# Patient Record
Sex: FEMALE | Race: WHITE | Employment: OTHER | URBAN - METROPOLITAN AREA
[De-identification: names, ages, dates, MRNs, and addresses within clinical notes are randomized per-mention and may not be internally consistent; named-entity substitution may affect disease eponyms.]

---

## 2017-01-01 ENCOUNTER — APPOINTMENT (OUTPATIENT)
Dept: CT IMAGING | Age: 82
End: 2017-01-01
Attending: EMERGENCY MEDICINE
Payer: MEDICARE

## 2017-01-01 ENCOUNTER — HOSPITAL ENCOUNTER (EMERGENCY)
Age: 82
Discharge: HOME | End: 2017-01-01
Attending: EMERGENCY MEDICINE
Payer: MEDICARE

## 2017-01-01 VITALS
BODY MASS INDEX: 21.75 KG/M2 | DIASTOLIC BLOOD PRESSURE: 69 MMHG | WEIGHT: 118.17 LBS | HEART RATE: 67 BPM | TEMPERATURE: 98.2 F | OXYGEN SATURATION: 94 % | HEIGHT: 62 IN | SYSTOLIC BLOOD PRESSURE: 149 MMHG | RESPIRATION RATE: 14 BRPM

## 2017-01-01 DIAGNOSIS — R10.2 SUPRAPUBIC PAIN: Primary | ICD-10-CM

## 2017-01-01 DIAGNOSIS — N76.0 ACUTE VAGINITIS: ICD-10-CM

## 2017-01-01 LAB
ANION GAP BLD CALC-SCNC: 9 MMOL/L (ref 5–15)
APPEARANCE UR: CLEAR
BACTERIA URNS QL MICRO: NEGATIVE /HPF
BASOPHILS # BLD AUTO: 0 K/UL
BASOPHILS # BLD: 1 %
BILIRUB UR QL: NEGATIVE
BUN SERPL-MCNC: 11 MG/DL (ref 6–20)
BUN/CREAT SERPL: 13 (ref 12–20)
CALCIUM SERPL-MCNC: 8.8 MG/DL (ref 8.5–10.1)
CHLORIDE SERPL-SCNC: 102 MMOL/L (ref 97–108)
CLUE CELLS VAG QL WET PREP: NORMAL
CO2 SERPL-SCNC: 27 MMOL/L (ref 21–32)
COLOR UR: ABNORMAL
CREAT SERPL-MCNC: 0.86 MG/DL (ref 0.55–1.02)
DIFFERENTIAL METHOD BLD: NORMAL
EOSINOPHIL # BLD: 0.2 K/UL
EOSINOPHIL NFR BLD: 4 %
EPITH CASTS URNS QL MICRO: ABNORMAL /LPF
ERYTHROCYTE [DISTWIDTH] IN BLOOD BY AUTOMATED COUNT: 13.2 % (ref 11.5–14.5)
GLUCOSE SERPL-MCNC: 139 MG/DL (ref 65–100)
GLUCOSE UR STRIP.AUTO-MCNC: NEGATIVE MG/DL
HCT VFR BLD AUTO: 41.2 % (ref 35–47)
HGB BLD-MCNC: 13.9 G/DL (ref 11.5–16)
HGB UR QL STRIP: NEGATIVE
HYALINE CASTS URNS QL MICRO: ABNORMAL /LPF (ref 0–5)
KETONES UR QL STRIP.AUTO: NEGATIVE MG/DL
KOH PREP SPEC: NORMAL
LEUKOCYTE ESTERASE UR QL STRIP.AUTO: ABNORMAL
LYMPHOCYTES # BLD AUTO: 11 %
LYMPHOCYTES # BLD: 0.5 K/UL
MCH RBC QN AUTO: 30.3 PG (ref 26–34)
MCHC RBC AUTO-ENTMCNC: 33.7 G/DL (ref 30–36.5)
MCV RBC AUTO: 90 FL (ref 80–99)
MONOCYTES # BLD: 0.4 K/UL
MONOCYTES NFR BLD AUTO: 9 %
NEUTS SEG # BLD: 3.2 K/UL
NEUTS SEG NFR BLD AUTO: 75 %
NITRITE UR QL STRIP.AUTO: NEGATIVE
PH UR STRIP: 7 [PH] (ref 5–8)
PLATELET # BLD AUTO: 227 K/UL (ref 150–400)
POTASSIUM SERPL-SCNC: 3.9 MMOL/L (ref 3.5–5.1)
PROT UR STRIP-MCNC: NEGATIVE MG/DL
RBC # BLD AUTO: 4.58 M/UL (ref 3.8–5.2)
RBC #/AREA URNS HPF: ABNORMAL /HPF (ref 0–5)
RBC MORPH BLD: NORMAL
SERVICE CMNT-IMP: NORMAL
SODIUM SERPL-SCNC: 138 MMOL/L (ref 136–145)
SP GR UR REFRACTOMETRY: 1 (ref 1–1.03)
T VAGINALIS VAG QL WET PREP: NORMAL
UA: UC IF INDICATED,UAUC: ABNORMAL
UROBILINOGEN UR QL STRIP.AUTO: 0.2 EU/DL (ref 0.2–1)
WBC # BLD AUTO: 4.3 K/UL (ref 3.6–11)
WBC URNS QL MICRO: ABNORMAL /HPF (ref 0–4)

## 2017-01-01 PROCEDURE — 74177 CT ABD & PELVIS W/CONTRAST: CPT

## 2017-01-01 PROCEDURE — 74011250636 HC RX REV CODE- 250/636: Performed by: EMERGENCY MEDICINE

## 2017-01-01 PROCEDURE — 80048 BASIC METABOLIC PNL TOTAL CA: CPT | Performed by: EMERGENCY MEDICINE

## 2017-01-01 PROCEDURE — 87210 SMEAR WET MOUNT SALINE/INK: CPT | Performed by: EMERGENCY MEDICINE

## 2017-01-01 PROCEDURE — 87086 URINE CULTURE/COLONY COUNT: CPT | Performed by: EMERGENCY MEDICINE

## 2017-01-01 PROCEDURE — 99284 EMERGENCY DEPT VISIT MOD MDM: CPT

## 2017-01-01 PROCEDURE — 85025 COMPLETE CBC W/AUTO DIFF WBC: CPT | Performed by: EMERGENCY MEDICINE

## 2017-01-01 PROCEDURE — 74011636320 HC RX REV CODE- 636/320: Performed by: EMERGENCY MEDICINE

## 2017-01-01 PROCEDURE — 74011250637 HC RX REV CODE- 250/637: Performed by: EMERGENCY MEDICINE

## 2017-01-01 PROCEDURE — 36415 COLL VENOUS BLD VENIPUNCTURE: CPT | Performed by: EMERGENCY MEDICINE

## 2017-01-01 PROCEDURE — 81001 URINALYSIS AUTO W/SCOPE: CPT | Performed by: EMERGENCY MEDICINE

## 2017-01-01 RX ORDER — SODIUM CHLORIDE 9 MG/ML
50 INJECTION, SOLUTION INTRAVENOUS
Status: COMPLETED | OUTPATIENT
Start: 2017-01-01 | End: 2017-01-01

## 2017-01-01 RX ORDER — SODIUM CHLORIDE 0.9 % (FLUSH) 0.9 %
10 SYRINGE (ML) INJECTION
Status: COMPLETED | OUTPATIENT
Start: 2017-01-01 | End: 2017-01-01

## 2017-01-01 RX ORDER — FLUCONAZOLE 100 MG/1
150 TABLET ORAL
Status: COMPLETED | OUTPATIENT
Start: 2017-01-01 | End: 2017-01-01

## 2017-01-01 RX ADMIN — IOPAMIDOL 100 ML: 755 INJECTION, SOLUTION INTRAVENOUS at 13:57

## 2017-01-01 RX ADMIN — Medication 10 ML: at 13:57

## 2017-01-01 RX ADMIN — SODIUM CHLORIDE 50 ML/HR: 900 INJECTION, SOLUTION INTRAVENOUS at 13:57

## 2017-01-01 RX ADMIN — FLUCONAZOLE 150 MG: 100 TABLET ORAL at 15:37

## 2017-01-01 NOTE — ED NOTES
Assumed care of patient. Patient placed in position of comfort. Call bell in reach. Skin warm and dry. Respirations even and unlabored. In no apparent distress at this time. Pt presents ambulatory into the ED with c/o suprapubic abd pain, dysuria, vaginal itching and mucous in her stool x 10 days. Seen and treated in the ED on 12/21, for a UTI but symptoms have become worse. Pmh diverticulitis.

## 2017-01-01 NOTE — ED NOTES
ED physician at bedside to provide discharge paperwork. Vital signs stable. Pt in no apparent distress at this time. Mental status at baseline. To waiting room via wheelchair. Accompanied by her granddaughter.

## 2017-01-01 NOTE — ED PROVIDER NOTES
HPI Comments: Sully Reese is a 80 y.o. female, pmhx HTN / A-fib, who presents ambulatory to the ED c/o progressively worsening dysuria s/p being dx'd with a UTI on 12/21/2016. Pt reports associated suprapubic pressure, vaginal discharge, and vaginal itching. She notes compliance with her 7 day course of Keflex following her last discharge for similar sx's. Pt reports additional diarrhea, but states it has since improved without intervention. Pt specifically denies any recent malodorous urine, nausea, vomiting, diarrhea, hematuria, hematochezia, fever, or chills. PCP: Cuco Gomez MD    PMHx: Significant for A-fib, HTN, GERD, arthritis, asthma, diverticulitis  PSHx: Significant for appendectomy, cholecystectomy, hernia repair, hemorrhoidectomy, tonsillectomy  Social Hx: -tobacco (former 0047), -EtOH, -Illicit Drugs    There are no other complaints, changes, or physical findings at this time. The history is provided by the patient.         Past Medical History:   Diagnosis Date    Arrhythmia      afib    Arthritis     Asthma     Atrial fibrillation (HCC)     Cancer (HCC)      skin    GERD (gastroesophageal reflux disease)     High cholesterol     Hypertension     Other ill-defined conditions(799.89)      collapsed lung prior to hernia repair surgery    Unspecified adverse effect of anesthesia      pt states she went into cardiac arrest prior to hernia repair after the insertion of gas in stomach       Past Surgical History:   Procedure Laterality Date    Hx appendectomy      Hx cholecystectomy      Hx hernia repair      Hx gi       hemorrhoidectomy    Hx tonsillectomy      Hx heent       sinus surgery         Family History:   Problem Relation Age of Onset    Heart Disease Mother     Dementia Mother     Heart Disease Father     Neuropathy Child     Depression Child     Other Child      diverticulitis       Social History     Social History    Marital status:      Spouse name: N/A    Number of children: N/A    Years of education: N/A     Occupational History    Not on file. Social History Main Topics    Smoking status: Former Smoker     Packs/day: 0.75     Years: 5.00     Quit date: 10/11/1967    Smokeless tobacco: Never Used    Alcohol use No    Drug use: No    Sexual activity: Not on file     Other Topics Concern    Not on file     Social History Narrative         ALLERGIES: Bactrim [sulfamethoprim ds]; Ciprofloxacin (bulk); Prednisone; and Statins-hmg-coa reductase inhibitors    Review of Systems   Constitutional: Negative for chills, fatigue and fever. HENT: Negative. Eyes: Negative. Respiratory: Negative for shortness of breath and wheezing. Cardiovascular: Negative for chest pain and leg swelling. Gastrointestinal: Positive for abdominal pain (suprapubic). Negative for blood in stool, constipation, diarrhea, nausea and vomiting. Endocrine: Negative. Genitourinary: Positive for dysuria and vaginal discharge. Negative for difficulty urinating, frequency and hematuria.        +vaginal itching  -malodorous urine   Musculoskeletal: Negative. Skin: Negative for rash. Allergic/Immunologic: Negative. Neurological: Negative for weakness and numbness. Hematological: Negative. Psychiatric/Behavioral: Negative. Vitals:    01/01/17 0937 01/01/17 1100 01/01/17 1200 01/01/17 1300   BP: 158/87 142/71 158/83 140/49   Pulse: 81 92 88 70   Resp: 20 14 17 17   Temp: 98.2 °F (36.8 °C)      SpO2: 97% 96% 96% 96%   Weight: 53.6 kg (118 lb 2.7 oz)      Height: 5' 2\" (1.575 m)               Physical Exam   Constitutional: She is oriented to person, place, and time. She appears well-developed and well-nourished. HENT:   Head: Normocephalic and atraumatic. Mouth/Throat: Mucous membranes are normal.   Eyes: EOM are normal. Pupils are equal, round, and reactive to light. Neck: Normal range of motion. No JVD present. No tracheal deviation present. Cardiovascular: Normal rate, regular rhythm, normal heart sounds and intact distal pulses. Exam reveals no gallop and no friction rub. No murmur heard. Pulmonary/Chest: Effort normal and breath sounds normal. No stridor. She has no wheezes. She has no rales. Abdominal: Soft. Bowel sounds are normal. She exhibits no distension and no mass. There is no tenderness. There is no guarding. Genitourinary:   Genitourinary Comments: Normal external genitalia. Normal cervix. Mild vaginal discharge; thick and white. Low suspicion for PID. Musculoskeletal: Normal range of motion. She exhibits no edema or tenderness. Neurological: She is alert and oriented to person, place, and time. Skin: Skin is warm and dry. No rash noted. Psychiatric: She has a normal mood and affect. Her behavior is normal. Judgment and thought content normal.   Nursing note and vitals reviewed. Written by JAMILAH Pleitez, as dictated by Kristyn Whitfield DO    MDM  Number of Diagnoses or Management Options  Acute vaginitis:   Suprapubic pain:   Diagnosis management comments: DDx: uti, vaginitis, urethritis, yeast infection. Per chart review, patient was treated with Keflex. Urine cultures showed mixed urogenital jimi. Will recheck UA, labs. Low for diverticulitis given lack of abdominal pain or fever. Amount and/or Complexity of Data Reviewed  Clinical lab tests: ordered and reviewed  Tests in the radiology section of CPT®: ordered and reviewed  Review and summarize past medical records: yes  Independent visualization of images, tracings, or specimens: yes    Patient Progress  Patient progress: stable        Procedures     PROGRESS NOTE:  10:29 AM  Per chart review; pt had mixed urogenital jimi only in urine cultures taken on 12/21/2016.    Written by JAMILAH Pleitez, as dictated by Kristyn Whitfield DO      Procedure Note - Pelvic Exam:    12:27 PM  Performed by: Kristyn Whitfield DO  Chaperoned by: Lonnie Carrillo RN  Pelvic exam was performed using bimanual and speculum. Further findings noted in physical exam.   The procedure took 1-15 minutes, and pt tolerated well. Written by Arjun Chang ED Scribe, as dictated by Jovon Hanna DO    PROGRESS NOTE:  3:00 PM  Pt reevaluated. Updated on all available lab findings at this time. Still awaiting CT; spoke with CT technician who states the radiologist hasn't read the CT yet. Will call radiologist to confirm. Written by Arjun Chang ED Scribe, as dictated by Jovon Hanna DO    PROGRESS NOTE:  3:19 PM  Pt reevaluated. Pt states she is feeling better. Updated on all final lab and imaging findings. Pt agrees to follow up as instructed. Vital signs stable for discharge. Written by Arjun Chang ED Scribe, as dictated by Jovon Hanna DO    LABORATORY TESTS:  Recent Results (from the past 12 hour(s))   METABOLIC PANEL, BASIC    Collection Time: 01/01/17 10:33 AM   Result Value Ref Range    Sodium 138 136 - 145 mmol/L    Potassium 3.9 3.5 - 5.1 mmol/L    Chloride 102 97 - 108 mmol/L    CO2 27 21 - 32 mmol/L    Anion gap 9 5 - 15 mmol/L    Glucose 139 (H) 65 - 100 mg/dL    BUN 11 6 - 20 MG/DL    Creatinine 0.86 0.55 - 1.02 MG/DL    BUN/Creatinine ratio 13 12 - 20      GFR est AA >60 >60 ml/min/1.73m2    GFR est non-AA >60 >60 ml/min/1.73m2    Calcium 8.8 8.5 - 10.1 MG/DL   CBC WITH AUTOMATED DIFF    Collection Time: 01/01/17 10:33 AM   Result Value Ref Range    WBC 4.3 3.6 - 11.0 K/uL    RBC 4.58 3.80 - 5.20 M/uL    HGB 13.9 11.5 - 16.0 g/dL    HCT 41.2 35.0 - 47.0 %    MCV 90.0 80.0 - 99.0 FL    MCH 30.3 26.0 - 34.0 PG    MCHC 33.7 30.0 - 36.5 g/dL    RDW 13.2 11.5 - 14.5 %    PLATELET 633 132 - 450 K/uL    NEUTROPHILS 75 %    LYMPHOCYTES 11 %    MONOCYTES 9 %    EOSINOPHILS 4 %    BASOPHILS 1 %    ABS. NEUTROPHILS 3.2 K/UL    ABS. LYMPHOCYTES 0.5 K/UL    ABS. MONOCYTES 0.4 K/UL    ABS.  EOSINOPHILS 0.2 K/UL ABS. BASOPHILS 0.0 K/UL    RBC COMMENTS NORMOCYTIC, NORMOCHROMIC      DF MANUAL     URINALYSIS W/ REFLEX CULTURE    Collection Time: 01/01/17 12:20 PM   Result Value Ref Range    Color YELLOW/STRAW      Appearance CLEAR CLEAR      Specific gravity 1.005 1.003 - 1.030      pH (UA) 7.0 5.0 - 8.0      Protein NEGATIVE  NEG mg/dL    Glucose NEGATIVE  NEG mg/dL    Ketone NEGATIVE  NEG mg/dL    Bilirubin NEGATIVE  NEG      Blood NEGATIVE  NEG      Urobilinogen 0.2 0.2 - 1.0 EU/dL    Nitrites NEGATIVE  NEG      Leukocyte Esterase MODERATE (A) NEG      WBC 10-20 0 - 4 /hpf    RBC 0-5 0 - 5 /hpf    Epithelial cells MODERATE (A) FEW /lpf    Bacteria NEGATIVE  NEG /hpf    UA:UC IF INDICATED URINE CULTURE ORDERED (A) CNI      Hyaline Cast 2-5 0 - 5 /lpf   WET PREP    Collection Time: 01/01/17 12:59 PM   Result Value Ref Range    Clue cells CLUE CELLS ABSENT      Wet prep NO TRICHOMONAS SEEN     KOH, OTHER SOURCES    Collection Time: 01/01/17 12:59 PM   Result Value Ref Range    Special Requests: NO SPECIAL REQUESTS      KOH NO YEAST SEEN         IMAGING RESULTS:  CT ABD PELV W CONT   Final Result     EXAM: CT ABDOMEN PELVIS WITH CONTRAST     INDICATION: Low abdominal pain     COMPARISON: 8/18/2026.     CONTRAST: 100 mL of Isovue-370.     TECHNIQUE:   Multislice helical CT was performed from the diaphragm to the symphysis pubis  during uneventful rapid bolus intravenous contrast administration. Oral contrast  administered. Contiguous 5 mm axial images were reconstructed and lung and soft  tissue windows were generated. Coronal and sagittal reformations were generated. CT dose reduction was achieved through use of a standardized protocol tailored  for this examination and automatic exposure control for dose modulation.      FINDINGS:  CT ABDOMEN/PELVIS:   Minimal opacity suggesting atelectasis at the left posterior lateral lung base  is seen  A cyst at the dome of the right lobe of the liver is again noted.   The spleen is upper normal size measuring 13.7 cm. There is slight prominence of the adrenal glands. There are no focal abnormalities detected within the liver, spleen, pancreas,  and adrenal glands. The kidneys show symmetric function and no hydronephrosis. The abdomen aorta is slightly ectatic and shows diffuse atherosclerotic mural  calcification. The gallbladder is surgically absent. The extrahepatic biliary duct measures 1.0 cm which is top normal to slightly  dilated status post cholecystectomy and there is trace dilatation of the  intrahepatic.      A diverticulum of the second portion of the duodenum is seen. The small bowel is normal in caliber. The colon shows quite numerous diverticula. There is no ascites or inflammatory infiltration.      The urinary bladder shows a satisfactory appearance  The uterus is normal in size. A prominent left ovarian vein and a few pelvic collateral veins are noted. Degenerative change of the spine with discogenic disease at L3-S1 is noted     IMPRESSION  IMPRESSION:   Extensive diverticulosis.     No ascites or inflammatory infiltration         MEDICATIONS GIVEN:  Medications   fluconazole (DIFLUCAN) tablet 150 mg (not administered)   0.9% sodium chloride infusion (50 mL/hr IntraVENous New Bag 1/1/17 1357)   iopamidol (ISOVUE-370) 76 % injection 100 mL (100 mL IntraVENous Given 1/1/17 1357)   sodium chloride (NS) flush 10 mL (10 mL IntraVENous Given 1/1/17 1357)       IMPRESSION:  1. Suprapubic pain    2. Acute vaginitis        PLAN:  1. Current Discharge Medication List      CONTINUE these medications which have NOT CHANGED    Details   CYANOCOBALAMIN, VITAMIN B-12, (VITAMIN B-12 PO) Take  by mouth.      magnesium hydroxide (YOUNG MILK OF MAGNESIA) 400 mg/5 mL suspension Take 30 mL by mouth daily as needed for Constipation. polyethylene glycol (MIRALAX) 17 gram packet Take 17 g by mouth daily.       aspirin delayed-release 81 mg tablet Take 1 Tab by mouth daily.  Qty: 30 Tab, Refills: 11      fexofenadine (ALLEGRA) 60 mg tablet Take 30 mg by mouth daily. Cholecalciferol, Vitamin D3, (VITAMIN D3) 1,000 unit cap Take  by mouth.      b complex vitamins (B COMPLEX 1) tablet Take 1 Tab by mouth daily. triamterene-hydrochlorothiazide (DYAZIDE) 37.5-25 mg per capsule Take 1 Cap by mouth daily. disopyramide (NORPACE) 100 mg capsule Take  by mouth two (2) times a day. metoprolol (LOPRESSOR) 25 mg tablet Take  by mouth two (2) times a day. esomeprazole (NEXIUM) 40 mg capsule Take  by mouth daily. 2.   Follow-up Information     Follow up With Details Comments [address] [place], [place] Schedule an appointment as soon as possible for a visit  [address]  [phone]      Your OBGYN Schedule an appointment as soon as possible for a visit      [facility]  As needed, If symptoms worsen [address] [phone]        Return to ED if worse     DISCHARGE NOTE:  3:27 PM  The patient's results have been reviewed with family and/or caregiver. They verbally convey their understanding and agreement of the patient's signs, symptoms, diagnosis, treatment, and prognosis. They additionally agree to follow up as recommended in the discharge instructions or to return to the Emergency Room should the patient's condition change prior to their follow-up appointment. The family and/or caregiver verbally agrees with the care-plan and all of their questions have been answered. The discharge instructions have also been provided to the them along with educational information regarding the patient's diagnosis and a list of reasons why the patient would want to return to the ER prior to their follow-up appointment should their condition change. Written by JAMILAH Thurston, as dictated by Aric Leigh DO.          This note is prepared by Reggie Riggins, acting as Scribe for [address], DO : The scribe's documentation has been prepared under my direction and personally reviewed by me in its entirety. I confirm that the note above accurately reflects all work, treatment, procedures, and medical decision making performed by me. This note will not be viewable in [address]

## 2017-01-01 NOTE — DISCHARGE INSTRUCTIONS
Pelvic Pain: Care Instructions  Your Care Instructions    Pelvic pain, or pain in the lower belly, can have many causes. Often pelvic pain is not serious and gets better in a few days. If your pain continues or gets worse, you may need tests and treatment. Tell your doctor about any new symptoms. These may be signs of a serious problem. Follow-up care is a key part of your treatment and safety. Be sure to make and go to all appointments, and call your doctor if you are having problems. It's also a good idea to know your test results and keep a list of the medicines you take. How can you care for yourself at home? · Rest until you feel better. Lie down, and raise your legs by placing a pillow under your knees. · Drink plenty of fluids. You may find that small, frequent sips are easier on your stomach than if you drink a lot at once. Avoid drinks with carbonation or caffeine, such as soda pop, tea, or coffee. · Try eating several small meals instead of 2 or 3 large ones. Eat mild foods, such as rice, dry toast or crackers, bananas, and applesauce. Avoid fatty and spicy foods, other fruits, and alcohol until 48 hours after your symptoms have gone away. · Take an over-the-counter pain medicine, such as acetaminophen (Tylenol), ibuprofen (Advil, Motrin), or naproxen (Aleve). Read and follow all instructions on the label. · Do not take two or more pain medicines at the same time unless the doctor told you to. Many pain medicines have acetaminophen, which is Tylenol. Too much acetaminophen (Tylenol) can be harmful. · You can put a heating pad, a warm cloth, or moist heat on your belly to relieve pain. When should you call for help? Call 911 anytime you think you may need emergency care. For example, call if:  · You passed out (lost consciousness). Call your doctor now or seek immediate medical care if:  · Your pain is getting worse. · Your pain becomes focused in one area of your belly.   · You have severe vaginal bleeding. Severe means that you are soaking through your usual pads or tampons every hour for 2 or more hours and passing clots of blood. · You have new symptoms such as fever, urinary problems or unexpected vaginal bleeding. · You are dizzy or lightheaded, or you feel like you may faint. Watch closely for changes in your health, and be sure to contact your doctor if:  · You do not get better as expected. Where can you learn more? Go to http://pinky-shamar.info/. Enter [phone] in the search box to learn more about \"Pelvic Pain: Care Instructions. \"  Current as of: February 25, 2016  Content Version: 11.1  © 3311-4173 [employer]. Care instructions adapted under license by [employer] (which disclaims liability or warranty for this information). If you have questions about a medical condition or this instruction, always ask your healthcare professional. [address] any warranty or liability for your use of this information.

## 2017-01-03 LAB
BACTERIA SPEC CULT: NORMAL
CC UR VC: NORMAL
SERVICE CMNT-IMP: NORMAL

## 2017-05-02 ENCOUNTER — OFFICE VISIT (OUTPATIENT)
Dept: NEUROLOGY | Age: 82
End: 2017-05-02

## 2017-05-02 VITALS
BODY MASS INDEX: 22.08 KG/M2 | RESPIRATION RATE: 16 BRPM | DIASTOLIC BLOOD PRESSURE: 68 MMHG | WEIGHT: 120 LBS | HEART RATE: 67 BPM | HEIGHT: 62 IN | SYSTOLIC BLOOD PRESSURE: 122 MMHG | OXYGEN SATURATION: 98 %

## 2017-05-02 DIAGNOSIS — G60.8 IDIOPATHIC SMALL AND LARGE FIBER SENSORY NEUROPATHY: ICD-10-CM

## 2017-05-02 DIAGNOSIS — E11.42 DIABETIC PERIPHERAL NEUROPATHY ASSOCIATED WITH TYPE 2 DIABETES MELLITUS (HCC): ICD-10-CM

## 2017-05-02 DIAGNOSIS — R93.0 ABNORMAL MRI OF HEAD: ICD-10-CM

## 2017-05-02 DIAGNOSIS — I65.23 STENOSIS OF BOTH CAROTID ARTERIES WITHOUT CEREBRAL INFARCTION: ICD-10-CM

## 2017-05-02 DIAGNOSIS — G25.0 ESSENTIAL TREMOR: Primary | ICD-10-CM

## 2017-05-02 DIAGNOSIS — R41.3 MEMORY LOSS: ICD-10-CM

## 2017-05-02 DIAGNOSIS — I67.9 CEREBROVASCULAR DISEASE, UNSPECIFIED: ICD-10-CM

## 2017-05-02 RX ORDER — MELATONIN 5 MG
5 CAPSULE ORAL
COMMUNITY
End: 2017-08-21 | Stop reason: ALTCHOICE

## 2017-05-02 NOTE — PATIENT INSTRUCTIONS

## 2017-05-02 NOTE — LETTER
2017 12:21 PM 
 
Patient:  Kevin Duran YOB: 1926 Date of Visit: 2017 Dear No Recipients: Thank you for referring Ms. Davion Dial to me for evaluation/treatment. Below are the relevant portions of my assessment and plan of care. Consult Subjective:  
 
Kevin Duran is a 80 y.o. right-handed  female seen for evaluation of new problem of increasing occasional dream that will persist when she wakes up, but the sensation that somebody is in the house so she is hearing somebody in the house and she gets very nervous and upset. This has happened since her  , never happened before. One time she had to get up and walk around the house to make sure nobody was there. Normally it will just go away if she just lays in bed. He is difficult to be sure but this sounds more like hypnagogic hallucinations from persistent REM sleep into the awake state probably from her fragmented sleep, but cannot completely rule out spontaneous visual hallucinations and auditory hallucinations that can be common in the elderly. She does not rest that well at night and we did suggest melatonin 3-5 mg to take at bedtime to help improve her sleep cycle. We will follow these at her next visit make sure that he will get any worse or daughter will call us. So far have not caused her to be panicky or do anything untoward and might injure her. She still has the essential tremor seems to be slowly progressive but not too bad today, and it does vary some. She is on metoprolol 55 mg twice a day. She has essential tremor in her arms and head, which was helped by the Mysoline, but she could not tolerate the sedation and GI side effects. Lyrica and Topamax also caused side effects. Patient tried Neurontin 100 mg and could not tolerate it. She has essential tremor, is already on a beta blocker, and I'm afraid to try amantadine because of all of her drug sensitivities.  We tried her on Remeron at the lowest dose of 7.5 mg but she had side effects on that also. . Very difficult case. She is also seen for progressive numbness in her feet,and neuropathy workup negative except for elevated hemoglobin A1c of 6.1 one year ago. B12 level was somewhat low and she is taking supplements now. She complained of memory loss but neuropsych testing did not documented dementia but did document some ADHD. Patient gives a five year history of action tremors in her hands when she tries to do things. It initially seemed to respond to her beta blockers, but now is getting worse. In addition she has progressive numbness in her feet for about 6 years. It is in both feet, and not associated with back pain, any numbness in her hands, or bowel or bladder difficulty. She is a latent diabetic, has no family history of tremor or neuropathy, and has had no toxin exposure, or precipitating causes for this. She also has progressive difficulty with memory, and seems to be forgetting things, and her family has noticed this. They feel she needs evaluation. She had an MRI scan 6 years ago that showed mild microvascular disease atrophic changes. She is on a diet, and watching her sugars well, and taking her vitamins and will increase her vitamin D up to 2000 units. She has had no other new focal weakness, sensory loss, visual changes or other Has had no new neurological symptoms or focal neurological deficits since last seen Patient did have abnormal MRI of the brain with lacunar type stroke and negative Dopplers and will continue aspirin Past Medical History:  
Diagnosis Date  Arrhythmia   
 afib  Arthritis  Asthma  Atrial fibrillation ([facility]  Cancer ([facility] skin  GERD (gastroesophageal reflux disease)  High cholesterol  Hypertension  Other ill-defined conditions   
 collapsed lung prior to hernia repair surgery  Unspecified adverse effect of anesthesia pt states she went into cardiac arrest prior to hernia repair after the insertion of gas in stomach Past Surgical History:  
Procedure Laterality Date  HX APPENDECTOMY  HX CHOLECYSTECTOMY  HX GI    
 hemorrhoidectomy  HX HEENT    
 sinus surgery  HX HERNIA REPAIR    
 HX TONSILLECTOMY Family History Problem Relation Age of Onset  Heart Disease Mother  Dementia Mother  Heart Disease Father  Neuropathy Child  Depression Child  Other Child   
  diverticulitis Social History Substance Use Topics  Smoking status: Former Smoker Packs/day: 0.75 Years: 5.00 Quit date: 10/11/1967  Smokeless tobacco: Never Used  Alcohol use No  
   
 
Current Outpatient Prescriptions:  
  melatonin 5 mg cap capsule, Take 5 mg by mouth nightly., Disp: , Rfl:  
  CYANOCOBALAMIN, VITAMIN B-12, (VITAMIN B-12 PO), Take  by mouth., Disp: , Rfl:  
  magnesium hydroxide (YOUNG MILK OF MAGNESIA) 400 mg/5 mL suspension, Take 30 mL by mouth daily as needed for Constipation. , Disp: , Rfl:  
  polyethylene glycol (MIRALAX) 17 gram packet, Take 17 g by mouth daily. , Disp: , Rfl:  
  aspirin delayed-release 81 mg tablet, Take 1 Tab by mouth daily. , Disp: 30 Tab, Rfl: 11 
  fexofenadine (ALLEGRA) 60 mg tablet, Take 30 mg by mouth daily. , Disp: , Rfl:  
  Cholecalciferol, Vitamin D3, (VITAMIN D3) 1,000 unit cap, Take  by mouth., Disp: , Rfl:  
  triamterene-hydrochlorothiazide (DYAZIDE) 37.5-25 mg per capsule, Take 1 Cap by mouth daily. , Disp: , Rfl:  
  disopyramide (NORPACE) 100 mg capsule, Take  by mouth two (2) times a day.  , Disp: , Rfl:  
  metoprolol (LOPRESSOR) 25 mg tablet, Take  by mouth two (2) times a day.  , Disp: , Rfl:  
  esomeprazole (NEXIUM) 40 mg capsule, Take  by mouth daily. , Disp: , Rfl:  
  b complex vitamins (B COMPLEX 1) tablet, Take 1 Tab by mouth daily. , Disp: , Rfl:  
 
 
 
Allergies Allergen Reactions  Bactrim [Sulfamethoprim Ds] Other (comments)  
  consipation  Ciprofloxacin (Bulk) Other (comments) Low wbc  Prednisone Other (comments)  
  tachycardia  Statins-Hmg-Coa Reductase Inhibitors Unknown (comments) Stomach uipset and mild muscle aches Review of Systems: A comprehensive review of systems was negative except for: Constitutional: positive for fatigue and malaise Cardiovascular: positive for palpitations, exertional chest pressure/discomfort Gastrointestinal: positive for dyspepsia, reflux symptoms and abdominal pain Musculoskeletal: positive for myalgias, arthralgias and stiff joints Neurological: positive for memory problems, paresthesia and tremor Behvioral/Psych: positive for anxiety and depression Vitals:  
 05/02/17 1140 BP: 122/68 Pulse: 67 Resp: 16 SpO2: 98% Weight: 120 lb (54.4 kg) Height: 5' 2\" (1.575 m) Objective: I 
 
 
NEUROLOGICAL EXAM: 
 
Appearance: The patient is well developed, well nourished, provides a coherent history and is in no acute distress. Mental Status: Oriented to time, place and person and the president, patient has slight difficulty doing serial 7 subtractions, can remember 2 of 3 words at 30 seconds with distraction, can spell the word world backwards, and draw a clock that shows that time 10 minutes to 11. Mood and affect appropriate but slightly anxious  And depressed. Cranial Nerves:   Intact visual fields. Fundi are benign. SIM, EOM's full, no nystagmus, no ptosis. Facial sensation is normal. Corneal reflexes are not tested. Facial movement is symmetric. Hearing is abnormal bilaterally. Palate is midline with normal sternocleidomastoid and trapezius muscles are normal. Tongue is midline. Neck without meningismus or bruits Motor:  4/5 strength in upper and lower proximal and distal muscles. Normal bulk and tone. No fasciculations.   
Reflexes:   Deep tendon reflexes 1+/4 and symmetrical. 
 No babinski or clonus present Sensory:   Abnormal to touch, pinprick and vibration and temperature in both feet to ankle level. DSS is intact Gait:  Normal gait for her age. Tremor:   Mild bilateral intention and head tremor noted. Cerebellar:  No cerebellar signs present. Neurovascular:  Normal heart sounds and regular rhythm, peripheral pulses decreased, and no carotid bruits. Assessment: ICD-10-CM ICD-9-CM 1. Essential tremor G25.0 333.1 2. Cerebrovascular disease, unspecified I67.9 437.9 3. Diabetic peripheral neuropathy associated with type 2 diabetes mellitus (HCC) E11.42 250.60   
  357.2 4. 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   
  433.30   
5. Abnormal MRI of head R93.0 793.0 6. Memory loss R41.3 780.93   
7. 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  
 
Patient with visual hallucinations and auditory hallucinations at night, questionable hypnagogic hallucinations versus visual hallucinations in the elderly We reassured the patient and will have her try melatonin to see if that helps stabilize her sleep patterns reduce the hallucinations They may be partly aggravated by the death of her  and her fear of being alone at night. Patient feels memory is stable and doesn't want treatment for that yet or neuropsych testing repeat Patient most likely has benign essential tremor, ruled out other treatable causes Patient also has mild memory loss and probably mild cognitive impairment with ADHD Patient also has a neuropathy, etiology latent disbetes Complete metabolic panels for neuropathy and memory loss, showed elevated hemoglobin A1c only and low vitamin D level and B12 Patient MRI of the brain did show lacunar type stroke with negative carotid Dopplers, continue baby aspirin and repeat MRI next visit Patient will continue Neurontin 300 mg 3 times a day for her tremor and her neuropathy She is to take a multivitamin and vitamin D and B12 on a daily basis and remain mentally and physically active 35 minutes spent reviewing her records, examining the patient, and her daughter, discussing her condition with family and patient in detail, and further diagnosis and treatment They will call for results of tests, and followup in 6 months time or earlier if needed Signed By: Marcelina Slaughter MD   
 May 2, 2017 If you have questions, please do not hesitate to call me. I look forward to following Ms. Hieu Chavez along with you. Sincerely, Marcelina Slaughter MD

## 2017-05-02 NOTE — MR AVS SNAPSHOT
Visit Information Date & Time Provider Department Dept. Phone Encounter #  
 5/2/2017 11:20 AM Maya Cid MD [facility] [phone] [id number] Follow-up Instructions Return in about 6 months (around 11/2/2017). Upcoming Health Maintenance Date Due  
 FOOT EXAM Q1 6/22/1936 MICROALBUMIN Q1 6/22/1936 EYE EXAM RETINAL OR DILATED Q1 6/22/1936 DTaP/Tdap/Td series (1 - Tdap) 6/22/1947 ZOSTER VACCINE AGE 60> 6/22/1986 GLAUCOMA SCREENING Q2Y 6/22/1991 Pneumococcal 65+ Low/Medium Risk (1 of 2 - PCV13) 6/22/1991 MEDICARE YEARLY EXAM 6/22/1991 LIPID PANEL Q1 8/20/2014 HEMOGLOBIN A1C Q6M 10/21/2016 INFLUENZA AGE 9 TO ADULT 8/1/2017 Allergies as of 5/2/2017  Review Complete On: 5/2/2017 By: Maya Cid MD  
  
 Severity Noted Reaction Type Reactions Bactrim [Sulfamethoprim Ds]  05/14/2012    Other (comments)  
 consipation Ciprofloxacin (Bulk)  05/14/2012    Other (comments) Low wbc Prednisone  05/14/2012    Other (comments)  
 tachycardia Statins-hmg-coa Reductase Inhibitors  02/19/2013   Intolerance Unknown (comments) Stomach uipset and mild muscle aches Current Immunizations  Never Reviewed No immunizations on file. Not reviewed this visit You Were Diagnosed With   
  
 Codes Comments Essential tremor    -  Primary ICD-10-CM: G25.0 ICD-9-CM: 333.1 Cerebrovascular disease, unspecified     ICD-10-CM: I67.9 ICD-9-CM: 437.9 Diabetic peripheral neuropathy associated with type 2 diabetes mellitus (HCC)     ICD-10-CM: E11.42 
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 4 Vitals BP Pulse Resp Height(growth percentile) Weight(growth percentile) SpO2  
 122/68 67 16 5' 2\" (1.575 m) 120 lb (54.4 kg) 98% BMI OB Status Smoking Status 21.95 kg/m2 Postmenopausal Former Smoker Vitals History BMI and BSA Data Body Mass Index Body Surface Area  
 21.95 kg/m 2 1.54 m 2 Preferred Pharmacy Pharmacy Name Phone [facility] [address], [address] [place] [address] Avenue [phone] Your Updated Medication List  
  
   
This list is accurate as of: 5/2/17 12:11 PM.  Always use your most recent med list.  
  
  
  
  
 aspirin delayed-release 81 mg tablet Take 1 Tab by mouth daily. B COMPLEX 1 tablet Generic drug:  b complex vitamins Take 1 Tab by mouth daily. DYAZIDE 37.5-25 mg per capsule Generic drug:  triamterene-hydroCHLOROthiazide Take 1 Cap by mouth daily. fexofenadine 60 mg tablet Commonly known as:  Nannie Murdock Take 30 mg by mouth daily. melatonin 5 mg Cap capsule Take 5 mg by mouth nightly. metoprolol tartrate 25 mg tablet Commonly known as:  LOPRESSOR Take  by mouth two (2) times a day. MIRALAX 17 gram packet Generic drug:  polyethylene glycol Take 17 g by mouth daily. NexIUM 40 mg capsule Generic drug:  esomeprazole Take  by mouth daily. NORPACE 100 mg capsule Generic drug:  disopyramide phosphate Take  by mouth two (2) times a day. YOUNG MILK OF MAGNESIA 400 mg/5 mL suspension Generic drug:  magnesium hydroxide Take 30 mL by mouth daily as needed for Constipation. VITAMIN B-12 PO Take  by mouth. VITAMIN D3 1,000 unit Cap Generic drug:  cholecalciferol Take  by mouth. Follow-up Instructions Return in about 6 months (around 11/2/2017). Patient Instructions A Healthy Lifestyle: Care Instructions Your Care Instructions A healthy lifestyle can help you feel good, stay at a healthy weight, and have plenty of energy for both work and play. A healthy lifestyle is something you can share with your whole family. A healthy lifestyle also can lower your risk for serious health problems, such as high blood pressure, heart disease, and diabetes. You can follow a few steps listed below to improve your health and the health of your family. Follow-up care is a key part of your treatment and safety. Be sure to make and go to all appointments, and call your doctor if you are having problems. Its also a good idea to know your test results and keep a list of the medicines you take. How can you care for yourself at home? · Do not eat too much sugar, fat, or fast foods. You can still have dessert and treats now and then. The goal is moderation. · Start small to improve your eating habits. Pay attention to portion sizes, drink less juice and soda pop, and eat more fruits and vegetables. ¨ Eat a healthy amount of food. A 3-ounce serving of meat, for example, is about the size of a deck of cards. Fill the rest of your plate with vegetables and whole grains. ¨ Limit the amount of soda and sports drinks you have every day. Drink more water when you are thirsty. ¨ Eat at least 5 servings of fruits and vegetables every day. It may seem like a lot, but it is not hard to reach this goal. A serving or helping is 1 piece of fruit, 1 cup of vegetables, or 2 cups of leafy, raw vegetables. Have an apple or some carrot sticks as an afternoon snack instead of a candy bar. Try to have fruits and/or vegetables at every meal. 
· Make exercise part of your daily routine. You may want to start with simple activities, such as walking, bicycling, or slow swimming. Try to be active 30 to 60 minutes every day. You do not need to do all 30 to 60 minutes all at once.  For example, you can exercise 3 times a day for 10 or 20 minutes. Moderate exercise is safe for most people, but it is always a good idea to talk to your doctor before starting an exercise program. 
· Keep moving. Arcelia Parry the lawn, work in the garden, or [employer]. Take the stairs instead of the elevator at work. · If you smoke, quit. People who smoke have an increased risk for heart attack, stroke, cancer, and other lung illnesses. Quitting is hard, but there are ways to boost your chance of quitting tobacco for good. ¨ Use nicotine gum, patches, or lozenges. ¨ Ask your doctor about stop-smoking programs and medicines. ¨ Keep trying. In addition to reducing your risk of diseases in the future, you will notice some benefits soon after you stop using tobacco. If you have shortness of breath or asthma symptoms, they will likely get better within a few weeks after you quit. · Limit how much alcohol you drink. Moderate amounts of alcohol (up to 2 drinks a day for men, 1 drink a day for women) are okay. But drinking too much can lead to liver problems, high blood pressure, and other health problems. Family health If you have a family, there are many things you can do together to improve your health. · Eat meals together as a family as often as possible. · Eat healthy foods. This includes fruits, vegetables, lean meats and dairy, and whole grains. · Include your family in your fitness plan. Most people think of activities such as jogging or tennis as the way to fitness, but there are many ways you and your family can be more active. Anything that makes you breathe hard and gets your heart pumping is exercise. Here are some tips: 
¨ Walk to do errands or to take your child to school or the bus. ¨ Go for a family bike ride after dinner instead of watching TV. Where can you learn more? Go to http://pinky-shamar.info/. Enter Y693 in the search box to learn more about \"A Healthy Lifestyle: Care Instructions. \" Current as of: July 26, 2016 Content Version: 11.2 © 4531-0113 [employer]. Care instructions adapted under license by [employer] (which disclaims liability or warranty for this information). If you have questions about a medical condition or this instruction, always ask your healthcare professional. [address] any warranty or liability for your use of this information. Introducing [facility]! Dear Marcus Trejo: Thank you for requesting a [facility] account. Our records indicate that you already have an active [facility] account. You can access your account anytime at [email or web address] Did you know that you can access your hospital and ER discharge instructions at any time in [facility]? You can also review all of your test results from your hospital stay or ER visit. Additional Information If you have questions, please visit the Frequently Asked Questions section of the [facility] website at [email or web address] Remember, [facility] is NOT to be used for urgent needs. For medical emergencies, dial 911. Now available from your iPhone and Android! Please provide this summary of care documentation to your next provider. Your primary care clinician is listed as RISHI Scott. If you have any questions after today's visit, please call [phone].

## 2017-05-02 NOTE — PROGRESS NOTES
Consult    Subjective:     Dorena Baumgarten is a 80 y.o. right-handed  female seen for evaluation of new problem of increasing occasional dream that will persist when she wakes up, but the sensation that somebody is in the house so she is hearing somebody in the house and she gets very nervous and upset. This has happened since her  , never happened before. One time she had to get up and walk around the house to make sure nobody was there. Normally it will just go away if she just lays in bed. He is difficult to be sure but this sounds more like hypnagogic hallucinations from persistent REM sleep into the awake state probably from her fragmented sleep, but cannot completely rule out spontaneous visual hallucinations and auditory hallucinations that can be common in the elderly. She does not rest that well at night and we did suggest melatonin 3-5 mg to take at bedtime to help improve her sleep cycle. We will follow these at her next visit make sure that he will get any worse or daughter will call us. So far have not caused her to be panicky or do anything untoward and might injure her. She still has the essential tremor seems to be slowly progressive but not too bad today, and it does vary some. She is on metoprolol 55 mg twice a day. She has essential tremor in her arms and head, which was helped by the Mysoline, but she could not tolerate the sedation and GI side effects. Lyrica and Topamax also caused side effects. Patient tried Neurontin 100 mg and could not tolerate it. She has essential tremor, is already on a beta blocker, and I'm afraid to try amantadine because of all of her drug sensitivities. We tried her on Remeron at the lowest dose of 7.5 mg but she had side effects on that also. . Very difficult case. She is also seen for progressive numbness in her feet,and neuropathy workup negative except for elevated hemoglobin A1c of 6.1 one year ago.  B12 level was somewhat low and she is taking supplements now. She complained of memory loss but neuropsych testing did not documented dementia but did document some ADHD. Patient gives a five year history of action tremors in her hands when she tries to do things. It initially seemed to respond to her beta blockers, but now is getting worse. In addition she has progressive numbness in her feet for about 6 years. It is in both feet, and not associated with back pain, any numbness in her hands, or bowel or bladder difficulty. She is a latent diabetic, has no family history of tremor or neuropathy, and has had no toxin exposure, or precipitating causes for this. She also has progressive difficulty with memory, and seems to be forgetting things, and her family has noticed this. They feel she needs evaluation. She had an MRI scan 6 years ago that showed mild microvascular disease atrophic changes. She is on a diet, and watching her sugars well, and taking her vitamins and will increase her vitamin D up to 2000 units.  She has had no other new focal weakness, sensory loss, visual changes or other  Has had no new neurological symptoms or focal neurological deficits since last seen  Patient did have abnormal MRI of the brain with lacunar type stroke and negative Dopplers and will continue aspirin    Past Medical History:   Diagnosis Date    Arrhythmia     afib    Arthritis     Asthma     Atrial fibrillation (HCC)     Cancer (HCC)     skin    GERD (gastroesophageal reflux disease)     High cholesterol     Hypertension     Other ill-defined conditions     collapsed lung prior to hernia repair surgery    Unspecified adverse effect of anesthesia     pt states she went into cardiac arrest prior to hernia repair after the insertion of gas in stomach      Past Surgical History:   Procedure Laterality Date    HX APPENDECTOMY      HX CHOLECYSTECTOMY      HX GI      hemorrhoidectomy    HX HEENT      sinus surgery    HX HERNIA REPAIR      HX TONSILLECTOMY       Family History   Problem Relation Age of Onset    Heart Disease Mother     Dementia Mother     Heart Disease Father     Neuropathy Child     Depression Child     Other Child      diverticulitis      Social History   Substance Use Topics    Smoking status: Former Smoker     Packs/day: 0.75     Years: 5.00     Quit date: 10/11/1967    Smokeless tobacco: Never Used    Alcohol use No         Current Outpatient Prescriptions:     melatonin 5 mg cap capsule, Take 5 mg by mouth nightly., Disp: , Rfl:     CYANOCOBALAMIN, VITAMIN B-12, (VITAMIN B-12 PO), Take  by mouth., Disp: , Rfl:     magnesium hydroxide (cube19 MILK OF MAGNESIA) 400 mg/5 mL suspension, Take 30 mL by mouth daily as needed for Constipation. , Disp: , Rfl:     polyethylene glycol (MIRALAX) 17 gram packet, Take 17 g by mouth daily. , Disp: , Rfl:     aspirin delayed-release 81 mg tablet, Take 1 Tab by mouth daily. , Disp: 30 Tab, Rfl: 11    fexofenadine (ALLEGRA) 60 mg tablet, Take 30 mg by mouth daily. , Disp: , Rfl:     Cholecalciferol, Vitamin D3, (VITAMIN D3) 1,000 unit cap, Take  by mouth., Disp: , Rfl:     triamterene-hydrochlorothiazide (DYAZIDE) 37.5-25 mg per capsule, Take 1 Cap by mouth daily. , Disp: , Rfl:     disopyramide (NORPACE) 100 mg capsule, Take  by mouth two (2) times a day.  , Disp: , Rfl:     metoprolol (LOPRESSOR) 25 mg tablet, Take  by mouth two (2) times a day.  , Disp: , Rfl:     esomeprazole (NEXIUM) 40 mg capsule, Take  by mouth daily. , Disp: , Rfl:     b complex vitamins (B COMPLEX 1) tablet, Take 1 Tab by mouth daily. , Disp: , Rfl:         Allergies   Allergen Reactions    Bactrim [Sulfamethoprim Ds] Other (comments)     consipation    Ciprofloxacin (Bulk) Other (comments)     Low wbc    Prednisone Other (comments)     tachycardia    Statins-Hmg-Coa Reductase Inhibitors Unknown (comments)     Stomach uipset and mild muscle aches        Review of Systems:  A comprehensive review of systems was negative except for: Constitutional: positive for fatigue and malaise  Cardiovascular: positive for palpitations, exertional chest pressure/discomfort  Gastrointestinal: positive for dyspepsia, reflux symptoms and abdominal pain  Musculoskeletal: positive for myalgias, arthralgias and stiff joints  Neurological: positive for memory problems, paresthesia and tremor  Behvioral/Psych: positive for anxiety and depression   Vitals:    05/02/17 1140   BP: 122/68   Pulse: 67   Resp: 16   SpO2: 98%   Weight: 120 lb (54.4 kg)   Height: 5' 2\" (1.575 m)     Objective:     I      NEUROLOGICAL EXAM:    Appearance: The patient is well developed, well nourished, provides a coherent history and is in no acute distress. Mental Status: Oriented to time, place and person and the president, patient has slight difficulty doing serial 7 subtractions, can remember 2 of 3 words at 30 seconds with distraction, can spell the word world backwards, and draw a clock that shows that time 10 minutes to 11. Mood and affect appropriate but slightly anxious  And depressed. Cranial Nerves:   Intact visual fields. Fundi are benign. SIM, EOM's full, no nystagmus, no ptosis. Facial sensation is normal. Corneal reflexes are not tested. Facial movement is symmetric. Hearing is abnormal bilaterally. Palate is midline with normal sternocleidomastoid and trapezius muscles are normal. Tongue is midline. Neck without meningismus or bruits   Motor:  4/5 strength in upper and lower proximal and distal muscles. Normal bulk and tone. No fasciculations. Reflexes:   Deep tendon reflexes 1+/4 and symmetrical.  No babinski or clonus present   Sensory:   Abnormal to touch, pinprick and vibration and temperature in both feet to ankle level. DSS is intact   Gait:  Normal gait for her age. Tremor:   Mild bilateral intention and head tremor noted. Cerebellar:  No cerebellar signs present.    Neurovascular:  Normal heart sounds and regular rhythm, peripheral pulses decreased, and no carotid bruits. Assessment:       ICD-10-CM ICD-9-CM    1. Essential tremor G25.0 333.1    2. Cerebrovascular disease, unspecified I67.9 437.9    3. 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 Abnormal MRI of head R93.0 793.0    6. Memory loss R41.3 780.93    7. Idiopathic small and large fiber sensory neuropathy G60.8 356.4          Plan:     Patient with visual hallucinations and auditory hallucinations at night, questionable hypnagogic hallucinations versus visual hallucinations in the elderly  We reassured the patient and will have her try melatonin to see if that helps stabilize her sleep patterns reduce the hallucinations  They may be partly aggravated by the death of her  and her fear of being alone at night.   Patient feels memory is stable and doesn't want treatment for that yet or neuropsych testing repeat  Patient most likely has benign essential tremor, ruled out other treatable causes  Patient also has mild memory loss and probably mild cognitive impairment with ADHD  Patient also has a neuropathy, etiology latent disbetes  Complete metabolic panels for neuropathy and memory loss, showed elevated hemoglobin A1c only and low vitamin D level and B12  Patient MRI of the brain did show lacunar type stroke with negative carotid Dopplers, continue baby aspirin and repeat MRI next visit  Patient will continue Neurontin 300 mg 3 times a day for her tremor and her neuropathy  She is to take a multivitamin and vitamin D and B12 on a daily basis and remain mentally and physically active  35 minutes spent reviewing her records, examining the patient, and her daughter, discussing her condition with family and patient in detail, and further diagnosis and treatment  They will call for results of tests, and followup in 6 months time or earlier if needed    Signed By: Reyes Monte MD     May 2, 2017

## 2017-06-16 ENCOUNTER — HOSPITAL ENCOUNTER (OUTPATIENT)
Dept: CT IMAGING | Age: 82
Discharge: HOME | End: 2017-06-16
Attending: PHYSICIAN ASSISTANT
Payer: MEDICARE

## 2017-06-16 DIAGNOSIS — R10.9 ABDOMINAL PAIN: ICD-10-CM

## 2017-06-16 LAB — CREAT BLD-MCNC: 0.9 MG/DL (ref 0.6–1.3)

## 2017-06-16 PROCEDURE — 74011250636 HC RX REV CODE- 250/636: Performed by: PHYSICIAN ASSISTANT

## 2017-06-16 PROCEDURE — 74011000255 HC RX REV CODE- 255: Performed by: PHYSICIAN ASSISTANT

## 2017-06-16 PROCEDURE — 74011636320 HC RX REV CODE- 636/320: Performed by: PHYSICIAN ASSISTANT

## 2017-06-16 PROCEDURE — 74177 CT ABD & PELVIS W/CONTRAST: CPT

## 2017-06-16 PROCEDURE — 82565 ASSAY OF CREATININE: CPT

## 2017-06-16 RX ORDER — BARIUM SULFATE 20 MG/ML
900 SUSPENSION ORAL
Status: COMPLETED | OUTPATIENT
Start: 2017-06-16 | End: 2017-06-16

## 2017-06-16 RX ORDER — SODIUM CHLORIDE 9 MG/ML
50 INJECTION, SOLUTION INTRAVENOUS
Status: COMPLETED | OUTPATIENT
Start: 2017-06-16 | End: 2017-06-16

## 2017-06-16 RX ORDER — SODIUM CHLORIDE 0.9 % (FLUSH) 0.9 %
10 SYRINGE (ML) INJECTION
Status: COMPLETED | OUTPATIENT
Start: 2017-06-16 | End: 2017-06-16

## 2017-06-16 RX ADMIN — Medication 10 ML: at 16:43

## 2017-06-16 RX ADMIN — SODIUM CHLORIDE 50 ML/HR: 900 INJECTION, SOLUTION INTRAVENOUS at 16:43

## 2017-06-16 RX ADMIN — IOPAMIDOL 100 ML: 755 INJECTION, SOLUTION INTRAVENOUS at 16:43

## 2017-06-16 RX ADMIN — BARIUM SULFATE 900 ML: 21 SUSPENSION ORAL at 16:43

## 2017-08-10 ENCOUNTER — HOSPITAL ENCOUNTER (EMERGENCY)
Age: 82
Discharge: HOME | End: 2017-08-10
Attending: EMERGENCY MEDICINE
Payer: MEDICARE

## 2017-08-10 ENCOUNTER — APPOINTMENT (OUTPATIENT)
Dept: CT IMAGING | Age: 82
End: 2017-08-10
Attending: EMERGENCY MEDICINE
Payer: MEDICARE

## 2017-08-10 VITALS
BODY MASS INDEX: 22.27 KG/M2 | SYSTOLIC BLOOD PRESSURE: 165 MMHG | HEIGHT: 62 IN | RESPIRATION RATE: 14 BRPM | OXYGEN SATURATION: 97 % | HEART RATE: 72 BPM | WEIGHT: 121.03 LBS | TEMPERATURE: 97.7 F | DIASTOLIC BLOOD PRESSURE: 82 MMHG

## 2017-08-10 DIAGNOSIS — N30.00 ACUTE CYSTITIS WITHOUT HEMATURIA: Primary | ICD-10-CM

## 2017-08-10 DIAGNOSIS — R19.7 DIARRHEA, UNSPECIFIED TYPE: ICD-10-CM

## 2017-08-10 DIAGNOSIS — R10.84 ABDOMINAL PAIN, GENERALIZED: ICD-10-CM

## 2017-08-10 LAB
ALBUMIN SERPL BCP-MCNC: 3.4 G/DL (ref 3.5–5)
ALBUMIN/GLOB SERPL: 0.9 {RATIO} (ref 1.1–2.2)
ALP SERPL-CCNC: 71 U/L (ref 45–117)
ALT SERPL-CCNC: 21 U/L (ref 12–78)
AMORPH CRY URNS QL MICRO: ABNORMAL
ANION GAP BLD CALC-SCNC: 6 MMOL/L (ref 5–15)
APPEARANCE UR: ABNORMAL
AST SERPL W P-5'-P-CCNC: 22 U/L (ref 15–37)
BACTERIA URNS QL MICRO: ABNORMAL /HPF
BASOPHILS # BLD AUTO: 0 K/UL (ref 0–0.1)
BASOPHILS # BLD: 1 % (ref 0–1)
BILIRUB SERPL-MCNC: 0.6 MG/DL (ref 0.2–1)
BILIRUB UR QL: NEGATIVE
BUN SERPL-MCNC: 13 MG/DL (ref 6–20)
BUN/CREAT SERPL: 18 (ref 12–20)
CALCIUM SERPL-MCNC: 9.2 MG/DL (ref 8.5–10.1)
CHLORIDE SERPL-SCNC: 94 MMOL/L (ref 97–108)
CO2 SERPL-SCNC: 31 MMOL/L (ref 21–32)
COLOR UR: ABNORMAL
CREAT SERPL-MCNC: 0.72 MG/DL (ref 0.55–1.02)
EOSINOPHIL # BLD: 0 K/UL (ref 0–0.4)
EOSINOPHIL NFR BLD: 1 % (ref 0–7)
EPITH CASTS URNS QL MICRO: ABNORMAL /LPF
ERYTHROCYTE [DISTWIDTH] IN BLOOD BY AUTOMATED COUNT: 13.2 % (ref 11.5–14.5)
GLOBULIN SER CALC-MCNC: 3.7 G/DL (ref 2–4)
GLUCOSE SERPL-MCNC: 99 MG/DL (ref 65–100)
GLUCOSE UR STRIP.AUTO-MCNC: NEGATIVE MG/DL
HCT VFR BLD AUTO: 39.2 % (ref 35–47)
HGB BLD-MCNC: 13.4 G/DL (ref 11.5–16)
HGB UR QL STRIP: ABNORMAL
KETONES UR QL STRIP.AUTO: NEGATIVE MG/DL
LACTATE SERPL-SCNC: 0.7 MMOL/L (ref 0.4–2)
LEUKOCYTE ESTERASE UR QL STRIP.AUTO: ABNORMAL
LIPASE SERPL-CCNC: 166 U/L (ref 73–393)
LYMPHOCYTES # BLD AUTO: 16 % (ref 12–49)
LYMPHOCYTES # BLD: 0.9 K/UL (ref 0.8–3.5)
MAGNESIUM SERPL-MCNC: 2.5 MG/DL (ref 1.6–2.4)
MCH RBC QN AUTO: 29.6 PG (ref 26–34)
MCHC RBC AUTO-ENTMCNC: 34.2 G/DL (ref 30–36.5)
MCV RBC AUTO: 86.5 FL (ref 80–99)
MONOCYTES # BLD: 0.4 K/UL (ref 0–1)
MONOCYTES NFR BLD AUTO: 6 % (ref 5–13)
NEUTS SEG # BLD: 4.6 K/UL (ref 1.8–8)
NEUTS SEG NFR BLD AUTO: 76 % (ref 32–75)
NITRITE UR QL STRIP.AUTO: NEGATIVE
PH UR STRIP: 7 [PH] (ref 5–8)
PLATELET # BLD AUTO: 250 K/UL (ref 150–400)
POTASSIUM SERPL-SCNC: 4.4 MMOL/L (ref 3.5–5.1)
PROT SERPL-MCNC: 7.1 G/DL (ref 6.4–8.2)
PROT UR STRIP-MCNC: NEGATIVE MG/DL
RBC # BLD AUTO: 4.53 M/UL (ref 3.8–5.2)
RBC #/AREA URNS HPF: ABNORMAL /HPF (ref 0–5)
SODIUM SERPL-SCNC: 131 MMOL/L (ref 136–145)
SP GR UR REFRACTOMETRY: 1.01 (ref 1–1.03)
UA: UC IF INDICATED,UAUC: ABNORMAL
UROBILINOGEN UR QL STRIP.AUTO: 0.2 EU/DL (ref 0.2–1)
WBC # BLD AUTO: 6 K/UL (ref 3.6–11)
WBC URNS QL MICRO: >100 /HPF (ref 0–4)

## 2017-08-10 PROCEDURE — 74011636320 HC RX REV CODE- 636/320: Performed by: EMERGENCY MEDICINE

## 2017-08-10 PROCEDURE — 74011250637 HC RX REV CODE- 250/637: Performed by: EMERGENCY MEDICINE

## 2017-08-10 PROCEDURE — 80053 COMPREHEN METABOLIC PANEL: CPT | Performed by: EMERGENCY MEDICINE

## 2017-08-10 PROCEDURE — 74177 CT ABD & PELVIS W/CONTRAST: CPT

## 2017-08-10 PROCEDURE — 87086 URINE CULTURE/COLONY COUNT: CPT | Performed by: EMERGENCY MEDICINE

## 2017-08-10 PROCEDURE — 99284 EMERGENCY DEPT VISIT MOD MDM: CPT

## 2017-08-10 PROCEDURE — 85025 COMPLETE CBC W/AUTO DIFF WBC: CPT | Performed by: EMERGENCY MEDICINE

## 2017-08-10 PROCEDURE — 83605 ASSAY OF LACTIC ACID: CPT | Performed by: EMERGENCY MEDICINE

## 2017-08-10 PROCEDURE — 74011250636 HC RX REV CODE- 250/636: Performed by: EMERGENCY MEDICINE

## 2017-08-10 PROCEDURE — 81001 URINALYSIS AUTO W/SCOPE: CPT | Performed by: EMERGENCY MEDICINE

## 2017-08-10 PROCEDURE — 36415 COLL VENOUS BLD VENIPUNCTURE: CPT | Performed by: EMERGENCY MEDICINE

## 2017-08-10 PROCEDURE — 83690 ASSAY OF LIPASE: CPT | Performed by: EMERGENCY MEDICINE

## 2017-08-10 PROCEDURE — 83735 ASSAY OF MAGNESIUM: CPT | Performed by: EMERGENCY MEDICINE

## 2017-08-10 RX ORDER — METRONIDAZOLE 250 MG/1
TABLET ORAL 3 TIMES DAILY
COMMUNITY
End: 2017-08-21 | Stop reason: ALTCHOICE

## 2017-08-10 RX ORDER — CEPHALEXIN 250 MG/1
500 CAPSULE ORAL
Status: COMPLETED | OUTPATIENT
Start: 2017-08-10 | End: 2017-08-10

## 2017-08-10 RX ORDER — SODIUM CHLORIDE 0.9 % (FLUSH) 0.9 %
10 SYRINGE (ML) INJECTION
Status: COMPLETED | OUTPATIENT
Start: 2017-08-10 | End: 2017-08-10

## 2017-08-10 RX ORDER — SODIUM CHLORIDE 9 MG/ML
50 INJECTION, SOLUTION INTRAVENOUS
Status: COMPLETED | OUTPATIENT
Start: 2017-08-10 | End: 2017-08-10

## 2017-08-10 RX ORDER — CEPHALEXIN 500 MG/1
500 CAPSULE ORAL 2 TIMES DAILY
Qty: 13 CAP | Refills: 0 | Status: SHIPPED | OUTPATIENT
Start: 2017-08-10 | End: 2017-08-17

## 2017-08-10 RX ADMIN — DIATRIZOATE MEGLUMINE AND DIATRIZOATE SODIUM 30 ML: 600; 100 SOLUTION ORAL; RECTAL at 11:28

## 2017-08-10 RX ADMIN — CEPHALEXIN 500 MG: 250 CAPSULE ORAL at 14:14

## 2017-08-10 RX ADMIN — SODIUM CHLORIDE 50 ML/HR: 900 INJECTION, SOLUTION INTRAVENOUS at 12:56

## 2017-08-10 RX ADMIN — Medication 10 ML: at 12:56

## 2017-08-10 RX ADMIN — IOPAMIDOL 100 ML: 755 INJECTION, SOLUTION INTRAVENOUS at 12:56

## 2017-08-10 NOTE — ED NOTES
Dr Og Bauer reviewed discharge instructions with the patient. The patient verbalized understanding. All questions and concerns were addressed. The patient declined a wheelchair and is discharged ambulatory in the care of family members with instructions and prescriptions in hand. Pt is alert and oriented x 4. Respirations are clear and unlabored.

## 2017-08-10 NOTE — ED NOTES
Patient has been seeing a GI doctor and has been having constipation with diarrhea for a while now. Patient reporting having some medication trouble with Miralax and Linzess.

## 2017-08-10 NOTE — ED PROVIDER NOTES
HPI Comments: 81 yo F with Hx of A-fib, GERD, diverticulitis, HTN, and hypercholesterolemia presenting for evaluation of ongoing LUQ pain and intermittent constipation and diarrhea x 2 months. Pt reports she has been followed by GI for same and had a CT scan at that time showing constipation at which time she was started on a bowel regimen. Today she has had multiple episodes of diarrhea and so came to ED after being unable to get in to see her GI doctor. Pt also notes decreased appetite and early satiety, but denies any weight loss or night sweats. She also reports urinary frequency and suprapubic pain without hematuria or dysuria. She denies any F/C, CP, SOB, nausea, vomiting, hematochezia, or melena. GI: Ashley    The history is provided by the patient. Past Medical History:   Diagnosis Date    Arrhythmia     afib    Arthritis     Asthma     Atrial fibrillation (HCC)     Cancer (HCC)     skin    GERD (gastroesophageal reflux disease)     High cholesterol     Hypertension     Other ill-defined conditions     collapsed lung prior to hernia repair surgery    Unspecified adverse effect of anesthesia     pt states she went into cardiac arrest prior to hernia repair after the insertion of gas in stomach       Past Surgical History:   Procedure Laterality Date    HX APPENDECTOMY      HX CHOLECYSTECTOMY      HX GI      hemorrhoidectomy    HX HEENT      sinus surgery    HX HERNIA REPAIR      HX TONSILLECTOMY           Family History:   Problem Relation Age of Onset    Heart Disease Mother     Dementia Mother     Heart Disease Father     Neuropathy Child     Depression Child     Other Child      diverticulitis       Social History     Social History    Marital status:      Spouse name: N/A    Number of children: N/A    Years of education: N/A     Occupational History    Not on file.      Social History Main Topics    Smoking status: Former Smoker     Packs/day: 0.75     Years: 5.00     Quit date: 10/11/1967    Smokeless tobacco: Never Used    Alcohol use No    Drug use: No    Sexual activity: Not on file     Other Topics Concern    Not on file     Social History Narrative         ALLERGIES: Bactrim [sulfamethoprim ds]; Ciprofloxacin (bulk); Prednisone; and Statins-hmg-coa reductase inhibitors    Review of Systems   Constitutional: Positive for appetite change. Negative for chills, diaphoresis, fever and unexpected weight change. HENT: Negative for congestion, rhinorrhea and sore throat. Eyes: Negative for photophobia, redness and visual disturbance. Respiratory: Negative for apnea, cough and shortness of breath. Cardiovascular: Negative for chest pain, palpitations and leg swelling. Gastrointestinal: Positive for abdominal pain, constipation and diarrhea. Negative for blood in stool, nausea and vomiting. Endocrine: Negative for polyuria. Genitourinary: Positive for frequency. Negative for difficulty urinating, dysuria, hematuria, vaginal bleeding and vaginal discharge. Musculoskeletal: Negative for arthralgias, back pain and myalgias. Skin: Negative for color change, pallor, rash and wound. Neurological: Negative for dizziness, syncope, light-headedness and headaches. Psychiatric/Behavioral: Negative for agitation and confusion. The patient is not nervous/anxious. All other systems reviewed and are negative. Vitals:    08/10/17 1200 08/10/17 1230 08/10/17 1330 08/10/17 1400   BP: 151/82 165/82     Pulse:       Resp:       Temp:       SpO2: 99% 95% 96% 97%   Weight:       Height:                Physical Exam   Constitutional: She appears well-developed and well-nourished. No distress. Well-appearing elderly female   HENT:   Head: Normocephalic and atraumatic. Eyes: Conjunctivae are normal. No scleral icterus. Neck: No tracheal deviation present. Cardiovascular: Normal rate, regular rhythm and normal heart sounds.   Exam reveals no gallop and no friction rub. No murmur heard. Pulmonary/Chest: Effort normal and breath sounds normal. No respiratory distress. She has no wheezes. She has no rales. Abdominal: Soft. Bowel sounds are normal. She exhibits no distension and no mass. There is tenderness (minimal TTP of LUQ and LLQ). There is no rebound and no guarding. Musculoskeletal: She exhibits no edema or deformity. Neurological: She is alert. Skin: Skin is warm and dry. No rash noted. She is not diaphoretic. No erythema. No pallor. Psychiatric: She has a normal mood and affect. Her behavior is normal.   Nursing note and vitals reviewed. MDM  Number of Diagnoses or Management Options  Abdominal pain, generalized:   Acute cystitis without hematuria:   Diarrhea, unspecified type:   Diagnosis management comments: DDx: Ischemic colitis, diverticulitis, pancreatitis, GI malignancy, constipation, UTI    A/P: 79 yo F with Hx of A-fib, GERD, diverticulitis, HTN, and hypercholesterolemia presenting for evaluation of ongoing LUQ pain and intermittent constipation and diarrhea x 2 months with previous negative CT at that time. Exam reveals only mild LUQ and LLQ TTP. Will obtain belly labs, UA, and repeat CT       Amount and/or Complexity of Data Reviewed  Clinical lab tests: ordered and reviewed  Tests in the radiology section of CPT®: ordered and reviewed  Tests in the medicine section of CPT®: ordered and reviewed  Review and summarize past medical records: yes    Patient Progress  Patient progress: stable    ED Course       Procedures    DISCHARGE NOTE  2:12 PM  The patient has been re-evaluated and is ready for discharge. Reviewed available results with patient. Counseled pt on diagnosis and care plan. Pt has expressed understanding, and all questions have been answered. Pt agrees with plan and agrees to F/U as recommended, or return to the ED if their sxs worsen.  Discharge instructions have been provided and explained to the pt, along with reasons to return to the ED. Roopa Case MD     LABORATORY TESTS:  Recent Results (from the past 12 hour(s))   CBC WITH AUTOMATED DIFF    Collection Time: 08/10/17 11:28 AM   Result Value Ref Range    WBC 6.0 3.6 - 11.0 K/uL    RBC 4.53 3.80 - 5.20 M/uL    HGB 13.4 11.5 - 16.0 g/dL    HCT 39.2 35.0 - 47.0 %    MCV 86.5 80.0 - 99.0 FL    MCH 29.6 26.0 - 34.0 PG    MCHC 34.2 30.0 - 36.5 g/dL    RDW 13.2 11.5 - 14.5 %    PLATELET 085 472 - 710 K/uL    NEUTROPHILS 76 (H) 32 - 75 %    LYMPHOCYTES 16 12 - 49 %    MONOCYTES 6 5 - 13 %    EOSINOPHILS 1 0 - 7 %    BASOPHILS 1 0 - 1 %    ABS. NEUTROPHILS 4.6 1.8 - 8.0 K/UL    ABS. LYMPHOCYTES 0.9 0.8 - 3.5 K/UL    ABS. MONOCYTES 0.4 0.0 - 1.0 K/UL    ABS. EOSINOPHILS 0.0 0.0 - 0.4 K/UL    ABS. BASOPHILS 0.0 0.0 - 0.1 K/UL   METABOLIC PANEL, COMPREHENSIVE    Collection Time: 08/10/17 11:28 AM   Result Value Ref Range    Sodium 131 (L) 136 - 145 mmol/L    Potassium 4.4 3.5 - 5.1 mmol/L    Chloride 94 (L) 97 - 108 mmol/L    CO2 31 21 - 32 mmol/L    Anion gap 6 5 - 15 mmol/L    Glucose 99 65 - 100 mg/dL    BUN 13 6 - 20 MG/DL    Creatinine 0.72 0.55 - 1.02 MG/DL    BUN/Creatinine ratio 18 12 - 20      GFR est AA >60 >60 ml/min/1.73m2    GFR est non-AA >60 >60 ml/min/1.73m2    Calcium 9.2 8.5 - 10.1 MG/DL    Bilirubin, total 0.6 0.2 - 1.0 MG/DL    ALT (SGPT) 21 12 - 78 U/L    AST (SGOT) 22 15 - 37 U/L    Alk.  phosphatase 71 45 - 117 U/L    Protein, total 7.1 6.4 - 8.2 g/dL    Albumin 3.4 (L) 3.5 - 5.0 g/dL    Globulin 3.7 2.0 - 4.0 g/dL    A-G Ratio 0.9 (L) 1.1 - 2.2     MAGNESIUM    Collection Time: 08/10/17 11:28 AM   Result Value Ref Range    Magnesium 2.5 (H) 1.6 - 2.4 mg/dL   LACTIC ACID    Collection Time: 08/10/17 11:28 AM   Result Value Ref Range    Lactic acid 0.7 0.4 - 2.0 MMOL/L   LIPASE    Collection Time: 08/10/17 11:28 AM   Result Value Ref Range    Lipase 166 73 - 393 U/L   URINALYSIS W/ REFLEX CULTURE    Collection Time: 08/10/17 11:28 AM   Result Value Ref Range Color YELLOW/STRAW      Appearance CLOUDY (A) CLEAR      Specific gravity 1.010 1.003 - 1.030      pH (UA) 7.0 5.0 - 8.0      Protein NEGATIVE  NEG mg/dL    Glucose NEGATIVE  NEG mg/dL    Ketone NEGATIVE  NEG mg/dL    Bilirubin NEGATIVE  NEG      Blood TRACE (A) NEG      Urobilinogen 0.2 0.2 - 1.0 EU/dL    Nitrites NEGATIVE  NEG      Leukocyte Esterase LARGE (A) NEG      WBC >100 (H) 0 - 4 /hpf    RBC 5-10 0 - 5 /hpf    Epithelial cells MANY (A) FEW /lpf    Bacteria 3+ (A) NEG /hpf    UA:UC IF INDICATED URINE CULTURE ORDERED (A) CNI      Amorphous Crystals FEW (A) NEG         IMAGING RESULTS:  CT ABD PELV W CONT   Final Result   INDICATION: LUQ pain, diarrhea, Hx of diverticulitis, early satiety     COMPARISON: CT June 16, 2017     CONTRAST:  100 mL of Isovue-370.     TECHNIQUE:   Following the uneventful intravenous administration of contrast, thin axial  images were obtained through the abdomen and pelvis. Coronal and sagittal  reconstructions were generated. Oral contrast was administered. CT dose  reduction was achieved through use of a standardized protocol tailored for this  examination and automatic exposure control for dose modulation.     FINDINGS:   LUNG BASES: Several tiny right basilar calcified nodules again shown. Linear  scarring at left pulmonary base again noted. INCIDENTALLY IMAGED HEART AND MEDIASTINUM: Unremarkable. LIVER: 1 cm cyst in segment 8 again noted. Otherwise, unremarkable. GALLBLADDER: Status post cholecystectomy. SPLEEN: No mass. PANCREAS: No mass or ductal dilatation. ADRENALS: Unchanged nonspecific mild diffuse thickening of left adrenal gland. KIDNEYS: No mass, calculus, or hydronephrosis. STOMACH: Unremarkable. SMALL BOWEL: No dilatation or wall thickening. COLON: Diffuse abundant diverticulosis. Moderate amount of fecal material  throughout sigmoid colon and rectum. APPENDIX: Not demonstrated. PERITONEUM: No ascites or pneumoperitoneum.   RETROPERITONEUM: No lymphadenopathy or aortic aneurysm. Moderately abundant  atherosclerotic calcifications. REPRODUCTIVE ORGANS:  URINARY BLADDER: No mass or calculus. BONES: Severe diffuse osteopenia. Moderate lower lumbar spine degenerative  changes. ADDITIONAL COMMENTS: N/A     IMPRESSION  IMPRESSION:  Colonic diverticulosis. Moderate fecal material throughout the sigmoid colon and  rectum. No acute findings. MEDICATIONS GIVEN:  Medications   diatrizoate meglumine-d.sodium (MD-GASTROVIEW,GASTROGRAFIN) 66-10 % contrast solution 30 mL (30 mL Oral Given 8/10/17 1128)   iopamidol (ISOVUE-370) 76 % injection 100 mL (100 mL IntraVENous Given 8/10/17 1256)   sodium chloride (NS) flush 10 mL (10 mL IntraVENous Given 8/10/17 1256)   0.9% sodium chloride infusion (0 mL/hr IntraVENous IV Completed 8/10/17 1420)   cephALEXin (KEFLEX) capsule 500 mg (500 mg Oral Given 8/10/17 1414)       IMPRESSION:  1. Acute cystitis without hematuria    2. Abdominal pain, generalized    3. Diarrhea, unspecified type        PLAN:  1. Discharge Medication List as of 8/10/2017  2:04 PM      START taking these medications    Details   cephALEXin (KEFLEX) 500 mg capsule Take 1 Cap by mouth two (2) times a day for 7 days. , Normal, Disp-13 Cap, R-0         CONTINUE these medications which have NOT CHANGED    Details   metroNIDAZOLE (FLAGYL) 250 mg tablet Take  by mouth three (3) times daily. , Historical Med      CYANOCOBALAMIN, VITAMIN B-12, (VITAMIN B-12 PO) Take  by mouth., Historical Med      polyethylene glycol (MIRALAX) 17 gram packet Take 17 g by mouth daily. , Historical Med      aspirin delayed-release 81 mg tablet Take 1 Tab by mouth daily. , No Print, Disp-30 Tab, R-11      fexofenadine (ALLEGRA) 60 mg tablet Take 30 mg by mouth daily. , Historical Med      Cholecalciferol, Vitamin D3, (VITAMIN D3) 1,000 unit cap Take  by mouth., Historical Med      b complex vitamins (B COMPLEX 1) tablet Take 1 Tab by mouth daily. , Historical Med triamterene-hydrochlorothiazide (DYAZIDE) 37.5-25 mg per capsule Take 1 Cap by mouth daily. , Historical Med      disopyramide (NORPACE) 100 mg capsule Take  by mouth two (2) times a day.  , Historical Med      metoprolol (LOPRESSOR) 25 mg tablet Take  by mouth two (2) times a day.  , Historical Med      esomeprazole (NEXIUM) 40 mg capsule Take  by mouth daily. , Historical Med      melatonin 5 mg cap capsule Take 5 mg by mouth nightly., Historical Med      magnesium hydroxide (YOUNG MILK OF MAGNESIA) 400 mg/5 mL suspension Take 30 mL by mouth daily as needed for Constipation. , Historical Med           2.    Follow-up Information     Follow up With Details Comments Contact Info    Daxa Serna MD In 2 days  [address] [facility]      Jose Luis Marquez MD In 2 days  [address]  [phone]      \[facility] [place] [facility]\"" EMERGENCY DEPT  If symptoms worsen, As needed [address] [place] [address] Drive  [address] [facility]  [phone]        Return to ED if worse

## 2017-08-10 NOTE — DISCHARGE INSTRUCTIONS
Abdominal Pain: Care Instructions  Your Care Instructions    Abdominal pain has many possible causes. Some aren't serious and get better on their own in a few days. Others need more testing and treatment. If your pain continues or gets worse, you need to be rechecked and may need more tests to find out what is wrong. You may need surgery to correct the problem. Don't ignore new symptoms, such as fever, nausea and vomiting, urination problems, pain that gets worse, and dizziness. These may be signs of a more serious problem. Your doctor may have recommended a follow-up visit in the next 8 to 12 hours. If you are not getting better, you may need more tests or treatment. The doctor has checked you carefully, but problems can develop later. If you notice any problems or new symptoms, get medical treatment right away. Follow-up care is a key part of your treatment and safety. Be sure to make and go to all appointments, and call your doctor if you are having problems. It's also a good idea to know your test results and keep a list of the medicines you take. How can you care for yourself at home? · Rest until you feel better. · To prevent dehydration, drink plenty of fluids, enough so that your urine is light yellow or clear like water. Choose water and other caffeine-free clear liquids until you feel better. If you have kidney, heart, or liver disease and have to limit fluids, talk with your doctor before you increase the amount of fluids you drink. · If your stomach is upset, eat mild foods, such as rice, dry toast or crackers, bananas, and applesauce. Try eating several small meals instead of two or three large ones. · Wait until 48 hours after all symptoms have gone away before you have spicy foods, alcohol, and drinks that contain caffeine. · Do not eat foods that are high in fat. · Avoid anti-inflammatory medicines such as aspirin, ibuprofen (Advil, Motrin), and naproxen (Aleve).  These can cause stomach upset. Talk to your doctor if you take daily aspirin for another health problem. When should you call for help? Call 911 anytime you think you may need emergency care. For example, call if:  · You passed out (lost consciousness). · You pass maroon or very bloody stools. · You vomit blood or what looks like coffee grounds. · You have new, severe belly pain. Call your doctor now or seek immediate medical care if:  · Your pain gets worse, especially if it becomes focused in one area of your belly. · You have a new or higher fever. · Your stools are black and look like tar, or they have streaks of blood. · You have unexpected vaginal bleeding. · You have symptoms of a urinary tract infection. These may include:  ¨ Pain when you urinate. ¨ Urinating more often than usual.  ¨ Blood in your urine. · You are dizzy or lightheaded, or you feel like you may faint. Watch closely for changes in your health, and be sure to contact your doctor if:  · You are not getting better after 1 day (24 hours). Where can you learn more? Go to http://pinkyDeep Casing Toolsshamar.info/. Enter Q902 in the search box to learn more about \"Abdominal Pain: Care Instructions. \"  Current as of: March 20, 2017  Content Version: 11.3  © 3646-8599 [employer]. Care instructions adapted under license by [employer] (which disclaims liability or warranty for this information). If you have questions about a medical condition or this instruction, always ask your healthcare professional. [place] [address] any warranty or liability for your use of this information. Diarrhea: Care Instructions  Your Care Instructions    Diarrhea is loose, watery stools (bowel movements). The exact cause is often hard to find. Sometimes diarrhea is your body's way of getting rid of what caused an upset stomach. Viruses, food poisoning, and many medicines can cause diarrhea.  Some people get diarrhea in response to emotional stress, anxiety, or certain foods. Almost everyone has diarrhea now and then. It usually isn't serious, and your stools will return to normal soon. The important thing to do is replace the fluids you have lost, so you can prevent dehydration. The doctor has checked you carefully, but problems can develop later. If you notice any problems or new symptoms, get medical treatment right away. Follow-up care is a key part of your treatment and safety. Be sure to make and go to all appointments, and call your doctor if you are having problems. It's also a good idea to know your test results and keep a list of the medicines you take. How can you care for yourself at home? · Watch for signs of dehydration, which means your body has lost too much water. Dehydration is a serious condition and should be treated right away. Signs of dehydration are:  ¨ Increasing thirst and dry eyes and mouth. ¨ Feeling faint or lightheaded. ¨ Darker urine, and a smaller amount of urine than normal.  · To prevent dehydration, drink plenty of fluids, enough so that your urine is light yellow or clear like water. Choose water and other caffeine-free clear liquids until you feel better. If you have kidney, heart, or liver disease and have to limit fluids, talk with your doctor before you increase the amount of fluids you drink. · Begin eating small amounts of mild foods the next day, if you feel like it. ¨ Try yogurt that has live cultures of Lactobacillus. (Check the label.)  ¨ Avoid spicy foods, fruits, alcohol, and caffeine until 48 hours after all symptoms are gone. ¨ Avoid chewing gum that contains sorbitol. ¨ Avoid dairy products (except for yogurt with Lactobacillus) while you have diarrhea and for 3 days after symptoms are gone. · The doctor may recommend that you take over-the-counter medicine, such as loperamide (Imodium), if you still have diarrhea after 6 hours.  Read and follow all instructions on the label. Do not use this medicine if you have bloody diarrhea, a high fever, or other signs of serious illness. Call your doctor if you think you are having a problem with your medicine. When should you call for help? Call 911 anytime you think you may need emergency care. For example, call if:  · You passed out (lost consciousness). · Your stools are maroon or very bloody. Call your doctor now or seek immediate medical care if:  · You are dizzy or lightheaded, or you feel like you may faint. · Your stools are black and look like tar, or they have streaks of blood. · You have new or worse belly pain. · You have symptoms of dehydration, such as:  ¨ Dry eyes and a dry mouth. ¨ Passing only a little dark urine. ¨ Feeling thirstier than usual.  · You have a new or higher fever. Watch closely for changes in your health, and be sure to contact your doctor if:  · Your diarrhea is getting worse. · You see pus in the diarrhea. · You are not getting better after 2 days (48 hours). Where can you learn more? Go to http://pinkyOwl biomedicalshamar.info/. Enter F870 in the search box to learn more about \"Diarrhea: Care Instructions. \"  Current as of: March 20, 2017  Content Version: 11.3  © 5440-1287 [employer]. Care instructions adapted under license by [employer] (which disclaims liability or warranty for this information). If you have questions about a medical condition or this instruction, always ask your healthcare professional. [place] [address] any warranty or liability for your use of this information. Urinary Tract Infection in Women: Care Instructions  Your Care Instructions    A urinary tract infection, or UTI, is a general term for an infection anywhere between the kidneys and the urethra (where urine comes out). Most UTIs are bladder infections. They often cause pain or burning when you urinate.   UTIs are caused by bacteria and can be cured with antibiotics. Be sure to complete your treatment so that the infection goes away. Follow-up care is a key part of your treatment and safety. Be sure to make and go to all appointments, and call your doctor if you are having problems. It's also a good idea to know your test results and keep a list of the medicines you take. How can you care for yourself at home? · Take your antibiotics as directed. Do not stop taking them just because you feel better. You need to take the full course of antibiotics. · Drink extra water and other fluids for the next day or two. This may help wash out the bacteria that are causing the infection. (If you have kidney, heart, or liver disease and have to limit fluids, talk with your doctor before you increase your fluid intake.)  · Avoid drinks that are carbonated or have caffeine. They can irritate the bladder. · Urinate often. Try to empty your bladder each time. · To relieve pain, take a hot bath or lay a heating pad set on low over your lower belly or genital area. Never go to sleep with a heating pad in place. To prevent UTIs  · Drink plenty of water each day. This helps you urinate often, which clears bacteria from your system. (If you have kidney, heart, or liver disease and have to limit fluids, talk with your doctor before you increase your fluid intake.)  · Urinate when you need to. · Urinate right after you have sex. · Change sanitary pads often. · Avoid douches, bubble baths, feminine hygiene sprays, and other feminine hygiene products that have deodorants. · After going to the bathroom, wipe from front to back. When should you call for help? Call your doctor now or seek immediate medical care if:  · Symptoms such as fever, chills, nausea, or vomiting get worse or appear for the first time. · You have new pain in your back just below your rib cage. This is called flank pain. · There is new blood or pus in your urine.   · You have any problems with your antibiotic medicine. Watch closely for changes in your health, and be sure to contact your doctor if:  · You are not getting better after taking an antibiotic for 2 days. · Your symptoms go away but then come back. Where can you learn more? Go to http://pinky-shamar.info/. Enter X710 in the search box to learn more about \"Urinary Tract Infection in Women: Care Instructions. \"  Current as of: November 28, 2016  Content Version: 11.3  © 1872-2082 [employer]. Care instructions adapted under license by [employer] (which disclaims liability or warranty for this information). If you have questions about a medical condition or this instruction, always ask your healthcare professional. [address] any warranty or liability for your use of this information.

## 2017-08-12 LAB
BACTERIA SPEC CULT: NORMAL
CC UR VC: NORMAL
SERVICE CMNT-IMP: NORMAL

## 2017-08-21 ENCOUNTER — OFFICE VISIT (OUTPATIENT)
Dept: INTERNAL MEDICINE CLINIC | Age: 82
End: 2017-08-21

## 2017-08-21 VITALS
TEMPERATURE: 99.2 F | SYSTOLIC BLOOD PRESSURE: 116 MMHG | BODY MASS INDEX: 22.34 KG/M2 | WEIGHT: 121.4 LBS | HEIGHT: 62 IN | DIASTOLIC BLOOD PRESSURE: 70 MMHG

## 2017-08-21 DIAGNOSIS — N39.0 URINARY TRACT INFECTION WITHOUT HEMATURIA, SITE UNSPECIFIED: Primary | ICD-10-CM

## 2017-08-21 DIAGNOSIS — B37.31 MONILIAL VAGINITIS: ICD-10-CM

## 2017-08-21 LAB
BACTERIA UA POCT, BACTPOCT: ABNORMAL
BILIRUB UR QL STRIP: NEGATIVE
CASTS UA POCT: ABNORMAL
CLUE CELLS, CLUEPOCT: NEGATIVE
CRYSTALS UA POCT, CRYSPOCT: NEGATIVE
EPITHELIAL CELLS POCT, EPITHPOCT: ABNORMAL
GLUCOSE UR-MCNC: NEGATIVE MG/DL
KETONES P FAST UR STRIP-MCNC: NEGATIVE MG/DL
MUCUS UA POCT, MUCPOCT: ABNORMAL
PH UR STRIP: 7 [PH] (ref 5–7)
PROTEIN,URINE POC: NEGATIVE MG/DL
RBC UA POCT, RBCPOCT: ABNORMAL
SP GR UR STRIP: 1 (ref 1.01–1.02)
TRICH UA POCT, TRICHPOC: NEGATIVE
UA UROBILINOGEN AMB POC: NORMAL (ref 0.2–1)
URINALYSIS CLARITY POC: ABNORMAL
URINALYSIS COLOR POC: ABNORMAL
URINE BLOOD POC: NEGATIVE
URINE LEUKOCYTES POC: ABNORMAL
URINE NITRITES POC: NEGATIVE
WBC UA POCT, WBCPOCT: ABNORMAL
YEAST UA POCT, YEASTPOC: NEGATIVE

## 2017-08-21 RX ORDER — FLUCONAZOLE 100 MG/1
100 TABLET ORAL DAILY
Qty: 3 TAB | Refills: 0 | Status: SHIPPED | OUTPATIENT
Start: 2017-08-21 | End: 2017-08-24

## 2017-08-21 NOTE — PATIENT INSTRUCTIONS
Urinary Tract Infection in Women: Care Instructions  Your Care Instructions    A urinary tract infection, or UTI, is a general term for an infection anywhere between the kidneys and the urethra (where urine comes out). Most UTIs are bladder infections. They often cause pain or burning when you urinate. UTIs are caused by bacteria and can be cured with antibiotics. Be sure to complete your treatment so that the infection goes away. Follow-up care is a key part of your treatment and safety. Be sure to make and go to all appointments, and call your doctor if you are having problems. It's also a good idea to know your test results and keep a list of the medicines you take. How can you care for yourself at home? · Take your antibiotics as directed. Do not stop taking them just because you feel better. You need to take the full course of antibiotics. · Drink extra water and other fluids for the next day or two. This may help wash out the bacteria that are causing the infection. (If you have kidney, heart, or liver disease and have to limit fluids, talk with your doctor before you increase your fluid intake.)  · Avoid drinks that are carbonated or have caffeine. They can irritate the bladder. · Urinate often. Try to empty your bladder each time. · To relieve pain, take a hot bath or lay a heating pad set on low over your lower belly or genital area. Never go to sleep with a heating pad in place. To prevent UTIs  · Drink plenty of water each day. This helps you urinate often, which clears bacteria from your system. (If you have kidney, heart, or liver disease and have to limit fluids, talk with your doctor before you increase your fluid intake.)  · Urinate when you need to. · Urinate right after you have sex. · Change sanitary pads often. · Avoid douches, bubble baths, feminine hygiene sprays, and other feminine hygiene products that have deodorants.   · After going to the bathroom, wipe from front to back.  When should you call for help? Call your doctor now or seek immediate medical care if:  · Symptoms such as fever, chills, nausea, or vomiting get worse or appear for the first time. · You have new pain in your back just below your rib cage. This is called flank pain. · There is new blood or pus in your urine. · You have any problems with your antibiotic medicine. Watch closely for changes in your health, and be sure to contact your doctor if:  · You are not getting better after taking an antibiotic for 2 days. · Your symptoms go away but then come back. Where can you learn more? Go to http://pinky-shamar.info/. Enter U294 in the search box to learn more about \"Urinary Tract Infection in Women: Care Instructions. \"  Current as of: November 28, 2016  Content Version: 11.3  © 2851-3943 [employer], "[employer]. Care instructions adapted under license by [employer] (which disclaims liability or warranty for this information). If you have questions about a medical condition or this instruction, always ask your healthcare professional. [address] any warranty or liability for your use of this information.

## 2017-08-21 NOTE — PROGRESS NOTES
Fidela Bamberger is a 80 y.o. female presenting for Bladder Infection  . 1. Have you been to the ER, urgent care clinic since your last visit? Hospitalized since your last visit? Yes When: 2 wks ago Where: ED [facility] ER Reason for visit: uti    2. Have you seen or consulted any other health care providers outside of the [address] [place] [address] since your last visit? Include any pap smears or colon screening. Yes When: 8-9-17 Where: Dr Tito Givens  Reason for visit: abd pain    Fall Risk Assessment, last 12 mths 8/21/2017   Able to walk? Yes   Fall in past 12 months? No         Abuse Screening Questionnaire 8/21/2017   Do you ever feel afraid of your partner? N   Are you in a relationship with someone who physically or mentally threatens you? N   Is it safe for you to go home? Y       No flowsheet data found. There are no discontinued medications.

## 2017-08-21 NOTE — MR AVS SNAPSHOT
Visit Information Date & Time Provider Department Dept. Phone Encounter #  
 8/21/2017 10:20 AM Amy Arreaga, [address] [facility] [phone] [id number] Follow-up Instructions Return if symptoms worsen or fail to improve. Follow-up and Disposition History Your Appointments 11/3/2017  2:20 PM  
Follow Up with Naga Amin MD  
[facility] [address]) Appt Note: f/u memory, jrb 5/2/17  
 [address] [place] [address], 
[address] [facility], [address] [phone]  
[address] [facility], [address] [place], [address] [address] Upcoming Health Maintenance Date Due  
 FOOT EXAM Q1 6/22/1936 MICROALBUMIN Q1 6/22/1936 EYE EXAM RETINAL OR DILATED Q1 6/22/1936 DTaP/Tdap/Td series (1 - Tdap) 6/22/1947 ZOSTER VACCINE AGE 60> 4/22/1986 GLAUCOMA SCREENING Q2Y 6/22/1991 Pneumococcal 65+ Low/Medium Risk (1 of 2 - PCV13) 6/22/1991 MEDICARE YEARLY EXAM 6/22/1991 LIPID PANEL Q1 8/20/2014 HEMOGLOBIN A1C Q6M 10/21/2016 INFLUENZA AGE 9 TO ADULT 8/1/2017 Allergies as of 8/21/2017  Review Complete On: 8/21/2017 By: Amy Arreaga MD  
  
 Severity Noted Reaction Type Reactions Bactrim [Sulfamethoprim Ds]  05/14/2012    Other (comments)  
 consipation Ciprofloxacin (Bulk)  05/14/2012    Other (comments) Low wbc Prednisone  05/14/2012    Other (comments)  
 tachycardia Statins-hmg-coa Reductase Inhibitors  02/19/2013   Intolerance Unknown (comments) Stomach uipset and mild muscle aches Current Immunizations  Reviewed on 8/10/2017 No immunizations on file. Not reviewed this visit You Were Diagnosed With   
  
 Codes Comments Urinary tract infection without hematuria, site unspecified    -  Primary ICD-10-CM: N39.0 ICD-9-CM: 599.0 Monilial vaginitis     ICD-10-CM: B37.3 ICD-9-CM: 112.1 Vitals BP Temp Height(growth percentile) Weight(growth percentile) BMI OB Status 116/70 (BP 1 Location: Left arm, BP Patient Position: Sitting) 99.2 °F (37.3 °C) (Oral) 5' 2\" (1.575 m) 121 lb 6.4 oz (55.1 kg) 22.2 kg/m2 Postmenopausal  
 Smoking Status Former Smoker BMI and BSA Data Body Mass Index Body Surface Area  
 22.2 kg/m 2 1.55 m 2 Preferred Pharmacy Pharmacy Name Phone [facility] [address] [place] [address] Dr [place], [address] [phone] Your Updated Medication List  
  
   
This list is accurate as of: 8/21/17 11:05 AM.  Always use your most recent med list.  
  
  
  
  
 DYAZIDE 37.5-25 mg per capsule Generic drug:  triamterene-hydroCHLOROthiazide Take 1 Cap by mouth daily. fexofenadine 60 mg tablet Commonly known as:  Brittany Grim Take 30 mg by mouth daily. fluconazole 100 mg tablet Commonly known as:  DIFLUCAN Take 1 Tab by mouth daily for 3 days. FDA advises cautious prescribing of oral fluconazole in pregnancy. metoprolol tartrate 25 mg tablet Commonly known as:  LOPRESSOR Take  by mouth two (2) times a day. NexIUM 40 mg capsule Generic drug:  esomeprazole Take  by mouth daily. NORPACE 100 mg capsule Generic drug:  disopyramide phosphate Take  by mouth two (2) times a day. VITAMIN D3 1,000 unit Cap Generic drug:  cholecalciferol Take  by mouth. Prescriptions Sent to Pharmacy Refills  
 fluconazole (DIFLUCAN) 100 mg tablet 0 Sig: Take 1 Tab by mouth daily for 3 days. FDA advises cautious prescribing of oral fluconazole in pregnancy. Class: Normal  
 Pharmacy: [address] [place], [address] Ph #: [phone] Route: Oral  
  
We Performed the Following AMB POC URINALYSIS DIP STICK AUTO W/ MICRO  [80466 CPT(R)] CULTURE, URINE [id number] CPT(R)] Follow-up Instructions Return if symptoms worsen or fail to improve. Patient Instructions Urinary Tract Infection in Women: Care Instructions Your Care Instructions A urinary tract infection, or UTI, is a general term for an infection anywhere between the kidneys and the urethra (where urine comes out). Most UTIs are bladder infections. They often cause pain or burning when you urinate. UTIs are caused by bacteria and can be cured with antibiotics. Be sure to complete your treatment so that the infection goes away. Follow-up care is a key part of your treatment and safety. Be sure to make and go to all appointments, and call your doctor if you are having problems. It's also a good idea to know your test results and keep a list of the medicines you take. How can you care for yourself at home? · Take your antibiotics as directed. Do not stop taking them just because you feel better. You need to take the full course of antibiotics. · Drink extra water and other fluids for the next day or two. This may help wash out the bacteria that are causing the infection. (If you have kidney, heart, or liver disease and have to limit fluids, talk with your doctor before you increase your fluid intake.) · Avoid drinks that are carbonated or have caffeine. They can irritate the bladder. · Urinate often. Try to empty your bladder each time. · To relieve pain, take a hot bath or lay a heating pad set on low over your lower belly or genital area. Never go to sleep with a heating pad in place. To prevent UTIs · Drink plenty of water each day. This helps you urinate often, which clears bacteria from your system. (If you have kidney, heart, or liver disease and have to limit fluids, talk with your doctor before you increase your fluid intake.) · Urinate when you need to. · Urinate right after you have sex. · Change sanitary pads often.  
· Avoid douches, bubble baths, feminine hygiene sprays, and other feminine hygiene products that have deodorants. · After going to the bathroom, wipe from front to back. When should you call for help? Call your doctor now or seek immediate medical care if: · Symptoms such as fever, chills, nausea, or vomiting get worse or appear for the first time. · You have new pain in your back just below your rib cage. This is called flank pain. · There is new blood or pus in your urine. · You have any problems with your antibiotic medicine. Watch closely for changes in your health, and be sure to contact your doctor if: 
· You are not getting better after taking an antibiotic for 2 days. · Your symptoms go away but then come back. Where can you learn more? Go to http://pinky-shamar.info/. Enter X969 in the search box to learn more about \"Urinary Tract Infection in Women: Care Instructions. \" Current as of: November 28, 2016 Content Version: 11.3 © 3021-4295 [employer]. Care instructions adapted under license by [employer] (which disclaims liability or warranty for this information). If you have questions about a medical condition or this instruction, always ask your healthcare professional. [address] any warranty or liability for your use of this information. Patient Instructions History Introducing [facility]! Dear Pauline Garcia: Thank you for requesting a ThrowMotion account. Our records indicate that you already have an active ThrowMotion account. You can access your account anytime at [email or web address] Did you know that you can access your hospital and ER discharge instructions at any time in ThrowMotion? You can also review all of your test results from your hospital stay or ER visit. Additional Information If you have questions, please visit the Frequently Asked Questions section of the ThrowMotion website at [email or web address] Remember, MyChart is NOT to be used for urgent needs. For medical emergencies, dial 911. Now available from your iPhone and Android! Please provide this summary of care documentation to your next provider. Your primary care clinician is listed as RISHI Scott. If you have any questions after today's visit, please call [phone].

## 2017-08-21 NOTE — PROGRESS NOTES
Subjective: This note will not be viewable in [address] The patient presents to the office with complaints of a possible UTI. Complains of dysuria, frequency and urgency ongoing for 4 days. Denies hematuria, abdominal pain or flank pain. No fever, chills, nausea or vomiting. She was recently seen in the emergency room on August 10 and a urinalysis was obtained because of possible urinary tract infection. She was placed on Keflex empirically but the culture only grew mixed urogenital jimi. Over the last several days she feels as though she is coming down with a yeast infection which she has had in the past.  She is having more urinary frequency and dysuria as well. Past Medical History:   Diagnosis Date    Arrhythmia     afib    Arthritis     Asthma     Atrial fibrillation (HCC)     Cancer (HCC)     skin    GERD (gastroesophageal reflux disease)     High cholesterol     Hypertension     Other ill-defined conditions     collapsed lung prior to hernia repair surgery    Unspecified adverse effect of anesthesia     pt states she went into cardiac arrest prior to hernia repair after the insertion of gas in stomach     Past Surgical History:   Procedure Laterality Date    HX APPENDECTOMY      HX CHOLECYSTECTOMY      HX GI      hemorrhoidectomy    HX HEENT      sinus surgery    HX HERNIA REPAIR      HX TONSILLECTOMY       Allergies   Allergen Reactions    Bactrim [Sulfamethoprim Ds] Other (comments)     consipation    Ciprofloxacin (Bulk) Other (comments)     Low wbc    Prednisone Other (comments)     tachycardia    Statins-Hmg-Coa Reductase Inhibitors Unknown (comments)     Stomach uipset and mild muscle aches     Current Outpatient Prescriptions   Medication Sig Dispense Refill    fluconazole (DIFLUCAN) 100 mg tablet Take 1 Tab by mouth daily for 3 days. FDA advises cautious prescribing of oral fluconazole in pregnancy.  3 Tab 0    fexofenadine (ALLEGRA) 60 mg tablet Take 30 mg by mouth daily.      Cholecalciferol, Vitamin D3, (VITAMIN D3) 1,000 unit cap Take  by mouth.  triamterene-hydrochlorothiazide (DYAZIDE) 37.5-25 mg per capsule Take 1 Cap by mouth daily.  disopyramide (NORPACE) 100 mg capsule Take  by mouth two (2) times a day.  metoprolol (LOPRESSOR) 25 mg tablet Take  by mouth two (2) times a day.  esomeprazole (NEXIUM) 40 mg capsule Take  by mouth daily. Social History     Social History    Marital status:      Spouse name: N/A    Number of children: N/A    Years of education: N/A     Social History Main Topics    Smoking status: Former Smoker     Packs/day: 0.75     Years: 5.00     Quit date: 10/11/1967    Smokeless tobacco: Never Used    Alcohol use No    Drug use: No    Sexual activity: Not Asked     Other Topics Concern    None     Social History Narrative     Family History   Problem Relation Age of Onset    Heart Disease Mother     Dementia Mother     Heart Disease Father     Neuropathy Child     Depression Child     Other Child      diverticulitis       Review of Systems:  GEN: no fever,chills or sweats  CV: no PND, orthopnea, or chest pain  Resp: no dyspnea on exertion, no cough  Abd: no nausea, vomiting or diarrhea or abdominal pain  Back: denies flank pain  : positive for dysuria, frequency and urgency  Neurological ROS: no TIA or stroke symptoms  ROS otherwise negative      Objective:     Visit Vitals    /70 (BP 1 Location: Left arm, BP Patient Position: Sitting)    Temp 99.2 °F (37.3 °C) (Oral)    Ht 5' 2\" (1.575 m)    Wt 121 lb 6.4 oz (55.1 kg)    BMI 22.2 kg/m2     Body mass index is 22.2 kg/(m^2). General:   alert, cooperative and no distress   Skin: No rash appreciated   Neck: Supple   Heart: RRR without murmur   Lungs: Clear to auscultation bilaterally   Abdomen: Soft, nontender.   Normal bowel sounds   Back: No CVAT present   Neuro: No focal deficits appreciated     Physical exam otherwise negative Assessment/Plan:     Diagnoses and all orders for this visit:    Urinary tract infection without hematuria, site unspecified  -     AMB POC URINALYSIS DIP STICK AUTO W/ MICRO   -     CULTURE, URINE    Monilial vaginitis  -     fluconazole (DIFLUCAN) 100 mg tablet; Take 1 Tab by mouth daily for 3 days. FDA advises cautious prescribing of oral fluconazole in pregnancy. , Normal, Disp-3 Tab, R-0        Other instructions:   Await results of urine culture    We will empirically treat her for a monilial vaginitis for 3 days worth of Diflucan. Azo-standard as needed for discomfort    Increase po fluids    Follow-up Disposition:  Return if symptoms worsen or fail to improve.     Alvaro Brennan MD

## 2017-08-22 LAB
BACTERIA UR CULT: NORMAL
BACTERIA UR CULT: NORMAL

## 2017-09-21 RX ORDER — ESOMEPRAZOLE MAGNESIUM 40 MG/1
CAPSULE, DELAYED RELEASE ORAL
Qty: 90 CAP | Refills: 3 | Status: SHIPPED | OUTPATIENT
Start: 2017-09-21 | End: 2018-03-06 | Stop reason: ALTCHOICE

## 2017-09-21 NOTE — TELEPHONE ENCOUNTER
Requested Prescriptions     Pending Prescriptions Disp Refills    NEXIUM 40 mg capsule [Pharmacy Med Name: Kamaljittisha Hendrix 40MG] 90 Cap 3     Sig: TAKE 1 CAPSULE DAILY       Last Refill: 6/26/17  Last visit:8/21/2017

## 2017-09-25 ENCOUNTER — OFFICE VISIT (OUTPATIENT)
Dept: INTERNAL MEDICINE CLINIC | Age: 82
End: 2017-09-25

## 2017-09-25 VITALS
BODY MASS INDEX: 22.12 KG/M2 | WEIGHT: 120.2 LBS | HEIGHT: 62 IN | SYSTOLIC BLOOD PRESSURE: 118 MMHG | TEMPERATURE: 98.4 F | DIASTOLIC BLOOD PRESSURE: 76 MMHG

## 2017-09-25 DIAGNOSIS — J01.00 ACUTE MAXILLARY SINUSITIS, RECURRENCE NOT SPECIFIED: Primary | ICD-10-CM

## 2017-09-25 PROBLEM — R68.81 EARLY SATIETY: Status: ACTIVE | Noted: 2017-09-25

## 2017-09-25 PROBLEM — M85.80 OSTEOPENIA: Status: ACTIVE | Noted: 2017-09-25

## 2017-09-25 PROBLEM — J30.9 ALLERGIC RHINITIS: Status: ACTIVE | Noted: 2017-09-25

## 2017-09-25 PROBLEM — R07.89 CHEST WALL PAIN: Status: ACTIVE | Noted: 2017-09-25

## 2017-09-25 PROBLEM — K57.30 DIVERTICULOSIS OF LARGE INTESTINE WITHOUT HEMORRHAGE: Status: ACTIVE | Noted: 2017-09-25

## 2017-09-25 PROBLEM — G25.0 BENIGN ESSENTIAL TREMOR: Status: ACTIVE | Noted: 2017-09-25

## 2017-09-25 PROBLEM — K57.92 ACUTE DIVERTICULITIS: Status: ACTIVE | Noted: 2017-09-25

## 2017-09-25 PROBLEM — K90.0 CELIAC DISEASE: Status: ACTIVE | Noted: 2017-09-25

## 2017-09-25 PROBLEM — H18.519 FUCHS' CORNEAL DYSTROPHY: Status: ACTIVE | Noted: 2017-09-25

## 2017-09-25 PROBLEM — E78.00 HYPERCHOLESTEROLEMIA: Status: ACTIVE | Noted: 2017-09-25

## 2017-09-25 PROBLEM — H02.403 PTOSIS, BOTH EYELIDS: Status: ACTIVE | Noted: 2017-09-25

## 2017-09-25 PROBLEM — R20.0 NUMBNESS: Status: ACTIVE | Noted: 2017-09-25

## 2017-09-25 PROBLEM — R19.7 DIARRHEA: Status: ACTIVE | Noted: 2017-09-25

## 2017-09-25 PROBLEM — Z78.9 STATIN INTOLERANCE: Status: ACTIVE | Noted: 2017-09-25

## 2017-09-25 PROBLEM — K21.9 GASTROESOPHAGEAL REFLUX DISEASE WITHOUT ESOPHAGITIS: Status: ACTIVE | Noted: 2017-09-25

## 2017-09-25 PROBLEM — K58.0 IRRITABLE BOWEL SYNDROME WITH DIARRHEA: Status: ACTIVE | Noted: 2017-09-25

## 2017-09-25 PROBLEM — K59.00 CONSTIPATION: Status: ACTIVE | Noted: 2017-09-25

## 2017-09-25 RX ORDER — BISMUTH SUBSALICYLATE 262 MG
1 TABLET,CHEWABLE ORAL DAILY
COMMUNITY
End: 2017-09-25 | Stop reason: ALTCHOICE

## 2017-09-25 RX ORDER — IPRATROPIUM BROMIDE 21 UG/1
2 SPRAY, METERED NASAL EVERY 12 HOURS
COMMUNITY
End: 2017-11-03 | Stop reason: CLARIF

## 2017-09-25 RX ORDER — UREA 10 %
100 LOTION (ML) TOPICAL DAILY
COMMUNITY
End: 2019-01-07 | Stop reason: SDUPTHER

## 2017-09-25 RX ORDER — AMOXICILLIN 250 MG/1
CAPSULE ORAL 3 TIMES DAILY
COMMUNITY
End: 2017-09-25 | Stop reason: ALTCHOICE

## 2017-09-25 RX ORDER — CEFUROXIME AXETIL 250 MG/1
250 TABLET ORAL 2 TIMES DAILY
Qty: 20 TAB | Refills: 0 | Status: SHIPPED | OUTPATIENT
Start: 2017-09-25 | End: 2017-11-03 | Stop reason: CLARIF

## 2017-09-25 NOTE — PROGRESS NOTES
Nicole Perez is a 80 y.o. female presenting for Cold  . 1. Have you been to the ER, urgent care clinic since your last visit? Hospitalized since your last visit? No    2. Have you seen or consulted any other health care providers outside of the [address] [place] [address] since your last visit? Include any pap smears or colon screening. No    Fall Risk Assessment, last 12 mths 8/21/2017   Able to walk? Yes   Fall in past 12 months? No         Abuse Screening Questionnaire 8/21/2017   Do you ever feel afraid of your partner? N   Are you in a relationship with someone who physically or mentally threatens you? N   Is it safe for you to go home? Y       No flowsheet data found. There are no discontinued medications.

## 2017-09-25 NOTE — MR AVS SNAPSHOT
Visit Information Date & Time Provider Department Dept. Phone Encounter #  
 9/25/2017  3:30 PM Sam Staples Melody Omer 26 [phone] [id number] Follow-up Instructions Return if symptoms worsen or fail to improve. Your Appointments 11/3/2017  2:20 PM  
Follow Up with Sarah Beth Norris MD  
[facility] at [facility] [address] [facility]) Appt Note: f/u memory, jrb 5/2/17  
 [address], 
[address] [place] [address], [phone]  
[address] [facility], [address] [place], [phone] Upcoming Health Maintenance Date Due  
 FOOT EXAM Q1 6/22/1936 MICROALBUMIN Q1 6/22/1936 EYE EXAM RETINAL OR DILATED Q1 6/22/1936 DTaP/Tdap/Td series (1 - Tdap) 6/22/1947 ZOSTER VACCINE AGE 60> 4/22/1986 GLAUCOMA SCREENING Q2Y 6/22/1991 Pneumococcal 65+ Low/Medium Risk (1 of 2 - PCV13) 6/22/1991 MEDICARE YEARLY EXAM 6/22/1991 LIPID PANEL Q1 8/20/2014 HEMOGLOBIN A1C Q6M 10/21/2016 INFLUENZA AGE 9 TO ADULT 8/1/2017 Allergies as of 9/25/2017  Review Complete On: 9/25/2017 By: Sam Staples MD  
  
 Severity Noted Reaction Type Reactions Bactrim [Sulfamethoprim Ds]  05/14/2012    Other (comments)  
 consipation Ciprofloxacin (Bulk)  05/14/2012    Other (comments) Low wbc Iodine  09/25/2017    Unknown (comments) Prednisone  05/14/2012    Other (comments)  
 tachycardia Statins-hmg-coa Reductase Inhibitors  02/19/2013   Intolerance Unknown (comments) Stomach uipset and mild muscle aches Current Immunizations  Reviewed on 8/10/2017 Name Date Influenza Vaccine 10/25/2016, 9/23/2014 Not reviewed this visit You Were Diagnosed With   
  
 Codes Comments Acute maxillary sinusitis, recurrence not specified    -  Primary ICD-10-CM: J01.00 ICD-9-CM: 461.0 Vitals BP Temp Height(growth percentile) Weight(growth percentile) BMI OB Status 118/76 (BP 1 Location: Right arm, BP Patient Position: Sitting) 98.4 °F (36.9 °C) (Oral) 5' 2\" (1.575 m) 120 lb 3.2 oz (54.5 kg) 21.98 kg/m2 Postmenopausal  
 Smoking Status Former Smoker BMI and BSA Data Body Mass Index Body Surface Area  
 21.98 kg/m 2 1.54 m 2 Preferred Pharmacy Pharmacy Name Phone [facility] [address] [phone] Your Updated Medication List  
  
   
This list is accurate as of: 9/25/17  3:46 PM.  Always use your most recent med list.  
  
  
  
  
 cefUROXime 250 mg tablet Commonly known as:  CEFTIN Take 1 Tab by mouth two (2) times a day. DYAZIDE 37.5-25 mg per capsule Generic drug:  triamterene-hydroCHLOROthiazide Take 1 Cap by mouth daily. fexofenadine 60 mg tablet Commonly known as:  Sergio Keny Take 30 mg by mouth daily. ipratropium 0.03 % nasal spray Commonly known as:  ATROVENT  
2 Sprays every twelve (12) hours. metoprolol tartrate 25 mg tablet Commonly known as:  LOPRESSOR Take  by mouth two (2) times a day. NexIUM 40 mg capsule Generic drug:  esomeprazole TAKE 1 CAPSULE DAILY  
  
 NORPACE 100 mg capsule Generic drug:  disopyramide phosphate Take  by mouth two (2) times a day. VITAMIN B-12 100 mcg tablet Generic drug:  cyanocobalamin Take 100 mcg by mouth daily. VITAMIN D3 1,000 unit Cap Generic drug:  cholecalciferol Take  by mouth. Prescriptions Sent to Pharmacy Refills  
 cefUROXime (CEFTIN) 250 mg tablet 0 Sig: Take 1 Tab by mouth two (2) times a day. Class: Normal  
 Pharmacy: [id number] #: [phone] Route: Oral  
  
Follow-up Instructions Return if symptoms worsen or fail to improve. Patient Instructions Sinusitis: Care Instructions Your Care Instructions Sinusitis is an infection of the lining of the sinus cavities in your head. Sinusitis often follows a cold. It causes pain and pressure in your head and face. In most cases, sinusitis gets better on its own in 1 to 2 weeks. But some mild symptoms may last for several weeks. Sometimes antibiotics are needed. Follow-up care is a key part of your treatment and safety. Be sure to make and go to all appointments, and call your doctor if you are having problems. It's also a good idea to know your test results and keep a list of the medicines you take. How can you care for yourself at home? · Take an over-the-counter pain medicine, such as acetaminophen (Tylenol), ibuprofen (Advil, Motrin), or naproxen (Aleve). Read and follow all instructions on the label. · If the doctor prescribed antibiotics, take them as directed. Do not stop taking them just because you feel better. You need to take the full course of antibiotics. · Be careful when taking over-the-counter cold or flu medicines and Tylenol at the same time. Many of these medicines have acetaminophen, which is Tylenol. Read the labels to make sure that you are not taking more than the recommended dose. Too much acetaminophen (Tylenol) can be harmful. · Breathe warm, moist air from a steamy shower, a hot bath, or a sink filled with hot water. Avoid cold, dry air. Using a humidifier in your home may help. Follow the directions for cleaning the machine. · Use saline (saltwater) nasal washes to help keep your nasal passages open and wash out mucus and bacteria. You can buy saline nose drops at a grocery store or drugstore. Or you can make your own at home by adding 1 teaspoon of salt and 1 teaspoon of baking soda to 2 cups of distilled water. If you make your own, fill a bulb syringe with the solution, insert the tip into your nostril, and squeeze gently. Felt Barban your nose. · Put a hot, wet towel or a warm gel pack on your face 3 or 4 times a day for 5 to 10 minutes each time. · Try a decongestant nasal spray like oxymetazoline (Afrin). Do not use it for more than 3 days in a row. Using it for more than 3 days can make your congestion worse. When should you call for help? Call your doctor now or seek immediate medical care if: 
· You have new or worse swelling or redness in your face or around your eyes. · You have a new or higher fever. Watch closely for changes in your health, and be sure to contact your doctor if: 
· You have new or worse facial pain. · The mucus from your nose becomes thicker (like pus) or has new blood in it. · You are not getting better as expected. Where can you learn more? Go to http://pinky-shamar.info/. Enter I174 in the search box to learn more about \"Sinusitis: Care Instructions. \" Current as of: July 29, 2016 Content Version: 11.3 © 6576-8033 Sr.Pago. Care instructions adapted under license by [employer] (which disclaims liability or warranty for this information). If you have questions about a medical condition or this instruction, always ask your healthcare professional. [place] [address] any warranty or liability for your use of this information. Introducing \[facility] [place] [facility]\"" & HEALTH SERVICES! Dear [facility]: Thank you for requesting a [employer] account. Our records indicate that you already have an active [employer] account. You can access your account anytime at [email or web address] Did you know that you can access your hospital and ER discharge instructions at any time in [employer]? You can also review all of your test results from your hospital stay or ER visit. Additional Information If you have questions, please visit the Frequently Asked Questions section of the [employer] website at [email or web address] Remember, [email or web address] is NOT to be used for urgent needs. For medical emergencies, dial 911. Now available from your iPhone and Android! Please provide this summary of care documentation to your next provider. Your primary care clinician is listed as RISHI Scott. If you have any questions after today's visit, please call [phone].

## 2017-09-25 NOTE — PATIENT INSTRUCTIONS
Sinusitis: Care Instructions  Your Care Instructions    Sinusitis is an infection of the lining of the sinus cavities in your head. Sinusitis often follows a cold. It causes pain and pressure in your head and face. In most cases, sinusitis gets better on its own in 1 to 2 weeks. But some mild symptoms may last for several weeks. Sometimes antibiotics are needed. Follow-up care is a key part of your treatment and safety. Be sure to make and go to all appointments, and call your doctor if you are having problems. It's also a good idea to know your test results and keep a list of the medicines you take. How can you care for yourself at home? · Take an over-the-counter pain medicine, such as acetaminophen (Tylenol), ibuprofen (Advil, Motrin), or naproxen (Aleve). Read and follow all instructions on the label. · If the doctor prescribed antibiotics, take them as directed. Do not stop taking them just because you feel better. You need to take the full course of antibiotics. · Be careful when taking over-the-counter cold or flu medicines and Tylenol at the same time. Many of these medicines have acetaminophen, which is Tylenol. Read the labels to make sure that you are not taking more than the recommended dose. Too much acetaminophen (Tylenol) can be harmful. · Breathe warm, moist air from a steamy shower, a hot bath, or a sink filled with hot water. Avoid cold, dry air. Using a humidifier in your home may help. Follow the directions for cleaning the machine. · Use saline (saltwater) nasal washes to help keep your nasal passages open and wash out mucus and bacteria. You can buy saline nose drops at a grocery store or drugstore. Or you can make your own at home by adding 1 teaspoon of salt and 1 teaspoon of baking soda to 2 cups of distilled water. If you make your own, fill a bulb syringe with the solution, insert the tip into your nostril, and squeeze gently. Anam Favre your nose.   · Put a hot, wet towel or a warm gel pack on your face 3 or 4 times a day for 5 to 10 minutes each time. · Try a decongestant nasal spray like oxymetazoline (Afrin). Do not use it for more than 3 days in a row. Using it for more than 3 days can make your congestion worse. When should you call for help? Call your doctor now or seek immediate medical care if:  · You have new or worse swelling or redness in your face or around your eyes. · You have a new or higher fever. Watch closely for changes in your health, and be sure to contact your doctor if:  · You have new or worse facial pain. · The mucus from your nose becomes thicker (like pus) or has new blood in it. · You are not getting better as expected. Where can you learn more? Go to http://pinky-shamar.info/. Enter R923 in the search box to learn more about \"Sinusitis: Care Instructions. \"  Current as of: July 29, 2016  Content Version: 11.3  © 1292-9740 [employer]. Care instructions adapted under license by Migo.me (which disclaims liability or warranty for this information). If you have questions about a medical condition or this instruction, always ask your healthcare professional. [place] [address] any warranty or liability for your use of this information.

## 2017-09-25 NOTE — PROGRESS NOTES
This note will not be viewable in [address] Arlene Clayton is a 80 y.o. female and presents with Cold  . Subjective:  Mrs. Harriette Jeans presents to the office today with complaints of an upper respiratory infection ongoing over the last week with the development of sinus congestion, maxillary discomfort and purulent sinus drainage. The patient has had a sore throat as well. She denies any significant cough and denies any neck stiffness or rash. She has taken over-the-counter medication without relief.     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0    Allergic rhinitis J30.9    Acute diverticulitis K57.92    Numbness R20.0    Fuchs' corneal dystrophy H18.51    Ptosis, both eyelids H02.403     Past Surgical History:   Procedure Laterality Date    HX APPENDECTOMY      HX CHOLECYSTECTOMY      HX GI      hemorrhoidectomy    HX HEENT      sinus surgery    HX HERNIA REPAIR      HX TONSILLECTOMY       Allergies   Allergen Reactions    Bactrim [Sulfamethoprim Ds] Other (comments)     consipation    Ciprofloxacin (Bulk) Other (comments)     Low wbc    Iodine Unknown (comments)    Prednisone Other (comments)     tachycardia    Statins-Hmg-Coa Reductase Inhibitors Unknown (comments)     Stomach uipset and mild muscle aches     Current Outpatient Prescriptions   Medication Sig Dispense Refill    cyanocobalamin (VITAMIN B-12) 100 mcg tablet Take 100 mcg by mouth daily.  ipratropium (ATROVENT) 0.03 % nasal spray 2 Sprays every twelve (12) hours.  cefUROXime (CEFTIN) 250 mg tablet Take 1 Tab by mouth two (2) times a day. 20 Tab 0    NEXIUM 40 mg capsule TAKE 1 CAPSULE DAILY 90 Cap 3    fexofenadine (ALLEGRA) 60 mg tablet Take 30 mg by mouth daily.  Cholecalciferol, Vitamin D3, (VITAMIN D3) 1,000 unit cap Take  by mouth.  triamterene-hydrochlorothiazide (DYAZIDE) 37.5-25 mg per capsule Take 1 Cap by mouth daily.  disopyramide (NORPACE) 100 mg capsule Take  by mouth two (2) times a day.  metoprolol (LOPRESSOR) 25 mg tablet Take  by mouth two (2) times a day.          Social History     Social History    Marital status:      Spouse name: N/A    Number of children: N/A    Years of education: N/A     Social History Main Topics    Smoking status: Former Smoker     Packs/day: 0.75     Years: 5.00     Quit date: 10/11/1967    Smokeless tobacco: Never Used    Alcohol use No    Drug use: No    Sexual activity: Not Asked     Other Topics Concern    None     Social History Narrative     Family History   Problem Relation Age of Onset    Heart Disease Mother     Dementia Mother     Heart Disease Father     Neuropathy Child     Depression Child     Other Child      diverticulitis       Review of Systems  Constitutional: negative for fevers, chills, anorexia and weight loss  Eyes:   negative for visual disturbance and irritation  ENT:   Positive for sinus congestion, maxillary discomfort, purulent psotnasal draingage and throat irritation  Respiratory:  negative for cough, hemoptysis, dyspnea,wheezing  CV:   negative for chest pain, palpitations, lower extremity edema  GI:   negative for nausea, vomiting, diarrhea, abdominal pain,melena  Integumentary: negative for rash and pruritus  Neurological:  negative for headaches, dizziness, vertigo, memory problems and gait       Objective:  Visit Vitals    /76 (BP 1 Location: Right arm, BP Patient Position: Sitting)    Temp 98.4 °F (36.9 °C) (Oral)    Ht 5' 2\" (1.575 m)    Wt 120 lb 3.2 oz (54.5 kg)    BMI 21.98 kg/m2     Body mass index is 21.98 kg/(m^2). Physical Exam:   General appearance - alert, well appearing, and in no distress  Mental status - alert, oriented to person, place, and time  EYE-SIM, EOMI, sclera clear. No lid swelling or purulent drainage  ENT- TM's clear without A/F level. Pharynx slightly erythematous with drainage noted  Nose - normal and patent, no erythema,  Neck - supple, no significant adenopathy   Chest - clear to auscultation, no wheezes, rales or rhonchi, symmetric air entry   Heart - normal rate, regular rhythm, normal S1, S2, no murmurs, rubs, clicks or gallops   Skin-No rash appreciated  Neuro -alert, oriented, normal speech, no focal findings. Assessment/Plan:  Diagnoses and all orders for this visit:    Acute maxillary sinusitis, recurrence not specified  -     cefUROXime (CEFTIN) 250 mg tablet; Take 1 Tab by mouth two (2) times a day., Normal, Disp-20 Tab, R-0        Other Instructions:  Mucinex as directed    Increase po fluids    Follow-up Disposition:  Return if symptoms worsen or fail to improve. I have reviewed with the patient details of the assessment and plan and all questions were answered. Relevent patient education was performed. An After Visit Summary was printed and given to the patient.     Gregor Noland MD

## 2017-10-09 RX ORDER — METOPROLOL TARTRATE 25 MG/1
TABLET, FILM COATED ORAL
Qty: 180 TAB | Refills: 2 | Status: SHIPPED | OUTPATIENT
Start: 2017-10-09 | End: 2018-11-26 | Stop reason: SDUPTHER

## 2017-10-09 NOTE — TELEPHONE ENCOUNTER
Requested Prescriptions     Pending Prescriptions Disp Refills    metoprolol tartrate (LOPRESSOR) 25 mg tablet [Pharmacy Med Name: METOPROLOL TARTRATE TABS 25MG] 180 Tab 2     Sig: TAKE 1 TABLET TWICE A DAY       Last Refill: 7/11/17  Last visit:9/25/2017

## 2017-11-01 ENCOUNTER — CLINICAL SUPPORT (OUTPATIENT)
Dept: INTERNAL MEDICINE CLINIC | Age: 82
End: 2017-11-01

## 2017-11-01 DIAGNOSIS — Z23 ENCOUNTER FOR IMMUNIZATION: Primary | ICD-10-CM

## 2017-11-01 NOTE — PROGRESS NOTES
After obtaining consent, and per orders of Dr. Pop Smith, injection of high dose flu vaccine given by Skye Michel LPN.

## 2017-11-03 ENCOUNTER — OFFICE VISIT (OUTPATIENT)
Dept: NEUROLOGY | Age: 82
End: 2017-11-03

## 2017-11-03 VITALS
BODY MASS INDEX: 22.26 KG/M2 | DIASTOLIC BLOOD PRESSURE: 66 MMHG | SYSTOLIC BLOOD PRESSURE: 166 MMHG | HEART RATE: 66 BPM | WEIGHT: 121 LBS | HEIGHT: 62 IN | OXYGEN SATURATION: 97 %

## 2017-11-03 DIAGNOSIS — R41.3 MEMORY LOSS: ICD-10-CM

## 2017-11-03 DIAGNOSIS — G25.0 BENIGN ESSENTIAL TREMOR: ICD-10-CM

## 2017-11-03 DIAGNOSIS — R93.0 ABNORMAL MRI OF HEAD: ICD-10-CM

## 2017-11-03 DIAGNOSIS — F98.8 ATTENTION DEFICIT DISORDER, UNSPECIFIED HYPERACTIVITY PRESENCE: ICD-10-CM

## 2017-11-03 DIAGNOSIS — G60.8 IDIOPATHIC SMALL AND LARGE FIBER SENSORY NEUROPATHY: Primary | ICD-10-CM

## 2017-11-03 DIAGNOSIS — I65.23 STENOSIS OF BOTH CAROTID ARTERIES WITHOUT CEREBRAL INFARCTION: ICD-10-CM

## 2017-11-03 DIAGNOSIS — E11.42 DIABETIC PERIPHERAL NEUROPATHY ASSOCIATED WITH TYPE 2 DIABETES MELLITUS (HCC): ICD-10-CM

## 2017-11-03 RX ORDER — DONEPEZIL HYDROCHLORIDE 10 MG/1
TABLET, FILM COATED ORAL
Qty: 90 TAB | Refills: 5 | Status: SHIPPED | OUTPATIENT
Start: 2017-11-03 | End: 2017-12-04

## 2017-11-03 RX ORDER — CHOLECALCIFEROL (VITAMIN D3) 125 MCG
10 CAPSULE ORAL
COMMUNITY
End: 2019-01-10 | Stop reason: SDUPTHER

## 2017-11-03 NOTE — LETTER
11/3/2017 7:04 PM 
 
Patient:  Marissa Orosco YOB: 1926 Date of Visit: 11/3/2017 Dear No Recipients: Thank you for referring Ms. Jersey Choudhury to me for evaluation/treatment. Below are the relevant portions of my assessment and plan of care. Consult Subjective:  
 
Marissa Orosco is a 80 y.o. right-handed  female seen for evaluation of new problem of increasing difficulty carrying on conversation, and remembering words, and forgetting things more frequently in the last several months on referral from Dr. Karina Wu. The patient had previous neuropsych testing that suggested attention deficit disorder 2 years ago, but she is convinced that her memory is slowly worsening and her daughter seems to verify this. 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 She does not want to repeat neuropsych testing again. Her visual hallucinations have all gotten better now. Still does not sleep that well at nighttime sometimes. Her imaging of the brain in the past is just shown mild microvascular disease and atrophic changes. Her metabolic studies have shown normal B12 and vitamin D levels recently. She still has the essential tremor seems to be slowly progressive but not too bad today, and it does vary some. She is on metoprolol 55 mg twice a day. She has essential tremor in her arms and head, which was helped by the Mysoline, but she could not tolerate the sedation and GI side effects. Lyrica and Topamax also caused side effects. Patient tried Neurontin 100 mg and could not tolerate it. She has essential tremor, is already on a beta blocker, and I'm afraid to try amantadine because of all of her drug sensitivities. We tried her on Remeron at the lowest dose of 7.5 mg but she had side effects on that also. . Very difficult case.  She is also seen for progressive numbness in her feet,and neuropathy workup negative except for elevated hemoglobin A1c of 6.1 one year ago. B12 level was somewhat low and she is taking supplements now. Patient gives a five year history of action tremors in her hands when she tries to do things. It initially seemed to respond to her beta blockers, but now is getting worse. In addition she has progressive numbness in her feet for about 6 years. It is in both feet, and not associated with back pain, any numbness in her hands, or bowel or bladder difficulty. She is a latent diabetic, has no family history of tremor or neuropathy, and has had no toxin exposure, or precipitating causes for this. She also has progressive difficulty with memory, and seems to be forgetting things, and her family has noticed this. They feel she needs evaluation. She had an MRI scan 6 years ago that showed mild microvascular disease atrophic changes. She is on a diet, and watching her sugars well, and taking her vitamins and will increase her vitamin D up to 2000 units. She has had no other new focal weakness, sensory loss, visual changes or other Has had no new neurological symptoms or focal neurological deficits since last seen Patient did have abnormal MRI of the brain with lacunar type stroke and negative Dopplers and will continue aspirin Past Medical History:  
Diagnosis Date  Acute diverticulitis 9/25/2017  Allergic rhinitis 9/25/2017  Arrhythmia   
 afib  Arthritis  Asthma  Atrial fibrillation ([address]  Benign essential tremor 9/25/2017  Cancer ([address] skin  Celiac disease 9/25/2017  Constipation 9/25/2017  Diabetic peripheral neuropathy associated with type 2 diabetes mellitus ([address] 4/21/2016  Diarrhea 9/25/2017  Diverticulosis of large intestine without hemorrhage 9/25/2017  Early satiety 9/25/2017  Fuchs' corneal dystrophy 9/25/2017  Gastroesophageal reflux disease without esophagitis 9/25/2017  GERD (gastroesophageal reflux disease)  High cholesterol  Hypercholesterolemia 9/25/2017  Hypertension  Irritable bowel syndrome with diarrhea 9/25/2017  Numbness 9/25/2017  Osteopenia 9/25/2017  Other ill-defined conditions(799.89)   
 collapsed lung prior to hernia repair surgery  Ptosis, both eyelids 9/25/2017  Statin intolerance 9/25/2017  Unspecified adverse effect of anesthesia   
 pt states she went into cardiac arrest prior to hernia repair after the insertion of gas in stomach Past Surgical History:  
Procedure Laterality Date  HX APPENDECTOMY  HX CHOLECYSTECTOMY  HX GI    
 hemorrhoidectomy  HX HEENT    
 sinus surgery  HX HERNIA REPAIR    
 HX TONSILLECTOMY Family History Problem Relation Age of Onset  Heart Disease Mother  Dementia Mother  Heart Disease Father  Neuropathy Child  Depression Child  Other Child   
  diverticulitis Social History Substance Use Topics  Smoking status: Former Smoker Packs/day: 0.75 Years: 5.00 Quit date: 10/11/1967  Smokeless tobacco: Never Used  Alcohol use No  
   
 
Current Outpatient Prescriptions:  
  melatonin tab tablet, Take 10 mg by mouth nightly., Disp: , Rfl:  
  donepezil (ARICEPT) 10 mg tablet, 1/2 po qam pc for 1 month, then 1 po qam pc thereafter  Indications: MILD TO MODERATE ALZHEIMER'S TYPE DEMENTIA, Disp: 90 Tab, Rfl: 5 
  metoprolol tartrate (LOPRESSOR) 25 mg tablet, TAKE 1 TABLET TWICE A DAY, Disp: 180 Tab, Rfl: 2   cyanocobalamin (VITAMIN B-12) 100 mcg tablet, Take 100 mcg by mouth daily. , Disp: , Rfl:  
  NEXIUM 40 mg capsule, TAKE 1 CAPSULE DAILY, Disp: 90 Cap, Rfl: 3 
  fexofenadine (ALLEGRA) 60 mg tablet, Take 30 mg by mouth daily. , Disp: , Rfl:  
  Cholecalciferol, Vitamin D3, (VITAMIN D3) 1,000 unit cap, Take  by mouth., Disp: , Rfl:  
  triamterene-hydrochlorothiazide (DYAZIDE) 37.5-25 mg per capsule, Take 1 Cap by mouth daily. , Disp: , Rfl:  
  disopyramide (NORPACE) 100 mg capsule, Take  by mouth two (2) times a day.  , Disp: , Rfl:  
 
 
 
Allergies Allergen Reactions  Bactrim [Sulfamethoprim Ds] Other (comments)  
  consipation  Ciprofloxacin (Bulk) Other (comments) Low wbc  Iodine Unknown (comments)  Prednisone Other (comments)  
  tachycardia  Statins-Hmg-Coa Reductase Inhibitors Unknown (comments) Stomach uipset and mild muscle aches Review of Systems: A comprehensive review of systems was negative except for: Constitutional: positive for fatigue and malaise Cardiovascular: positive for palpitations, exertional chest pressure/discomfort Gastrointestinal: positive for dyspepsia, reflux symptoms and abdominal pain Musculoskeletal: positive for myalgias, arthralgias and stiff joints Neurological: positive for memory problems, paresthesia and tremor Behvioral/Psych: positive for anxiety and depression Vitals:  
 11/03/17 1437 BP: 166/66 Pulse: 66 SpO2: 97% Weight: 121 lb (54.9 kg) Height: 5' 2\" (1.575 m) Objective: I 
 
 
NEUROLOGICAL EXAM: 
 
Appearance: The patient is well developed, well nourished, provides a coherent history and is in no acute distress. Mental Status: Oriented to time, place and person and the president, patient has slight difficulty doing serial 7 subtractions, can remember 2 of 3 words at 30 seconds with distraction, can spell the word world backwards, and draw a clock that shows that time 10 minutes to 11. Mood and affect appropriate but slightly anxious  And depressed. Cranial Nerves:   Intact visual fields. Fundi are benign. SIM, EOM's full, no nystagmus, no ptosis. Facial sensation is normal. Corneal reflexes are not tested. Facial movement is symmetric. Hearing is abnormal bilaterally. Palate is midline with normal sternocleidomastoid and trapezius muscles are normal. Tongue is midline. Neck without meningismus or bruits Temporal arteries are not tender or enlarged Motor:  4/5 strength in upper and lower proximal and distal muscles. Normal bulk and tone. No fasciculations. Reflexes:   Deep tendon reflexes 1+/4 and symmetrical. 
No babinski or clonus present Sensory:   Abnormal to touch, pinprick and vibration and temperature in both feet to ankle level. DSS is intact Gait:  Normal gait for her age. Tremor:   Mild bilateral intention and head tremor noted. Cerebellar:  No cerebellar signs present. Neurovascular:  Normal heart sounds and regular rhythm, peripheral pulses decreased, and no carotid bruits. Assessment: ICD-10-CM ICD-9-CM 1. Idiopathic small and large fiber sensory neuropathy G60.8 356.4 melatonin tab tablet  
   donepezil (ARICEPT) 10 mg tablet 2. Diabetic peripheral neuropathy associated with type 2 diabetes mellitus (HCC) E11.42 250.60 melatonin tab tablet  
  357.2 donepezil (ARICEPT) 10 mg tablet 3. 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 Benign essential tremor G25.0 333.1 melatonin tab tablet  
   donepezil (ARICEPT) 10 mg tablet 5. Memory loss R41.3 780.93 melatonin tab tablet  
   donepezil (ARICEPT) 10 mg tablet 6. Abnormal MRI of head R93.0 793.0 melatonin tab tablet  
   donepezil (ARICEPT) 10 mg tablet 7. Attention deficit disorder, unspecified hyperactivity presence F98.8 314.00 melatonin tab tablet  
   donepezil (ARICEPT) 10 mg tablet Plan: She with progressive memory loss, and seems to be more impaired, will start her on Aricept and see how she does. Family and the patient advised of the risk of the medications and complications. In particular to watch for dizziness, increased sedation, stomach upset, or any side effect at all to call us immediately. Her hallucinations have all gotten better and not too much of a problem now. We will have her continue melatonin to see if that helps stabilize her sleep patterns reduce the hallucinations Patient is essential tremor is still present, but not to progressive so far, we will continue current therapy. Patient also has a neuropathy, etiology latent disbetes, and the patient is counseled again to watch her blood sugars and watch her diet carefully as the best control of her neuropathy Complete metabolic panels for neuropathy and memory loss, showed elevated hemoglobin A1c only and low vitamin D level and B12 Patient MRI of the brain did show lacunar type stroke with negative carotid Dopplers, continue baby aspirin and repeat MRI next visit Patient intolerant of Neurontin and Lyrica for her tremors and Remeron and Mysoline She is to take a multivitamin and vitamin D and B12 on a daily basis and remain mentally and physically active 35 minutes spent reviewing her records, examining the patient, and her daughter, discussing her condition with family and patient in detail, and further diagnosis and treatment They will call for results of tests, and followup in 6 months time or earlier if needed Signed By: Dee Antonio MD   
 November 3, 2017 This note will not be viewable in [address] If you have questions, please do not hesitate to call me. I look forward to following Ms. Ioana Kwok along with you. Sincerely, Dee Antonio MD

## 2017-11-03 NOTE — PROGRESS NOTES
Consult    Subjective:     Terrance Brittle is a 80 y.o. right-handed  female seen for evaluation of new problem of increasing difficulty carrying on conversation, and remembering words, and forgetting things more frequently in the last several months on referral from Dr. Celina Gamez. The patient had previous neuropsych testing that suggested attention deficit disorder 2 years ago, but she is convinced that her memory is slowly worsening and her daughter seems to verify this. 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 She does not want to repeat neuropsych testing again. Her visual hallucinations have all gotten better now. Still does not sleep that well at nighttime sometimes. Her imaging of the brain in the past is just shown mild microvascular disease and atrophic changes. Her metabolic studies have shown normal B12 and vitamin D levels recently. She still has the essential tremor seems to be slowly progressive but not too bad today, and it does vary some. She is on metoprolol 55 mg twice a day. She has essential tremor in her arms and head, which was helped by the Mysoline, but she could not tolerate the sedation and GI side effects. Lyrica and Topamax also caused side effects. Patient tried Neurontin 100 mg and could not tolerate it. She has essential tremor, is already on a beta blocker, and I'm afraid to try amantadine because of all of her drug sensitivities. We tried her on Remeron at the lowest dose of 7.5 mg but she had side effects on that also. . Very difficult case. She is also seen for progressive numbness in her feet,and neuropathy workup negative except for elevated hemoglobin A1c of 6.1 one year ago. B12 level was somewhat low and she is taking supplements now. Patient gives a five year history of action tremors in her hands when she tries to do things.  It initially seemed to respond to her beta blockers, but now is getting worse. In addition she has progressive numbness in her feet for about 6 years. It is in both feet, and not associated with back pain, any numbness in her hands, or bowel or bladder difficulty. She is a latent diabetic, has no family history of tremor or neuropathy, and has had no toxin exposure, or precipitating causes for this. She also has progressive difficulty with memory, and seems to be forgetting things, and her family has noticed this. They feel she needs evaluation. She had an MRI scan 6 years ago that showed mild microvascular disease atrophic changes. She is on a diet, and watching her sugars well, and taking her vitamins and will increase her vitamin D up to 2000 units.  She has had no other new focal weakness, sensory loss, visual changes or other  Has had no new neurological symptoms or focal neurological deficits since last seen  Patient did have abnormal MRI of the brain with lacunar type stroke and negative Dopplers and will continue aspirin    Past Medical History:   Diagnosis Date    Acute diverticulitis 9/25/2017    Allergic rhinitis 9/25/2017    Arrhythmia     afib    Arthritis     Asthma     Atrial fibrillation ([address]     Benign essential tremor 9/25/2017    Cancer ([address]     skin    Celiac disease 9/25/2017    Constipation 9/25/2017    Diabetic peripheral neuropathy associated with type 2 diabetes mellitus ([address] 4/21/2016    Diarrhea 9/25/2017    Diverticulosis of large intestine without hemorrhage 9/25/2017    Early satiety 9/25/2017    Fuchs' corneal dystrophy 9/25/2017    Gastroesophageal reflux disease without esophagitis 9/25/2017    GERD (gastroesophageal reflux disease)     High cholesterol     Hypercholesterolemia 9/25/2017    Hypertension     Irritable bowel syndrome with diarrhea 9/25/2017    Numbness 9/25/2017    Osteopenia 9/25/2017    Other ill-defined conditions(799.89)     collapsed lung prior to hernia repair surgery  Ptosis, both eyelids 9/25/2017    Statin intolerance 9/25/2017    Unspecified adverse effect of anesthesia     pt states she went into cardiac arrest prior to hernia repair after the insertion of gas in stomach      Past Surgical History:   Procedure Laterality Date    HX APPENDECTOMY      HX CHOLECYSTECTOMY      HX GI      hemorrhoidectomy    HX HEENT      sinus surgery    HX HERNIA REPAIR      HX TONSILLECTOMY       Family History   Problem Relation Age of Onset    Heart Disease Mother     Dementia Mother     Heart Disease Father     Neuropathy Child     Depression Child     Other Child      diverticulitis      Social History   Substance Use Topics    Smoking status: Former Smoker     Packs/day: 0.75     Years: 5.00     Quit date: 10/11/1967    Smokeless tobacco: Never Used    Alcohol use No         Current Outpatient Prescriptions:     melatonin tab tablet, Take 10 mg by mouth nightly., Disp: , Rfl:     donepezil (ARICEPT) 10 mg tablet, 1/2 po qam pc for 1 month, then 1 po qam pc thereafter  Indications: MILD TO MODERATE ALZHEIMER'S TYPE DEMENTIA, Disp: 90 Tab, Rfl: 5    metoprolol tartrate (LOPRESSOR) 25 mg tablet, TAKE 1 TABLET TWICE A DAY, Disp: 180 Tab, Rfl: 2    cyanocobalamin (VITAMIN B-12) 100 mcg tablet, Take 100 mcg by mouth daily. , Disp: , Rfl:     NEXIUM 40 mg capsule, TAKE 1 CAPSULE DAILY, Disp: 90 Cap, Rfl: 3    fexofenadine (ALLEGRA) 60 mg tablet, Take 30 mg by mouth daily. , Disp: , Rfl:     Cholecalciferol, Vitamin D3, (VITAMIN D3) 1,000 unit cap, Take  by mouth., Disp: , Rfl:     triamterene-hydrochlorothiazide (DYAZIDE) 37.5-25 mg per capsule, Take 1 Cap by mouth daily. , Disp: , Rfl:     disopyramide (NORPACE) 100 mg capsule, Take  by mouth two (2) times a day.  , Disp: , Rfl:         Allergies   Allergen Reactions    Bactrim [Sulfamethoprim Ds] Other (comments)     consipation    Ciprofloxacin (Bulk) Other (comments)     Low wbc    Iodine Unknown (comments)    Prednisone Other (comments)     tachycardia    Statins-Hmg-Coa Reductase Inhibitors Unknown (comments)     Stomach uipset and mild muscle aches        Review of Systems:  A comprehensive review of systems was negative except for: Constitutional: positive for fatigue and malaise  Cardiovascular: positive for palpitations, exertional chest pressure/discomfort  Gastrointestinal: positive for dyspepsia, reflux symptoms and abdominal pain  Musculoskeletal: positive for myalgias, arthralgias and stiff joints  Neurological: positive for memory problems, paresthesia and tremor  Behvioral/Psych: positive for anxiety and depression   Vitals:    11/03/17 1437   BP: 166/66   Pulse: 66   SpO2: 97%   Weight: 121 lb (54.9 kg)   Height: 5' 2\" (1.575 m)     Objective:     I      NEUROLOGICAL EXAM:    Appearance: The patient is well developed, well nourished, provides a coherent history and is in no acute distress. Mental Status: Oriented to time, place and person and the president, patient has slight difficulty doing serial 7 subtractions, can remember 2 of 3 words at 30 seconds with distraction, can spell the word world backwards, and draw a clock that shows that time 10 minutes to 11. Mood and affect appropriate but slightly anxious  And depressed. Cranial Nerves:   Intact visual fields. Fundi are benign. SIM, EOM's full, no nystagmus, no ptosis. Facial sensation is normal. Corneal reflexes are not tested. Facial movement is symmetric. Hearing is abnormal bilaterally. Palate is midline with normal sternocleidomastoid and trapezius muscles are normal. Tongue is midline. Neck without meningismus or bruits  Temporal arteries are not tender or enlarged   Motor:  4/5 strength in upper and lower proximal and distal muscles. Normal bulk and tone. No fasciculations.    Reflexes:   Deep tendon reflexes 1+/4 and symmetrical.  No babinski or clonus present   Sensory:   Abnormal to touch, pinprick and vibration and temperature in both feet to ankle level. DSS is intact   Gait:  Normal gait for her age. Tremor:   Mild bilateral intention and head tremor noted. Cerebellar:  No cerebellar signs present. Neurovascular:  Normal heart sounds and regular rhythm, peripheral pulses decreased, and no carotid bruits. Assessment:       ICD-10-CM ICD-9-CM    1. Idiopathic small and large fiber sensory neuropathy G60.8 356.4 melatonin tab tablet      donepezil (ARICEPT) 10 mg tablet   2. Diabetic peripheral neuropathy associated with type 2 diabetes mellitus (HCC) E11.42 250.60 melatonin tab tablet     357.2 donepezil (ARICEPT) 10 mg tablet   3. 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 Benign essential tremor G25.0 333.1 melatonin tab tablet      donepezil (ARICEPT) 10 mg tablet   5. Memory loss R41.3 780.93 melatonin tab tablet      donepezil (ARICEPT) 10 mg tablet   6. Abnormal MRI of head R93.0 793.0 melatonin tab tablet      donepezil (ARICEPT) 10 mg tablet   7. Attention deficit disorder, unspecified hyperactivity presence F98.8 314.00 melatonin tab tablet      donepezil (ARICEPT) 10 mg tablet         Plan:     She with progressive memory loss, and seems to be more impaired, will start her on Aricept and see how she does. Family and the patient advised of the risk of the medications and complications. In particular to watch for dizziness, increased sedation, stomach upset, or any side effect at all to call us immediately. Her hallucinations have all gotten better and not too much of a problem now. We will have her continue melatonin to see if that helps stabilize her sleep patterns reduce the hallucinations  Patient is essential tremor is still present, but not to progressive so far, we will continue current therapy.   Patient also has a neuropathy, etiology latent disbetes, and the patient is counseled again to watch her blood sugars and watch her diet carefully as the best control of her neuropathy  Complete metabolic panels for neuropathy and memory loss, showed elevated hemoglobin A1c only and low vitamin D level and B12  Patient MRI of the brain did show lacunar type stroke with negative carotid Dopplers, continue baby aspirin and repeat MRI next visit  Patient intolerant of Neurontin and Lyrica for her tremors and Remeron and Mysoline  She is to take a multivitamin and vitamin D and B12 on a daily basis and remain mentally and physically active  35 minutes spent reviewing her records, examining the patient, and her daughter, discussing her condition with family and patient in detail, and further diagnosis and treatment  They will call for results of tests, and followup in 6 months time or earlier if needed    Signed By: Maggi Rojas MD     November 3, 2017       This note will not be viewable in [address]

## 2017-11-03 NOTE — PATIENT INSTRUCTIONS

## 2017-11-03 NOTE — MR AVS SNAPSHOT
Visit Information Date & Time Provider Department Dept. Phone Encounter #  
 11/3/2017  2:20 PM Heath Meier MD Neurology [facility] at [facility] [phone] [id number] Follow-up Instructions Return in about 6 months (around 5/3/2018). Upcoming Health Maintenance Date Due  
 FOOT EXAM Q1 6/22/1936 MICROALBUMIN Q1 6/22/1936 EYE EXAM RETINAL OR DILATED Q1 6/22/1936 DTaP/Tdap/Td series (1 - Tdap) 6/22/1947 ZOSTER VACCINE AGE 60> 4/22/1986 GLAUCOMA SCREENING Q2Y 6/22/1991 Pneumococcal 65+ Low/Medium Risk (1 of 2 - PCV13) 6/22/1991 MEDICARE YEARLY EXAM 6/22/1991 LIPID PANEL Q1 8/20/2014 HEMOGLOBIN A1C Q6M 10/21/2016 Allergies as of 11/3/2017  Review Complete On: 11/3/2017 By: Heath Meier MD  
  
 Severity Noted Reaction Type Reactions Bactrim [Sulfamethoprim Ds]  05/14/2012    Other (comments)  
 consipation Ciprofloxacin (Bulk)  05/14/2012    Other (comments) Low wbc Iodine  09/25/2017    Unknown (comments) Prednisone  05/14/2012    Other (comments)  
 tachycardia Statins-hmg-coa Reductase Inhibitors  02/19/2013   Intolerance Unknown (comments) Stomach uipset and mild muscle aches Current Immunizations  Reviewed on 8/10/2017 Name Date Influenza High Dose Vaccine PF 11/1/2017 Influenza Vaccine 10/25/2016, 9/23/2014 Not reviewed this visit You Were Diagnosed With   
  
 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  
 166/66 66 5' 2\" (1.575 m) 121 lb (54.9 kg) 97% 22.13 kg/m2 OB Status Smoking Status Postmenopausal Former Smoker BMI and BSA Data Body Mass Index Body Surface Area  
 22.13 kg/m 2 1.55 m 2 Preferred Pharmacy Pharmacy Name Phone [address] [facility] [phone] Your Updated Medication List  
  
   
This list is accurate as of: 11/3/17  3:04 PM.  Always use your most recent med list.  
  
  
  
  
 donepezil 10 mg tablet Commonly known as:  ARICEPT  
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 fexofenadine 60 mg tablet Commonly known as:  Rod Holstein Take 30 mg by mouth daily. melatonin Tab tablet Take 10 mg by mouth nightly. metoprolol tartrate 25 mg tablet Commonly known as:  LOPRESSOR  
TAKE 1 TABLET TWICE A DAY NexIUM 40 mg capsule Generic drug:  esomeprazole TAKE 1 CAPSULE DAILY  
  
 NORPACE 100 mg capsule Generic drug:  disopyramide phosphate Take  by mouth two (2) times a day. VITAMIN B-12 100 mcg tablet Generic drug:  cyanocobalamin Take 100 mcg by mouth daily. VITAMIN D3 1,000 unit Cap Generic drug:  cholecalciferol Take  by mouth. Prescriptions Sent to Pharmacy Refills  
 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  
 Pharmacy: [address], [address] [facility] #: [phone] Follow-up Instructions Return in about 6 months (around 5/3/2018). Patient Instructions A Healthy Lifestyle: Care Instructions Your Care Instructions A healthy lifestyle can help you feel good, stay at a healthy weight, and have plenty of energy for both work and play. A healthy lifestyle is something you can share with your whole family. A healthy lifestyle also can lower your risk for serious health problems, such as high blood pressure, heart disease, and diabetes. You can follow a few steps listed below to improve your health and the health of your family. Follow-up care is a key part of your treatment and safety. Be sure to make and go to all appointments, and call your doctor if you are having problems. It's also a good idea to know your test results and keep a list of the medicines you take. How can you care for yourself at home? · Do not eat too much sugar, fat, or fast foods. You can still have dessert and treats now and then. The goal is moderation. · Start small to improve your eating habits. Pay attention to portion sizes, drink less juice and soda pop, and eat more fruits and vegetables. ¨ Eat a healthy amount of food. A 3-ounce serving of meat, for example, is about the size of a deck of cards. Fill the rest of your plate with vegetables and whole grains. ¨ Limit the amount of soda and sports drinks you have every day. Drink more water when you are thirsty. ¨ Eat at least 5 servings of fruits and vegetables every day. It may seem like a lot, but it is not hard to reach this goal. A serving or helping is 1 piece of fruit, 1 cup of vegetables, or 2 cups of leafy, raw vegetables. Have an apple or some carrot sticks as an afternoon snack instead of a candy bar. Try to have fruits and/or vegetables at every meal. 
· Make exercise part of your daily routine.  You may want to start with simple activities, such as walking, bicycling, or slow swimming. Try to be active 30 to 60 minutes every day. You do not need to do all 30 to 60 minutes all at once. For example, you can exercise 3 times a day for 10 or 20 minutes. Moderate exercise is safe for most people, but it is always a good idea to talk to your doctor before starting an exercise program. 
· Keep moving. Narda  the lawn, work in the garden, or Medicine in Practice. Take the stairs instead of the elevator at work. · If you smoke, quit. People who smoke have an increased risk for heart attack, stroke, cancer, and other lung illnesses. Quitting is hard, but there are ways to boost your chance of quitting tobacco for good. ¨ Use nicotine gum, patches, or lozenges. ¨ Ask your doctor about stop-smoking programs and medicines. ¨ Keep trying. In addition to reducing your risk of diseases in the future, you will notice some benefits soon after you stop using tobacco. If you have shortness of breath or asthma symptoms, they will likely get better within a few weeks after you quit. · Limit how much alcohol you drink. Moderate amounts of alcohol (up to 2 drinks a day for men, 1 drink a day for women) are okay. But drinking too much can lead to liver problems, high blood pressure, and other health problems. Family health If you have a family, there are many things you can do together to improve your health. · Eat meals together as a family as often as possible. · Eat healthy foods. This includes fruits, vegetables, lean meats and dairy, and whole grains. · Include your family in your fitness plan. Most people think of activities such as jogging or tennis as the way to fitness, but there are many ways you and your family can be more active. Anything that makes you breathe hard and gets your heart pumping is exercise. Here are some tips: 
¨ Walk to do errands or to take your child to school or the bus. ¨ Go for a family bike ride after dinner instead of watching TV. Where can you learn more? Go to http://pinky-shamar.info/. Enter K490 in the search box to learn more about \"A Healthy Lifestyle: Care Instructions. \" Current as of: May 12, 2017 Content Version: 11.4 © 0596-6932 [employer]. Care instructions adapted under license by [employer] (which disclaims liability or warranty for this information). If you have questions about a medical condition or this instruction, always ask your healthcare professional. [address] any warranty or liability for your use of this information. Introducing [facility]! Dear Gurdeep Nava: Thank you for requesting a [employer] account. Our records indicate that you already have an active [employer] account. You can access your account anytime at [email or web address] Did you know that you can access your hospital and ER discharge instructions at any time in [employer]? You can also review all of your test results from your hospital stay or ER visit. Additional Information If you have questions, please visit the Frequently Asked Questions section of the [employer] website at [email or web address] Remember, [employer] is NOT to be used for urgent needs. For medical emergencies, dial 911. Now available from your iPhone and Android! Please provide this summary of care documentation to your next provider. Your primary care clinician is listed as RISHI Scott. If you have any questions after today's visit, please call [phone].

## 2017-12-04 ENCOUNTER — APPOINTMENT (OUTPATIENT)
Dept: CT IMAGING | Age: 82
End: 2017-12-04
Attending: EMERGENCY MEDICINE
Payer: MEDICARE

## 2017-12-04 ENCOUNTER — HOSPITAL ENCOUNTER (EMERGENCY)
Age: 82
Discharge: HOME | End: 2017-12-04
Attending: EMERGENCY MEDICINE
Payer: MEDICARE

## 2017-12-04 ENCOUNTER — APPOINTMENT (OUTPATIENT)
Dept: GENERAL RADIOLOGY | Age: 82
End: 2017-12-04
Attending: EMERGENCY MEDICINE
Payer: MEDICARE

## 2017-12-04 VITALS
WEIGHT: 118 LBS | SYSTOLIC BLOOD PRESSURE: 159 MMHG | DIASTOLIC BLOOD PRESSURE: 73 MMHG | RESPIRATION RATE: 18 BRPM | TEMPERATURE: 97.7 F | OXYGEN SATURATION: 97 % | BODY MASS INDEX: 21.71 KG/M2 | HEART RATE: 82 BPM | HEIGHT: 62 IN

## 2017-12-04 DIAGNOSIS — S82.892A CLOSED FRACTURE OF LEFT ANKLE, INITIAL ENCOUNTER: Primary | ICD-10-CM

## 2017-12-04 LAB
ALBUMIN SERPL-MCNC: 3.3 G/DL (ref 3.5–5)
ALBUMIN/GLOB SERPL: 1.1 {RATIO} (ref 1.1–2.2)
ALP SERPL-CCNC: 56 U/L (ref 45–117)
ALT SERPL-CCNC: 23 U/L (ref 12–78)
ANION GAP SERPL CALC-SCNC: 6 MMOL/L (ref 5–15)
APPEARANCE UR: CLEAR
AST SERPL-CCNC: 16 U/L (ref 15–37)
BACTERIA URNS QL MICRO: NEGATIVE /HPF
BASOPHILS # BLD: 0 K/UL (ref 0–0.1)
BASOPHILS NFR BLD: 1 % (ref 0–1)
BILIRUB SERPL-MCNC: 0.3 MG/DL (ref 0.2–1)
BILIRUB UR QL: NEGATIVE
BUN SERPL-MCNC: 16 MG/DL (ref 6–20)
BUN/CREAT SERPL: 20 (ref 12–20)
CALCIUM SERPL-MCNC: 8.8 MG/DL (ref 8.5–10.1)
CHLORIDE SERPL-SCNC: 106 MMOL/L (ref 97–108)
CO2 SERPL-SCNC: 28 MMOL/L (ref 21–32)
COLOR UR: ABNORMAL
CREAT SERPL-MCNC: 0.8 MG/DL (ref 0.55–1.02)
DIFFERENTIAL METHOD BLD: ABNORMAL
EOSINOPHIL # BLD: 0.1 K/UL (ref 0–0.4)
EOSINOPHIL NFR BLD: 2 % (ref 0–7)
EPITH CASTS URNS QL MICRO: ABNORMAL /LPF
ERYTHROCYTE [DISTWIDTH] IN BLOOD BY AUTOMATED COUNT: 13.6 % (ref 11.5–14.5)
GLOBULIN SER CALC-MCNC: 3.1 G/DL (ref 2–4)
GLUCOSE SERPL-MCNC: 97 MG/DL (ref 65–100)
GLUCOSE UR STRIP.AUTO-MCNC: NEGATIVE MG/DL
HCT VFR BLD AUTO: 38.7 % (ref 35–47)
HGB BLD-MCNC: 13.1 G/DL (ref 11.5–16)
HGB UR QL STRIP: NEGATIVE
KETONES UR QL STRIP.AUTO: NEGATIVE MG/DL
LEUKOCYTE ESTERASE UR QL STRIP.AUTO: ABNORMAL
LYMPHOCYTES # BLD: 0.7 K/UL (ref 0.8–3.5)
LYMPHOCYTES NFR BLD: 19 % (ref 12–49)
MCH RBC QN AUTO: 30.3 PG (ref 26–34)
MCHC RBC AUTO-ENTMCNC: 33.9 G/DL (ref 30–36.5)
MCV RBC AUTO: 89.6 FL (ref 80–99)
MONOCYTES # BLD: 0.5 K/UL (ref 0–1)
MONOCYTES NFR BLD: 13 % (ref 5–13)
NEUTS SEG # BLD: 2.6 K/UL (ref 1.8–8)
NEUTS SEG NFR BLD: 65 % (ref 32–75)
NITRITE UR QL STRIP.AUTO: NEGATIVE
PH UR STRIP: 7 [PH] (ref 5–8)
PLATELET # BLD AUTO: 193 K/UL (ref 150–400)
POTASSIUM SERPL-SCNC: 3.9 MMOL/L (ref 3.5–5.1)
PROT SERPL-MCNC: 6.4 G/DL (ref 6.4–8.2)
PROT UR STRIP-MCNC: NEGATIVE MG/DL
RBC # BLD AUTO: 4.32 M/UL (ref 3.8–5.2)
RBC #/AREA URNS HPF: ABNORMAL /HPF (ref 0–5)
RBC MORPH BLD: ABNORMAL
SODIUM SERPL-SCNC: 140 MMOL/L (ref 136–145)
SP GR UR REFRACTOMETRY: 1.01 (ref 1–1.03)
UA: UC IF INDICATED,UAUC: ABNORMAL
UROBILINOGEN UR QL STRIP.AUTO: 0.2 EU/DL (ref 0.2–1)
WBC # BLD AUTO: 3.9 K/UL (ref 3.6–11)
WBC URNS QL MICRO: ABNORMAL /HPF (ref 0–4)

## 2017-12-04 PROCEDURE — 85025 COMPLETE CBC W/AUTO DIFF WBC: CPT | Performed by: EMERGENCY MEDICINE

## 2017-12-04 PROCEDURE — 93005 ELECTROCARDIOGRAM TRACING: CPT

## 2017-12-04 PROCEDURE — 80053 COMPREHEN METABOLIC PANEL: CPT | Performed by: EMERGENCY MEDICINE

## 2017-12-04 PROCEDURE — 97116 GAIT TRAINING THERAPY: CPT

## 2017-12-04 PROCEDURE — G8978 MOBILITY CURRENT STATUS: HCPCS

## 2017-12-04 PROCEDURE — G8979 MOBILITY GOAL STATUS: HCPCS

## 2017-12-04 PROCEDURE — 99285 EMERGENCY DEPT VISIT HI MDM: CPT

## 2017-12-04 PROCEDURE — 36415 COLL VENOUS BLD VENIPUNCTURE: CPT | Performed by: EMERGENCY MEDICINE

## 2017-12-04 PROCEDURE — 70450 CT HEAD/BRAIN W/O DYE: CPT

## 2017-12-04 PROCEDURE — 77030036687 HC SHOE PSTOP S2SG -A

## 2017-12-04 PROCEDURE — 73610 X-RAY EXAM OF ANKLE: CPT

## 2017-12-04 PROCEDURE — G8980 MOBILITY D/C STATUS: HCPCS

## 2017-12-04 PROCEDURE — 97161 PT EVAL LOW COMPLEX 20 MIN: CPT

## 2017-12-04 PROCEDURE — 81001 URINALYSIS AUTO W/SCOPE: CPT | Performed by: EMERGENCY MEDICINE

## 2017-12-04 RX ORDER — ADHESIVE BANDAGE
30 BANDAGE TOPICAL DAILY PRN
COMMUNITY
End: 2019-07-24

## 2017-12-04 RX ORDER — ASPIRIN 81 MG/1
81 TABLET ORAL DAILY
COMMUNITY
End: 2018-03-06 | Stop reason: ALTCHOICE

## 2017-12-04 RX ORDER — TRAMADOL HYDROCHLORIDE 50 MG/1
50 TABLET ORAL
Qty: 20 TAB | Refills: 0 | Status: SHIPPED | OUTPATIENT
Start: 2017-12-04 | End: 2018-02-02 | Stop reason: ALTCHOICE

## 2017-12-04 NOTE — ED NOTES
Assumed care, pt resting in bed, call bell within reach, family at bedside, awaiting PT eval and [facility] transport home

## 2017-12-04 NOTE — DISCHARGE INSTRUCTIONS
Broken Ankle: Care Instructions  Your Care Instructions    An ankle may break (fracture) during sports, a fall, or other accidents. Fractures can range from a small, hairline crack, to a bone or bones broken into two or more pieces. Your treatment depends on how bad the break is. Your doctor may have put your ankle in a splint or cast to allow it to heal or to keep it stable until you see another doctor. It may take weeks or months for your ankle to heal. You can help your ankle heal with some care at home. You heal best when you take good care of yourself. Eat a variety of healthy foods, and don't smoke. You may have had a sedative to help you relax. You may be unsteady after having sedation. It can take a few hours for the medicine's effects to wear off. Common side effects of sedation include nausea, vomiting, and feeling sleepy or tired. The doctor has checked you carefully, but problems can develop later. If you notice any problems or new symptoms,  get medical treatment right away. Follow-up care is a key part of your treatment and safety. Be sure to make and go to all appointments, and call your doctor if you are having problems. It's also a good idea to know your test results and keep a list of the medicines you take. How can you care for yourself at home? · If the doctor gave you a sedative:  ¨ For 24 hours, don't do anything that requires attention to detail. It takes time for the medicine's effects to completely wear off. ¨ For your safety, do not drive or operate any machinery that could be dangerous. Wait until the medicine wears off and you can think clearly and react easily. · Put ice or a cold pack on your ankle for 10 to 20 minutes at a time. Try to do this every 1 to 2 hours for the next 3 days (when you are awake). Put a thin cloth between the ice and your cast or splint. Keep your cast or splint dry. · Follow the cast care instructions your doctor gives you.  If you have a splint, do not take it off unless your doctor tells you to. · Be safe with medicines. Take pain medicines exactly as directed. ¨ If the doctor gave you a prescription medicine for pain, take it as prescribed. ¨ If you are not taking a prescription pain medicine, ask your doctor if you can take an over-the-counter medicine. · Prop up your leg on pillows in the first few days after the injury. Keep the ankle higher than the level of your heart. This will help reduce swelling. · Do not put weight on your ankle unless your doctor tells you to. Use crutches to walk. · Follow instructions for exercises to keep your leg strong. · Wiggle your toes often to reduce swelling and stiffness. When should you call for help? Call 911 anytime you think you may need emergency care. For example, call if:  ? · You have chest pain, are short of breath, or you cough up blood. ? · You are very sleepy and you have trouble waking up. ?Call your doctor now or seek immediate medical care if:  ? · You have new or worse nausea or vomiting. ? · You have new or worse pain. ? · Your foot is cool or pale or changes color. ? · You have tingling, weakness, or numbness in your toes. ? · Your cast or splint feels too tight. ? · You have signs of a blood clot in your leg (called a deep vein thrombosis), such as:  ¨ Pain in your calf, back of the knee, thigh, or groin. ¨ Redness or swelling in your leg. ? Watch closely for changes in your health, and be sure to contact your doctor if:  ? · You have a problem with your splint or cast.   ? · You do not get better as expected. Where can you learn more? Go to http://pinky-shamar.info/. Enter P763 in the search box to learn more about \"Broken Ankle: Care Instructions. \"  Current as of: March 21, 2017  Content Version: 11.4  © 9070-7134 [employer].  Care instructions adapted under license by [employer] (which disclaims liability or warranty for this information). If you have questions about a medical condition or this instruction, always ask your healthcare professional. [place] [address] any warranty or liability for your use of this information.

## 2017-12-04 NOTE — PROGRESS NOTES
physical Therapy Emergency Department EVALUATION/DISCHARGE with CMS G codes  Patient: Марина Torres (58 y.o. female)  Date: 12/4/2017  Primary Diagnosis: There are no admission diagnoses documented for this encounter. Precautions: WBAT LLE     ASSESSMENT :  Based on the objective data described below, the patient presents with limitations in gait, balance and activity tolerance s/p fall resulting in L fibular fx. Pt in air cast and spoke with nurse who relays that physician confirmed WBAT. Pt lives partially alone, dtr with her part of week; however, dtr will stay with her currently post fx until pt stable. Pt normally walks with furniture cruising in the home and [facility] out of the home. She has 5 steps to enter. Inside she is mainly on one level but does have one step into sunken living room. Currently, pt is independent in her bed mobility and transfers. She is able after instruction to amb with RW with WBAT (self limits to less than 50%) with SBA only and vcs. She shows good balance with RW. Reviewed stair amb and walker height adjust. Dtr present for all training. Handouts for written review issued for walker adjust, amb sequencing, stair amb, and home safety. Dtr states she will be staying with pt until pt able to function independently. Pt and dtr express confidence with training. Discussed possibility of home safety check, but pt and dtr state they will evaluate how it is going at home and if needed will request referral from PCP/ortho on follow-up. Rounded with CM. She met with pt and dtr/pt choose to obtain walker on their own. They voice no further questions. Pt ready for d/c to home with dtr. Further acute physical therapy in the ED is not indicated at this time.      PLAN :  Discharge Recommendations:     [x]   Home with family  []   Skilled nursing facility  []   Admission to hospital with rehab likely needed  []   Inpatient rehab referral  []   Outpatient physical therapy referral  [] Other:    Further Equipment Recommendations for Discharge:   [x]   Rolling walker with 5\" wheels  []   Crutches   []   Cane   []   Wheelchair   []   Other:     COMMUNICATION/EDUCATION:   Communication/Collaboration:  [x]   Fall prevention education was provided and the patient/caregiver indicated understanding. [x]   Patient/family have participated as able and agree with findings and recommendations. []   Patient is unable to participate in plan of care at this time. Findings and recommendations were discussed with: MD physician and Registered Nurse and care manager       SUBJECTIVE:   Patient stated It hurts a little, it is better than before.     OBJECTIVE DATA SUMMARY:     Past Medical History:   Diagnosis Date    Acute diverticulitis 9/25/2017    Allergic rhinitis 9/25/2017    Arrhythmia     afib    Arthritis     Asthma     [facility]     Benign essential tremor 9/25/2017    Cancer ([facility]     skin    Celiac disease 9/25/2017    Constipation 9/25/2017    Diabetic peripheral neuropathy associated with type 2 diabetes mellitus ([facility] [address] 4/21/2016    Diarrhea 9/25/2017    Diverticulosis of large intestine without hemorrhage 9/25/2017    Early satiety 9/25/2017    Fuchs' corneal dystrophy 9/25/2017    Gastroesophageal reflux disease without esophagitis 9/25/2017    GERD (gastroesophageal reflux disease)     High cholesterol     Hypercholesterolemia 9/25/2017    Hypertension     Irritable bowel syndrome with diarrhea 9/25/2017    Numbness 9/25/2017    Osteopenia 9/25/2017    Other ill-defined conditions(799.89)     collapsed lung prior to hernia repair surgery    Ptosis, both eyelids 9/25/2017    Statin intolerance 9/25/2017    Unspecified adverse effect of anesthesia     pt states she went into cardiac arrest prior to hernia repair after the insertion of gas in stomach     Past Surgical History:   Procedure Laterality Date    HX APPENDECTOMY      HX CHOLECYSTECTOMY      HX GI      hemorrhoidectomy    HX HEENT      sinus surgery    HX HERNIA REPAIR      HX TONSILLECTOMY       Prior Level of Function/Home Situation: Pt lives partially alone, dtr with her part of week; however, dtr will stay with her currently post fx until pt stable. Pt normally walks with furniture cruising in the home and [facility] out of the home. She has 5 steps to enter. Inside she is mainly on one level but does have one step into sunken living room. Home Situation  Home Environment: Private residence  # Steps to Enter: 5  Rails to Enter: Yes  Hand Rails : Bilateral (wide)  One/Two Story Residence: One story (does have one step to living room)  Living Alone: No (dtr will be with her)  Support Systems: Child(maggie)  Patient Expects to be Discharged to[de-identified] Private residence  Current DME Used/Available at Home: molly Daniel, Shower chair  Critical Behavior:  Neurologic State: Alert  Orientation Level: Oriented X4  Cognition: Follows commands     Strength:    Strength:  Within functional limits                    Tone & Sensation:   Tone: Normal              Sensation: Intact               Range Of Motion:  AROM: Within functional limits (except L ankle in air splint)           PROM: Within functional limits (L ankle not stressed)           Coordination:  Coordination: Within functional limits    Functional Mobility:  Bed Mobility:  Rolling: Independent  Supine to Sit: Independent  Sit to Supine: Independent     Transfers:  Sit to Stand: Independent  Stand to Sit: Independent                       Balance:   Sitting: Intact  Standing: Impaired  Standing - Static: Good  Standing - Dynamic : Good (with RW)  Ambulation/Gait Training:  Distance (ft): 75 Feet (ft)  Assistive Device: Walker, rolling  Ambulation - Level of Assistance: Stand-by asssistance        Gait Abnormalities: Antalgic (on L)     Left Side Weight Bearing: As tolerated (per RN/MD)  Base of Support: Shift to right  Stance: Left decreased  Speed/Brittni: Slow  Step Length: Right shortened             Functional Measure:  Barthel Index:    Bathin (at sink)  Bladder: 10  Bowels: 10  Groomin  Dressin  Feeding: 10  Mobility: 10  Stairs: 5  Toilet Use: 10  Transfer (Bed to Chair and Back): 15  Total: 85       Barthel and G-code impairment scale:  Percentage of impairment CH  0% CI  1-19% CJ  20-39% CK  40-59% CL  60-79% CM  80-99% CN  100%   Barthel Score 0-100 100 99-80 79-60 59-40 20-39 1-19   0   Barthel Score 0-20 20 17-19 13-16 9-12 5-8 1-4 0      The Barthel ADL Index: Guidelines  1. The index should be used as a record of what a patient does, not as a record of what a patient could do. 2. The main aim is to establish degree of independence from any help, physical or verbal, however minor and for whatever reason. 3. The need for supervision renders the patient not independent. 4. A patient's performance should be established using the best available evidence. Asking the patient, friends/relatives and nurses are the usual sources, but direct observation and common sense are also important. However direct testing is not needed. 5. Usually the patient's performance over the preceding 24-48 hours is important, but occasionally longer periods will be relevant. 6. Middle categories imply that the patient supplies over 50 per cent of the effort. 7. Use of aids to be independent is allowed. Mars Peralta., Barthel, DPjW. (1835). Functional evaluation: the Barthel Index. [facility] (14)2. ASHLEY Thomas, [place]ancho Howell, [facility] (). Measuring the change indisability after inpatient rehabilitation; comparison of the responsiveness of the Barthel Index and Functional Susan Measure. Journal of Neurology, Neurosurgery, and Psychiatry, 66(4), 941-371. Preet Gannon, N.OMAR.DI, CINTHIA Flores, & Teresa Montes MCESAR. (2004.) Assessment of post-stroke quality of life in cost-effectiveness studies:  The usefulness of the Barthel Index and the EuroQoL-5D. Quality of Life Research, 13, 792-16       In compliance with CMSs Claims Based Outcome Reporting, the following G-code set was chosen for this patient based on their primary functional limitation being treated: The outcome measure chosen to determine the severity of the functional limitation was the barthel with a score of 85/100 which was correlated with the impairment scale. ? Mobility - Walking and Moving Around:     - CURRENT STATUS: CI - 1%-19% impaired, limited or restricted    - GOAL STATUS: CI - 1%-19% impaired, limited or restricted    - D/C STATUS:  CI - 1%-19% impaired, limited or restricted     Pain:Pain Scale 1: Numeric (0 - 10)  Pain Intensity 1: 6  Pain Location 1: Ankle  Pain Orientation 1: Left  Pain Description 1: Aching; Intermittent     Activity Tolerance:   Good for session; no acute distress    Please refer to the flowsheet for vital signs taken during this treatment.   After treatment:   []         Patient left in no apparent distress sitting up in chair  [x]         Patient left in no apparent distress in bed  [x]         Call bell left within reach  [x]         Nursing notified  [x]         Caregiver present  []         Bed alarm activated        Thank you for this referral.  Gilma Treviño, PT   Time Calculation: 32 mins

## 2017-12-04 NOTE — ED NOTES
Pt dressed and removed off monitor per family request. [facility]ing for [facility]. Bedside and Verbal shift change report given to MILLY Moreno (oncoming nurse) by Ashley Buenrostro (offgoing nurse). Report included the following information SBAR, ED Summary, Intake/Output, MAR and Recent Results. Monitor x 0. Call bell in reach. Bed in lowest position, with side rails up x 2. Pt updated on plan of care. Family at bedside.

## 2017-12-04 NOTE — ED PROVIDER NOTES
EMERGENCY DEPARTMENT HISTORY AND PHYSICAL EXAM      Date: 12/4/2017  Patient Name: Denise Woodall    History of Presenting Illness     Chief Complaint   Patient presents with   Erickson Luria Fall     on the way to the bathroom. Denies LOC       History Provided By: Patient and EMS    HPI: Denise Woodall is a 80 y.o. female, who presents via EMS to the ED for evaluation s/p GLF earlier this morning. Pt states she was attempting to ambulate to the bathroom when \"I just started to slip down to the floor\". On evaluation in the ED, pt currently c/o L ankle and L sided facial pain. She notes her daughter was able to help her get up off the ground and ambulate to the bathroom. Pt denies any recent medications for her current sxs. She notes she is intermittently ambulatory with a cane at baseline, but states she only uses it as needed. Pt otherwise specifically denies any recent LOC, fever, chills, nausea, vomiting, diarrhea, abd pain, CP, SOB, lightheadedness, dizziness, numbness, weakness, tingling, BLE swelling, HA, heart palpitations, urinary sxs, changes in BM, changes in PO intake, melena, hematochezia, cough, or congestion. PCP: Melquiades Toth MD   Cardiology: [facility] Hx: -tobacco (former 5514), -EtOH, -Illicit Drugs    There are no other complaints, changes, or physical findings at this time. Current Outpatient Prescriptions   Medication Sig Dispense Refill    aspirin delayed-release 81 mg tablet Take 81 mg by mouth daily.  magnesium hydroxide (YOUNG MILK OF MAGNESIA) 400 mg/5 mL suspension Take 30 mL by mouth daily as needed for Constipation.  psyllium (METAMUCIL) powd Take 1 Dose by mouth daily.  traMADol (ULTRAM) 50 mg tablet Take 1 Tab by mouth every six (6) hours as needed for Pain. Max Daily Amount: 200 mg. 20 Tab 0    melatonin tab tablet Take 10 mg by mouth nightly.       metoprolol tartrate (LOPRESSOR) 25 mg tablet TAKE 1 TABLET TWICE A  Tab 2    cyanocobalamin (VITAMIN B-12) 100 mcg tablet Take 100 mcg by mouth daily.  NEXIUM 40 mg capsule TAKE 1 CAPSULE DAILY 90 Cap 3    fexofenadine (ALLEGRA) 60 mg tablet Take 30 mg by mouth daily.  Cholecalciferol, Vitamin D3, (VITAMIN D3) 1,000 unit cap Take  by mouth.  triamterene-hydrochlorothiazide (DYAZIDE) 37.5-25 mg per capsule Take 1 Cap by mouth daily.  disopyramide (NORPACE) 100 mg capsule Take  by mouth two (2) times a day.            Past History     Past Medical History:  Past Medical History:   Diagnosis Date    Acute diverticulitis 9/25/2017    Allergic rhinitis 9/25/2017    Arrhythmia     afib    Arthritis     Asthma     [facility] ([facility]     Benign essential tremor 9/25/2017    Cancer ([facility]     skin    Celiac disease 9/25/2017    Constipation 9/25/2017    Diabetic peripheral neuropathy associated with type 2 diabetes mellitus ([facility] 4/21/2016    Diarrhea 9/25/2017    Diverticulosis of large intestine without hemorrhage 9/25/2017    Early satiety 9/25/2017    Fuchs' corneal dystrophy 9/25/2017    Gastroesophageal reflux disease without esophagitis 9/25/2017    GERD (gastroesophageal reflux disease)     High cholesterol     Hypercholesterolemia 9/25/2017    Hypertension     Irritable bowel syndrome with diarrhea 9/25/2017    Numbness 9/25/2017    Osteopenia 9/25/2017    Other ill-defined conditions(799.89)     collapsed lung prior to hernia repair surgery    Ptosis, both eyelids 9/25/2017    Statin intolerance 9/25/2017    Unspecified adverse effect of anesthesia     pt states she went into cardiac arrest prior to hernia repair after the insertion of gas in stomach       Past Surgical History:  Past Surgical History:   Procedure Laterality Date    HX APPENDECTOMY      HX CHOLECYSTECTOMY      HX GI      hemorrhoidectomy    HX HEENT      sinus surgery    HX HERNIA REPAIR      HX TONSILLECTOMY         Family History:  Family History   Problem Relation Age of Onset  Heart Disease Mother     Dementia Mother     Heart Disease Father     Neuropathy Child     Depression Child     Other Child      diverticulitis       Social History:  Social History   Substance Use Topics    Smoking status: Former Smoker     Packs/day: 0.75     Years: 5.00     Quit date: 10/11/1967    Smokeless tobacco: Never Used    Alcohol use No       Allergies: Allergies   Allergen Reactions    Bactrim [Sulfamethoprim Ds] Other (comments)     consipation    Ciprofloxacin (Bulk) Other (comments)     Low wbc    Iodine Unknown (comments)    Prednisone Other (comments)     tachycardia    Statins-Hmg-Coa Reductase Inhibitors Unknown (comments)     Stomach uipset and mild muscle aches         Review of Systems   Review of Systems   Constitutional: Negative for chills and fever. HENT: Negative for congestion, ear pain, rhinorrhea and sore throat.         +L sided facial pain   Respiratory: Negative for cough and shortness of breath. Cardiovascular: Negative for chest pain, palpitations and leg swelling. Gastrointestinal: Negative for abdominal pain, constipation, diarrhea, nausea and vomiting. No melena  No hematochezia   Endocrine: Negative for polyuria. Genitourinary: Negative for dysuria, frequency and hematuria. Musculoskeletal: Positive for arthralgias (L ankle). Neurological: Negative for dizziness, weakness, light-headedness, numbness and headaches. No tingling  No LOC   All other systems reviewed and are negative. Physical Exam   Physical Exam   Nursing note and vitals reviewed.     General appearance - well nourished, well appearing, and in no distress  Eyes - pupils equal and reactive, extraocular eye movements intact  ENT - mucous membranes moist, pharynx normal without lesions, , ecchymosis and swelling over her L cheek; normal bite  Neck - supple, no significant adenopathy; non-tender to palpation  Chest - clear to auscultation, no wheezes, rales or rhonchi; non-tender to palpation  Heart - normal rate and regular rhythm, S1 and S2 normal, no murmurs noted  Abdomen - soft, nontender, nondistended, no masses or organomegaly  Musculoskeletal - no joint deformity or swelling; decreased ROM secondary to pain, tenderness over the L lateral malleolus  Extremities - peripheral pulses normal, no pedal edema  Skin - normal coloration and turgor, no rashes  Neurological - alert, oriented x3, normal speech, no focal findings or movement disorder noted  Written by Loli Majano ED Scribe, as dictated by Armin Sheffield MD      Diagnostic Study Results     Labs -     Recent Results (from the past 12 hour(s))   CBC WITH AUTOMATED DIFF    Collection Time: 12/04/17  5:41 AM   Result Value Ref Range    WBC 3.9 3.6 - 11.0 K/uL    RBC 4.32 3.80 - 5.20 M/uL    HGB 13.1 11.5 - 16.0 g/dL    HCT 38.7 35.0 - 47.0 %    MCV 89.6 80.0 - 99.0 FL    MCH 30.3 26.0 - 34.0 PG    MCHC 33.9 30.0 - 36.5 g/dL    RDW 13.6 11.5 - 14.5 %    PLATELET 346 090 - 115 K/uL    NEUTROPHILS 65 32 - 75 %    LYMPHOCYTES 19 12 - 49 %    MONOCYTES 13 5 - 13 %    EOSINOPHILS 2 0 - 7 %    BASOPHILS 1 0 - 1 %    ABS. NEUTROPHILS 2.6 1.8 - 8.0 K/UL    ABS. LYMPHOCYTES 0.7 (L) 0.8 - 3.5 K/UL    ABS. MONOCYTES 0.5 0.0 - 1.0 K/UL    ABS. EOSINOPHILS 0.1 0.0 - 0.4 K/UL    ABS.  BASOPHILS 0.0 0.0 - 0.1 K/UL    RBC COMMENTS NORMOCYTIC, NORMOCHROMIC      DF SMEAR SCANNED     URINALYSIS W/ REFLEX CULTURE    Collection Time: 12/04/17  5:41 AM   Result Value Ref Range    Color YELLOW/STRAW      Appearance CLEAR CLEAR      Specific gravity 1.011 1.003 - 1.030      pH (UA) 7.0 5.0 - 8.0      Protein NEGATIVE  NEG mg/dL    Glucose NEGATIVE  NEG mg/dL    Ketone NEGATIVE  NEG mg/dL    Bilirubin NEGATIVE  NEG      Blood NEGATIVE  NEG      Urobilinogen 0.2 0.2 - 1.0 EU/dL    Nitrites NEGATIVE  NEG      Leukocyte Esterase TRACE (A) NEG      WBC 0-4 0 - 4 /hpf    RBC 0-5 0 - 5 /hpf    Epithelial cells FEW FEW /lpf    Bacteria NEGATIVE NEG /hpf    UA:UC IF INDICATED CULTURE NOT INDICATED BY UA RESULT CNI     METABOLIC PANEL, COMPREHENSIVE    Collection Time: 12/04/17  5:41 AM   Result Value Ref Range    Sodium 140 136 - 145 mmol/L    Potassium 3.9 3.5 - 5.1 mmol/L    Chloride 106 97 - 108 mmol/L    CO2 28 21 - 32 mmol/L    Anion gap 6 5 - 15 mmol/L    Glucose 97 65 - 100 mg/dL    BUN 16 6 - 20 MG/DL    Creatinine 0.80 0.55 - 1.02 MG/DL    BUN/Creatinine ratio 20 12 - 20      GFR est AA >60 >60 ml/min/1.73m2    GFR est non-AA >60 >60 ml/min/1.73m2    Calcium 8.8 8.5 - 10.1 MG/DL    Bilirubin, total 0.3 0.2 - 1.0 MG/DL    ALT (SGPT) 23 12 - 78 U/L    AST (SGOT) 16 15 - 37 U/L    Alk. phosphatase 56 45 - 117 U/L    Protein, total 6.4 6.4 - 8.2 g/dL    Albumin 3.3 (L) 3.5 - 5.0 g/dL    Globulin 3.1 2.0 - 4.0 g/dL    A-G Ratio 1.1 1.1 - 2.2     EKG, 12 LEAD, INITIAL    Collection Time: 12/04/17  5:42 AM   Result Value Ref Range    Ventricular Rate 73 BPM    Atrial Rate 73 BPM    P-R Interval 154 ms    QRS Duration 78 ms    Q-T Interval 408 ms    QTC Calculation (Bezet) 449 ms    Calculated P Axis 67 degrees    Calculated R Axis 24 degrees    Calculated T Axis 51 degrees    Diagnosis       Normal sinus rhythm  Possible Left atrial enlargement  Borderline ECG  No previous ECGs available         Radiologic Studies -   CT HEAD WO CONT   Final Result      XR ANKLE LT MIN 3 V   Final Result        CT Results  (Last 48 hours)               12/04/17 0451  CT HEAD WO CONT Final result    Impression:  IMPRESSION: Right basal ganglia lacunae, interval small right cerebellar infarct   which has a chronic appearance, and chronic white matter changes compatible with   small vessel ischemic and/or senescent change. No acute hemorrhage or infarct. Cranial atherosclerosis. Paranasal sinus disease. Narrative:  EXAM:  CT HEAD WO CONT       INDICATION:   Ground-level fall this a.m. attempting to go the bathroom. COMPARISON: MRI brain 4/30/2015. [facility] CONTRAST:  None. TECHNIQUE: Unenhanced CT of the head was performed using 5 mm images. Brain and   bone windows were generated. CT dose reduction was achieved through use of a   standardized protocol tailored for this examination and automatic exposure   control for dose modulation. FINDINGS:   The ventricles and sulci are normal in size, shape and configuration and   midline. Chronic right basal ganglia lacune noted. There is a small focus of   encephalomalacia in the superior right cerebellar hemisphere, not seen on prior   MRI. There is periventricular white matter hypodensity corresponding with T2   signal elevation on prior MRI. There is no intracranial hemorrhage, extra-axial   collection, mass, mass effect or midline shift. The basilar cisterns are open. No acute infarct is identified. Atherosclerotic calcifications affect the   carotid siphons and vertebrobasilar system. The bone windows demonstrate no   abnormalities. The visualized portions of the paranasal sinuses and mastoid air   cells show some posterior debris within the maxillary sinus disease and some   minimal mucoperiosteal thickening in the ethmoid sinus disease. .               CXR Results  (Last 48 hours)    None            Medical Decision Making   I am the first provider for this patient. I reviewed the vital signs, available nursing notes, past medical history, past surgical history, family history and social history. Vital Signs-Reviewed the patient's vital signs.   Patient Vitals for the past 12 hrs:   Temp Pulse Resp BP SpO2   12/04/17 0711 - 82 18 159/73 97 %   12/04/17 0700 - 77 18 - 95 %   12/04/17 0533 - 73 - 149/71 95 %   12/04/17 0515 - 73 18 152/72 96 %   12/04/17 0500 - 76 18 166/72 97 %   12/04/17 0431 - 77 18 167/68 98 %   12/04/17 0422 97.7 °F (36.5 °C) 78 18 159/80 95 %       Pulse Oximetry Analysis - 96% on RA    Cardiac Monitor:   Rate: 79bpm  Rhythm: Normal Sinus Rhythm       Records Reviewed: Nursing Notes, Old Medical Records, Previous electrocardiograms, Ambulance Run Sheet, Previous Radiology Studies and Previous Laboratory Studies    Provider Notes (Medical Decision Making):     DDx: sprain, strain, fracture, contusion, abrasions, UTI, ICH    ED Course:   Initial assessment performed. The patients presenting problems have been discussed, and they are in agreement with the care plan formulated and outlined with them. I have encouraged them to ask questions as they arise throughout their visit. EKG interpretation: (Preliminary) 0545  Rhythm: normal sinus rhythm. Rate (approx.): 73bpm; Axis: normal; Normal WV, QRS, QTc intervals; ST/T wave: no ischemic changes; Other findings: non-ischemic. Written by Bam Waters, ED Scribe, as dictated by Drea Barnes MD    PROGRESS NOTE:  6:37 AM  Pt reevaluated. Pt states her pain is improved at this time. Velcro splint in place. Updated on all final lab and imaging findings. Will await 0700 PT consult prior to discharge. Written by Bam Waters, ED Scribe, as dictated by Dena Delcid MD      Progress note:  7:12 AM  Pt noted to be feeling better, ready for discharge. Updated pt and/or family on all final lab and imaging findings. Will follow up as instructed. All questions have been answered, pt voiced understanding and agreement with plan. Specific return precautions provided as well as instructions to return to the ED should sx worsen at any time. Vital signs stable for discharge. Written by Bam Waters, ED Scribe, as dictated by Drea Barnes MD        PLAN:  1. Current Discharge Medication List      START taking these medications    Details   traMADol (ULTRAM) 50 mg tablet Take 1 Tab by mouth every six (6) hours as needed for Pain. Max Daily Amount: 200 mg. Qty: 20 Tab, Refills: 0           2.    Follow-up Information     Follow up With Details 0000 Andrea De Leon MD   [place] [address] [address] [facility]  [phone]      Chris Quintana MD Schedule an appointment as soon as possible for a visit  [address] [facility]  [address] [facility],[address]  [facility]  [phone]      [facility] EMERGENCY DEPT  If symptoms worsen [address] [place] [address]  [phone]        Return to ED if worse     Disposition:    DISCHARGE NOTE:  7:12 AM  The patient's results have been reviewed with family and/or caregiver. They verbally convey their understanding and agreement of the patient's signs, symptoms, diagnosis, treatment, and prognosis. They additionally agree to follow up as recommended in the discharge instructions or to return to the Emergency Room should the patient's condition change prior to their follow-up appointment. The family and/or caregiver verbally agrees with the care-plan and all of their questions have been answered. The discharge instructions have also been provided to the them along with educational information regarding the patient's diagnosis and a list of reasons why the patient would want to return to the ER prior to their follow-up appointment should their condition change. Diagnosis     Clinical Impression:   1. Closed fracture of left ankle, initial encounter        Attestations: This note is prepared by Dora Rodriguez, acting as Scribe for MD Dena Huntley MD : The scribe's documentation has been prepared under my direction and personally reviewed by me in its entirety. I confirm that the note above accurately reflects all work, treatment, procedures, and medical decision making performed by me. This note will not be viewable in [address]

## 2017-12-04 NOTE — ED NOTES
Family concerned about ambulation at discharge. MD discussed with charge RN. Patient to be transported by [facility].

## 2017-12-04 NOTE — ED NOTES
Pt report approx 30min prior to EMS arrival falling out of bed in attempts to go to bathroom. Denies LOC. Denies dizziness/SOB/lightheadedness. \"I feel like I just slipped out the bed, I hit my face on the carpet. \" Reports usually uses cane to ambulate. Daughter lives with patient part time. Pt reports taking 2 Tylenol prior to arrival in ED.

## 2017-12-05 LAB
ATRIAL RATE: 73 BPM
CALCULATED P AXIS, ECG09: 67 DEGREES
CALCULATED R AXIS, ECG10: 24 DEGREES
CALCULATED T AXIS, ECG11: 51 DEGREES
DIAGNOSIS, 93000: NORMAL
P-R INTERVAL, ECG05: 154 MS
Q-T INTERVAL, ECG07: 408 MS
QRS DURATION, ECG06: 78 MS
QTC CALCULATION (BEZET), ECG08: 449 MS
VENTRICULAR RATE, ECG03: 73 BPM

## 2018-02-02 ENCOUNTER — OFFICE VISIT (OUTPATIENT)
Dept: INTERNAL MEDICINE CLINIC | Age: 83
End: 2018-02-02

## 2018-02-02 VITALS
SYSTOLIC BLOOD PRESSURE: 122 MMHG | BODY MASS INDEX: 22.6 KG/M2 | DIASTOLIC BLOOD PRESSURE: 78 MMHG | HEIGHT: 62 IN | WEIGHT: 122.8 LBS

## 2018-02-02 DIAGNOSIS — L72.0 EPIDERMOID CYST OF FINGER OF LEFT HAND: Primary | ICD-10-CM

## 2018-02-02 NOTE — PATIENT INSTRUCTIONS
Epidermoid Cyst: Care Instructions  Your Care Instructions  An epidermoid (say \"kd-gth-BXF-darrion\") cyst is a lump just under the skin. These cysts can form when a hair follicle becomes blocked. They are common in acne and may occur on the face, neck, back, and genitals. However, they can form anywhere on the body. These cysts are not cancer and do not lead to cancer. They tend not to hurt, but they can sometimes become swollen and painful. They also may break open (rupture) and cause scarring. These cysts sometimes do not cause problems and may not need treatment. If you have a cyst that is swollen and hurts, your doctor may inject it with a medicine to help it heal. But it is more likely that a painful cyst will need to be removed. Your doctor will give you a shot of numbing medicine and cut into the cyst to drain it or remove it. This makes the symptoms go away. Follow-up care is a key part of your treatment and safety. Be sure to make and go to all appointments, and call your doctor if you are having problems. It's also a good idea to know your test results and keep a list of the medicines you take. How can you care for yourself at home? · Do not squeeze the cyst or poke it with a needle to open it. This can cause swelling, redness, and infection. · Always have a doctor look at any new lumps you get to make sure that they are not serious. When should you call for help? Watch closely for changes in your health, and be sure to contact your doctor if:  ? · You have a fever, redness, or swelling after you get a shot of medicine in the cyst.   ? · You see or feel a new lump on your skin. Where can you learn more? Go to http://pinky-shamar.info/. Enter U525 in the search box to learn more about \"Epidermoid Cyst: Care Instructions. \"  Current as of: October 13, 2016  Content Version: 11.4  © 5430-5933 [employer].  Care instructions adapted under license by [employer] (which disclaims liability or warranty for this information). If you have questions about a medical condition or this instruction, always ask your healthcare professional. [address] any warranty or liability for your use of this information.

## 2018-02-02 NOTE — PROGRESS NOTES
This note will not be viewable in [address] Subjective:     Mrs. Alonso Ríos presents with a small cyst on the proximal phalanx of her left third finger it is been present for 1 week and there is been no trauma to this area she notes no pain and no drainage from it.     Past Medical History:   Diagnosis Date    Acute diverticulitis 9/25/2017    Allergic rhinitis 9/25/2017    Arrhythmia     afib    Arthritis     Asthma     [facility] ([facility]     Benign essential tremor 9/25/2017    Cancer ([facility]     skin    Celiac disease 9/25/2017    Constipation 9/25/2017    Diabetic peripheral neuropathy associated with type 2 diabetes mellitus ([facility] 4/21/2016    Diarrhea 9/25/2017    Diverticulosis of large intestine without hemorrhage 9/25/2017    Early satiety 9/25/2017    Fuchs' corneal dystrophy 9/25/2017    Gastroesophageal reflux disease without esophagitis 9/25/2017    GERD (gastroesophageal reflux disease)     High cholesterol     Hypercholesterolemia 9/25/2017    Hypertension     Irritable bowel syndrome with diarrhea 9/25/2017    Numbness 9/25/2017    Osteopenia 9/25/2017    Other ill-defined conditions(799.89)     collapsed lung prior to hernia repair surgery    Ptosis, both eyelids 9/25/2017    Statin intolerance 9/25/2017    Unspecified adverse effect of anesthesia     pt states she went into cardiac arrest prior to hernia repair after the insertion of gas in stomach     Past Surgical History:   Procedure Laterality Date    HX APPENDECTOMY      HX CHOLECYSTECTOMY      HX GI      hemorrhoidectomy    HX HEENT      sinus surgery    HX HERNIA REPAIR      HX TONSILLECTOMY       Allergies   Allergen Reactions    Bactrim [Sulfamethoprim Ds] Other (comments)     consipation    Ciprofloxacin (Bulk) Other (comments)     Low wbc    Iodine Unknown (comments)    Prednisone Other (comments)     tachycardia    Statins-Hmg-Coa Reductase Inhibitors Unknown (comments)     Stomach uipset and mild muscle aches     Current Outpatient Prescriptions   Medication Sig Dispense Refill    aspirin delayed-release 81 mg tablet Take 81 mg by mouth daily.  magnesium hydroxide (YOUNG MILK OF MAGNESIA) 400 mg/5 mL suspension Take 30 mL by mouth daily as needed for Constipation.  psyllium (METAMUCIL) powd Take 1 Dose by mouth daily.  melatonin tab tablet Take 10 mg by mouth nightly.  metoprolol tartrate (LOPRESSOR) 25 mg tablet TAKE 1 TABLET TWICE A  Tab 2    cyanocobalamin (VITAMIN B-12) 100 mcg tablet Take 100 mcg by mouth daily.  NEXIUM 40 mg capsule TAKE 1 CAPSULE DAILY 90 Cap 3    fexofenadine (ALLEGRA) 60 mg tablet Take 30 mg by mouth daily.  Cholecalciferol, Vitamin D3, (VITAMIN D3) 1,000 unit cap Take  by mouth.  triamterene-hydrochlorothiazide (DYAZIDE) 37.5-25 mg per capsule Take 1 Cap by mouth daily.  disopyramide (NORPACE) 100 mg capsule Take  by mouth two (2) times a day.          Social History     Social History    Marital status:      Spouse name: N/A    Number of children: N/A    Years of education: N/A     Social History Main Topics    Smoking status: Former Smoker     Packs/day: 0.75     Years: 5.00     Quit date: 10/11/1967    Smokeless tobacco: Never Used    Alcohol use No    Drug use: No    Sexual activity: Not Asked     Other Topics Concern    None     Social History Narrative     Family History   Problem Relation Age of Onset    Heart Disease Mother     Dementia Mother     Heart Disease Father     Neuropathy Child     Depression Child     Other Child      diverticulitis       Review of Systems:  GEN: no weight loss, weight gain, fatigue or night sweats  Derm: Small inclusion cyst present on left hand  ROS otherwise negative      Objective:     Visit Vitals    /78 (BP 1 Location: Right arm, BP Patient Position: Sitting)    Ht 5' 2\" (1.575 m)    Wt 122 lb 12.8 oz (55.7 kg)    BMI 22.46 kg/m2     Body mass index is 22.46 kg/(m^2). General:   alert, cooperative and no distress   Derm  BB sized cyst proximal phalanx of left hand. Firm and without pain or drainage     Physical exam otherwise negative         Assessment/Plan:     Diagnoses and all orders for this visit:    Epidermoid cyst of finger of left hand        Other instructions:   I have covered the cyst with a Band-Aid for the time being and we will just watch it. Have asked her to try to avoid trauma to it. Should it become bothersome can refer for excision. Follow-up Disposition:  Return if symptoms worsen or fail to improve.     Bonilla Porras MD

## 2018-02-02 NOTE — PROGRESS NOTES
Kavon Olguin is a 80 y.o. female presenting for Finger Swelling  . 1. Have you been to the ER, urgent care clinic since your last visit? Hospitalized since your last visit? No    2. Have you seen or consulted any other health care providers outside of the [address] [place] [address] since your last visit? Include any pap smears or colon screening. No    Fall Risk Assessment, last 12 mths 8/21/2017   Able to walk? Yes   Fall in past 12 months? No         Abuse Screening Questionnaire 8/21/2017   Do you ever feel afraid of your partner? N   Are you in a relationship with someone who physically or mentally threatens you? N   Is it safe for you to go home? Y       No flowsheet data found. There are no discontinued medications.

## 2018-02-02 NOTE — MR AVS SNAPSHOT
[facility] [address] [id number] [phone] Patient: Jing Rivers MRN: [id number] Visit Information Date & Time Provider Department Dept. Phone Encounter #  
 2018  2:00 PM Jack Hernandez MD [facility] [id number] Follow-up Instructions Return if symptoms worsen or fail to improve. Your Appointments 2018  2:20 PM  
Follow Up with Erika De Jesus MD  
[facility] at [facility] [facility]) Appt Note: f/u neuropathy, jrb 11/3/17  
 [address] [facility], 
[address] [place] [address], [address] [address]  
[address] [facility], [address] [place] [address], [address] [facility] [address] [address] Upcoming Health Maintenance Date Due  
 FOOT EXAM Q1 1936 MICROALBUMIN Q1 1936 EYE EXAM RETINAL OR DILATED Q1 1936 DTaP/Tdap/Td series (1 - Tdap) 1947 ZOSTER VACCINE AGE 60> 1986 GLAUCOMA SCREENING Q2Y 1991 Pneumococcal 65+ Low/Medium Risk (1 of 2 - PCV13) 1991 MEDICARE YEARLY EXAM 1991 LIPID PANEL Q1 2014 HEMOGLOBIN A1C Q6M 10/21/2016 Allergies as of 2018  Review Complete On: 2018 By: Jack Hernandez MD  
  
 Severity Noted Reaction Type Reactions Bactrim [Sulfamethoprim Ds]  2012    Other (comments)  
 consipation Ciprofloxacin (Bulk)  2012    Other (comments) Low wbc Iodine  2017    Unknown (comments) Prednisone  2012    Other (comments)  
 tachycardia Statins-hmg-coa Reductase Inhibitors  2013   Intolerance Unknown (comments) Stomach uipset and mild muscle aches Current Immunizations  Reviewed on 8/10/2017 Name Date Influenza High Dose Vaccine PF 2017 Influenza Vaccine 10/25/2016, 2014 Not reviewed this visit You Were Diagnosed With   
  
 Codes Comments Epidermoid cyst of finger of left hand    -  Primary ICD-10-CM: L72.0 ICD-9-CM: 706. 2 Vitals BP Height(growth percentile) Weight(growth percentile) BMI OB Status Smoking Status 122/78 (BP 1 Location: Right arm, BP Patient Position: Sitting) 5' 2\" (1.575 m) 122 lb 12.8 oz (55.7 kg) 22.46 kg/m2 Postmenopausal Former Smoker BMI and BSA Data Body Mass Index Body Surface Area  
 22.46 kg/m 2 1.56 m 2 Preferred Pharmacy Pharmacy Name Phone [address] [facility] [phone] Your Updated Medication List  
  
   
This list is accurate as of: 2/2/18  2:16 PM.  Always use your most recent med list.  
  
  
  
  
 aspirin delayed-release 81 mg tablet Take 81 mg by mouth daily. DYAZIDE 37.5-25 mg per capsule Generic drug:  triamterene-hydroCHLOROthiazide Take 1 Cap by mouth daily. fexofenadine 60 mg tablet Commonly known as:  Froylan Ege Take 30 mg by mouth daily. melatonin Tab tablet Take 10 mg by mouth nightly. metoprolol tartrate 25 mg tablet Commonly known as:  LOPRESSOR  
TAKE 1 TABLET TWICE A DAY NexIUM 40 mg capsule Generic drug:  esomeprazole TAKE 1 CAPSULE DAILY  
  
 NORPACE 100 mg capsule Generic drug:  disopyramide phosphate Take  by mouth two (2) times a day. YOUNG MILK OF MAGNESIA 400 mg/5 mL suspension Generic drug:  magnesium hydroxide Take 30 mL by mouth daily as needed for Constipation. psyllium Powd Commonly known as:  METAMUCIL Take 1 Dose by mouth daily. VITAMIN B-12 100 mcg tablet Generic drug:  cyanocobalamin Take 100 mcg by mouth daily. VITAMIN D3 1,000 unit Cap Generic drug:  cholecalciferol Take  by mouth. Follow-up Instructions Return if symptoms worsen or fail to improve. Patient Instructions Epidermoid Cyst: Care Instructions Your Care Instructions An epidermoid (say \"ks-vlu-RUY-darrion\") cyst is a lump just under the skin. These cysts can form when a hair follicle becomes blocked. They are common in acne and may occur on the face, neck, back, and genitals. However, they can form anywhere on the body. These cysts are not cancer and do not lead to cancer. They tend not to hurt, but they can sometimes become swollen and painful. They also may break open (rupture) and cause scarring. These cysts sometimes do not cause problems and may not need treatment. If you have a cyst that is swollen and hurts, your doctor may inject it with a medicine to help it heal. But it is more likely that a painful cyst will need to be removed. Your doctor will give you a shot of numbing medicine and cut into the cyst to drain it or remove it. This makes the symptoms go away. Follow-up care is a key part of your treatment and safety. Be sure to make and go to all appointments, and call your doctor if you are having problems. It's also a good idea to know your test results and keep a list of the medicines you take. How can you care for yourself at home? · Do not squeeze the cyst or poke it with a needle to open it. This can cause swelling, redness, and infection. · Always have a doctor look at any new lumps you get to make sure that they are not serious. When should you call for help? Watch closely for changes in your health, and be sure to contact your doctor if: 
? · You have a fever, redness, or swelling after you get a shot of medicine in the cyst.  
? · You see or feel a new lump on your skin. Where can you learn more? Go to http://pinky-shamar.info/. Enter M713 in the search box to learn more about \"Epidermoid Cyst: Care Instructions. \" Current as of: October 13, 2016 Content Version: 11.4 © 1317-7241 [email or web address].  Care instructions adapted under license by Vurb (which disclaims liability or warranty for this information). If you have questions about a medical condition or this instruction, always ask your healthcare professional. [address] any warranty or liability for your use of this information. Introducing \[facility]\"" & HEALTH SERVICES! Dear Ralf Williamson: Thank you for requesting a Dailymotion account. Our records indicate that you already have an active Dailymotion account. You can access your account anytime at [email or web address] Did you know that you can access your hospital and ER discharge instructions at any time in Dailymotion? You can also review all of your test results from your hospital stay or ER visit. Additional Information If you have questions, please visit the Frequently Asked Questions section of the Dailymotion website at [email or web address] Remember, Dailymotion is NOT to be used for urgent needs. For medical emergencies, dial 911. Now available from your iPhone and Android! Please provide this summary of care documentation to your next provider. Your primary care clinician is listed as RISHI Scott. If you have any questions after today's visit, please call [phone].

## 2018-02-06 ENCOUNTER — TELEPHONE (OUTPATIENT)
Dept: INTERNAL MEDICINE CLINIC | Age: 83
End: 2018-02-06

## 2018-02-06 RX ORDER — PANTOPRAZOLE SODIUM 40 MG/1
40 TABLET, DELAYED RELEASE ORAL DAILY
Qty: 90 TAB | Refills: 3 | Status: SHIPPED | COMMUNITY
Start: 2018-02-06 | End: 2018-12-05 | Stop reason: SDUPTHER

## 2018-02-13 RX ORDER — DISOPYRAMIDE PHOSPHATE 100 MG/1
CAPSULE ORAL
Qty: 180 CAP | Refills: 3 | Status: SHIPPED | OUTPATIENT
Start: 2018-02-13 | End: 2018-12-05 | Stop reason: SDUPTHER

## 2018-02-17 ENCOUNTER — HOSPITAL ENCOUNTER (OUTPATIENT)
Dept: MRI IMAGING | Age: 83
Discharge: HOME | End: 2018-02-17
Attending: PHYSICIAN ASSISTANT
Payer: MEDICARE

## 2018-02-17 DIAGNOSIS — K57.30 DIVERTICULOSIS OF LARGE INTESTINE WITHOUT DIVERTICULITIS: ICD-10-CM

## 2018-02-17 PROCEDURE — 74011250636 HC RX REV CODE- 250/636: Performed by: PHYSICIAN ASSISTANT

## 2018-02-17 PROCEDURE — A9576 INJ PROHANCE MULTIPACK: HCPCS | Performed by: PHYSICIAN ASSISTANT

## 2018-02-17 PROCEDURE — 72197 MRI PELVIS W/O & W/DYE: CPT

## 2018-02-17 RX ADMIN — GADOTERIDOL 10 ML: 279.3 INJECTION, SOLUTION INTRAVENOUS at 14:34

## 2018-03-06 ENCOUNTER — OFFICE VISIT (OUTPATIENT)
Dept: INTERNAL MEDICINE CLINIC | Age: 83
End: 2018-03-06

## 2018-03-06 VITALS
WEIGHT: 120 LBS | OXYGEN SATURATION: 96 % | DIASTOLIC BLOOD PRESSURE: 74 MMHG | HEIGHT: 62 IN | HEART RATE: 84 BPM | SYSTOLIC BLOOD PRESSURE: 146 MMHG | BODY MASS INDEX: 22.08 KG/M2

## 2018-03-06 DIAGNOSIS — J01.00 ACUTE MAXILLARY SINUSITIS, RECURRENCE NOT SPECIFIED: Primary | ICD-10-CM

## 2018-03-06 RX ORDER — CEFUROXIME AXETIL 250 MG/1
250 TABLET ORAL 2 TIMES DAILY
Qty: 20 TAB | Refills: 0 | Status: SHIPPED | OUTPATIENT
Start: 2018-03-06 | End: 2018-06-11 | Stop reason: CLARIF

## 2018-03-06 RX ORDER — ESTRADIOL 0.1 MG/G
2 CREAM VAGINAL DAILY
COMMUNITY
End: 2018-06-11 | Stop reason: CLARIF

## 2018-03-06 NOTE — PATIENT INSTRUCTIONS
Sinusitis: Care Instructions  Your Care Instructions    Sinusitis is an infection of the lining of the sinus cavities in your head. Sinusitis often follows a cold. It causes pain and pressure in your head and face. In most cases, sinusitis gets better on its own in 1 to 2 weeks. But some mild symptoms may last for several weeks. Sometimes antibiotics are needed. Follow-up care is a key part of your treatment and safety. Be sure to make and go to all appointments, and call your doctor if you are having problems. It's also a good idea to know your test results and keep a list of the medicines you take. How can you care for yourself at home? · Take an over-the-counter pain medicine, such as acetaminophen (Tylenol), ibuprofen (Advil, Motrin), or naproxen (Aleve). Read and follow all instructions on the label. · If the doctor prescribed antibiotics, take them as directed. Do not stop taking them just because you feel better. You need to take the full course of antibiotics. · Be careful when taking over-the-counter cold or flu medicines and Tylenol at the same time. Many of these medicines have acetaminophen, which is Tylenol. Read the labels to make sure that you are not taking more than the recommended dose. Too much acetaminophen (Tylenol) can be harmful. · Breathe warm, moist air from a steamy shower, a hot bath, or a sink filled with hot water. Avoid cold, dry air. Using a humidifier in your home may help. Follow the directions for cleaning the machine. · Use saline (saltwater) nasal washes to help keep your nasal passages open and wash out mucus and bacteria. You can buy saline nose drops at a grocery store or drugstore. Or you can make your own at home by adding 1 teaspoon of salt and 1 teaspoon of baking soda to 2 cups of distilled water. If you make your own, fill a bulb syringe with the solution, insert the tip into your nostril, and squeeze gently. Erinn Solian your nose.   · Put a hot, wet towel or a warm gel pack on your face 3 or 4 times a day for 5 to 10 minutes each time. · Try a decongestant nasal spray like oxymetazoline (Afrin). Do not use it for more than 3 days in a row. Using it for more than 3 days can make your congestion worse. When should you call for help? Call your doctor now or seek immediate medical care if:  ? · You have new or worse swelling or redness in your face or around your eyes. ? · You have a new or higher fever. ? Watch closely for changes in your health, and be sure to contact your doctor if:  ? · You have new or worse facial pain. ? · The mucus from your nose becomes thicker (like pus) or has new blood in it. ? · You are not getting better as expected. Where can you learn more? Go to http://pinky-shamar.info/. Enter B487 in the search box to learn more about \"Sinusitis: Care Instructions. \"  Current as of: May 12, 2017  Content Version: 11.4  © 2643-6410 Fishidy. Care instructions adapted under license by Vidible (which disclaims liability or warranty for this information). If you have questions about a medical condition or this instruction, always ask your healthcare professional. [address] any warranty or liability for your use of this information.

## 2018-03-06 NOTE — PROGRESS NOTES
Ollie Lyons is a 80 y.o. female presenting for Sinus Infection  . 1. Have you been to the ER, urgent care clinic since your last visit? Hospitalized since your last visit? No    2. Have you seen or consulted any other health care providers outside of the [address] [place] [address] since your last visit? Include any pap smears or colon screening. Yes When: 2/2018 Where: Dr. Karyle Lawrence Reason for visit: GYN eval    Fall Risk Assessment, last 12 mths 8/21/2017   Able to walk? Yes   Fall in past 12 months? No         Abuse Screening Questionnaire 8/21/2017   Do you ever feel afraid of your partner? N   Are you in a relationship with someone who physically or mentally threatens you? N   Is it safe for you to go home? Y       No flowsheet data found. There are no discontinued medications.

## 2018-03-06 NOTE — PROGRESS NOTES
This note will not be viewable in [address] Concetta Gill is a 80 y.o. female and presents with Sinus Infection  . Subjective:  Mrs. Sela Hamman resents to the office today with complaints of an upper respiratory infection ongoing over the last week with the development of sinus congestion, postnasal drainage, sore throat and right earache. She denies any fevers, chills, significant cough, neck stiffness or rash. She has been taking Allegra without relief.     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    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 I65.23    Osteopenia M85.80    Gastroesophageal reflux disease without esophagitis K21.9    Diverticulosis of large intestine without hemorrhage K57.30    Benign essential tremor G25.0    Irritable bowel syndrome with diarrhea K58.0    Chest wall pain R07.89    Statin intolerance Z78.9    Diarrhea R19.7    Constipation K59.00    Hypercholesterolemia E78.00    Early satiety R68.81    Celiac disease K90.0    Allergic rhinitis J30.9    Acute diverticulitis K57.92    Numbness R20.0    Fuchs' corneal dystrophy H18.51    Ptosis, both eyelids H02.403    Epidermoid cyst of finger of left hand L72.0     Past Surgical History:   Procedure Laterality Date    HX APPENDECTOMY      HX CHOLECYSTECTOMY      HX GI      hemorrhoidectomy    HX HEENT      sinus surgery    HX HERNIA REPAIR      HX TONSILLECTOMY Allergies   Allergen Reactions    Bactrim [Sulfamethoprim Ds] Other (comments)     consipation    Ciprofloxacin (Bulk) Other (comments)     Low wbc    Iodine Unknown (comments)    Prednisone Other (comments)     tachycardia    Statins-Hmg-Coa Reductase Inhibitors Unknown (comments)     Stomach uipset and mild muscle aches     Current Outpatient Prescriptions   Medication Sig Dispense Refill    estradiol (ESTRACE) 0.01 % (0.1 mg/gram) vaginal cream Insert 2 g into vagina daily.  cefUROXime (CEFTIN) 250 mg tablet Take 1 Tab by mouth two (2) times a day. 20 Tab 0    disopyramide phosphate (NORPACE) 100 mg capsule TAKE 1 CAPSULE TWICE A  Cap 3    pantoprazole (PROTONIX) 40 mg tablet Take 1 Tab by mouth daily. 90 Tab 3    magnesium hydroxide (YOUNG MILK OF MAGNESIA) 400 mg/5 mL suspension Take 30 mL by mouth daily as needed for Constipation.  psyllium (METAMUCIL) powd Take 1 Dose by mouth daily.  melatonin tab tablet Take 10 mg by mouth nightly.  metoprolol tartrate (LOPRESSOR) 25 mg tablet TAKE 1 TABLET TWICE A  Tab 2    cyanocobalamin (VITAMIN B-12) 100 mcg tablet Take 100 mcg by mouth daily.  fexofenadine (ALLEGRA) 60 mg tablet Take 30 mg by mouth daily.  Cholecalciferol, Vitamin D3, (VITAMIN D3) 1,000 unit cap Take  by mouth.  triamterene-hydrochlorothiazide (DYAZIDE) 37.5-25 mg per capsule Take 1 Cap by mouth daily.        Social History     Social History    Marital status:      Spouse name: N/A    Number of children: N/A    Years of education: N/A     Social History Main Topics    Smoking status: Former Smoker     Packs/day: 0.75     Years: 5.00     Quit date: 10/11/1967    Smokeless tobacco: Never Used    Alcohol use No    Drug use: No    Sexual activity: Not Asked     Other Topics Concern    None     Social History Narrative     Family History   Problem Relation Age of Onset    Heart Disease Mother     Dementia Mother     Heart Disease Father     Neuropathy Child     Depression Child     Other Child      diverticulitis       Review of Systems  Constitutional: negative for fevers, chills, anorexia and weight loss  Eyes:   negative for visual disturbance and irritation  ENT:   Positive for sinus congestion, maxillary discomfort, purulent psotnasal draingage and throat irritation  Respiratory:  negative for cough, hemoptysis, dyspnea,wheezing  CV:   negative for chest pain, palpitations, lower extremity edema  GI:   negative for nausea, vomiting, diarrhea, abdominal pain,melena  Integumentary: negative for rash and pruritus  Neurological:  negative for headaches, dizziness, vertigo, memory problems and gait       Objective:  Visit Vitals    /74 (BP 1 Location: Right arm, BP Patient Position: Sitting)    Pulse 84    Ht 5' 2\" (1.575 m)    Wt 120 lb (54.4 kg)    SpO2 96%    BMI 21.95 kg/m2     Body mass index is 21.95 kg/(m^2). Physical Exam:   General appearance - alert, well appearing, and in no distress  Mental status - alert, oriented to person, place, and time  EYE-SIM, EOMI, sclera clear. No lid swelling or purulent drainage  ENT- TM's clear without A/F level. Pharynx slightly erythematous with drainage noted  Nose - normal and patent, no erythema,  Neck - supple, no significant adenopathy   Chest - clear to auscultation, no wheezes, rales or rhonchi, symmetric air entry   Heart - normal rate, regular rhythm, normal S1, S2, no murmurs, rubs, clicks or gallops   Skin-No rash appreciated  Neuro -alert, oriented, normal speech, no focal findings. Assessment/Plan:  Diagnoses and all orders for this visit:    Acute maxillary sinusitis, recurrence not specified  -     cefUROXime (CEFTIN) 250 mg tablet; Take 1 Tab by mouth two (2) times a day., Normal, Disp-20 Tab, R-0        Other Instructions:  Mucinex as directed    Increase po fluids    Follow-up Disposition:  Return if symptoms worsen or fail to improve.       I have reviewed with the patient details of the assessment and plan and all questions were answered. Relevent patient education was performed. An After Visit Summary was printed and given to the patient.     Torrie Claudio MD

## 2018-03-06 NOTE — MR AVS SNAPSHOT
[facility] [address] [address] [phone] Patient: America Tsang MRN: [id number] Visit Information Date & Time Provider Department Dept. Phone Encounter #  
 3/6/2018  2:50 PM Dominic Ott MD [facility] [id number] Follow-up Instructions Return if symptoms worsen or fail to improve. Your Appointments 2018  2:20 PM  
Follow Up with Etienne Espinal MD  
Neurology Clinic at [facility] [address] [facility]) Appt Note: f/u neuropathy, jrb 11/3/17  
 [address] [place] [address], 
[address] [place] [address], [address] [address]  
[address] [facility], [address] [facility], [address] [address] Upcoming Health Maintenance Date Due  
 FOOT EXAM Q1 1936 MICROALBUMIN Q1 1936 EYE EXAM RETINAL OR DILATED Q1 1936 DTaP/Tdap/Td series (1 - Tdap) 1947 ZOSTER VACCINE AGE 60> 1986 GLAUCOMA SCREENING Q2Y 1991 Pneumococcal 65+ Low/Medium Risk (1 of 2 - PCV13) 1991 MEDICARE YEARLY EXAM 1991 LIPID PANEL Q1 2014 HEMOGLOBIN A1C Q6M 10/21/2016 Allergies as of 3/6/2018  Review Complete On: 3/6/2018 By: Dominic Ott MD  
  
 Severity Noted Reaction Type Reactions Bactrim [Sulfamethoprim Ds]  2012    Other (comments)  
 consipation Ciprofloxacin (Bulk)  2012    Other (comments) Low wbc Iodine  2017    Unknown (comments) Prednisone  2012    Other (comments)  
 tachycardia Statins-hmg-coa Reductase Inhibitors  2013   Intolerance Unknown (comments) Stomach uipset and mild muscle aches Current Immunizations  Reviewed on 8/10/2017 Name Date Influenza High Dose Vaccine PF 2017 Influenza Vaccine 10/25/2016, 2014 Not reviewed this visit You Were Diagnosed With   
  
 Codes Comments Acute maxillary sinusitis, recurrence not specified    -  Primary ICD-10-CM: J01.00 ICD-9-CM: 461.0 Vitals BP Pulse Height(growth percentile) Weight(growth percentile) SpO2 BMI  
 146/74 (BP 1 Location: Right arm, BP Patient Position: Sitting) 84 5' 2\" (1.575 m) 120 lb (54.4 kg) 96% 21.95 kg/m2 OB Status Smoking Status Postmenopausal Former Smoker BMI and BSA Data Body Mass Index Body Surface Area  
 21.95 kg/m 2 1.54 m 2 Preferred Pharmacy Pharmacy Name Phone [facility] [address] [facility] [address] [phone] Your Updated Medication List  
  
   
This list is accurate as of 3/6/18  3:00 PM.  Always use your most recent med list.  
  
  
  
  
 cefUROXime 250 mg tablet Commonly known as:  CEFTIN Take 1 Tab by mouth two (2) times a day. disopyramide phosphate 100 mg capsule Commonly known as:  NORPACE  
TAKE 1 CAPSULE TWICE A DAY DYAZIDE 37.5-25 mg per capsule Generic drug:  triamterene-hydroCHLOROthiazide Take 1 Cap by mouth daily. ESTRACE 0.01 % (0.1 mg/gram) vaginal cream  
Generic drug:  estradiol Insert 2 g into vagina daily. fexofenadine 60 mg tablet Commonly known as:  Heather Cross Take 30 mg by mouth daily. melatonin Tab tablet Take 10 mg by mouth nightly. metoprolol tartrate 25 mg tablet Commonly known as:  LOPRESSOR  
TAKE 1 TABLET TWICE A DAY pantoprazole 40 mg tablet Commonly known as:  PROTONIX Take 1 Tab by mouth daily. YOUNG MILK OF MAGNESIA 400 mg/5 mL suspension Generic drug:  magnesium hydroxide Take 30 mL by mouth daily as needed for Constipation. psyllium Powd Commonly known as:  METAMUCIL Take 1 Dose by mouth daily. VITAMIN B-12 100 mcg tablet Generic drug:  cyanocobalamin Take 100 mcg by mouth daily. VITAMIN D3 1,000 unit Cap Generic drug:  cholecalciferol Take  by mouth. Prescriptions Sent to Pharmacy Refills  
 cefUROXime (CEFTIN) 250 mg tablet 0 Sig: Take 1 Tab by mouth two (2) times a day. Class: Normal  
 Pharmacy: [address], [address] [place] [address] #: [phone] Route: Oral  
  
Follow-up Instructions Return if symptoms worsen or fail to improve. Introducing [facility] & HEALTH SERVICES! Dear Jaya Richey: Thank you for requesting a MediaSilo account. Our records indicate that you already have an active MediaSilo account. You can access your account anytime at [email or web address] Did you know that you can access your hospital and ER discharge instructions at any time in MediaSilo? You can also review all of your test results from your hospital stay or ER visit. Additional Information If you have questions, please visit the Frequently Asked Questions section of the MediaSilo website at [email or web address] Remember, MediaSilo is NOT to be used for urgent needs. For medical emergencies, dial 911. Now available from your iPhone and Android! Please provide this summary of care documentation to your next provider. Your primary care clinician is listed as RISHI Scott. If you have any questions after today's visit, please call [phone].

## 2018-05-16 ENCOUNTER — TELEPHONE (OUTPATIENT)
Dept: CARDIOLOGY CLINIC | Age: 83
End: 2018-05-16

## 2018-05-16 NOTE — TELEPHONE ENCOUNTER
Patient daughter Susu Jang on [address] Verified patient with two identifiers.  Reporting C/O jaw discomfort , fatigue and abdomen discomfort relieved with Mylanta recommend check vital signs and report back may need ED today verse appt 5-17-18

## 2018-05-17 ENCOUNTER — OFFICE VISIT (OUTPATIENT)
Dept: CARDIOLOGY CLINIC | Age: 83
End: 2018-05-17

## 2018-05-17 ENCOUNTER — TELEPHONE (OUTPATIENT)
Dept: NEUROLOGY | Age: 83
End: 2018-05-17

## 2018-05-17 VITALS
HEART RATE: 64 BPM | SYSTOLIC BLOOD PRESSURE: 150 MMHG | HEIGHT: 62 IN | OXYGEN SATURATION: 99 % | BODY MASS INDEX: 22.25 KG/M2 | RESPIRATION RATE: 16 BRPM | WEIGHT: 120.9 LBS | DIASTOLIC BLOOD PRESSURE: 80 MMHG

## 2018-05-17 DIAGNOSIS — R06.09 DOE (DYSPNEA ON EXERTION): ICD-10-CM

## 2018-05-17 DIAGNOSIS — I65.23 STENOSIS OF BOTH CAROTID ARTERIES WITHOUT CEREBRAL INFARCTION: ICD-10-CM

## 2018-05-17 DIAGNOSIS — I48.0 PAROXYSMAL ATRIAL FIBRILLATION (HCC): Primary | ICD-10-CM

## 2018-05-17 DIAGNOSIS — R68.84 JAW PAIN: ICD-10-CM

## 2018-05-17 DIAGNOSIS — K21.9 GASTROESOPHAGEAL REFLUX DISEASE WITHOUT ESOPHAGITIS: ICD-10-CM

## 2018-05-17 RX ORDER — POLYETHYLENE GLYCOL 3350 17 G/17G
17 POWDER, FOR SOLUTION ORAL DAILY
COMMUNITY

## 2018-05-17 RX ORDER — ASPIRIN 81 MG/1
81 TABLET ORAL DAILY
Qty: 30 TAB | Refills: 11
Start: 2018-05-17 | End: 2018-06-11 | Stop reason: CLARIF

## 2018-05-17 RX ORDER — NITROGLYCERIN 0.4 MG/1
0.4 TABLET SUBLINGUAL
Qty: 25 TAB | Refills: 1 | Status: SHIPPED | OUTPATIENT
Start: 2018-05-17 | End: 2020-02-06 | Stop reason: SDUPTHER

## 2018-05-17 NOTE — PROGRESS NOTES
Jackson Simpson DNP, ANP-BC  Subjective/HPI:     Alfrieda Councilman is a 80 y.o. female is here for symptom based appointment. Patient reports in the last few weeks she has been experiencing upper anterior chest discomfort pressure and burning. One episode in particular happened after large meal however she states the discomfort which peaked at 7/10 was present for greater than 3 days. She has a history of paroxysmal atrial fibrillation has maintained sinus rhythm for number years, anticoagulated with aspirin alone. She has family history of heart disease, type 2 diabetes elevated blood pressure and history of carotid stenosis. She reports in addition that she is having some increasing dyspnea on exertion and most notable significant increasing fatigue in the last few months with doing her activities of daily life. Additionally, patient has been treated for GERD, has been switched to generic version of Nexium and questions if this could possibly be the culprit for her symptoms.     PCP Provider  Latesha Richardson MD  Past Medical History:   Diagnosis Date    Acute diverticulitis 9/25/2017    Allergic rhinitis 9/25/2017    Arrhythmia     afib    Arthritis     Asthma     Atrial fibrillation ([address]     Benign essential tremor 9/25/2017    Cancer ([address]     skin    Celiac disease 9/25/2017    Constipation 9/25/2017    Diabetic peripheral neuropathy associated with type 2 diabetes mellitus ([address] 4/21/2016    Diarrhea 9/25/2017    Diverticulosis of large intestine without hemorrhage 9/25/2017    Early satiety 9/25/2017    Fuchs' corneal dystrophy 9/25/2017    Gastroesophageal reflux disease without esophagitis 9/25/2017    GERD (gastroesophageal reflux disease)     High cholesterol     Hypercholesterolemia 9/25/2017    Hypertension     Irritable bowel syndrome with diarrhea 9/25/2017    Numbness 9/25/2017    Osteopenia 9/25/2017    Other ill-defined conditions(799.89)     collapsed lung prior to hernia repair surgery    Ptosis, both eyelids 9/25/2017    Statin intolerance 9/25/2017    Unspecified adverse effect of anesthesia     pt states she went into cardiac arrest prior to hernia repair after the insertion of gas in stomach      Past Surgical History:   Procedure Laterality Date    HX APPENDECTOMY      HX CHOLECYSTECTOMY      HX GI      hemorrhoidectomy    HX HEENT      sinus surgery    HX HERNIA REPAIR      HX TONSILLECTOMY       Allergies   Allergen Reactions    Bactrim [Sulfamethoprim Ds] Other (comments)     consipation    Ciprofloxacin (Bulk) Other (comments)     Low wbc    Iodine Unknown (comments)    Prednisone Other (comments)     tachycardia    Statins-Hmg-Coa Reductase Inhibitors Unknown (comments)     Stomach uipset and mild muscle aches      Family History   Problem Relation Age of Onset    Heart Disease Mother     Dementia Mother     Heart Disease Father     Neuropathy Child     Depression Child     Other Child      diverticulitis      Current Outpatient Prescriptions   Medication Sig    vit A/vit C/vit E/zinc/copper (ICAPS AREDS PO) Take  by mouth two (2) times a day.  polyethylene glycol (MIRALAX) 17 gram/dose powder Take 17 g by mouth every fourty-eight (48) hours.  aspirin delayed-release 81 mg tablet Take 1 Tab by mouth daily.  nitroglycerin (NITROSTAT) 0.4 mg SL tablet 1 Tab by SubLINGual route every five (5) minutes as needed for Chest Pain.  disopyramide phosphate (NORPACE) 100 mg capsule TAKE 1 CAPSULE TWICE A DAY    pantoprazole (PROTONIX) 40 mg tablet Take 1 Tab by mouth daily.  magnesium hydroxide (YOUNG MILK OF MAGNESIA) 400 mg/5 mL suspension Take 30 mL by mouth daily as needed for Constipation.  psyllium (METAMUCIL) powd Take 1 Dose by mouth daily.  melatonin tab tablet Take 10 mg by mouth nightly.     metoprolol tartrate (LOPRESSOR) 25 mg tablet TAKE 1 TABLET TWICE A DAY    cyanocobalamin (VITAMIN B-12) 100 mcg tablet Take 100 mcg by mouth daily.  fexofenadine (ALLEGRA) 60 mg tablet Take 30 mg by mouth as needed.  Cholecalciferol, Vitamin D3, (VITAMIN D3) 1,000 unit cap Take  by mouth.  triamterene-hydrochlorothiazide (DYAZIDE) 37.5-25 mg per capsule Take 1 Cap by mouth daily.  estradiol (ESTRACE) 0.01 % (0.1 mg/gram) vaginal cream Insert 2 g into vagina daily.  cefUROXime (CEFTIN) 250 mg tablet Take 1 Tab by mouth two (2) times a day. No current facility-administered medications for this visit. Vitals:    05/17/18 1003   BP: 150/80   Pulse: 64   Resp: 16   SpO2: 99%   Weight: 120 lb 14.4 oz (54.8 kg)   Height: 5' 2\" (1.575 m)     Social History     Social History    Marital status:      Spouse name: N/A    Number of children: N/A    Years of education: N/A     Occupational History    Not on file. Social History Main Topics    Smoking status: Former Smoker     Packs/day: 0.75     Years: 5.00     Quit date: 10/11/1967    Smokeless tobacco: Never Used    Alcohol use No    Drug use: No    Sexual activity: Not on file     Other Topics Concern    Not on file     Social History Narrative       I have reviewed the nurses notes, vitals, problem list, allergy list, medical history, family, social history and medications. Review of Symptoms:    General: Pt denies excessive weight gain or loss. Pt is able to conduct ADL's  HEENT: Denies blurred vision, headaches, epistaxis and difficulty swallowing. Respiratory: Denies shortness of breath, + LATHAM, wheezing or stridor. Cardiovascular: + Anterior chest discomfort, denies palpitations, edema or PND  Gastrointestinal: Denies poor appetite, + indigestion, abdominal pain or blood in stool  Musculoskeletal: Denies pain or swelling from muscles or joints  Neurologic: Denies tremor, paresthesias, or sensory motor disturbance  Skin: Denies rash, itching or texture change.       Physical Exam:      General: Well developed, in no acute distress, cooperative and alert  HEENT: No carotid bruits, no JVD, trach is midline. Neck Supple,   Heart:  Normal S1/S2 negative S3 or S4. Regular, no murmur, gallop or rub.   Respiratory: Clear bilaterally x 4, no wheezing or rales  Abdomen:   Soft, non-tender, no masses, bowel sounds are active.   Extremities:  No edema, normal cap refill, no cyanosis, atraumatic. Neuro: A&Ox3, speech clear, gait stable. Skin: Skin color is normal. No rashes or lesions. Non diaphoretic  Vascular: 2+ pulses symmetric in all extremities    Cardiographics    ECG: Normal sinus rhythm  Results for orders placed or performed during the hospital encounter of 12/04/17   EKG, 12 LEAD, INITIAL   Result Value Ref Range    Ventricular Rate 73 BPM    Atrial Rate 73 BPM    P-R Interval 154 ms    QRS Duration 78 ms    Q-T Interval 408 ms    QTC Calculation (Bezet) 449 ms    Calculated P Axis 67 degrees    Calculated R Axis 24 degrees    Calculated T Axis 51 degrees    Diagnosis       Normal sinus rhythm  Normal ECG    No previous ECGs available  Confirmed by Seth Meléndez ([id number]) on 12/5/2017 9:15:41 PM     Results for orders placed or performed in visit on 10/28/16   CARDIAC HOLTER MONITOR, 24 HOURS    Narrative    ECG Monitor/24 hours, Complete    Reason for Holter Monitor  PAC'S    Heartbeat    Slowest 52  Average 64  Fastest  87    Results:   Underlying Rhythm: Normal sinus rhythm      Atrial Arrhythmias: premature atrial contractions; occasional            AV Conduction: normal    Ventricular Arrhythmias: premature ventricular contractions; rare     ST Segment Analysis:non-specific changes     Symptom Correlation:  Specific symptoms not reported. Comment:   No evidence of recurrent paroxysmal atrial fibrillation.      Delona Felty, MD             Cardiology Labs:  Lab Results   Component Value Date/Time    Cholesterol, total 260 (H) 08/20/2013 08:16 AM    HDL Cholesterol 71 08/20/2013 08:16 AM    LDL, calculated 172 (H) 08/20/2013 08:16 AM Triglyceride 84 08/20/2013 08:16 AM       Lab Results   Component Value Date/Time    Sodium 140 12/04/2017 05:41 AM    Potassium 3.9 12/04/2017 05:41 AM    Chloride 106 12/04/2017 05:41 AM    CO2 28 12/04/2017 05:41 AM    Anion gap 6 12/04/2017 05:41 AM    Glucose 97 12/04/2017 05:41 AM    BUN 16 12/04/2017 05:41 AM    Creatinine 0.80 12/04/2017 05:41 AM    BUN/Creatinine ratio 20 12/04/2017 05:41 AM    GFR est AA >60 12/04/2017 05:41 AM    GFR est non-AA >60 12/04/2017 05:41 AM    Calcium 8.8 12/04/2017 05:41 AM    Bilirubin, total 0.3 12/04/2017 05:41 AM    AST (SGOT) 16 12/04/2017 05:41 AM    Alk. phosphatase 56 12/04/2017 05:41 AM    Protein, total 6.4 12/04/2017 05:41 AM    Albumin 3.3 (L) 12/04/2017 05:41 AM    Globulin 3.1 12/04/2017 05:41 AM    A-G Ratio 1.1 12/04/2017 05:41 AM    ALT (SGPT) 23 12/04/2017 05:41 AM           Assessment:     Assessment:     Diagnoses and all orders for this visit:    1. Paroxysmal atrial fibrillation (HCC)  -     AMB POC EKG ROUTINE W/ 12 LEADS, INTER & REP  -     2D ECHO COMPLETE ADULT (TTE) W OR WO CONTR  -     LEXISCAN/CARDIOLITE, Clinic Performed; Future    2. 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  -     2D ECHO COMPLETE ADULT (TTE) W OR WO CONTR  -     LEXISCAN/CARDIOLITE, Clinic Performed; Future    3. Gastroesophageal reflux disease without esophagitis  -     2D ECHO COMPLETE ADULT (TTE) W OR WO CONTR  -     LEXISCAN/CARDIOLITE, Clinic Performed; Future    4. LATHAM (dyspnea on exertion)  -     2D ECHO COMPLETE ADULT (TTE) W OR WO CONTR  -     LEXISCAN/CARDIOLITE, Clinic Performed; Future    5. Jaw pain  -     2D ECHO COMPLETE ADULT (TTE) W OR WO CONTR  -     LEXISCAN/CARDIOLITE, Clinic Performed; Future    Other orders  -     aspirin delayed-release 81 mg tablet; Take 1 Tab by mouth daily. -     nitroglycerin (NITROSTAT) 0.4 mg SL tablet; 1 Tab by SubLINGual route every five (5) minutes as needed for Chest Pain. ICD-10-CM ICD-9-CM    1. Paroxysmal atrial fibrillation (HCC) I48.0 427.31 vit A/vit C/vit E/zinc/copper (ICAPS AREDS PO)      polyethylene glycol (MIRALAX) 17 gram/dose powder      AMB POC EKG ROUTINE W/ 12 LEADS, INTER & REP      2D ECHO COMPLETE ADULT (TTE) W OR WO CONTR      STRESS TEST LEXISCAN/CARDIOLITE   2. 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 I65.23 433.10 2D ECHO COMPLETE ADULT (TTE) W OR WO CONTR     433.30 STRESS TEST LEXISCAN/CARDIOLITE   3. Gastroesophageal reflux disease without esophagitis K21.9 530.81 2D ECHO COMPLETE ADULT (TTE) W OR WO CONTR      STRESS TEST LEXISCAN/CARDIOLITE   4. LATHAM (dyspnea on exertion) R06.09 786.09 2D ECHO COMPLETE ADULT (TTE) W OR WO CONTR      STRESS TEST LEXISCAN/CARDIOLITE   5. Jaw pain R68.84 784.92 2D ECHO COMPLETE ADULT (TTE) W OR WO CONTR      STRESS TEST LEXISCAN/CARDIOLITE     Orders Placed This Encounter    AMB POC EKG ROUTINE W/ 12 LEADS, INTER & REP     Order Specific Question:   Reason for Exam:     Answer:   routine    LEXISCAN/CARDIOLITE, Clinic Performed     Standing Status:   Future     Standing Expiration Date:   2018     Order Specific Question:   Reason for Exam:     Answer:   LATHAM    2D ECHO COMPLETE ADULT (TTE) W OR WO CONTR     Order Specific Question:   Reason for Exam:     Answer:   LATHAM     Order Specific Question:   Contrast Enhancement (Bubble Study, Definity, Optison) may be used if criteria listed in established evidence-based protocol has been identified. Answer: Yes    vit A/vit C/vit E/zinc/copper (ICAPS AREDS PO)     Sig: Take  by mouth two (2) times a day.  polyethylene glycol (MIRALAX) 17 gram/dose powder     Sig: Take 17 g by mouth every fourty-eight (48) hours.  aspirin delayed-release 81 mg tablet     Sig: Take 1 Tab by mouth daily. Dispense:  30 Tab     Refill:  11    nitroglycerin (NITROSTAT) 0.4 mg SL tablet     Si Tab by SubLINGual route every five (5) minutes as needed for Chest Pain.      Dispense: 25 Tab     Refill:  1        Plan:     She is a 80-year-old male presenting with episodes of anterior chest discomfort/burning/pain with associated increased dyspnea on exertion and increasing fatigue and weakness. Cardiac risk factors include postmenopausal female, elevated blood pressure, type 2 diabetes and family history of heart disease. Will evaluate symptoms with Lexiscan nuclear stress test as she is ambulatory only with a walker, echocardiogram.  Add aspirin therapy, SL NTG PRN, follow-up in 1 month. If symptoms completely resolved after changing to a proton pump inhibitor and if testing is normal, she may move her appointment out to 6-12 months. Aida Way MD    This note was created using voice recognition software. Despite editing, there may be syntax errors.

## 2018-05-17 NOTE — MR AVS SNAPSHOT
[address] [facility] 
[phone] Patient: Yariel Nugent MRN:  [id number] Visit Information Date & Time Provider Department Dept. Phone Encounter #  
 2018 10:00 AM Gerber Rudd, [address] [facility] [phone] [id number] Follow-up Instructions Routing History Follow-up and Disposition History Your Appointments 2018 12:30 PM  
ECHO CARDIOGRAMS 2D with ECHO, [address] [facility]) Appt Note: $0CP 18ksr /per Dr POTTER/5'2\" 120/  
 [address]  
[phone] [address] [address]  
  
    
 2018  1:30 PM  
NUCLEAR MEDICINE with NUCLEAR, [facility] [place] [facility]) Appt Note: $0CP 18ksr /per Dr POTTER/5'2\" 120/  
 [address]  
[phone] [address]  
  
    
 2018 11:30 AM  
ESTABLISHED PATIENT with Gerber Rudd MD  
[place] [facility]) Appt Note: $0CP 18ksr /1 month follow up [address]  
[phone] [address] Upcoming Health Maintenance Date Due  
 FOOT EXAM Q1 1936 MICROALBUMIN Q1 1936 EYE EXAM RETINAL OR DILATED Q1 1936 DTaP/Tdap/Td series (1 - Tdap) 1947 ZOSTER VACCINE AGE 60> 1986 GLAUCOMA SCREENING Q2Y 1991 Bone Densitometry (Dexa) Screening 1991 Pneumococcal 65+ Low/Medium Risk (1 of 2 - PCV13) 1991 LIPID PANEL Q1 2014 HEMOGLOBIN A1C Q6M 10/21/2016 MEDICARE YEARLY EXAM 3/14/2018 Influenza Age 5 to Adult 2018 Allergies as of 2018  Review Complete On: 2018 By: Gerber Rudd MD  
  
 Severity Noted Reaction Type Reactions Bactrim [Sulfamethoprim Ds]  05/14/2012    Other (comments)  
 consipation Ciprofloxacin (Bulk)  05/14/2012    Other (comments) Low wbc Iodine  09/25/2017    Unknown (comments) Prednisone  05/14/2012    Other (comments)  
 tachycardia Statins-hmg-coa Reductase Inhibitors  02/19/2013   Intolerance Unknown (comments) Stomach uipset and mild muscle aches Current Immunizations  Reviewed on 8/10/2017 Name Date Influenza High Dose Vaccine PF 11/1/2017 Influenza Vaccine 10/25/2016, 9/23/2014 Not reviewed this visit You Were Diagnosed With   
  
 Codes Comments Paroxysmal atrial fibrillation (HCC)    -  Primary ICD-10-CM: I48.0 ICD-9-CM: 427.31 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     ICD-10-CM: I65.23 ICD-9-CM: 433.10, 433.30 Gastroesophageal reflux disease without esophagitis     ICD-10-CM: K21.9 ICD-9-CM: 530.81   
 LATHAM (dyspnea on exertion)     ICD-10-CM: R06.09 
ICD-9-CM: 786.09 Jaw pain     ICD-10-CM: R68.84 ICD-9-CM: 784.92 Vitals BP Pulse Resp Height(growth percentile) Weight(growth percentile) SpO2  
 150/80 (BP Patient Position: Sitting) 64 16 5' 2\" (1.575 m) 120 lb 14.4 oz (54.8 kg) 99% BMI OB Status Smoking Status 22.11 kg/m2 Postmenopausal Former Smoker BMI and BSA Data Body Mass Index Body Surface Area  
 22.11 kg/m 2 1.55 m 2 Preferred Pharmacy Pharmacy Name Phone [facility] [place] [address] [place] [address], [address] [place] [address] Avenue [phone] Your Updated Medication List  
  
   
This list is accurate as of 5/17/18 11:03 AM.  Always use your most recent med list.  
  
  
  
  
 aspirin delayed-release 81 mg tablet Take 1 Tab by mouth daily. cefUROXime 250 mg tablet Commonly known as:  CEFTIN Take 1 Tab by mouth two (2) times a day. disopyramide phosphate 100 mg capsule Commonly known as:  Larbrayan Huge  
 TAKE 1 CAPSULE TWICE A DAY DYAZIDE 37.5-25 mg per capsule Generic drug:  triamterene-hydroCHLOROthiazide Take 1 Cap by mouth daily. ESTRACE 0.01 % (0.1 mg/gram) vaginal cream  
Generic drug:  estradiol Insert 2 g into vagina daily. fexofenadine 60 mg tablet Commonly known as:  Wilburt Saunas Take 30 mg by mouth as needed. ICAPS AREDS PO Take  by mouth two (2) times a day. melatonin Tab tablet Take 10 mg by mouth nightly. metoprolol tartrate 25 mg tablet Commonly known as:  LOPRESSOR  
TAKE 1 TABLET TWICE A DAY MIRALAX 17 gram/dose powder Generic drug:  polyethylene glycol Take 17 g by mouth every fourty-eight (48) hours. nitroglycerin 0.4 mg SL tablet Commonly known as:  NITROSTAT  
1 Tab by SubLINGual route every five (5) minutes as needed for Chest Pain.  
  
 pantoprazole 40 mg tablet Commonly known as:  PROTONIX Take 1 Tab by mouth daily. YOUNG MILK OF MAGNESIA 400 mg/5 mL suspension Generic drug:  magnesium hydroxide Take 30 mL by mouth daily as needed for Constipation. psyllium Powd Commonly known as:  METAMUCIL Take 1 Dose by mouth daily. VITAMIN B-12 100 mcg tablet Generic drug:  cyanocobalamin Take 100 mcg by mouth daily. VITAMIN D3 1,000 unit Cap Generic drug:  cholecalciferol Take  by mouth. Prescriptions Sent to Pharmacy Refills  
 nitroglycerin (NITROSTAT) 0.4 mg SL tablet 1 Si Tab by SubLINGual route every five (5) minutes as needed for Chest Pain. Class: Normal  
 Pharmacy: [facility] [address] [place] [address] Ph #: [phone] Route: SubLINGual  
  
We Performed the Following 2D ECHO COMPLETE ADULT (TTE) W OR WO CONTR [06863 CPT(R)] AMB POC EKG ROUTINE W/ 12 LEADS, INTER & REP [75332 CPT(R)] To-Do List   
 2018 ECG:  STRESS TEST LEXISCAN/CARDIOLITE Introducing [facility]! Dear Christine Bras: Thank you for requesting a [employer] account. Our records indicate that you already have an active [employer] account. You can access your account anytime at [email or web address] Did you know that you can access your hospital and ER discharge instructions at any time in [employer]? You can also review all of your test results from your hospital stay or ER visit. Additional Information If you have questions, please visit the Frequently Asked Questions section of the [employer] website at [email or web address] Remember, [employer] is NOT to be used for urgent needs. For medical emergencies, dial 911. Now available from your iPhone and Android! Please provide this summary of care documentation to your next provider. Your primary care clinician is listed as RISHI Scott. If you have any questions after today's visit, please call [phone].

## 2018-05-17 NOTE — PROGRESS NOTES
1. Have you been to the ER, urgent care clinic since your last visit? Hospitalized since your last visit? No    2. Have you seen or consulted any other health care providers outside of the [address] [place] [address] since your last visit? Include any pap smears or colon screening. Yes When: eye exam 5/2018     Patient C/O fatigue, jaw discomfort and abdomen discomfort for a few days relieved with Mylanta recently. Laila Christianson

## 2018-05-17 NOTE — TELEPHONE ENCOUNTER
Advised I would put on a cancellation list. Had appointment scheduled for tomorrow, but can't make it

## 2018-05-17 NOTE — TELEPHONE ENCOUNTER
----- Message from [facility] sent at 5/17/2018  8:51 AM EDT -----  Regarding: Dr. Negar Gibson  Pt daughter  Song Lacey [phone] would like r/s pts appt  before Jan.

## 2018-06-06 ENCOUNTER — CLINICAL SUPPORT (OUTPATIENT)
Dept: CARDIOLOGY CLINIC | Age: 83
End: 2018-06-06

## 2018-06-06 DIAGNOSIS — I65.23 STENOSIS OF BOTH CAROTID ARTERIES WITHOUT CEREBRAL INFARCTION: ICD-10-CM

## 2018-06-06 DIAGNOSIS — I48.0 PAROXYSMAL ATRIAL FIBRILLATION (HCC): ICD-10-CM

## 2018-06-06 DIAGNOSIS — R06.09 DOE (DYSPNEA ON EXERTION): ICD-10-CM

## 2018-06-06 DIAGNOSIS — K21.9 GASTROESOPHAGEAL REFLUX DISEASE WITHOUT ESOPHAGITIS: ICD-10-CM

## 2018-06-06 DIAGNOSIS — R68.84 JAW PAIN: ICD-10-CM

## 2018-06-10 RX ORDER — TRIAMTERENE AND HYDROCHLOROTHIAZIDE 37.5; 25 MG/1; MG/1
CAPSULE ORAL
Qty: 90 CAP | Refills: 2 | Status: SHIPPED | OUTPATIENT
Start: 2018-06-10 | End: 2018-12-05 | Stop reason: SDUPTHER

## 2018-06-10 NOTE — PROGRESS NOTES
Stress test and echo are normal.  If her symptoms are resolved after changing her medicine for acid reflux, she can follow-up in 6-12 months. If she has significant persistent chest discomfort, follow-up sooner as stress test can occasionally miss blockages in the arteries of the heart.

## 2018-06-11 ENCOUNTER — OFFICE VISIT (OUTPATIENT)
Dept: NEUROLOGY | Age: 83
End: 2018-06-11

## 2018-06-11 VITALS
OXYGEN SATURATION: 97 % | WEIGHT: 118 LBS | HEIGHT: 62 IN | DIASTOLIC BLOOD PRESSURE: 68 MMHG | BODY MASS INDEX: 21.71 KG/M2 | RESPIRATION RATE: 12 BRPM | SYSTOLIC BLOOD PRESSURE: 142 MMHG | HEART RATE: 74 BPM

## 2018-06-11 DIAGNOSIS — E11.42 DIABETIC PERIPHERAL NEUROPATHY ASSOCIATED WITH TYPE 2 DIABETES MELLITUS (HCC): ICD-10-CM

## 2018-06-11 DIAGNOSIS — I65.23 STENOSIS OF BOTH CAROTID ARTERIES WITHOUT CEREBRAL INFARCTION: ICD-10-CM

## 2018-06-11 DIAGNOSIS — G25.0 BENIGN ESSENTIAL TREMOR: ICD-10-CM

## 2018-06-11 DIAGNOSIS — R41.3 MEMORY LOSS: ICD-10-CM

## 2018-06-11 DIAGNOSIS — R93.0 ABNORMAL MRI OF HEAD: ICD-10-CM

## 2018-06-11 DIAGNOSIS — G60.8 IDIOPATHIC SMALL AND LARGE FIBER SENSORY NEUROPATHY: ICD-10-CM

## 2018-06-11 DIAGNOSIS — Z86.73 HISTORY OF STROKE: ICD-10-CM

## 2018-06-11 DIAGNOSIS — I67.9 CEREBROVASCULAR DISEASE, UNSPECIFIED: Primary | ICD-10-CM

## 2018-06-11 RX ORDER — MEMANTINE HYDROCHLORIDE 5 MG/1
5 TABLET ORAL DAILY
Qty: 30 TAB | Refills: 11 | Status: SHIPPED | OUTPATIENT
Start: 2018-06-11 | End: 2018-07-23 | Stop reason: ALTCHOICE

## 2018-06-11 RX ORDER — GUAIFENESIN 100 MG/5ML
81 LIQUID (ML) ORAL DAILY
COMMUNITY
End: 2019-01-07 | Stop reason: SDUPTHER

## 2018-06-11 RX ORDER — CLOPIDOGREL BISULFATE 75 MG/1
TABLET ORAL
COMMUNITY
End: 2018-06-11 | Stop reason: CLARIF

## 2018-06-11 NOTE — PROGRESS NOTES
Spoke to patient using 2 identifiers. Per Dr. Eunice Rajput, pt was made aware of the following:  Stress test and echo are normal.  If symptoms are resolved after changing medicine for acid reflux, follow-up in 6-12 months. If  having significant persistent chest discomfort, follow-up sooner as stress test can occasionally miss blockages in the arteries of the heart. Patient verbalized understanding.

## 2018-06-11 NOTE — MR AVS SNAPSHOT
[address], 
[id number], [address] [facility] [address] 
[phone] Patient: Justine Buenrostro MRN:  [id number] Visit Information Date & Time Provider Department Dept. Phone Encounter #  
 2018 11:40 AM Zehn Robledo MD [facility] [phone] [id number] Follow-up Instructions Return in about 6 months (around 2018). Follow-up and Disposition History Your Appointments 2018  1:00 PM  
PROCEDURE with DOPPLER_NEUMR [facility]) Appt Note: doppler  $ 0 CP jll 18  
 [address], [address] [phone]  
[address] [facility], [address] [place] [address], [address] [phone]  
  
    
 2018 11:30 AM  
ESTABLISHED PATIENT with Albert Goddard MD  
[facility]) Appt Note: $0CP 18ksr /1 month follow up [id number] [facility] [address]  
[phone] [id number] [facility] [address]  
  
    
 2019 11:00 AM  
Follow Up with Zhen Robledo MD  
[facility] at [facility]) Appt Note: f/u CVA, tremor, jrb 18; f/u CVA, tremor, jrb 18; follow up CVA  $ 0 CP jll 18  
 [address], [address] [phone]  
[address] [facility], [address] [place] [address], [address] [phone] Upcoming Health Maintenance Date Due  
 FOOT EXAM Q1 1936 MICROALBUMIN Q1 1936 EYE EXAM RETINAL OR DILATED Q1 1936 DTaP/Tdap/Td series (1 - Tdap) 1947 ZOSTER VACCINE AGE 60> 1986 GLAUCOMA SCREENING Q2Y 1991 Bone Densitometry (Dexa) Screening 1991 Pneumococcal 65+ Low/Medium Risk (1 of 2 - PCV13) 1991 LIPID PANEL Q1 2014 HEMOGLOBIN A1C Q6M 10/21/2016 MEDICARE YEARLY EXAM 3/14/2018 Influenza Age 5 to Adult 8/1/2018 Allergies as of 6/11/2018  Review Complete On: 6/11/2018 By: Nelly Yip MD  
  
 Severity Noted Reaction Type Reactions Bactrim [Sulfamethoprim Ds]  05/14/2012    Other (comments)  
 consipation Ciprofloxacin (Bulk)  05/14/2012    Other (comments) Low wbc Iodine  09/25/2017    Unknown (comments) Prednisone  05/14/2012    Other (comments)  
 tachycardia Statins-hmg-coa Reductase Inhibitors  02/19/2013   Intolerance Unknown (comments) Stomach uipset and mild muscle aches Current Immunizations  Reviewed on 8/10/2017 Name Date Influenza High Dose Vaccine PF 11/1/2017 Influenza Vaccine 10/25/2016, 9/23/2014 Not reviewed this visit You Were Diagnosed With   
  
 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 
ICD-9-CM: 250.60, 357.2 Memory loss     ICD-10-CM: R41.3 ICD-9-CM: 780.93 Vitals BP Pulse Resp Height(growth percentile) Weight(growth percentile) SpO2  
 142/68 74 12 5' 2\" (1.575 m) 118 lb (53.5 kg) 97% BMI OB Status Smoking Status 21.58 kg/m2 Postmenopausal Former Smoker Vitals History BMI and BSA Data Body Mass Index Body Surface Area  
 21.58 kg/m 2 1.53 m 2 Preferred Pharmacy Pharmacy Name Phone [facility] [address], [address] [place] [address] [phone] Your Updated Medication List  
  
   
This list is accurate as of 6/11/18 11:59 PM.  Always use your most recent med list.  
  
  
  
  
 aspirin 81 mg chewable tablet Take 81 mg by mouth daily. disopyramide phosphate 100 mg capsule Commonly known as:  NORPACE  
TAKE 1 CAPSULE TWICE A DAY ICAPS AREDS PO Take  by mouth two (2) times a day. melatonin Tab tablet Take 10 mg by mouth nightly. memantine 5 mg tablet Commonly known as:  Dasilva Clan Take 1 Tab by mouth daily. metoprolol tartrate 25 mg tablet Commonly known as:  LOPRESSOR  
TAKE 1 TABLET TWICE A DAY MIRALAX 17 gram/dose powder Generic drug:  polyethylene glycol Take 17 g by mouth every fourty-eight (48) hours. nitroglycerin 0.4 mg SL tablet Commonly known as:  NITROSTAT  
1 Tab by SubLINGual route every five (5) minutes as needed for Chest Pain.  
  
 pantoprazole 40 mg tablet Commonly known as:  PROTONIX Take 1 Tab by mouth daily. YOUNG MILK OF MAGNESIA 400 mg/5 mL suspension Generic drug:  magnesium hydroxide Take 30 mL by mouth daily as needed for Constipation. psyllium Powd Commonly known as:  METAMUCIL Take 1 Dose by mouth daily. triamterene-hydroCHLOROthiazide 37.5-25 mg per capsule Commonly known as:  Anne Hamming TAKE 1 CAPSULE EVERY OTHER DAY AS NEEDED  
  
 VITAMIN B-12 100 mcg tablet Generic drug:  cyanocobalamin Take 100 mcg by mouth daily. VITAMIN D3 1,000 unit Cap Generic drug:  cholecalciferol Take  by mouth. Prescriptions Sent to Pharmacy Refills  
 memantine (NAMENDA) 5 mg tablet 11 Sig: Take 1 Tab by mouth daily. Class: Normal  
 Pharmacy: [address] Ph #: [phone] Route: Oral  
  
Follow-up Instructions Return in about 6 months (around 12/11/2018). To-Do List   
 06/14/2018 Imaging:  DUPLEX CAROTID BILATERAL AMB NEURO Patient Instructions Office Policies 
 
o Phone calls/patient messages: Please allow up to 24 hours for someone in the office to contact you about your call or message. Be mindful your provider may be out of the office or your message may require further review. We encourage you to use [employer] for your messages as this is a faster, more efficient way to communicate with our office 
 
o Medication Refills: 
Prescription medications require up to 48 business hours to process. We encourage you to use [employer] for your refills. For controlled medications: Please allow up to 72 business hours to process. Certain medications may require you to  a written prescription at our office. NO narcotic/controlled medications will be prescribed after 4pm Monday through Friday or on weekends 
 
o Form/Paperwork Completion: 
Please note there is a $25 fee for all paperwork completed by our providers. We ask that you allow 7-14 business days. Pre-payment is due prior to picking up/faxing the completed form. You may also download your forms to [employer] to have your doctor print off. A Healthy Lifestyle: Care Instructions Your Care Instructions A healthy lifestyle can help you feel good, stay at a healthy weight, and have plenty of energy for both work and play. A healthy lifestyle is something you can share with your whole family. A healthy lifestyle also can lower your risk for serious health problems, such as high blood pressure, heart disease, and diabetes. You can follow a few steps listed below to improve your health and the health of your family. Follow-up care is a key part of your treatment and safety. Be sure to make and go to all appointments, and call your doctor if you are having problems. It's also a good idea to know your test results and keep a list of the medicines you take. How can you care for yourself at home? · Do not eat too much sugar, fat, or fast foods. You can still have dessert and treats now and then. The goal is moderation. · Start small to improve your eating habits. Pay attention to portion sizes, drink less juice and soda pop, and eat more fruits and vegetables. ¨ Eat a healthy amount of food. A 3-ounce serving of meat, for example, is about the size of a deck of cards. Fill the rest of your plate with vegetables and whole grains. ¨ Limit the amount of soda and sports drinks you have every day. Drink more water when you are thirsty. ¨ Eat at least 5 servings of fruits and vegetables every day. It may seem like a lot, but it is not hard to reach this goal. A serving or helping is 1 piece of fruit, 1 cup of vegetables, or 2 cups of leafy, raw vegetables. Have an apple or some carrot sticks as an afternoon snack instead of a candy bar. Try to have fruits and/or vegetables at every meal. 
· Make exercise part of your daily routine. You may want to start with simple activities, such as walking, bicycling, or slow swimming. Try to be active 30 to 60 minutes every day. You do not need to do all 30 to 60 minutes all at once. For example, you can exercise 3 times a day for 10 or 20 minutes. Moderate exercise is safe for most people, but it is always a good idea to talk to your doctor before starting an exercise program. 
· Keep moving. Theodore Buddle the lawn, work in the garden, or [employer]. Take the stairs instead of the elevator at work. · If you smoke, quit. People who smoke have an increased risk for heart attack, stroke, cancer, and other lung illnesses. Quitting is hard, but there are ways to boost your chance of quitting tobacco for good. ¨ Use nicotine gum, patches, or lozenges. ¨ Ask your doctor about stop-smoking programs and medicines. ¨ Keep trying.  
In addition to reducing your risk of diseases in the future, you will notice some benefits soon after you stop using tobacco. If you have shortness of breath or asthma symptoms, they will likely get better within a few weeks after you quit. · Limit how much alcohol you drink. Moderate amounts of alcohol (up to 2 drinks a day for men, 1 drink a day for women) are okay. But drinking too much can lead to liver problems, high blood pressure, and other health problems. Family health If you have a family, there are many things you can do together to improve your health. · Eat meals together as a family as often as possible. · Eat healthy foods. This includes fruits, vegetables, lean meats and dairy, and whole grains. · Include your family in your fitness plan. Most people think of activities such as jogging or tennis as the way to fitness, but there are many ways you and your family can be more active. Anything that makes you breathe hard and gets your heart pumping is exercise. Here are some tips: 
¨ Walk to do errands or to take your child to school or the bus. ¨ Go for a family bike ride after dinner instead of watching TV. Where can you learn more? Go to http://pinkySimple Lifeformsshamar.info/. Enter N303 in the search box to learn more about \"A Healthy Lifestyle: Care Instructions. \" Current as of: May 12, 2017 Content Version: 11.4 © 1616-7964 [employer]. Care instructions adapted under license by [employer] (which disclaims liability or warranty for this information). If you have questions about a medical condition or this instruction, always ask your healthcare professional. [place] [address] any warranty or liability for your use of this information. Patient Instructions History Introducing [facility]! Dear Marcel Evangelista: Thank you for requesting a [employer] account. Our records indicate that you already have an active [employer] account. You can access your account anytime at [email or web address] Did you know that you can access your hospital and ER discharge instructions at any time in Qonf? You can also review all of your test results from your hospital stay or ER visit. Additional Information If you have questions, please visit the Frequently Asked Questions section of the Qonf website at [email or web address] Remember, Qonf is NOT to be used for urgent needs. For medical emergencies, dial 911. Now available from your iPhone and Android! Please provide this summary of care documentation to your next provider. Your primary care clinician is listed as RISHI Scott. If you have any questions after today's visit, please call [phone].

## 2018-06-11 NOTE — PATIENT INSTRUCTIONS
Office Policies    o Phone calls/patient messages:  Please allow up to 24 hours for someone in the office to contact you about your call or message. Be mindful your provider may be out of the office or your message may require further review. We encourage you to use [employer] for your messages as this is a faster, more efficient way to communicate with our office    o Medication Refills:  Prescription medications require up to 48 business hours to process. We encourage you to use [employer] for your refills. For controlled medications: Please allow up to 72 business hours to process. Certain medications may require you to  a written prescription at our office. NO narcotic/controlled medications will be prescribed after 4pm Monday through Friday or on weekends    o Form/Paperwork Completion:  Please note there is a $25 fee for all paperwork completed by our providers. We ask that you allow 7-14 business days. Pre-payment is due prior to picking up/faxing the completed form. You may also download your forms to [employer] to have your doctor print off. A Healthy Lifestyle: Care Instructions  Your Care Instructions    A healthy lifestyle can help you feel good, stay at a healthy weight, and have plenty of energy for both work and play. A healthy lifestyle is something you can share with your whole family. A healthy lifestyle also can lower your risk for serious health problems, such as high blood pressure, heart disease, and diabetes. You can follow a few steps listed below to improve your health and the health of your family. Follow-up care is a key part of your treatment and safety. Be sure to make and go to all appointments, and call your doctor if you are having problems. It's also a good idea to know your test results and keep a list of the medicines you take. How can you care for yourself at home? · Do not eat too much sugar, fat, or fast foods. You can still have dessert and treats now and then. The goal is moderation. · Start small to improve your eating habits. Pay attention to portion sizes, drink less juice and soda pop, and eat more fruits and vegetables. ¨ Eat a healthy amount of food. A 3-ounce serving of meat, for example, is about the size of a deck of cards. Fill the rest of your plate with vegetables and whole grains. ¨ Limit the amount of soda and sports drinks you have every day. Drink more water when you are thirsty. ¨ Eat at least 5 servings of fruits and vegetables every day. It may seem like a lot, but it is not hard to reach this goal. A serving or helping is 1 piece of fruit, 1 cup of vegetables, or 2 cups of leafy, raw vegetables. Have an apple or some carrot sticks as an afternoon snack instead of a candy bar. Try to have fruits and/or vegetables at every meal.  · Make exercise part of your daily routine. You may want to start with simple activities, such as walking, bicycling, or slow swimming. Try to be active 30 to 60 minutes every day. You do not need to do all 30 to 60 minutes all at once. For example, you can exercise 3 times a day for 10 or 20 minutes. Moderate exercise is safe for most people, but it is always a good idea to talk to your doctor before starting an exercise program.  · Keep moving. Theodore Buddle the lawn, work in the garden, or [employer]. Take the stairs instead of the elevator at work. · If you smoke, quit. People who smoke have an increased risk for heart attack, stroke, cancer, and other lung illnesses. Quitting is hard, but there are ways to boost your chance of quitting tobacco for good. ¨ Use nicotine gum, patches, or lozenges. ¨ Ask your doctor about stop-smoking programs and medicines. ¨ Keep trying. In addition to reducing your risk of diseases in the future, you will notice some benefits soon after you stop using tobacco. If you have shortness of breath or asthma symptoms, they will likely get better within a few weeks after you quit.   · Limit how much alcohol you drink. Moderate amounts of alcohol (up to 2 drinks a day for men, 1 drink a day for women) are okay. But drinking too much can lead to liver problems, high blood pressure, and other health problems. Family health  If you have a family, there are many things you can do together to improve your health. · Eat meals together as a family as often as possible. · Eat healthy foods. This includes fruits, vegetables, lean meats and dairy, and whole grains. · Include your family in your fitness plan. Most people think of activities such as jogging or tennis as the way to fitness, but there are many ways you and your family can be more active. Anything that makes you breathe hard and gets your heart pumping is exercise. Here are some tips:  ¨ Walk to do errands or to take your child to school or the bus. ¨ Go for a family bike ride after dinner instead of watching TV. Where can you learn more? Go to http://pinky-shamar.info/. Enter S900 in the search box to learn more about \"A Healthy Lifestyle: Care Instructions. \"  Current as of: May 12, 2017  Content Version: 11.4  © 3478-4134 [employer]wise[employer]. Care instructions adapted under license by [employer]ook[employer] (which disclaims liability or warranty for this information). If you have questions about a medical condition or this instruction, always ask your healthcare professional. [address] any warranty or liability for your use of this information.

## 2018-06-11 NOTE — LETTER
6/11/2018 8:34 PM 
 
Patient:  Suzi Abad YOB: 1926 Date of Visit: 6/11/2018 Dear No Recipients: Thank you for referring Ms. Deidre Keating to me for evaluation/treatment. Below are the relevant portions of my assessment and plan of care. Consult Subjective:  
 
Suzi Abad is a 80 y.o. right-handed  female seen for evaluation of new problem of tolerance to medication of Aricept even 5 mg a day, that she was given to treat for increasing difficulty carrying on conversation, and remembering words, and forgetting things more frequently in the last several months on referral from Dr. Gwen Santiago. The patient had previous neuropsych testing that suggested attention deficit disorder 2 years ago, but she is convinced that her memory is slowly worsening and her daughter seems to verify this. 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 She does not want to repeat neuropsych testing again. Her visual hallucinations have all gotten better now. She has many reactions to medications, so I do not think she can take any other anticholinesterase, we will try her on Namenda 5 mg a day and see how she does for 3 weeks 4 weeks, and slowly advance that to 10 mg and maybe even to 20 mg if tolerated. Her imaging of the brain in the past has just shown mild microvascular disease and atrophic changes. Her metabolic studies have shown normal B12 and vitamin D levels recently. She still has the essential tremor seems to be slowly progressive but not too bad today, and it does vary some. She is on metoprolol 55 mg twice a day. She has essential tremor in her arms and head, which was helped by the Mysoline, but she could not tolerate the sedation and GI side effects. Lyrica and Topamax also caused side effects.  Patient tried Neurontin 100 mg and could not tolerate it. She has essential tremor, is already on a beta blocker, and I'm afraid to try amantadine because of all of her drug sensitivities. We tried her on Remeron at the lowest dose of 7.5 mg but she had side effects on that also. . Very difficult case. She is also seen for progressive numbness in her feet,and neuropathy workup negative except for elevated hemoglobin A1c of 6.1 one year ago. B12 level was somewhat low and she is taking supplements now. Patient gives a five year history of action tremors in her hands when she tries to do things. It initially seemed to respond to her beta blockers, but now is getting worse. In addition she has progressive numbness in her feet for about 6 years. It is in both feet, and not associated with back pain, any numbness in her hands, or bowel or bladder difficulty. She is a latent diabetic, has no family history of tremor or neuropathy, and has had no toxin exposure, or precipitating causes for this. She also has progressive difficulty with memory, and seems to be forgetting things, and her family has noticed this. They feel she needs evaluation. She had an MRI scan 6 years ago that showed mild microvascular disease atrophic changes. She is on a diet, and watching her sugars well, and taking her vitamins and will increase her vitamin D up to 2000 units. She has had no other new focal weakness, sensory loss, visual changes or other Has had no new neurological symptoms or focal neurological deficits since last seen Patient did have abnormal MRI of the brain with lacunar type stroke and negative Dopplers and will continue aspirin and repeat her Doppler since she has not had one in 2 years. Past Medical History:  
Diagnosis Date  Acute diverticulitis 9/25/2017  Allergic rhinitis 9/25/2017  Arrhythmia   
 afib  Arthritis  Asthma  Atrial fibrillation ([facility] [address]  Benign essential tremor 9/25/2017  Cancer ([facility] [address] skin  Celiac disease 9/25/2017  Constipation 9/25/2017  Diabetic peripheral neuropathy associated with type 2 diabetes mellitus ([facility] [address] 4/21/2016  Diarrhea 9/25/2017  Diverticulosis of large intestine without hemorrhage 9/25/2017  Early satiety 9/25/2017  Fuchs' corneal dystrophy 9/25/2017  Gastroesophageal reflux disease without esophagitis 9/25/2017  GERD (gastroesophageal reflux disease)  High cholesterol  Hypercholesterolemia 9/25/2017  Hypertension  Irritable bowel syndrome with diarrhea 9/25/2017  Numbness 9/25/2017  Osteopenia 9/25/2017  Other ill-defined conditions(799.89)   
 collapsed lung prior to hernia repair surgery  Ptosis, both eyelids 9/25/2017  Statin intolerance 9/25/2017  Unspecified adverse effect of anesthesia   
 pt states she went into cardiac arrest prior to hernia repair after the insertion of gas in stomach Past Surgical History:  
Procedure Laterality Date  HX APPENDECTOMY  HX CHOLECYSTECTOMY  HX GI    
 hemorrhoidectomy  HX HEENT    
 sinus surgery  HX HERNIA REPAIR    
 HX TONSILLECTOMY Family History Problem Relation Age of Onset  Heart Disease Mother  Dementia Mother  Heart Disease Father  Neuropathy Child  Depression Child  Other Child   
  diverticulitis Social History Substance Use Topics  Smoking status: Former Smoker Packs/day: 0.75 Years: 5.00 Quit date: 10/11/1967  Smokeless tobacco: Never Used  Alcohol use No  
   
 
Current Outpatient Prescriptions:  
  aspirin 81 mg chewable tablet, Take 81 mg by mouth daily. , Disp: , Rfl:  
  memantine (NAMENDA) 5 mg tablet, Take 1 Tab by mouth daily. , Disp: 30 Tab, Rfl: 11 
  triamterene-hydroCHLOROthiazide (DYAZIDE) 37.5-25 mg per capsule, TAKE 1 CAPSULE EVERY OTHER DAY AS NEEDED, Disp: 90 Cap, Rfl: 2   vit A/vit C/vit E/zinc/copper (ICAPS AREDS PO), Take  by mouth two (2) times a day., Disp: , Rfl:  
   polyethylene glycol (MIRALAX) 17 gram/dose powder, Take 17 g by mouth every fourty-eight (48) hours. , Disp: , Rfl:  
  nitroglycerin (NITROSTAT) 0.4 mg SL tablet, 1 Tab by SubLINGual route every five (5) minutes as needed for Chest Pain., Disp: 25 Tab, Rfl: 1 
  disopyramide phosphate (NORPACE) 100 mg capsule, TAKE 1 CAPSULE TWICE A DAY, Disp: 180 Cap, Rfl: 3 
  pantoprazole (PROTONIX) 40 mg tablet, Take 1 Tab by mouth daily. , Disp: 90 Tab, Rfl: 3 
  magnesium hydroxide (YOUNG MILK OF MAGNESIA) 400 mg/5 mL suspension, Take 30 mL by mouth daily as needed for Constipation. , Disp: , Rfl:  
  psyllium (METAMUCIL) powd, Take 1 Dose by mouth daily. , Disp: , Rfl:  
  melatonin tab tablet, Take 10 mg by mouth nightly., Disp: , Rfl:  
  metoprolol tartrate (LOPRESSOR) 25 mg tablet, TAKE 1 TABLET TWICE A DAY, Disp: 180 Tab, Rfl: 2   cyanocobalamin (VITAMIN B-12) 100 mcg tablet, Take 100 mcg by mouth daily. , Disp: , Rfl:  
  Cholecalciferol, Vitamin D3, (VITAMIN D3) 1,000 unit cap, Take  by mouth., Disp: , Rfl:  
 
 
 
Allergies Allergen Reactions  Bactrim [Sulfamethoprim Ds] Other (comments)  
  consipation  Ciprofloxacin (Bulk) Other (comments) Low wbc  Iodine Unknown (comments)  Prednisone Other (comments)  
  tachycardia  Statins-Hmg-Coa Reductase Inhibitors Unknown (comments) Stomach uipset and mild muscle aches Review of Systems: A comprehensive review of systems was negative except for: Constitutional: positive for fatigue and malaise Cardiovascular: positive for palpitations, exertional chest pressure/discomfort Gastrointestinal: positive for dyspepsia, reflux symptoms and abdominal pain Musculoskeletal: positive for myalgias, arthralgias and stiff joints Neurological: positive for memory problems, paresthesia and tremor Behvioral/Psych: positive for anxiety and depression Vitals:  
 06/11/18 1151 BP: 142/68 Pulse: 74 Resp: 12 SpO2: 97% Weight: 118 lb (53.5 kg) Height: 5' 2\" (1.575 m) Objective: I 
 
 
NEUROLOGICAL EXAM: 
 
Appearance: The patient is well developed, well nourished, provides a coherent history and is in no acute distress. Mental Status: Oriented to year and the month but not the day of the month, place and person and the president, patient has difficulty doing serial 7 subtractions, can remember 2 of 3 words at 30 seconds with distraction, can spell the word world backwards, and draw a clock that shows that time 10 minutes to 11 with difficulty. She can name her children, but not her grandchildren. Mood and affect appropriate but slightly anxious  And depressed. Cranial Nerves:   Intact visual fields. Fundi are benign. SIM, EOM's full, no nystagmus, no ptosis. Facial sensation is normal. Corneal reflexes are not tested. Facial movement is symmetric. Hearing is abnormal bilaterally. Palate is midline with normal sternocleidomastoid and trapezius muscles are normal. Tongue is midline. Neck without meningismus or bruits Temporal arteries are not tender or enlarged Motor:  4/5 strength in upper and lower proximal and distal muscles. Normal bulk and tone. No fasciculations. Reflexes:   Deep tendon reflexes 1+/4 and symmetrical. 
No babinski or clonus present Sensory:   Abnormal to touch, pinprick and vibration and temperature in both feet to ankle level. DSS is intact Gait:  Normal gait for her age. Tremor:   Mild bilateral intention and head tremor noted. Cerebellar:  No cerebellar signs present. Neurovascular:  Normal heart sounds and regular rhythm, peripheral pulses decreased, and no carotid bruits. Assessment: ICD-10-CM ICD-9-CM 1. Cerebrovascular disease, unspecified I67.9 437.9 aspirin 81 mg chewable tablet  
   memantine (NAMENDA) 5 mg tablet DUPLEX CAROTID BILATERAL AMB NEURO  
   DISCONTINUED: clopidogrel (PLAVIX) 75 mg tab CANCELED: XR SKULL MIN 4 V(COMP) CANCELED: DUPLEX CAROTID BILATERAL AMB NEURO 2. 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  
   DISCONTINUED: clopidogrel (PLAVIX) 75 mg tab CANCELED: XR SKULL MIN 4 V(COMP) CANCELED: DUPLEX CAROTID BILATERAL AMB NEURO 3. History of stroke Z86.73 V12.54 aspirin 81 mg chewable tablet  
   memantine (NAMENDA) 5 mg tablet DUPLEX CAROTID BILATERAL AMB NEURO  
   DISCONTINUED: clopidogrel (PLAVIX) 75 mg tab CANCELED: XR SKULL MIN 4 V(COMP) CANCELED: DUPLEX CAROTID BILATERAL AMB NEURO 4. Abnormal MRI of head R93.0 793.0 aspirin 81 mg chewable tablet  
   memantine (NAMENDA) 5 mg tablet DUPLEX CAROTID BILATERAL AMB NEURO  
   DISCONTINUED: clopidogrel (PLAVIX) 75 mg tab CANCELED: XR SKULL MIN 4 V(COMP) CANCELED: DUPLEX CAROTID BILATERAL AMB NEURO 5. Benign essential tremor G25.0 333.1 aspirin 81 mg chewable tablet  
   memantine (NAMENDA) 5 mg tablet DUPLEX CAROTID BILATERAL AMB NEURO  
   DISCONTINUED: clopidogrel (PLAVIX) 75 mg tab CANCELED: XR SKULL MIN 4 V(COMP) CANCELED: DUPLEX CAROTID BILATERAL AMB NEURO 6. Idiopathic small and large fiber sensory neuropathy G60.8 356.4 aspirin 81 mg chewable tablet  
   memantine (NAMENDA) 5 mg tablet DUPLEX CAROTID BILATERAL AMB NEURO  
   DISCONTINUED: clopidogrel (PLAVIX) 75 mg tab CANCELED: XR SKULL MIN 4 V(COMP) CANCELED: DUPLEX CAROTID BILATERAL AMB NEURO 7. Diabetic peripheral neuropathy associated with type 2 diabetes mellitus (HCC) E11.42 250.60 aspirin 81 mg chewable tablet  
  357.2 memantine (NAMENDA) 5 mg tablet DUPLEX CAROTID BILATERAL AMB NEURO  
   DISCONTINUED: clopidogrel (PLAVIX) 75 mg tab CANCELED: XR SKULL MIN 4 V(COMP)    CANCELED: DUPLEX CAROTID BILATERAL AMB NEURO  
 8. Memory loss R41.3 780.93 aspirin 81 mg chewable tablet  
   memantine (NAMENDA) 5 mg tablet DUPLEX CAROTID BILATERAL AMB NEURO  
   DISCONTINUED: clopidogrel (PLAVIX) 75 mg tab CANCELED: XR SKULL MIN 4 V(COMP) CANCELED: DUPLEX CAROTID BILATERAL AMB NEURO Plan:  
 
Patient with marked intolerance to Aricept, and I believe she can not take any cholinesterase inhibitors She with progressive memory loss, and seems to be more impaired, will start her on low-dose Namenda 5 mg and see how she does. Family and the patient advised of the risk of the medications and complications. In particular to watch for dizziness, increased sedation, stomach upset, or any side effect at all to call us immediately. Her hallucinations have all gotten better and not too much of a problem now. We will have her continue melatonin to see if that helps stabilize her sleep patterns reduce the hallucinations Patient has essential tremor is still present, but not to progressive so far, we will continue current therapy with beta-blocker only since she is intolerant of all the other medications. Patient also has a neuropathy, etiology latent disbetes, and the patient is counseled again to watch her blood sugars and watch her diet carefully as the best control of her neuropathy Complete metabolic panels for neuropathy and memory loss, showed elevated hemoglobin A1c only and low vitamin D level and B12 Patient MRI of the brain did show lacunar type stroke with negative carotid Dopplers, continue baby aspirin and repeat Dopplers Patient intolerant of Neurontin and Lyrica for her tremors and Remeron and Mysoline She is to take a multivitamin and vitamin D and B12 on a daily basis and remain mentally and physically active 35 minutes spent reviewing her records, examining the patient, and her daughter, discussing her condition with family and patient in detail, and further diagnosis and treatment They will call for results of tests, and followup in 6 months time or earlier if needed Signed By: Vega Page MD   
 June 11, 2018 This note will not be viewable in [address] If you have questions, please do not hesitate to call me. I look forward to following Ms. Michelle Nava along with you. Sincerely, Vega Page MD

## 2018-06-12 NOTE — PROGRESS NOTES
Consult    Subjective:     Francisco Andino is a 80 y.o. right-handed  female seen for evaluation of new problem of tolerance to medication of Aricept even 5 mg a day, that she was given to treat for increasing difficulty carrying on conversation, and remembering words, and forgetting things more frequently in the last several months on referral from Dr. Zeeshan Travis. The patient had previous neuropsych testing that suggested attention deficit disorder 2 years ago, but she is convinced that her memory is slowly worsening and her daughter seems to verify this. 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 She does not want to repeat neuropsych testing again. Her visual hallucinations have all gotten better now. She has many reactions to medications, so I do not think she can take any other anticholinesterase, we will try her on Namenda 5 mg a day and see how she does for 3 weeks 4 weeks, and slowly advance that to 10 mg and maybe even to 20 mg if tolerated. Her imaging of the brain in the past has just shown mild microvascular disease and atrophic changes. Her metabolic studies have shown normal B12 and vitamin D levels recently. She still has the essential tremor seems to be slowly progressive but not too bad today, and it does vary some. She is on metoprolol 55 mg twice a day. She has essential tremor in her arms and head, which was helped by the Mysoline, but she could not tolerate the sedation and GI side effects. Lyrica and Topamax also caused side effects. Patient tried Neurontin 100 mg and could not tolerate it. She has essential tremor, is already on a beta blocker, and I'm afraid to try amantadine because of all of her drug sensitivities. We tried her on Remeron at the lowest dose of 7.5 mg but she had side effects on that also. . Very difficult case.  She is also seen for progressive numbness in her feet,and neuropathy workup negative except for elevated hemoglobin A1c of 6.1 one year ago. B12 level was somewhat low and she is taking supplements now. Patient gives a five year history of action tremors in her hands when she tries to do things. It initially seemed to respond to her beta blockers, but now is getting worse. In addition she has progressive numbness in her feet for about 6 years. It is in both feet, and not associated with back pain, any numbness in her hands, or bowel or bladder difficulty. She is a latent diabetic, has no family history of tremor or neuropathy, and has had no toxin exposure, or precipitating causes for this. She also has progressive difficulty with memory, and seems to be forgetting things, and her family has noticed this. They feel she needs evaluation. She had an MRI scan 6 years ago that showed mild microvascular disease atrophic changes. She is on a diet, and watching her sugars well, and taking her vitamins and will increase her vitamin D up to 2000 units. She has had no other new focal weakness, sensory loss, visual changes or other  Has had no new neurological symptoms or focal neurological deficits since last seen  Patient did have abnormal MRI of the brain with lacunar type stroke and negative Dopplers and will continue aspirin and repeat her Doppler since she has not had one in 2 years.     Past Medical History:   Diagnosis Date    Acute diverticulitis 9/25/2017    Allergic rhinitis 9/25/2017    Arrhythmia     afib    Arthritis     Asthma     Atrial fibrillation ([address]     Benign essential tremor 9/25/2017    Cancer ([address]     skin    Celiac disease 9/25/2017    Constipation 9/25/2017    Diabetic peripheral neuropathy associated with type 2 diabetes mellitus ([address] 4/21/2016    Diarrhea 9/25/2017    Diverticulosis of large intestine without hemorrhage 9/25/2017    Early satiety 9/25/2017    Fuchs' corneal dystrophy 9/25/2017    Gastroesophageal reflux disease without esophagitis 9/25/2017    GERD (gastroesophageal reflux disease)     High cholesterol     Hypercholesterolemia 9/25/2017    Hypertension     Irritable bowel syndrome with diarrhea 9/25/2017    Numbness 9/25/2017    Osteopenia 9/25/2017    Other ill-defined conditions(799.89)     collapsed lung prior to hernia repair surgery    Ptosis, both eyelids 9/25/2017    Statin intolerance 9/25/2017    Unspecified adverse effect of anesthesia     pt states she went into cardiac arrest prior to hernia repair after the insertion of gas in stomach      Past Surgical History:   Procedure Laterality Date    HX APPENDECTOMY      HX CHOLECYSTECTOMY      HX GI      hemorrhoidectomy    HX HEENT      sinus surgery    HX HERNIA REPAIR      HX TONSILLECTOMY       Family History   Problem Relation Age of Onset    Heart Disease Mother     Dementia Mother     Heart Disease Father     Neuropathy Child     Depression Child     Other Child      diverticulitis      Social History   Substance Use Topics    Smoking status: Former Smoker     Packs/day: 0.75     Years: 5.00     Quit date: 10/11/1967    Smokeless tobacco: Never Used    Alcohol use No         Current Outpatient Prescriptions:     aspirin 81 mg chewable tablet, Take 81 mg by mouth daily. , Disp: , Rfl:     memantine (NAMENDA) 5 mg tablet, Take 1 Tab by mouth daily. , Disp: 30 Tab, Rfl: 11    triamterene-hydroCHLOROthiazide (DYAZIDE) 37.5-25 mg per capsule, TAKE 1 CAPSULE EVERY OTHER DAY AS NEEDED, Disp: 90 Cap, Rfl: 2    vit A/vit C/vit E/zinc/copper (ICAPS AREDS PO), Take  by mouth two (2) times a day., Disp: , Rfl:     polyethylene glycol (MIRALAX) 17 gram/dose powder, Take 17 g by mouth every fourty-eight (48) hours. , Disp: , Rfl:     nitroglycerin (NITROSTAT) 0.4 mg SL tablet, 1 Tab by SubLINGual route every five (5) minutes as needed for Chest Pain., Disp: 25 Tab, Rfl: 1    disopyramide phosphate (NORPACE) 100 mg capsule, TAKE 1 CAPSULE TWICE A DAY, Disp: 180 Cap, Rfl: 3    pantoprazole (PROTONIX) 40 mg tablet, Take 1 Tab by mouth daily. , Disp: 90 Tab, Rfl: 3    magnesium hydroxide (YOUNG MILK OF MAGNESIA) 400 mg/5 mL suspension, Take 30 mL by mouth daily as needed for Constipation. , Disp: , Rfl:     psyllium (METAMUCIL) powd, Take 1 Dose by mouth daily. , Disp: , Rfl:     melatonin tab tablet, Take 10 mg by mouth nightly., Disp: , Rfl:     metoprolol tartrate (LOPRESSOR) 25 mg tablet, TAKE 1 TABLET TWICE A DAY, Disp: 180 Tab, Rfl: 2    cyanocobalamin (VITAMIN B-12) 100 mcg tablet, Take 100 mcg by mouth daily. , Disp: , Rfl:     Cholecalciferol, Vitamin D3, (VITAMIN D3) 1,000 unit cap, Take  by mouth., Disp: , Rfl:         Allergies   Allergen Reactions    Bactrim [Sulfamethoprim Ds] Other (comments)     consipation    Ciprofloxacin (Bulk) Other (comments)     Low wbc    Iodine Unknown (comments)    Prednisone Other (comments)     tachycardia    Statins-Hmg-Coa Reductase Inhibitors Unknown (comments)     Stomach uipset and mild muscle aches        Review of Systems:  A comprehensive review of systems was negative except for: Constitutional: positive for fatigue and malaise  Cardiovascular: positive for palpitations, exertional chest pressure/discomfort  Gastrointestinal: positive for dyspepsia, reflux symptoms and abdominal pain  Musculoskeletal: positive for myalgias, arthralgias and stiff joints  Neurological: positive for memory problems, paresthesia and tremor  Behvioral/Psych: positive for anxiety and depression   Vitals:    06/11/18 1151   BP: 142/68   Pulse: 74   Resp: 12   SpO2: 97%   Weight: 118 lb (53.5 kg)   Height: 5' 2\" (1.575 m)     Objective:     I      NEUROLOGICAL EXAM:    Appearance: The patient is well developed, well nourished, provides a coherent history and is in no acute distress.    Mental Status: Oriented to year and the month but not the day of the month, place and person and the president, patient has difficulty doing serial 7 subtractions, can remember 2 of 3 words at 30 seconds with distraction, can spell the word world backwards, and draw a clock that shows that time 10 minutes to 11 with difficulty. Mood and affect appropriate but slightly anxious  And depressed. Cranial Nerves:   Intact visual fields. Fundi are benign. SIM, EOM's full, no nystagmus, no ptosis. Facial sensation is normal. Corneal reflexes are not tested. Facial movement is symmetric. Hearing is abnormal bilaterally. Palate is midline with normal sternocleidomastoid and trapezius muscles are normal. Tongue is midline. Neck without meningismus or bruits  Temporal arteries are not tender or enlarged   Motor:  4/5 strength in upper and lower proximal and distal muscles. Normal bulk and tone. No fasciculations. Reflexes:   Deep tendon reflexes 1+/4 and symmetrical.  No babinski or clonus present   Sensory:   Abnormal to touch, pinprick and vibration and temperature in both feet to ankle level. DSS is intact   Gait:  Normal gait for her age. Tremor:   Mild bilateral intention and head tremor noted. Cerebellar:  No cerebellar signs present. Neurovascular:  Normal heart sounds and regular rhythm, peripheral pulses decreased, and no carotid bruits. Assessment:       ICD-10-CM ICD-9-CM    1. Cerebrovascular disease, unspecified I67.9 437.9 aspirin 81 mg chewable tablet      memantine (NAMENDA) 5 mg tablet      DUPLEX CAROTID BILATERAL AMB NEURO      DISCONTINUED: clopidogrel (PLAVIX) 75 mg tab      CANCELED: XR SKULL MIN 4 V(COMP)      CANCELED: DUPLEX CAROTID BILATERAL AMB NEURO   2. 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  History of stroke Z86.73 V12.54 aspirin 81 mg chewable tablet      memantine (NAMENDA) 5 mg tablet      DUPLEX CAROTID BILATERAL AMB NEURO      DISCONTINUED: clopidogrel (PLAVIX) 75 mg tab      CANCELED: XR SKULL MIN 4 V(COMP)      CANCELED: DUPLEX CAROTID BILATERAL AMB NEURO   4. Abnormal MRI of head R93.0 793.0 aspirin 81 mg chewable tablet      memantine (NAMENDA) 5 mg tablet      DUPLEX CAROTID BILATERAL AMB NEURO      DISCONTINUED: clopidogrel (PLAVIX) 75 mg tab      CANCELED: XR SKULL MIN 4 V(COMP)      CANCELED: DUPLEX CAROTID BILATERAL AMB NEURO   5. Benign essential tremor G25.0 333.1 aspirin 81 mg chewable tablet      memantine (NAMENDA) 5 mg tablet      DUPLEX CAROTID BILATERAL AMB NEURO      DISCONTINUED: clopidogrel (PLAVIX) 75 mg tab      CANCELED: XR SKULL MIN 4 V(COMP)      CANCELED: DUPLEX CAROTID BILATERAL AMB NEURO   6. Idiopathic small and large fiber sensory neuropathy G60.8 356.4 aspirin 81 mg chewable tablet      memantine (NAMENDA) 5 mg tablet      DUPLEX CAROTID BILATERAL AMB NEURO      DISCONTINUED: clopidogrel (PLAVIX) 75 mg tab      CANCELED: XR SKULL MIN 4 V(COMP)      CANCELED: DUPLEX CAROTID BILATERAL AMB NEURO   7. Diabetic peripheral neuropathy associated with type 2 diabetes mellitus (HCC) E11.42 250.60 aspirin 81 mg chewable tablet     357.2 memantine (NAMENDA) 5 mg tablet      DUPLEX CAROTID BILATERAL AMB NEURO      DISCONTINUED: clopidogrel (PLAVIX) 75 mg tab      CANCELED: XR SKULL MIN 4 V(COMP)      CANCELED: DUPLEX CAROTID BILATERAL AMB NEURO   8.  Memory loss R41.3 780.93 aspirin 81 mg chewable tablet      memantine (NAMENDA) 5 mg tablet      DUPLEX CAROTID BILATERAL AMB NEURO      DISCONTINUED: clopidogrel (PLAVIX) 75 mg tab      CANCELED: XR SKULL MIN 4 V(COMP)      CANCELED: DUPLEX CAROTID BILATERAL AMB NEURO         Plan:     Patient with marked intolerance to Aricept, and I believe she can not take any cholinesterase inhibitors  She with progressive memory loss, and seems to be more impaired, will start her on low-dose Namenda 5 mg and see how she does. Family and the patient advised of the risk of the medications and complications. In particular to watch for dizziness, increased sedation, stomach upset, or any side effect at all to call us immediately. Her hallucinations have all gotten better and not too much of a problem now. We will have her continue melatonin to see if that helps stabilize her sleep patterns reduce the hallucinations  Patient has essential tremor is still present, but not to progressive so far, we will continue current therapy with beta-blocker only since she is intolerant of all the other medications. Patient also has a neuropathy, etiology latent disbetes, and the patient is counseled again to watch her blood sugars and watch her diet carefully as the best control of her neuropathy  Complete metabolic panels for neuropathy and memory loss, showed elevated hemoglobin A1c only and low vitamin D level and B12  Patient MRI of the brain did show lacunar type stroke with negative carotid Dopplers, continue baby aspirin and repeat Dopplers  Patient intolerant of Neurontin and Lyrica for her tremors and Remeron and Mysoline  She is to take a multivitamin and vitamin D and B12 on a daily basis and remain mentally and physically active  35 minutes spent reviewing her records, examining the patient, and her daughter, discussing her condition with family and patient in detail, and further diagnosis and treatment  They will call for results of tests, and followup in 6 months time or earlier if needed    Signed By: Balaji Yao MD     June 11, 2018       This note will not be viewable in [address] This note will not be viewable in [address]

## 2018-06-13 ENCOUNTER — TELEPHONE (OUTPATIENT)
Dept: NEUROLOGY | Age: 83
End: 2018-06-13

## 2018-06-13 ENCOUNTER — TELEPHONE (OUTPATIENT)
Dept: CARDIOLOGY CLINIC | Age: 83
End: 2018-06-13

## 2018-06-13 RX ORDER — MINERAL OIL
90 ENEMA (ML) RECTAL
COMMUNITY
End: 2018-11-28 | Stop reason: ALTCHOICE

## 2018-06-13 NOTE — TELEPHONE ENCOUNTER
Pj Larry Ball daughters called this morning with patient present. They went onto My Chart and read the entire note. Have several questions from yesterday's documentation. Specifically the medications where several are listed as discontinued. The dx of abnormal MRI, CV disease and history of stroke. Also mental examination and XR of skull order and then cancelled.     Please call jacklyn Mantillaus Pam at [phone]

## 2018-06-13 NOTE — TELEPHONE ENCOUNTER
Spoke with daughter on [address] Verified patient with two identifiers. The recent stress test doesn't show th Carotids this was done in 2015 and may need repeat per Dr Alondra Ruvalcaba.

## 2018-06-13 NOTE — TELEPHONE ENCOUNTER
Pt's daughter Keshav Alfredo (on [facility]) called and has questions regarding previous test. Her neurologist wants her to have a doppler on tomorrow. She's wondering if the previous test would've picked up whatever this doppler would. She has questions to ask.     Please call at [phone]

## 2018-06-13 NOTE — TELEPHONE ENCOUNTER
Called the daughter, explain the mistake of charts mixed up, and corrections made to make sure the charts were correct, and also corrected part of the mental status exam they said was wrong, and added Allegra as her prescription, and discussed the MRI findings from 2015 that were noted in the chart    I think I explained the best I could, and make corrections that I could, so we will wait and see what happens with time

## 2018-06-13 NOTE — TELEPHONE ENCOUNTER
I called the patient's daughter and went over her questions on the printed out version of the AVS:  I transferred the patient over to Dr Alondra Ruvalcaba directly to discuss all concerns

## 2018-06-14 ENCOUNTER — OFFICE VISIT (OUTPATIENT)
Dept: NEUROLOGY | Age: 83
End: 2018-06-14

## 2018-06-14 DIAGNOSIS — G60.8 IDIOPATHIC SMALL AND LARGE FIBER SENSORY NEUROPATHY: ICD-10-CM

## 2018-06-14 DIAGNOSIS — I65.23 STENOSIS OF BOTH CAROTID ARTERIES WITHOUT CEREBRAL INFARCTION: Primary | ICD-10-CM

## 2018-06-14 DIAGNOSIS — R41.3 MEMORY LOSS: ICD-10-CM

## 2018-06-14 DIAGNOSIS — R93.0 ABNORMAL MRI OF HEAD: ICD-10-CM

## 2018-06-14 DIAGNOSIS — E11.42 DIABETIC PERIPHERAL NEUROPATHY ASSOCIATED WITH TYPE 2 DIABETES MELLITUS (HCC): ICD-10-CM

## 2018-06-14 DIAGNOSIS — G25.0 BENIGN ESSENTIAL TREMOR: ICD-10-CM

## 2018-06-14 DIAGNOSIS — Z86.73 HISTORY OF STROKE: ICD-10-CM

## 2018-06-14 DIAGNOSIS — I67.9 CEREBROVASCULAR DISEASE, UNSPECIFIED: ICD-10-CM

## 2018-07-23 ENCOUNTER — OFFICE VISIT (OUTPATIENT)
Dept: INTERNAL MEDICINE CLINIC | Age: 83
End: 2018-07-23

## 2018-07-23 VITALS
HEART RATE: 65 BPM | WEIGHT: 118.4 LBS | OXYGEN SATURATION: 96 % | HEIGHT: 62 IN | DIASTOLIC BLOOD PRESSURE: 68 MMHG | SYSTOLIC BLOOD PRESSURE: 120 MMHG | BODY MASS INDEX: 21.79 KG/M2

## 2018-07-23 DIAGNOSIS — I15.9 SECONDARY HYPERTENSION: Chronic | ICD-10-CM

## 2018-07-23 DIAGNOSIS — E78.00 HYPERCHOLESTEROLEMIA: Chronic | ICD-10-CM

## 2018-07-23 DIAGNOSIS — G25.0 BENIGN ESSENTIAL TREMOR: Chronic | ICD-10-CM

## 2018-07-23 DIAGNOSIS — E11.42 DIABETIC PERIPHERAL NEUROPATHY ASSOCIATED WITH TYPE 2 DIABETES MELLITUS (HCC): Chronic | ICD-10-CM

## 2018-07-23 DIAGNOSIS — I48.0 PAROXYSMAL ATRIAL FIBRILLATION (HCC): Chronic | ICD-10-CM

## 2018-07-23 DIAGNOSIS — Z00.00 INITIAL MEDICARE ANNUAL WELLNESS VISIT: Primary | ICD-10-CM

## 2018-07-23 DIAGNOSIS — N39.0 URINARY TRACT INFECTION WITHOUT HEMATURIA, SITE UNSPECIFIED: ICD-10-CM

## 2018-07-23 DIAGNOSIS — Z78.0 POSTMENOPAUSAL: ICD-10-CM

## 2018-07-23 LAB
BACTERIA UA POCT, BACTPOCT: ABNORMAL
BILIRUB UR QL STRIP: NEGATIVE
CASTS UA POCT: ABNORMAL
CLUE CELLS, CLUEPOCT: NEGATIVE
CRYSTALS UA POCT, CRYSPOCT: NEGATIVE
EPITHELIAL CELLS POCT: ABNORMAL
GLUCOSE UR-MCNC: NEGATIVE MG/DL
GRAN# POC: 3.9 K/UL (ref 2–7.8)
GRAN% POC: 68.6 % (ref 37–92)
HCT VFR BLD CALC: 45.9 % (ref 37–51)
HGB BLD-MCNC: 15 G/DL (ref 12–18)
KETONES P FAST UR STRIP-MCNC: NEGATIVE MG/DL
LY# POC: 1.4 K/UL (ref 0.6–4.1)
LY% POC: 25.6 % (ref 10–58.5)
MCH RBC QN: 29.2 PG (ref 26–32)
MCHC RBC-ENTMCNC: 32.6 G/DL (ref 30–36)
MCV RBC: 90 FL (ref 80–97)
MICROALBUMIN UR TEST STR-MCNC: NEGATIVE MG/L (ref 0–20)
MID #, POC: 0.3 K/UL (ref 0–1.8)
MID% POC: 5.8 % (ref 0.1–24)
MUCUS UA POCT, MUCPOCT: ABNORMAL
PH UR STRIP: 7 [PH] (ref 5–7)
PLATELET # BLD: 271 K/UL (ref 140–440)
PROT UR QL STRIP: NEGATIVE
RBC # BLD: 5.12 M/UL (ref 4.2–6.3)
RBC UA POCT, RBCPOCT: ABNORMAL
SP GR UR STRIP: 1.01 (ref 1.01–1.02)
TRICH UA POCT, TRICHPOC: NEGATIVE
UA UROBILINOGEN AMB POC: NORMAL (ref 0.2–1)
URINALYSIS CLARITY POC: ABNORMAL
URINALYSIS COLOR POC: YELLOW
URINE BLOOD POC: NEGATIVE
URINE CULT COMMENT, POCT: ABNORMAL
URINE LEUKOCYTES POC: ABNORMAL
URINE NITRITES POC: NEGATIVE
WBC # BLD: 5.6 K/UL (ref 4.1–10.9)
WBC UA POCT, WBCPOCT: ABNORMAL
YEAST UA POCT, YEASTPOC: NEGATIVE

## 2018-07-23 RX ORDER — ESTRADIOL 0.1 MG/G
2 CREAM VAGINAL DAILY
COMMUNITY
End: 2018-11-28 | Stop reason: ALTCHOICE

## 2018-07-23 NOTE — PROGRESS NOTES
This note will not be viewable in [address] Kindra Andersen is a 80 y.o. female who presents for an Initial Medicare Annual Wellness Exam (AWV) and follow up of chronic medical conditions. The patient has not had a Medicare wellness exam in the past    I have reviewed the patient's medical history in detail and updated the computerized patient record. History     Subjective:  Mrs. Leydi Eldridge is a 27-year-old female who presents to the office today for Medicare wellness exam and follow-up of multiple medical problems. The patient has a history of diabetic peripheral neuropathy associated with type 2 diabetes mellitus. She has been seen by neurology in the past and placed on B12 as a result. Patient currently is not on any type of diabetic medication. She does note some numbness in her feet. She denies polyuria, polydipsia or blurred vision. The patient has had a diabetic retinal eye examination done within the last year. Her blood pressure is currently managed on Dyazide and metoprolol. She denies any dizziness or fatigue. She has had no muscle cramping or lower extremity edema. She denies headaches, numbness, tingling or focal neurological problems. She has hypercholesterolemia currently not on any type of statin therapy. She has no history of coronary artery disease and denies exertional chest pains or claudication. She does have a history of depression but currently is off treatment for this and is had no exacerbation of symptoms. She has a history of atrial fibrillation and is been treated at [facility]. She is currently on Lopressor for this as well as Norpace. She has had no palpitations, syncope or shortness of breath and nerve been no strokelike symptoms. She has had some memory loss and has been on Namenda in the past but could not tolerate that due to GI upset.   In addition she is also had a history of depression and was treated for this in the past but is now off the medication and is had no recurrent recurrence of symptoms.     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 I65.23    Osteopenia M85.80    Gastroesophageal reflux disease without esophagitis K21.9    Diverticulosis of large intestine without hemorrhage K57.30    Benign essential tremor G25.0    Irritable bowel syndrome with diarrhea K58.0    Chest wall pain R07.89    Statin intolerance Z78.9    Diarrhea R19.7    Constipation K59.00    Hypercholesterolemia E78.00    Early satiety R68.81    Celiac disease K90.0    Allergic rhinitis J30.9    Acute diverticulitis K57.92    Numbness R20.0    Fuchs' corneal dystrophy H18.51    Ptosis, both eyelids H02.403    Epidermoid cyst of finger of left hand L72.0    History of stroke Z86.73    Postmenopausal Z78.0       Patient Care Team:  Miriam Lacey MD as PCP - General (Internal Medicine)  Yolanda Singh PsyD (Psychology)  Yris Aldridge MD as Physician (Cardiology)  Katheryn Godfrey MD (Ophthalmology)  Sumit Loco MD (Ophthalmology)  Dee Antonio MD (Neurology)  Marjorie Smith DPM (Podiatry)  Isreal Spurling, MD (Gastroenterology)    Past Medical History:   Diagnosis Date    Acute diverticulitis 9/25/2017    Allergic rhinitis 9/25/2017    Arrhythmia     afib    Arthritis     Asthma     Atrial fibrillation (HCC)     Benign essential tremor 9/25/2017    Cancer ([facility]     skin    Celiac disease 9/25/2017    Constipation 9/25/2017    Diabetic peripheral neuropathy associated with type 2 diabetes mellitus ([facility] 4/21/2016    Diarrhea 9/25/2017    Diverticulosis of large intestine without hemorrhage 9/25/2017    Early satiety 9/25/2017    Fuchs' corneal dystrophy 9/25/2017    Gastroesophageal reflux disease without esophagitis 9/25/2017    GERD (gastroesophageal reflux disease)     High cholesterol     Hypercholesterolemia 9/25/2017    Hypertension     Irritable bowel syndrome with diarrhea 9/25/2017    Numbness 9/25/2017    Osteopenia 9/25/2017    Other ill-defined conditions(799.89)     collapsed lung prior to hernia repair surgery    Postmenopausal 7/23/2018    Ptosis, both eyelids 9/25/2017    Statin intolerance 9/25/2017    Unspecified adverse effect of anesthesia     pt states she went into cardiac arrest prior to hernia repair after the insertion of gas in stomach     Past Surgical History:   Procedure Laterality Date    HX APPENDECTOMY      HX CHOLECYSTECTOMY      HX GI      hemorrhoidectomy    HX HEENT      sinus surgery    HX HERNIA REPAIR      HX TONSILLECTOMY       Allergies   Allergen Reactions    Bactrim [Sulfamethoprim Ds] Other (comments)     consipation    Ciprofloxacin (Bulk) Other (comments)     Low wbc    Iodine Unknown (comments)    Prednisone Other (comments)     tachycardia    Statins-Hmg-Coa Reductase Inhibitors Unknown (comments)     Stomach uipset and mild muscle aches     Current Outpatient Prescriptions   Medication Sig Dispense Refill    estradiol (ESTRACE) 0.01 % (0.1 mg/gram) vaginal cream Insert 2 g into vagina daily.  fexofenadine (ALLEGRA) 180 mg tablet Take 90 mg by mouth daily as needed for Allergies.  aspirin 81 mg chewable tablet Take 81 mg by mouth daily.       triamterene-hydroCHLOROthiazide (DYAZIDE) 37.5-25 mg per capsule TAKE 1 CAPSULE EVERY OTHER DAY AS NEEDED 90 Cap 2    polyethylene glycol (MIRALAX) 17 gram/dose powder Take 17 g by mouth every fourty-eight (48) hours.  nitroglycerin (NITROSTAT) 0.4 mg SL tablet 1 Tab by SubLINGual route every five (5) minutes as needed for Chest Pain. 25 Tab 1    disopyramide phosphate (NORPACE) 100 mg capsule TAKE 1 CAPSULE TWICE A  Cap 3    pantoprazole (PROTONIX) 40 mg tablet Take 1 Tab by mouth daily. 90 Tab 3    magnesium hydroxide (YOUNG MILK OF MAGNESIA) 400 mg/5 mL suspension Take 30 mL by mouth daily as needed for Constipation.  psyllium (METAMUCIL) powd Take 1 Dose by mouth daily.  melatonin tab tablet Take 10 mg by mouth nightly.  metoprolol tartrate (LOPRESSOR) 25 mg tablet TAKE 1 TABLET TWICE A  Tab 2    cyanocobalamin (VITAMIN B-12) 100 mcg tablet Take 100 mcg by mouth daily.  Cholecalciferol, Vitamin D3, (VITAMIN D3) 1,000 unit cap Take  by mouth.        Social History     Social History    Marital status:      Spouse name: N/A    Number of children: N/A    Years of education: N/A     Social History Main Topics    Smoking status: Former Smoker     Packs/day: 0.75     Years: 5.00     Quit date: 10/11/1967    Smokeless tobacco: Never Used    Alcohol use No    Drug use: No    Sexual activity: Not Asked     Other Topics Concern    None     Social History Narrative     Family History   Problem Relation Age of Onset    Heart Disease Mother     Dementia Mother     Heart Disease Father     Neuropathy Child     Depression Child     Other Child      diverticulitis     Health Maintenance   Topic Date Due    FOOT EXAM Q1  06/22/1936    MICROALBUMIN Q1  06/22/1936    EYE EXAM RETINAL OR DILATED Q1  06/22/1936    DTaP/Tdap/Td series (1 - Tdap) 06/22/1947    ZOSTER VACCINE AGE 60>  04/22/1986    GLAUCOMA SCREENING Q2Y  06/22/1991    Bone Densitometry (Dexa) Screening  06/22/1991    Pneumococcal 65+ Low/Medium Risk (1 of 2 - PCV13) 06/22/1991    LIPID PANEL Q1  08/20/2014    HEMOGLOBIN A1C Q6M  10/21/2016    MEDICARE YEARLY EXAM  03/14/2018    Influenza Age 9 to Adult  08/01/2018          Review of Systems  Constitutional: negative for fevers, chills, anorexia and weight loss  Eyes:   negative for visual disturbance and irritation  ENT:   negative for tinnitus,sore throat,nasal congestion,ear pain,hoarseness  Respiratory:  negative for cough, hemoptysis, dyspnea,wheezing  CV:   negative for chest pain, palpitations, lower extremity edema  GI:   negative for nausea, vomiting, diarrhea, abdominal pain,melena  Endo:               negative for polyuria,polydipsia,polyphagia,heat intolerance  Genitourinary: negative for frequency, dysuria and hematuria  Integumentary: negative for rash and pruritus  Hematologic:  negative for easy bruising and gum/nose bleeding  Musculoskel: negative for myalgias, arthralgias, back pain, muscle weakness, joint pain  Neurological:  negative for headaches, dizziness, vertigo, memory problems and gait   Behavl/Psych: negative for feelings of anxiety, depression, mood changes  ROS otherwise negative      Depression Risk Factor Screening:     PHQ over the last two weeks 7/23/2018   Little interest or pleasure in doing things Several days   Feeling down, depressed, irritable, or hopeless Several days   Total Score PHQ 2 2   Trouble falling or staying asleep, or sleeping too much Not at all   Feeling tired or having little energy More than half the days   Poor appetite, weight loss, or overeating Not at all   Feeling bad about yourself - or that you are a failure or have let yourself or your family down Not at all   Trouble concentrating on things such as school, work, reading, or watching TV Several days   Moving or speaking so slowly that other people could have noticed; or the opposite being so fidgety that others notice Not at all   Thoughts of being better off dead, or hurting yourself in some way Not at all   PHQ 9 Score 5       Alcohol Risk Factor Screening:    You do not drink alcohol or very rarely. Functional Ability and Level of Safety:   Hearing Loss  Hearing is good. The patient needs further evaluation. Activities of Daily Living  ADL Assessment 7/23/2018   Feeding yourself No Help Needed   Getting from bed to chair No Help Needed   Getting dressed No Help Needed   Bathing or showering No Help Needed   Walk across the room (includes cane/walker) No Help Needed   Using the telphone No Help Needed   Taking your medications No Help Needed   Preparing meals No Help Needed   Managing money (expenses/bills) Help Needed   Moderately strenuous housework (laundry) No Help Needed   Shopping for personal items (toiletries/medicines) Help Needed   Shopping for groceries Help Needed   Driving Help Needed   Climbing a flight of stairs Help Needed   Getting to places beyond walking distances Help Needed       Fall Risk  Fall Risk Assessment, last 12 mths 7/23/2018   Able to walk? Yes   Fall in past 12 months? No       Abuse Screen  Abuse Screening Questionnaire 7/23/2018   Do you ever feel afraid of your partner? N   Are you in a relationship with someone who physically or mentally threatens you? N   Is it safe for you to go home? Y       Cognitive Screening   Evaluation of Cognitive Function:  Has your family/caregiver stated any concerns about your memory: yes    Physical Exam     Visit Vitals    /68 (BP 1 Location: Left arm, BP Patient Position: Sitting)    Pulse 65    Ht 5' 2\" (1.575 m)    Wt 118 lb 6.4 oz (53.7 kg)    SpO2 96%    BMI 21.66 kg/m2     Body mass index is 21.66 kg/(m^2).      General appearance - alert, well appearing, and in no distress  Mental status - alert, oriented to person, place, and time  EYE-SIM, EOMI,conjunctiva normal bilaterally, lids normal  ENT-ENT exam normal, no neck nodes or sinus tenderness  Nose - normal and patent, no erythema,  Or discharge   Mouth - mucous membranes moist, pharynx normal without lesions  Neck - supple, no significant adenopathy or bruit  Chest - clear to auscultation, no wheezes, rales or rhonchi. Heart - normal rate, regular rhythm, normal S1, S2, no murmurs, rubs, clicks or gallops   Abdomen - soft, nontender, nondistended, no masses or organomegaly  Lymph- no adenopathy palpable  Ext-peripheral pulses normal, no pedal edema, no clubbing or cyanosis  Skin-Warm and dry. no hyperpigmentation, vitiligo, or suspicious lesions  Neuro -alert, oriented, normal speech, no focal findings or movement disorder noted  Diabetic foot exam:     Left Foot:   Visual Exam: Bunion present   Pulse DP: 2+ (normal)   Filament test: normal sensation    Vibratory sensation: absent      Right Foot:   Visual Exam: Bunion is present   Pulse DP: 2+ (normal)   Filament test: normal sensation    Vibratory sensation: absent        Assessment/Plan   [place] education and counseling provided:  Age appropriate evidence-based preventive care recommendations based on today's review and evaluation; including relevant cancer screening guidelines, and vaccination recommendations. An After Visit Summary was printed and given to the patient which information about these guidelines, and a personalized schedule for health maintenance items. Whe appropriate and with patient agreement, orders noted below were placed to complete missing health maintenance items.       Additional Plan for follow up chronic medical conditions includes:  Diagnoses and all orders for this visit:    Initial Medicare annual wellness visit    Diabetic peripheral neuropathy associated with type 2 diabetes mellitus ([facility]  -     COLLECTION VENOUS BLOOD,VENIPUNCTURE  -     AMB POC URINE, MICROALBUMIN, SEMIQUANT (1 RESULT)  -     HEMOGLOBIN A1C W/O EAG    Benign essential tremor  -     AMB POC URINALYSIS DIP STICK AUTO W/ MICRO   -     METABOLIC PANEL, COMPREHENSIVE  -     AMB POC COMPLETE CBC,AUTOMATED ENTER    Hypercholesterolemia  -     LIPID PANEL  -     TSH 3RD GENERATION    Secondary hypertension    Paroxysmal atrial fibrillation (HCC)    Postmenopausal  -     DEXA BONE DENSITY STUDY AXIAL; Future          Other instructions: The patient's medications are reviewed and reconciled. No change in her current medical regimen is made. Advanced care planning discussion occurred today    The patient has previously had a diabetic retinal eye exam and glaucoma screening and will have a copy of this report forwarded to us    A diabetic foot exam is done today    She is postmenopausal and has osteoporosis and bone density will be scheduled. Age-appropriate vaccinations are discussed and she is in need of a pneumococcal vaccination Tdap and shingles vaccine which she will consider having done at her pharmacy. Await results of multiple labs    Follow-up 6 months        Follow-up Disposition:  Return in about 6 months (around 1/23/2019). I have reviewed with the patient details of the assessment and plan and all questions were answered. Relevent patient education was performed. The most recent lab findings were reviewed with the patient.     Santino Eugene MD

## 2018-07-23 NOTE — PROGRESS NOTES
The best way to stay healthy is to live a healthy lifestyle. A healthy lifestyle includes regular exercise, eating a well-balanced diet, keeping a healthy weight and not smoking. Regular physical exams and screening tests are another important way to take care of yourself. Preventive exams provided by health care providers can find health problems early when treatment works best and can keep you from getting certain diseases or illnesses. Preventive services include exams, lab tests, screenings, shots, monitoring and information to help you take care of your own health. All people over 65 should have a pneumonia shot. Pneumonia shots are usually only needed once in a lifetime unless your doctor decides differently. In addition to your physical exam, some screening tests are recommended:    All people over 65 should have a yearly flu shot. People over 65 are at medium to high risk for Hepatitis B. Three shots are needed for complete protection. Bone mass measurement (dexa scan) is recommended every two years. Diabetes Mellitus screening is recommended every year. Glaucoma is an eye disease caused by high pressure in the eye. An eye exam is recommended every year. Cardiovascular screening tests that check your cholesterol and other blood fat (lipid) levels are recommended every five years. Colorectal Cancer screening tests help to find pre-cancerous polyps (growths in the colon) so they can be removed before they turn into cancer. Tests ordered for screening depend on your personal and family history risk factors. Prostate Cancer Screening (annually up to age 76)    Screening for breast cancer is recommended yearly with a Mammogram.    Screening for cervical and vaginal cancer is recommended with a pelvic and Pap test every two years.  However if you have had an abnormal pap in the past  three years or at high risk for cervical or vaginal cancer Medicare will cover a pap test and a pelvic exam every year.      Here is a list of your current Health Maintenance items with a due date:  Health Maintenance Due   Topic Date Due    Diabetic Foot Care  06/22/1936    Albumin Urine Test  06[birth year]22/1936    Eye Exam  06/22/1936    DTaP/Tdap/Td  (1 - Tdap) 06/22/1947    Shingles Vaccine  04/22/1986    Glaucoma Screening   06/22/1991    Bone Mineral Density   06/22/1991    Pneumococcal Vaccine (1 of 2 - PCV13) 06/22/1991    Cholesterol Test   08/20/2014    Hemoglobin A1C    10/21/2016    Annual Well Visit  03/14/2018

## 2018-07-23 NOTE — MR AVS SNAPSHOT
[address] [facility] 
 
 
 [address] [address] [phone] Patient: Kristin Justin MRN: [id number] Visit Information Date & Time Provider Department Dept. Phone Encounter #  
 2018  2:30 PM Renetta Hester MD [facility] [id number] Follow-up Instructions Return in about 6 months (around 2019). Follow-up and Disposition History Your Appointments 2018  2:30 PM  
DEXA with DEXA SCAN  
[facility] ([facility]) Appt Note: dexa [address] [address] [address] [facility] [address]  
  
    
 2018  1:40 PM  
Follow Up with Hung Moya DO [facility] at [address] [facility]) Appt Note: f/u tremors, forgetfulness, switching ok per MD 2018 [address] [facility] [address]  
[phone]  
  
   
 [address]  
  
    
 2019  2:00 PM  
FOLLOW UP 10 with Renetta Hester MD  
[facility] ([facility]) Appt Note: 6 mo fu  
 [address] [address] [address] [facility] [address]  
  
    
 2019 11:00 AM  
Follow Up with Lise Good MD  
[facility] at [facility]) Appt Note: f/u CVA, tremor, jrb 18; f/u CVA, tremor, jrb 18; follow up CVA  $ 0 CP jll 18  
 [address] [place] [address], 
[address] [place] [address], [address] [address]  
[address] [facility], [address] [place] [address], [address] [address] Upcoming Health Maintenance Date Due  
 EYE EXAM RETINAL OR DILATED Q1 1936 DTaP/Tdap/Td series (1 - Tdap) 1947 ZOSTER VACCINE AGE 60> 4/22/1986 GLAUCOMA SCREENING Q2Y 6/22/1991 Bone Densitometry (Dexa) Screening 6/22/1991 Pneumococcal 65+ Low/Medium Risk (1 of 2 - PCV13) 6/22/1991 LIPID PANEL Q1 8/20/2014 HEMOGLOBIN A1C Q6M 10/21/2016 Influenza Age 5 to Adult 8/1/2018 FOOT EXAM Q1 7/23/2019 MICROALBUMIN Q1 7/23/2019 MEDICARE YEARLY EXAM 7/24/2019 Allergies as of 7/23/2018  Review Complete On: 7/23/2018 By: Casey Ahmadi MD  
  
 Severity Noted Reaction Type Reactions Bactrim [Sulfamethoprim Ds]  05/14/2012    Other (comments)  
 consipation Ciprofloxacin (Bulk)  05/14/2012    Other (comments) Low wbc Iodine  09/25/2017    Unknown (comments) Prednisone  05/14/2012    Other (comments)  
 tachycardia Statins-hmg-coa Reductase Inhibitors  02/19/2013   Intolerance Unknown (comments) Stomach uipset and mild muscle aches Current Immunizations  Reviewed on 8/10/2017 Name Date Influenza High Dose Vaccine PF 11/1/2017 Influenza Vaccine 10/25/2016, 9/23/2014 Not reviewed this visit You Were Diagnosed With   
  
 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 
ICD-9-CM: 250.60, 357.2 Benign essential tremor     ICD-10-CM: G25.0 ICD-9-CM: 333.1 Hypercholesterolemia     ICD-10-CM: E78.00 ICD-9-CM: 272.0 Secondary hypertension     ICD-10-CM: I15.9 ICD-9-CM: 405.99 Paroxysmal atrial fibrillation (HCC)     ICD-10-CM: I48.0 ICD-9-CM: 427.31 Postmenopausal     ICD-10-CM: Z78.0 ICD-9-CM: V49.81 Urinary tract infection without hematuria, site unspecified     ICD-10-CM: N39.0 ICD-9-CM: 599.0 Vitals  BP Pulse Height(growth percentile) Weight(growth percentile) SpO2 BMI  
 120/68 (BP 1 Location: Left arm, BP Patient Position: Sitting) 65 5' 2\" (1.575 m) 118 lb 6.4 oz (53.7 kg) 96% 21.66 kg/m2 OB Status Smoking Status Postmenopausal Former Smoker BMI and BSA Data Body Mass Index Body Surface Area  
 21.66 kg/m 2 1.53 m 2 Preferred Pharmacy Pharmacy Name Phone [facility] [address], [address] [place] [address] Avenue [phone] Your Updated Medication List  
  
   
This list is accurate as of 7/23/18 11:59 PM.  Always use your most recent med list.  
  
  
  
  
 aspirin 81 mg chewable tablet Take 81 mg by mouth daily. disopyramide phosphate 100 mg capsule Commonly known as:  NORPACE  
TAKE 1 CAPSULE TWICE A DAY  
  
 ESTRACE 0.01 % (0.1 mg/gram) vaginal cream  
Generic drug:  estradiol Insert 2 g into vagina daily. fexofenadine 180 mg tablet Commonly known as:  Tiffanie Saran Take 90 mg by mouth daily as needed for Allergies. melatonin Tab tablet Take 10 mg by mouth nightly. metoprolol tartrate 25 mg tablet Commonly known as:  LOPRESSOR  
TAKE 1 TABLET TWICE A DAY MIRALAX 17 gram/dose powder Generic drug:  polyethylene glycol Take 17 g by mouth every fourty-eight (48) hours. nitroglycerin 0.4 mg SL tablet Commonly known as:  NITROSTAT  
1 Tab by SubLINGual route every five (5) minutes as needed for Chest Pain.  
  
 pantoprazole 40 mg tablet Commonly known as:  PROTONIX Take 1 Tab by mouth daily. YOUNG MILK OF MAGNESIA 400 mg/5 mL suspension Generic drug:  magnesium hydroxide Take 30 mL by mouth daily as needed for Constipation. psyllium Powd Commonly known as:  METAMUCIL Take 1 Dose by mouth daily. triamterene-hydroCHLOROthiazide 37.5-25 mg per capsule Commonly known as:  Yaniv Makos TAKE 1 CAPSULE EVERY OTHER DAY AS NEEDED  
  
 VITAMIN B-12 100 mcg tablet Generic drug:  cyanocobalamin Take 100 mcg by mouth daily. VITAMIN D3 1,000 unit Cap Generic drug:  cholecalciferol Take  by mouth. We Performed the Following AMB POC COMPLETE CBC,AUTOMATED ENTER J2236910 CPT(R)] AMB POC URINALYSIS DIP STICK AUTO W/ MICRO  [96475 CPT(R)] AMB POC URINE, MICROALBUMIN, SEMIQUANT (1 RESULT) [19184 CPT(R)] COLLECTION VENOUS BLOOD,VENIPUNCTURE [id number] CPT(R)] CULTURE, URINE [id number] CPT(R)] HEMOGLOBIN A1C W/O EAG [23076 CPT(R)] LIPID PANEL [78283 CPT(R)] METABOLIC PANEL, COMPREHENSIVE [53493 CPT(R)] TSH 3RD GENERATION [34031 CPT(R)] Follow-up Instructions Return in about 6 months (around 1/23/2019). To-Do List   
 07/23/2018 Imaging:  DEXA BONE DENSITY STUDY AXIAL Patient Instructions The best way to stay healthy is to live a healthy lifestyle. A healthy lifestyle includes regular exercise, eating a well-balanced diet, keeping a healthy weight and not smoking. Regular physical exams and screening tests are another important way to take care of yourself. Preventive exams provided by health care providers can find health problems early when treatment works best and can keep you from getting certain diseases or illnesses. Preventive services include exams, lab tests, screenings, shots, monitoring and information to help you take care of your own health. All people over 65 should have a pneumonia shot. Pneumonia shots are usually only needed once in a lifetime unless your doctor decides differently. In addition to your physical exam, some screening tests are recommended: 
 
All people over 65 should have a yearly flu shot. People over 65 are at medium to high risk for Hepatitis B. Three shots are needed for complete protection. Bone mass measurement (dexa scan) is recommended every two years. Diabetes Mellitus screening is recommended every year. Glaucoma is an eye disease caused by high pressure in the eye. An eye exam is recommended every year.   
 
Cardiovascular screening tests that check your cholesterol and other blood fat (lipid) levels are recommended every five years. Colorectal Cancer screening tests help to find pre-cancerous polyps (growths in the colon) so they can be removed before they turn into cancer. Tests ordered for screening depend on your personal and family history risk factors. Prostate Cancer Screening (annually up to age 76) Screening for breast cancer is recommended yearly with a Mammogram. 
 
Screening for cervical and vaginal cancer is recommended with a pelvic and Pap test every two years. However if you have had an abnormal pap in the past  three years or at high risk for cervical or vaginal cancer Medicare will cover a pap test and a pelvic exam every year. Here is a list of your current Health Maintenance items with a due date: 
Health Maintenance Due Topic Date Due  
 Diabetic Foot Care  06/22/1936  Albumin Urine Test  06/22/1936  Eye Exam  06/22/1936  
 DTaP/Tdap/Td  (1 - Tdap) 06/22/1947  Shingles Vaccine  04/22/1986  Glaucoma Screening   06/22/1991  Bone Mineral Density   06/22/1991  Pneumococcal Vaccine (1 of 2 - PCV13) 06/22/1991  Cholesterol Test   08/20/2014  Hemoglobin A1C    10/21/2016 [facility] Annual Well Visit  03/14/2018 Advance Directives: Care Instructions Your Care Instructions An advance directive is a legal way to state your wishes at the end of your life. It tells your family and your doctor what to do if you can no longer say what you want. There are two main types of advance directives. You can change them any time that your wishes change. · A living will tells your family and your doctor your wishes about life support and other treatment. · A durable power of  for health care lets you name a person to make treatment decisions for you when you can't speak for yourself. This person is called a health care agent.  
If you do not have an advance directive, decisions about your medical care may be made by a doctor or a  who doesn't know you. It may help to think of an advance directive as a gift to the people who care for you. If you have one, they won't have to make tough decisions by themselves. Follow-up care is a key part of your treatment and safety. Be sure to make and go to all appointments, and call your doctor if you are having problems. It's also a good idea to know your test results and keep a list of the medicines you take. How can you care for yourself at home? · Discuss your wishes with your loved ones and your doctor. This way, there are no surprises. · Many states have a unique form. Or you might use a universal form that has been approved by many states. This kind of form can sometimes be completed and stored online. Your electronic copy will then be available wherever you have a connection to the Internet. In most cases, doctors will respect your wishes even if you have a form from a different state. · You don't need a  to do an advance directive. But you may want to get legal advice. · Think about these questions when you prepare an advance directive: ¨ Who do you want to make decisions about your medical care if you are not able to? Many people choose a family member or close friend. ¨ Do you know enough about life support methods that might be used? If not, talk to your doctor so you understand. ¨ What are you most afraid of that might happen? You might be afraid of having pain, losing your independence, or being kept alive by machines. ¨ Where would you prefer to die? Choices include your home, a hospital, or a nursing home. ¨ Would you like to have information about hospice care to support you and your family? ¨ Do you want to donate organs when you die? ¨ Do you want certain Voodoo practices performed before you die? If so, put your wishes in the advance directive. · Read your advance directive every year, and make changes as needed. When should you call for help? Be sure to contact your doctor if you have any questions. Where can you learn more? Go to http://pinky-shamar.info/. Enter R264 in the search box to learn more about \"Advance Directives: Care Instructions. \" Current as of: October 6, 2017 Content Version: 11.7 © 7570-8600 [employer]. Care instructions adapted under license by [employer] (which disclaims liability or warranty for this information). If you have questions about a medical condition or this instruction, always ask your healthcare professional. [address] any warranty or liability for your use of this information. Patient Instructions History Introducing [facility]! Dear Monica Olvera: Thank you for requesting a [employer] account. Our records indicate that you already have an active Off[employer] account. You can access your account anytime at [email or web address] Did you know that you can access your hospital and ER discharge instructions at any time in [employer]? You can also review all of your test results from your hospital stay or ER visit. Additional Information If you have questions, please visit the Frequently Asked Questions section of the [employer] website at [email or web address] Remember, Off[employer] is NOT to be used for urgent needs. For medical emergencies, dial 911. Now available from your iPhone and Android! Please provide this summary of care documentation to your next provider. Your primary care clinician is listed as RISHI Scott. If you have any questions after today's visit, please call [phone].

## 2018-07-23 NOTE — PATIENT INSTRUCTIONS
The best way to stay healthy is to live a healthy lifestyle. A healthy lifestyle includes regular exercise, eating a well-balanced diet, keeping a healthy weight and not smoking. Regular physical exams and screening tests are another important way to take care of yourself. Preventive exams provided by health care providers can find health problems early when treatment works best and can keep you from getting certain diseases or illnesses. Preventive services include exams, lab tests, screenings, shots, monitoring and information to help you take care of your own health. All people over 65 should have a pneumonia shot. Pneumonia shots are usually only needed once in a lifetime unless your doctor decides differently. In addition to your physical exam, some screening tests are recommended:    All people over 65 should have a yearly flu shot. People over 65 are at medium to high risk for Hepatitis B. Three shots are needed for complete protection. Bone mass measurement (dexa scan) is recommended every two years. Diabetes Mellitus screening is recommended every year. Glaucoma is an eye disease caused by high pressure in the eye. An eye exam is recommended every year. Cardiovascular screening tests that check your cholesterol and other blood fat (lipid) levels are recommended every five years. Colorectal Cancer screening tests help to find pre-cancerous polyps (growths in the colon) so they can be removed before they turn into cancer. Tests ordered for screening depend on your personal and family history risk factors. Prostate Cancer Screening (annually up to age 76)    Screening for breast cancer is recommended yearly with a Mammogram.    Screening for cervical and vaginal cancer is recommended with a pelvic and Pap test every two years.  However if you have had an abnormal pap in the past  three years or at high risk for cervical or vaginal cancer Medicare will cover a pap test and a pelvic exam every year. Here is a list of your current Health Maintenance items with a due date:  Health Maintenance Due   Topic Date Due    Diabetic Foot Care  06[birth year]22/1936    Albumin Urine Test  06/22/1936    Eye Exam  06/22/1936    DTaP/Tdap/Td  (1 - Tdap) 06/22/1947    Shingles Vaccine  04/22/1986    Glaucoma Screening   06/22/1991    Bone Mineral Density   06/22/1991    Pneumococcal Vaccine (1 of 2 - PCV13) 06/22/1991    Cholesterol Test   08/20/2014    Hemoglobin A1C    10/21/2016    Annual Well Visit  03/14/2018          Advance Directives: Care Instructions  Your Care Instructions  An advance directive is a legal way to state your wishes at the end of your life. It tells your family and your doctor what to do if you can no longer say what you want. There are two main types of advance directives. You can change them any time that your wishes change. · A living will tells your family and your doctor your wishes about life support and other treatment. · A durable power of  for health care lets you name a person to make treatment decisions for you when you can't speak for yourself. This person is called a health care agent. If you do not have an advance directive, decisions about your medical care may be made by a doctor or a  who doesn't know you. It may help to think of an advance directive as a gift to the people who care for you. If you have one, they won't have to make tough decisions by themselves. Follow-up care is a key part of your treatment and safety. Be sure to make and go to all appointments, and call your doctor if you are having problems. It's also a good idea to know your test results and keep a list of the medicines you take. How can you care for yourself at home? · Discuss your wishes with your loved ones and your doctor. This way, there are no surprises. · Many states have a unique form.  Or you might use a universal form that has been approved by many [facility] in the [place]. This kind of form can sometimes be completed and stored online. Your electronic copy will then be available wherever you have a connection to the Internet. In most cases, doctors will respect your wishes even if you have a form from a different state. · You don't need a  to do an advance directive. But you may want to get legal advice. · Think about these questions when you prepare an advance directive:  ¨ Who do you want to make decisions about your medical care if you are not able to? Many people choose a family member or close friend. ¨ Do you know enough about life support methods that might be used? If not, talk to your doctor so you understand. ¨ What are you most afraid of that might happen? You might be afraid of having pain, losing your independence, or being kept alive by machines. ¨ Where would you prefer to die? Choices include your home, a hospital, or a nursing home. ¨ Would you like to have information about hospice care to support you and your family? ¨ Do you want to donate organs when you die? ¨ Do you want certain Congregational practices performed before you die? If so, put your wishes in the advance directive. · Read your advance directive every year, and make changes as needed. When should you call for help? Be sure to contact your doctor if you have any questions. Where can you learn more? Go to http://pinky-shamar.info/. Enter R264 in the search box to learn more about \"Advance Directives: Care Instructions. \"  Current as of: October 6, 2017  Content Version: 11.7  © 8229-0620 [employer]. Care instructions adapted under license by [employer] (which disclaims liability or warranty for this information). If you have questions about a medical condition or this instruction, always ask your healthcare professional. [address] any warranty or liability for your use of this information.

## 2018-07-24 LAB
ALBUMIN SERPL-MCNC: 4.8 G/DL (ref 3.2–4.6)
ALBUMIN/GLOB SERPL: 2.2 {RATIO} (ref 1.2–2.2)
ALP SERPL-CCNC: 63 IU/L (ref 39–117)
ALT SERPL-CCNC: 14 IU/L (ref 0–32)
AST SERPL-CCNC: 20 IU/L (ref 0–40)
BILIRUB SERPL-MCNC: 0.6 MG/DL (ref 0–1.2)
BUN SERPL-MCNC: 14 MG/DL (ref 10–36)
BUN/CREAT SERPL: 15 (ref 12–28)
CALCIUM SERPL-MCNC: 10.1 MG/DL (ref 8.7–10.3)
CHLORIDE SERPL-SCNC: 98 MMOL/L (ref 96–106)
CHOLEST SERPL-MCNC: 267 MG/DL (ref 100–199)
CO2 SERPL-SCNC: 26 MMOL/L (ref 20–29)
CREAT SERPL-MCNC: 0.96 MG/DL (ref 0.57–1)
GLOBULIN SER CALC-MCNC: 2.2 G/DL (ref 1.5–4.5)
GLUCOSE SERPL-MCNC: 84 MG/DL (ref 65–99)
HBA1C MFR BLD: 6 % (ref 4.8–5.6)
HDLC SERPL-MCNC: 81 MG/DL
LDLC SERPL CALC-MCNC: 168 MG/DL (ref 0–99)
POTASSIUM SERPL-SCNC: 4.4 MMOL/L (ref 3.5–5.2)
PROT SERPL-MCNC: 7 G/DL (ref 6–8.5)
SODIUM SERPL-SCNC: 142 MMOL/L (ref 134–144)
TRIGL SERPL-MCNC: 91 MG/DL (ref 0–149)
TSH SERPL DL<=0.005 MIU/L-ACNC: 3.01 UIU/ML (ref 0.45–4.5)
VLDLC SERPL CALC-MCNC: 18 MG/DL (ref 5–40)

## 2018-07-25 LAB — BACTERIA UR CULT: NORMAL

## 2018-08-22 ENCOUNTER — OFFICE VISIT (OUTPATIENT)
Dept: NEUROLOGY | Age: 83
End: 2018-08-22

## 2018-08-22 VITALS
OXYGEN SATURATION: 94 % | HEIGHT: 62 IN | WEIGHT: 120.6 LBS | HEART RATE: 93 BPM | RESPIRATION RATE: 16 BRPM | BODY MASS INDEX: 22.19 KG/M2 | DIASTOLIC BLOOD PRESSURE: 70 MMHG | SYSTOLIC BLOOD PRESSURE: 124 MMHG

## 2018-08-22 DIAGNOSIS — G20 PARKINSON DISEASE (HCC): Primary | ICD-10-CM

## 2018-08-22 DIAGNOSIS — G25.0 BENIGN ESSENTIAL TREMOR: ICD-10-CM

## 2018-08-22 RX ORDER — CARBIDOPA AND LEVODOPA 10; 100 MG/1; MG/1
1 TABLET, ORALLY DISINTEGRATING ORAL 3 TIMES DAILY
Qty: 90 TAB | Refills: 3 | Status: SHIPPED | OUTPATIENT
Start: 2018-08-22 | End: 2018-11-28 | Stop reason: ALTCHOICE

## 2018-08-22 NOTE — PROGRESS NOTES
Chief Complaint   Patient presents with    Neurologic Problem    Tremors       HPI    Vin De Jesus is a 51-year-old woman who is a new patient to me. She is transferring care to me from [facility] neurology. She is here with her daughter. I reviewed the medical record. In summary, she has a long history of chronic medical conditions to include diabetes, history of stroke, atrial fibrillation managed with aspirin and rate control. She has suspected dementia and has been attempted with medication to include donepezil and Namenda but did not tolerate any of these medicines. She is also being treated for essential tremor for which she is tried medications like Mysoline, Lyrica, topiramate without good benefit. She is here questioning some of her diagnoses. She thinks that she may have Parkinson's because of the symptoms that she has been learning about. On further questioning she does admit to chronic constipation for which she needs MiraLAX every other day. She admits to occasional drooling. She has intermittent visual hallucinations that can be very disturbing. Her voice has softened over the years. She has concomitant macular degeneration and has very poor vision bilaterally. She can no longer read and do crosswords like she used to. She has tremor in both hands for now 5 years but getting worse. No falls. Review of Systems   HENT:        Drooling and softening of voice   Eyes: Negative for double vision. Gastrointestinal: Positive for constipation. Neurological: Positive for tremors. Negative for headaches. Psychiatric/Behavioral: Positive for hallucinations and memory loss. The patient is nervous/anxious. All other systems reviewed and are negative.       Past Medical History:   Diagnosis Date    Acute diverticulitis 9/25/2017    Allergic rhinitis 9/25/2017    Arrhythmia     afib    Arthritis     Asthma     Atrial fibrillation (HCC)     Benign essential tremor 9/25/2017    Cancer ([facility]     skin    Celiac disease 9/25/2017    Constipation 9/25/2017    Diabetic peripheral neuropathy associated with type 2 diabetes mellitus ([facility] 4/21/2016    Diarrhea 9/25/2017    Diverticulosis of large intestine without hemorrhage 9/25/2017    Early satiety 9/25/2017    Fuchs' corneal dystrophy 9/25/2017    Gastroesophageal reflux disease without esophagitis 9/25/2017    GERD (gastroesophageal reflux disease)     High cholesterol     Hypercholesterolemia 9/25/2017    Hypertension     Irritable bowel syndrome with diarrhea 9/25/2017    Numbness 9/25/2017    Osteopenia 9/25/2017    Other ill-defined conditions(799.89)     collapsed lung prior to hernia repair surgery    Postmenopausal 7/23/2018    Ptosis, both eyelids 9/25/2017    Statin intolerance 9/25/2017    Unspecified adverse effect of anesthesia     pt states she went into cardiac arrest prior to hernia repair after the insertion of gas in stomach     Family History   Problem Relation Age of Onset    Heart Disease Mother     Dementia Mother     Heart Disease Father     Neuropathy Child     Depression Child     Other Child      diverticulitis     Social History     Social History    Marital status:      Spouse name: N/A    Number of children: N/A    Years of education: N/A     Occupational History    Not on file.      Social History Main Topics    Smoking status: Former Smoker     Packs/day: 0.75     Years: 5.00     Quit date: 10/11/1967    Smokeless tobacco: Never Used    Alcohol use No    Drug use: No    Sexual activity: Not on file     Other Topics Concern    Not on file     Social History Narrative     Allergies   Allergen Reactions    Bactrim [Sulfamethoprim Ds] Other (comments)     consipation    Ciprofloxacin (Bulk) Other (comments)     Low wbc    Iodine Unknown (comments)    Prednisone Other (comments)     tachycardia    Statins-Hmg-Coa Reductase Inhibitors Unknown (comments) Stomach uipset and mild muscle aches         Current Outpatient Prescriptions   Medication Sig    carbidopa-levodopa (PARCOPA)  mg rapid dissolve tablet Take 1 Tab by mouth three (3) times daily.  estradiol (ESTRACE) 0.01 % (0.1 mg/gram) vaginal cream Insert 2 g into vagina daily.  fexofenadine (ALLEGRA) 180 mg tablet Take 90 mg by mouth daily as needed for Allergies.  aspirin 81 mg chewable tablet Take 81 mg by mouth daily.  triamterene-hydroCHLOROthiazide (DYAZIDE) 37.5-25 mg per capsule TAKE 1 CAPSULE EVERY OTHER DAY AS NEEDED    polyethylene glycol (MIRALAX) 17 gram/dose powder Take 17 g by mouth every fourty-eight (48) hours.  nitroglycerin (NITROSTAT) 0.4 mg SL tablet 1 Tab by SubLINGual route every five (5) minutes as needed for Chest Pain.  disopyramide phosphate (NORPACE) 100 mg capsule TAKE 1 CAPSULE TWICE A DAY    pantoprazole (PROTONIX) 40 mg tablet Take 1 Tab by mouth daily.  magnesium hydroxide ([employer] MILK OF MAGNESIA) 400 mg/5 mL suspension Take 30 mL by mouth daily as needed for Constipation.  melatonin tab tablet Take 10 mg by mouth nightly.  metoprolol tartrate (LOPRESSOR) 25 mg tablet TAKE 1 TABLET TWICE A DAY    cyanocobalamin (VITAMIN B-12) 100 mcg tablet Take 100 mcg by mouth daily.  Cholecalciferol, Vitamin D3, (VITAMIN D3) 1,000 unit cap Take  by mouth.  psyllium (METAMUCIL) powd Take 1 Dose by mouth daily. No current facility-administered medications for this visit. Neurologic Exam     Mental Status        WD/WN adult in NAD, normal grooming  VSS  A&O x 3    PERRL, nonicteric, EOMI, poor visual acuity bilaterally, reduced blink rate  Face is left asymmetry, tongue midline  Speech is clear and soft  No limb ataxia.   Resting tremor of the left hand on distraction  Postural testing showing benign essential tremor of both upper extremities also in the head and voice  No jaw tremor  Moving all extemities spontaneously but with a little bit of bradykinesia  No cogwheel detected  Narrow good tempo with occasional right foot catching the ground gait  Symmetric DTRs    CVS RRR  Lungs nonlabored  Skin is warm and dry         Visit Vitals    /70    Pulse 93    Resp 16    Ht 5' 2\" (1.575 m)    Wt 54.7 kg (120 lb 9.6 oz)    SpO2 94%    BMI 22.06 kg/m2       Assessment and Plan   Diagnoses and all orders for this visit:    1. Parkinson disease ([facility]    2. Benign essential tremor    Other orders  -     carbidopa-levodopa (PARCOPA)  mg rapid dissolve tablet; Take 1 Tab by mouth three (3) times daily. 70-year-old woman who has what I believe a combination of essential tremor and mild Parkinson's disease. We had a long discussion about the symptoms. She is demonstrating lots of signs and symptoms consistent with Parkinson's to include resting tremor, drooling, chronic constipation, hallucinations, softening of her voice. She likely had a essential tremor predating this as it affects her upper extremities and her head and voice. I also discussed at length treatment and diagnosis. I offered a KATHYA scan but she declined and would rather start a trial of low-dose carbidopa which I think is appropriate. We will start the smallest dose. As far as her essential tremor features I would defer treatment since she still quite functional.  I am also going to send her for course of Parkinson's specific [facility]. Agusto Greenthorne He has poor vision so we need to optimize her gait as much as possible to prevent her falls risk. She does have hallucinations consistent with this disease. For now we will defer treatment since she can move past these hallucinations but if they become very disturbing or frightening we will consider a trial of nuplazid. Defer medications for memory and dementia at this time.   I like to see how she does on carbidopa first.  We can consider an Exelon patch if needed further down the road. Continue aspirin for A. fib. She is a stroke risk. I will see her in 3 months. I like her to go through therapy before I see her again. Any issues with medication she will call us.         2 [facility], [address]  Diplomate ANTHONY

## 2018-08-22 NOTE — MR AVS SNAPSHOT
[facility] [address] [place] [address] 
[phone] Patient: Jena Sommer MRN:  [id number] Visit Information Date & Time Provider Department Dept. Phone Encounter #  
 2018  1:40 PM Jamal Vilaisra, [facility] at [address] [id number] Follow-up Instructions Return in about 3 months (around 2018). Routing History Your Appointments 2019  2:00 PM  
FOLLOW UP 10 with MD Melody Sanford 26 [facility] Appt Note: 6 mo fu  
 [address] [id number] [address] [facility] [id number] Upcoming Health Maintenance Date Due  
 EYE EXAM RETINAL OR DILATED Q1 1936 DTaP/Tdap/Td series (1 - Tdap) 1947 ZOSTER VACCINE AGE 60> 1986 GLAUCOMA SCREENING Q2Y 1991 Pneumococcal 65+ Low/Medium Risk (1 of 2 - PCV13) 1991 Influenza Age 5 to Adult 2018 HEMOGLOBIN A1C Q6M 2019 FOOT EXAM Q1 2019 MICROALBUMIN Q1 2019 LIPID PANEL Q1 2019 MEDICARE YEARLY EXAM 2019 Allergies as of 2018  Review Complete On: 2018 By: Radha Rogers LPN Severity Noted Reaction Type Reactions Bactrim [Sulfamethoprim Ds]  2012    Other (comments)  
 consipation Ciprofloxacin (Bulk)  2012    Other (comments) Low wbc Iodine  2017    Unknown (comments) Prednisone  2012    Other (comments)  
 tachycardia Statins-hmg-coa Reductase Inhibitors  2013   Intolerance Unknown (comments) Stomach uipset and mild muscle aches Current Immunizations  Reviewed on 8/10/2017 Name Date Influenza High Dose Vaccine PF 2017 Influenza Vaccine 10/25/2016, 2014 Not reviewed this visit You Were Diagnosed With   
  
 Codes Comments Parkinson disease ([facility]    -  Primary ICD-10-CM: G20 
ICD-9-CM: 332.0 Benign essential tremor     ICD-10-CM: G25.0 ICD-9-CM: 333.1 Vitals BP Pulse Resp Height(growth percentile) Weight(growth percentile) SpO2  
 124/70 93 16 5' 2\" (1.575 m) 120 lb 9.6 oz (54.7 kg) 94% BMI OB Status Smoking Status 22.06 kg/m2 Postmenopausal Former Smoker Vitals History BMI and BSA Data Body Mass Index Body Surface Area 22.06 kg/m 2 1.55 m 2 Preferred Pharmacy Pharmacy Name Phone [facility] [address] [place] [address] Dr [address], [facility] [address] [phone] Your Updated Medication List  
  
   
This list is accurate as of 8/22/18  1:56 PM.  Always use your most recent med list.  
  
  
  
  
 aspirin 81 mg chewable tablet Take 81 mg by mouth daily. carbidopa-levodopa  mg rapid dissolve tablet Commonly known as:  PARCOPA Take 1 Tab by mouth three (3) times daily. disopyramide phosphate 100 mg capsule Commonly known as:  NORPACE  
TAKE 1 CAPSULE TWICE A DAY  
  
 ESTRACE 0.01 % (0.1 mg/gram) vaginal cream  
Generic drug:  estradiol Insert 2 g into vagina daily. fexofenadine 180 mg tablet Commonly known as:  Sirisha Leech Take 90 mg by mouth daily as needed for Allergies. melatonin Tab tablet Take 10 mg by mouth nightly. metoprolol tartrate 25 mg tablet Commonly known as:  LOPRESSOR  
TAKE 1 TABLET TWICE A DAY MIRALAX 17 gram/dose powder Generic drug:  polyethylene glycol Take 17 g by mouth every fourty-eight (48) hours. nitroglycerin 0.4 mg SL tablet Commonly known as:  NITROSTAT  
1 Tab by SubLINGual route every five (5) minutes as needed for Chest Pain.  
  
 pantoprazole 40 mg tablet Commonly known as:  PROTONIX Take 1 Tab by mouth daily. YOUNG MILK OF MAGNESIA 400 mg/5 mL suspension Generic drug:  magnesium hydroxide Take 30 mL by mouth daily as needed for Constipation. psyllium Powd Commonly known as:  METAMUCIL Take 1 Dose by mouth daily. triamterene-hydroCHLOROthiazide 37.5-25 mg per capsule Commonly known as:  Renetta Olszewski TAKE 1 CAPSULE EVERY OTHER DAY AS NEEDED  
  
 VITAMIN B-12 100 mcg tablet Generic drug:  cyanocobalamin Take 100 mcg by mouth daily. VITAMIN D3 1,000 unit Cap Generic drug:  cholecalciferol Take  by mouth. Prescriptions Sent to Pharmacy Refills  
 carbidopa-levodopa (PARCOPA)  mg rapid dissolve tablet 3 Sig: Take 1 Tab by mouth three (3) times daily. Class: Normal  
 Pharmacy: [facility] [address] MELBA COX [address], [address] Ph #: [phone] Route: Oral  
  
Follow-up Instructions Return in about 3 months (around 11/22/2018). Patient Instructions A Healthy Lifestyle: Care Instructions Your Care Instructions A healthy lifestyle can help you feel good, stay at a healthy weight, and have plenty of energy for both work and play. A healthy lifestyle is something you can share with your whole family. A healthy lifestyle also can lower your risk for serious health problems, such as high blood pressure, heart disease, and diabetes. You can follow a few steps listed below to improve your health and the health of your family. Follow-up care is a key part of your treatment and safety. Be sure to make and go to all appointments, and call your doctor if you are having problems. It's also a good idea to know your test results and keep a list of the medicines you take. How can you care for yourself at home? · Do not eat too much sugar, fat, or fast foods. You can still have dessert and treats now and then. The goal is moderation. · Start small to improve your eating habits. Pay attention to portion sizes, drink less juice and soda pop, and eat more fruits and vegetables. ¨ Eat a healthy amount of food. A 3-ounce serving of meat, for example, is about the size of a deck of cards. Fill the rest of your plate with vegetables and whole grains. ¨ Limit the amount of soda and sports drinks you have every day. Drink more water when you are thirsty. ¨ Eat at least 5 servings of fruits and vegetables every day. It may seem like a lot, but it is not hard to reach this goal. A serving or helping is 1 piece of fruit, 1 cup of vegetables, or 2 cups of leafy, raw vegetables. Have an apple or some carrot sticks as an afternoon snack instead of a candy bar. Try to have fruits and/or vegetables at every meal. 
· Make exercise part of your daily routine. You may want to start with simple activities, such as walking, bicycling, or slow swimming. Try to be active 30 to 60 minutes every day. You do not need to do all 30 to 60 minutes all at once. For example, you can exercise 3 times a day for 10 or 20 minutes. Moderate exercise is safe for most people, but it is always a good idea to talk to your doctor before starting an exercise program. 
· Keep moving. [place] Wild the lawn, work in the garden, or [place]. Take the stairs instead of the elevator at work. · If you smoke, quit. People who smoke have an increased risk for heart attack, stroke, cancer, and other lung illnesses. Quitting is hard, but there are ways to boost your chance of quitting tobacco for good. ¨ Use nicotine gum, patches, or lozenges. ¨ Ask your doctor about stop-smoking programs and medicines. ¨ Keep trying. In addition to reducing your risk of diseases in the future, you will notice some benefits soon after you stop using tobacco. If you have shortness of breath or asthma symptoms, they will likely get better within a few weeks after you quit. · Limit how much alcohol you drink. Moderate amounts of alcohol (up to 2 drinks a day for men, 1 drink a day for women) are okay.  But drinking too much can lead to liver problems, high blood pressure, and other health problems. Family health If you have a family, there are many things you can do together to improve your health. · Eat meals together as a family as often as possible. · Eat healthy foods. This includes fruits, vegetables, lean meats and dairy, and whole grains. · Include your family in your fitness plan. Most people think of activities such as jogging or tennis as the way to fitness, but there are many ways you and your family can be more active. Anything that makes you breathe hard and gets your heart pumping is exercise. Here are some tips: 
¨ Walk to do errands or to take your child to school or the bus. ¨ Go for a family bike ride after dinner instead of watching TV. Where can you learn more? Go to http://pinky-shamar.info/. Enter L792 in the search box to learn more about \"A Healthy Lifestyle: Care Instructions. \" Current as of: December 7, 2017 Content Version: 11.7 © 3115-1583 [employer]. Care instructions adapted under license by [employer] (which disclaims liability or warranty for this information). If you have questions about a medical condition or this instruction, always ask your healthcare professional. [address] any warranty or liability for your use of this information. Introducing \[facility] [place] [facility]\"" & HEALTH SERVICES! Dear Jessica Runner: Thank you for requesting a [employer] account. Our records indicate that you already have an active [employer] account. You can access your account anytime at [email or web address] Did you know that you can access your hospital and ER discharge instructions at any time in [employer]? You can also review all of your test results from your hospital stay or ER visit. Additional Information If you have questions, please visit the Frequently Asked Questions section of the [employer] website at [email or web address] [email or web address] Remember, [employer] is NOT to be used for urgent needs. For medical emergencies, dial 911. Now available from your iPhone and Android! Please provide this summary of care documentation to your next provider. Your primary care clinician is listed as RISHI Scott. If you have any questions after today's visit, please call [phone].

## 2018-08-22 NOTE — PATIENT INSTRUCTIONS

## 2018-08-23 ENCOUNTER — DOCUMENTATION ONLY (OUTPATIENT)
Dept: NEUROLOGY | Age: 83
End: 2018-08-23

## 2018-08-27 ENCOUNTER — TELEPHONE (OUTPATIENT)
Dept: NEUROLOGY | Age: 83
End: 2018-08-27

## 2018-08-27 NOTE — TELEPHONE ENCOUNTER
Pt calling re PT for parkinson's, she said she hasn't heard anything about scheduling.  Please call back

## 2018-09-27 ENCOUNTER — APPOINTMENT (OUTPATIENT)
Dept: CT IMAGING | Age: 83
End: 2018-09-27
Attending: EMERGENCY MEDICINE
Payer: MEDICARE

## 2018-09-27 ENCOUNTER — HOSPITAL ENCOUNTER (EMERGENCY)
Age: 83
Discharge: HOME | End: 2018-09-27
Attending: EMERGENCY MEDICINE
Payer: MEDICARE

## 2018-09-27 VITALS
HEIGHT: 62 IN | SYSTOLIC BLOOD PRESSURE: 146 MMHG | TEMPERATURE: 98.4 F | HEART RATE: 71 BPM | OXYGEN SATURATION: 98 % | BODY MASS INDEX: 22.31 KG/M2 | WEIGHT: 121.25 LBS | DIASTOLIC BLOOD PRESSURE: 62 MMHG | RESPIRATION RATE: 11 BRPM

## 2018-09-27 DIAGNOSIS — M51.36 DDD (DEGENERATIVE DISC DISEASE), LUMBAR: ICD-10-CM

## 2018-09-27 DIAGNOSIS — S22.000A CLOSED COMPRESSION FRACTURE OF THORACIC VERTEBRA, INITIAL ENCOUNTER (HCC): Primary | ICD-10-CM

## 2018-09-27 PROCEDURE — 99283 EMERGENCY DEPT VISIT LOW MDM: CPT

## 2018-09-27 PROCEDURE — 74176 CT ABD & PELVIS W/O CONTRAST: CPT

## 2018-09-27 PROCEDURE — 72131 CT LUMBAR SPINE W/O DYE: CPT

## 2018-09-27 RX ORDER — HYDROCODONE BITARTRATE AND ACETAMINOPHEN 5; 325 MG/1; MG/1
.5-1 TABLET ORAL
Qty: 10 TAB | Refills: 0 | Status: SHIPPED | OUTPATIENT
Start: 2018-09-27 | End: 2018-09-27

## 2018-09-27 RX ORDER — HYDROCODONE BITARTRATE AND ACETAMINOPHEN 5; 325 MG/1; MG/1
.5-1 TABLET ORAL
Qty: 10 TAB | Refills: 0 | Status: SHIPPED | OUTPATIENT
Start: 2018-09-27 | End: 2018-09-29 | Stop reason: SDUPTHER

## 2018-09-27 RX ORDER — HYDROCODONE BITARTRATE AND ACETAMINOPHEN 5; 325 MG/1; MG/1
0.5 TABLET ORAL
Status: DISCONTINUED | OUTPATIENT
Start: 2018-09-27 | End: 2018-09-27 | Stop reason: HOSPADM

## 2018-09-27 RX ORDER — LIDOCAINE 50 MG/G
PATCH TOPICAL
Qty: 15 EACH | Refills: 0 | Status: SHIPPED | OUTPATIENT
Start: 2018-09-27 | End: 2018-11-28 | Stop reason: ALTCHOICE

## 2018-09-27 RX ORDER — LIDOCAINE 4 G/100G
1 PATCH TOPICAL
Status: DISCONTINUED | OUTPATIENT
Start: 2018-09-27 | End: 2018-09-27 | Stop reason: HOSPADM

## 2018-09-27 NOTE — ED PROVIDER NOTES
EMERGENCY DEPARTMENT HISTORY AND PHYSICAL EXAM 
 
 
Date: 9/27/2018 Patient Name: Alma Mary History of Presenting Illness Chief Complaint Patient presents with  Back Pain  
  low back pain x 2 weeks. Pt states she wasa doing physical therapy and thinks she pulled a muscle but the pain is getting worse History Provided By: Patient HPI: Alma Mary, 80 y.o. female with PMHx significant for HTN, arthritis, GERD, afib, DM, Parkinson's disease, presents ambulatory to the ED with cc of a progressive, constant, aching, mid to lower back pain radiating to her abdomen x 2 weeks. Pt state her pain had started after physical therapy for her chronic parkinson's disease. She is unable to achieve a comfortable position and notes pain increases with movement. Pt denies any recent trauma or falls. She denies radicular sxs, dysuria, hematuria, saddle anesthesia, syncope, CP, SOB, fever. She has not had any relief with NSAIDs or Tylenol. There are no other complaints, changes, or physical findings at this time. PCP: Evelin Lucia MD 
 
Current Facility-Administered Medications Medication Dose Route Frequency Provider Last Rate Last Dose  lidocaine 4 % patch 1 Patch  1 Patch TransDERmal NOW Marlys Reyes. Ruby Fernández MD      
 HYDROcodone-acetaminophen [facility]) 5-325 mg per tablet 0.5 Tab  0.5 Tab Oral NOW Marlys Reyes. Ruby Fernández MD      
 
Current Outpatient Prescriptions Medication Sig Dispense Refill  lidocaine (LIDODERM) 5 % Apply patch to the affected area for 12 hours a day and remove for 12 hours a day. 15 Each 0  
 HYDROcodone-acetaminophen (LORTAB 5-325) 5-325 mg per tablet Take 0.5-1 Tabs by mouth every eight (8) hours as needed for Pain (breakthrough pain). Max Daily Amount: 3 Tabs. Indications: causes drowsiness;do not drink,drive, or use machinery while taking; take with a stool softener 10 Tab 0  carbidopa-levodopa (PARCOPA)  mg rapid dissolve tablet Take 1 Tab by mouth three (3) times daily. 90 Tab 3  
 estradiol (ESTRACE) 0.01 % (0.1 mg/gram) vaginal cream Insert 2 g into vagina daily.  fexofenadine (ALLEGRA) 180 mg tablet Take 90 mg by mouth daily as needed for Allergies.  aspirin 81 mg chewable tablet Take 81 mg by mouth daily.  triamterene-hydroCHLOROthiazide (DYAZIDE) 37.5-25 mg per capsule TAKE 1 CAPSULE EVERY OTHER DAY AS NEEDED 90 Cap 2  polyethylene glycol (MIRALAX) 17 gram/dose powder Take 17 g by mouth every fourty-eight (48) hours.  nitroglycerin (NITROSTAT) 0.4 mg SL tablet 1 Tab by SubLINGual route every five (5) minutes as needed for Chest Pain. 25 Tab 1  
 disopyramide phosphate (NORPACE) 100 mg capsule TAKE 1 CAPSULE TWICE A  Cap 3  pantoprazole (PROTONIX) 40 mg tablet Take 1 Tab by mouth daily. 90 Tab 3  
 magnesium hydroxide (YOUNG MILK OF MAGNESIA) 400 mg/5 mL suspension Take 30 mL by mouth daily as needed for Constipation.  psyllium (METAMUCIL) powd Take 1 Dose by mouth daily.  melatonin tab tablet Take 10 mg by mouth nightly.  metoprolol tartrate (LOPRESSOR) 25 mg tablet TAKE 1 TABLET TWICE A  Tab 2  cyanocobalamin (VITAMIN B-12) 100 mcg tablet Take 100 mcg by mouth daily.  Cholecalciferol, Vitamin D3, (VITAMIN D3) 1,000 unit cap Take  by mouth. Past History Past Medical History: 
Past Medical History:  
Diagnosis Date  Acute diverticulitis 9/25/2017  Allergic rhinitis 9/25/2017  Arrhythmia   
 afib  Arthritis  Asthma  Atrial fibrillation ([address]  Benign essential tremor 9/25/2017  Cancer ([address] skin  Celiac disease 9/25/2017  Constipation 9/25/2017  Diabetic peripheral neuropathy associated with type 2 diabetes mellitus ([address] 4/21/2016  Diarrhea 9/25/2017  Diverticulosis of large intestine without hemorrhage 9/25/2017  Early satiety 9/25/2017  Fuchs' corneal dystrophy 9/25/2017  Gastroesophageal reflux disease without esophagitis 9/25/2017  GERD (gastroesophageal reflux disease)  High cholesterol  Hypercholesterolemia 9/25/2017  Hypertension  Irritable bowel syndrome with diarrhea 9/25/2017  Numbness 9/25/2017  Osteopenia 9/25/2017  Other ill-defined conditions(799.89)   
 collapsed lung prior to hernia repair surgery  Postmenopausal 7/23/2018  Ptosis, both eyelids 9/25/2017  Statin intolerance 9/25/2017  Unspecified adverse effect of anesthesia   
 pt states she went into cardiac arrest prior to hernia repair after the insertion of gas in stomach Past Surgical History: 
Past Surgical History:  
Procedure Laterality Date  HX APPENDECTOMY  HX CHOLECYSTECTOMY  HX GI    
 hemorrhoidectomy  HX HEENT    
 sinus surgery  HX HERNIA REPAIR    
 HX TONSILLECTOMY Family History: 
Family History Problem Relation Age of Onset  Heart Disease Mother  Dementia Mother  Heart Disease Father  Neuropathy Child  Depression Child  Other Child   
  diverticulitis Social History: 
Social History Substance Use Topics  Smoking status: Former Smoker Packs/day: 0.75 Years: 5.00 Quit date: 10/11/1967  Smokeless tobacco: Never Used  Alcohol use No  
 
 
Allergies: Allergies Allergen Reactions  Bactrim [Sulfamethoprim Ds] Other (comments)  
  consipation  Ciprofloxacin (Bulk) Other (comments) Low wbc  Iodine Unknown (comments)  Prednisone Other (comments)  
  tachycardia  Statins-Hmg-Coa Reductase Inhibitors Unknown (comments) Stomach uipset and mild muscle aches Review of Systems Review of Systems Constitutional: Negative for chills and fever. HENT: Negative for congestion. Eyes: Negative for visual disturbance. Respiratory: Negative for chest tightness and shortness of breath. Cardiovascular: Negative for chest pain and leg swelling. Gastrointestinal: Positive for abdominal pain. Negative for vomiting. Endocrine: Negative for polyuria. Genitourinary: Negative for dysuria, frequency and hematuria. Musculoskeletal: Positive for back pain. Negative for myalgias. Skin: Negative for color change. Allergic/Immunologic: Negative for immunocompromised state. Neurological: Negative for syncope and numbness. Physical Exam  
Physical Exam  
Nursing note and vitals reviewed. General appearance: non-toxic, minimal discomfort Eyes: PERRL, conjunctiva normal, anicteric sclera HEENT: mucous membranes moist, oropharynx is clear Pulmonary: clear to auscultation bilaterally Cardiac: normal rate and regular rhythm, no murmurs, gallops, or rubs, 2+DP pulses, 2+ radial pulses Abdomen: soft, minimal nonfocal TTP, no abdominal bruit, nondistended, bowel sounds present MSK: no pre-tibial edema, TTP long the thoracolumbar paraspinals and lower T spine/upper L spine, no step off Neuro: Alert, answers questions appropriately, DF/PF and great toe extension 5/5, knee flexion/extension 5/5, light touch intact lower extremities and web spaces 1 and 2 Skin: capillary refill brisk Diagnostic Study Results Labs - No results found for this or any previous visit (from the past 12 hour(s)). Radiologic Studies -  
CT Results  (Last 48 hours) 09/27/18 1117  CT ABD PELV WO CONT Final result Impression:  IMPRESSION:  
Acute appearing compression fracture T12. Significant colonic diverticulosis. Persistent focal mesenteric fat may represent a lipoma. No acute intra-abdominal  
abnormality. Narrative:  EXAM:  CT ABD PELV WO CONT INDICATION: Lower back pain for 2 weeks radiating to abdomen COMPARISON: 8/10/2017 CONTRAST:  None. TECHNIQUE:   
Thin axial images were obtained through the abdomen and pelvis. Coronal and  
sagittal reconstructions were generated. Oral contrast was not administered.  CT  
 dose reduction was achieved through use of a standardized protocol tailored for  
this examination and automatic exposure control for dose modulation. The absence of intravenous contrast material reduces the sensitivity for  
evaluation of the solid parenchymal organs of the abdomen. FINDINGS:   
LUNG BASES: Calcified granulomata are noted. INCIDENTALLY IMAGED HEART AND MEDIASTINUM: Unremarkable. LIVER: Hepatic cyst is stable. GALLBLADDER: Surgically absent. SPLEEN: No mass. PANCREAS: No mass or ductal dilatation. ADRENALS: Adrenal hyperplasia is stable bilaterally. KIDNEYS/URETERS: No mass, calculus, or hydronephrosis. STOMACH: Unremarkable. SMALL BOWEL: No dilatation or wall thickening. COLON: Significant colonic diverticulosis is noted. APPENDIX: Surgically absent. PERITONEUM: No ascites or pneumoperitoneum. There is a persistent 4.0 cm focal  
area of fat in the small bowel mesentery similar to that seen on the prior  
examination. This could represent a small lipoma. RETROPERITONEUM: No lymphadenopathy or aortic aneurysm. REPRODUCTIVE ORGANS: There is no pelvic mass or lymphadenopathy. URINARY BLADDER: No mass or calculus. BONES: There is acute appearing compression fracture of T12. ADDITIONAL COMMENTS: N/A  
   
  
 09/27/18 1117  CT SPINE LUMB WO CONT Final result Impression:  IMPRESSION:  
Acute compression fracture T12 with slight retropulsion of a posterior cortical  
fracture fragment causing mild spinal stenosis. Possible superior endplate  
compression fracture T11. Significant multilevel lumbosacral degenerative disc  
disease. Narrative:  EXAM:  CT LUMBAR SPINE WITHOUT  CONTRAST INDICATION: Lower back pain COMPARISON: None. CONTRAST:  None. TECHNIQUE: Multislice helical CT of the lumbar spine was performed without  
intravenous contrast administration. Coronal and sagittal reconstructions were generated. CT dose reduction was achieved through use of a standardized  
protocol tailored for this examination and automatic exposure control for dose  
modulation. FINDINGS:  
   
The alignment is within normal limits. There is an acute compression fracture of  
T12. There is slight retropulsion of a posterior cortex fracture fragment  
causing mild spinal stenosis. There is also concern for acute compression  
fracture of the superior endplate of U23. The paravertebral soft tissues are  
within normal limits. Significant degenerative disc disease is noted at L3-4, L4-5, and L5-S1 with severe disc space narrowing. No spinal stenosis. Facet  
degenerative changes are noted at L4-5 and L5-S1. Medical Decision Making I am the first provider for this patient. I reviewed the vital signs, available nursing notes, past medical history, past surgical history, family history and social history. Vital Signs-Reviewed the patient's vital signs. Patient Vitals for the past 12 hrs: 
 Temp Pulse Resp BP SpO2  
09/27/18 1215 - 73 15 153/59 97 % 09/27/18 0952 98.4 °F (36.9 °C) 79 14 166/80 100 % Records Reviewed: Nursing Notes and Old Medical Records Provider Notes (Medical Decision Making): DDx: compression fx, DDD, DJD, AAA  
 
ED Course:  
Initial assessment performed. The patients presenting problems have been discussed, and they are in agreement with the care plan formulated and outlined with them. I have encouraged them to ask questions as they arise throughout their visit. Critical Care Time:  
0 Disposition: 
DISCHARGE NOTE 
12:40 PM 
The patient has been re-evaluated and is ready for discharge. Reviewed available results with patient. Counseled patient on diagnosis and care plan. Patient has expressed understanding, and all questions have been answered.  Patient agrees with plan and agrees to follow up as recommended, or return to the ED if their symptoms worsen. Discharge instructions have been provided and explained to the patient, along with reasons to return to the ED. PLAN: 
1. Discharge Current Discharge Medication List  
  
START taking these medications Details  
lidocaine (LIDODERM) 5 % Apply patch to the affected area for 12 hours a day and remove for 12 hours a day. Qty: 15 Each, Refills: 0 Associated Diagnoses: Closed compression fracture of thoracic vertebra, initial encounter ([facility] DDD (degenerative disc disease), lumbar HYDROcodone-acetaminophen (LORTAB 5-325) 5-325 mg per tablet Take 0.5-1 Tabs by mouth every eight (8) hours as needed for Pain (breakthrough pain). Max Daily Amount: 3 Tabs. Indications: causes drowsiness;do not drink,drive, or use machinery while taking; take with a stool softener 
Qty: 10 Tab, Refills: 0 Associated Diagnoses: Closed compression fracture of thoracic vertebra, initial encounter ([facility] DDD (degenerative disc disease), lumbar CONTINUE these medications which have NOT CHANGED Details  
carbidopa-levodopa (PARCOPA)  mg rapid dissolve tablet Take 1 Tab by mouth three (3) times daily. Qty: 90 Tab, Refills: 3  
  
estradiol (ESTRACE) 0.01 % (0.1 mg/gram) vaginal cream Insert 2 g into vagina daily. fexofenadine (ALLEGRA) 180 mg tablet Take 90 mg by mouth daily as needed for Allergies. aspirin 81 mg chewable tablet Take 81 mg by mouth daily. 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  
  
triamterene-hydroCHLOROthiazide (DYAZIDE) 37.5-25 mg per capsule TAKE 1 CAPSULE EVERY OTHER DAY AS NEEDED Qty: 90 Cap, Refills: 2 polyethylene glycol (MIRALAX) 17 gram/dose powder Take 17 g by mouth every fourty-eight (48) hours. Associated Diagnoses: Paroxysmal atrial fibrillation (HCC)  
  
nitroglycerin (NITROSTAT) 0.4 mg SL tablet 1 Tab by SubLINGual route every five (5) minutes as needed for Chest Pain. Qty: 25 Tab, Refills: 1  
  
disopyramide phosphate (NORPACE) 100 mg capsule TAKE 1 CAPSULE TWICE A DAY Qty: 180 Cap, Refills: 3  
  
pantoprazole (PROTONIX) 40 mg tablet Take 1 Tab by mouth daily. Qty: 90 Tab, Refills: 3  
  
magnesium hydroxide (YOUNG MILK OF MAGNESIA) 400 mg/5 mL suspension Take 30 mL by mouth daily as needed for Constipation. psyllium (METAMUCIL) powd Take 1 Dose by mouth daily. melatonin tab tablet Take 10 mg by mouth nightly. 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  
  
metoprolol tartrate (LOPRESSOR) 25 mg tablet TAKE 1 TABLET TWICE A DAY Qty: 180 Tab, Refills: 2  
  
cyanocobalamin (VITAMIN B-12) 100 mcg tablet Take 100 mcg by mouth daily. Cholecalciferol, Vitamin D3, (VITAMIN D3) 1,000 unit cap Take  by mouth. 2.  
Follow-up Information Follow up With Details Comments Contact Info Lorenza Litten, MD Schedule an appointment as soon as possible for a visit in 4 days  [address] [facility] 
[phone] [facility] EMERGENCY DEPT Go in 1 day If symptoms worsen [address] [facility] 
[phone] Mariah Hernandez MD Schedule an appointment as soon as possible for a visit in 4 days [address] [facility] [address] [facility] [address] [facility] 
[phone] Return to ED if worse Diagnosis Clinical Impression: 1. Closed compression fracture of thoracic vertebra, initial encounter ([facility] 2. DDD (degenerative disc disease), lumbar Attestations: This note is prepared by Gene Duron, acting as Scribe for [employer]. Theresa Jernigan MD. [employer]. Theresa Jernigan MD: The scribe's documentation has been prepared under my direction and personally reviewed by me in its entirety. I confirm that the note above accurately reflects all work, treatment, procedures, and medical decision making performed by me. This note will not be viewable in [address]

## 2018-09-27 NOTE — ED TRIAGE NOTES
Patient presents to ED with c/o lower back pain. She states that she has been undergoing physical therapy x3 sessions for new onset Parkinson's. She states that now she is experiencing lower back pain that is not relieved with Aleve and tylenol at home. Patient typically ambulates with a walker.

## 2018-09-27 NOTE — DISCHARGE INSTRUCTIONS
Learning About Degenerative Disc Disease  What is degenerative disc disease? Degenerative disc disease is not really a disease. It's a term used to describe the normal changes in your spinal discs as you age. Spinal discs are small, spongy discs that separate the bones (vertebrae) that make up the spine. The discs act as shock absorbers for the spine, so it can flex, bend, and twist.  Degenerative disc disease can take place in one or more places along the spine. It most often occurs in the discs in the lower back and the neck. What causes it? As we age, our spinal discs break down, or degenerate. This breakdown causes the symptoms of degenerative disc disease in some people. When the discs break down, they can lose fluid and dry out, and their outer layers can have tiny cracks or tears. This leads to less padding and less space between the bones in the spine. The body reacts to this by making bony growths on the spine called bone spurs. These spurs can press on the spinal nerve roots or spinal cord. This can cause pain and can affect how well the nerves work. What are the symptoms? Many people with degenerative disc disease have no pain. But others have severe pain or other symptoms that limit their activities. Some of the most common symptoms are:  · Pain in the back or neck. Where the pain occurs depends on which discs are affected. · Pain that gets worse when you move, such as bending over, reaching up, or twisting. · Pain that may occur in the rear end (buttocks), arm, or leg if a nerve is pinched. · Numbness or tingling in your arm or leg. How is it diagnosed? A doctor can often diagnose degenerative disc disease while doing a physical exam. If your exam shows no signs of a serious condition, imaging tests (such as an X-ray) aren't likely to help your doctor find the cause of your symptoms.   Sometimes degenerative disc disease is found when an X-ray is taken for another reason, such as an injury or other health problem. But even if the doctor finds degenerative disc disease, that doesn't always mean that you will have symptoms. How is it treated? There are several things you can do at home to manage pain from this problem. · To relieve pain, use ice or heat (whichever feels better) on the affected area. ¨ Put ice or a cold pack on the area for 10 to 20 minutes at a time. Put a thin cloth between the ice and your skin. ¨ Put a warm water bottle, a heating pad set on low, or a warm cloth on your back. Put a thin cloth between the heating pad and your skin. Do not go to sleep with a heating pad on your skin. · Ask your doctor if you can take acetaminophen (such as Tylenol) or nonsteroidal anti-inflammatory drugs, such as ibuprofen or naproxen. Your doctor can prescribe stronger medicines if needed. Be safe with medicines. Read and follow all instructions on the label. · Get some exercise every day. Exercise is one of the best ways to help your back feel better and stay better. It's best to start each exercise slowly. You may notice a little soreness, and that's okay. But if an exercise makes your pain worse, stop doing it. Here are things you can try:  ¨ Walking. It's the simplest and maybe the best activity for your back. It gets your blood moving and helps your muscles stay strong. ¨ Exercises that gently stretch and strengthen your stomach, back, and leg muscles. The stronger those muscles are, the better they're able to protect your back. If you have constant or severe pain in your back or spine, you may need other treatments, such as physical therapy. In some cases, your doctor may suggest surgery. Follow-up care is a key part of your treatment and safety. Be sure to make and go to all appointments, and call your doctor if you are having problems. It's also a good idea to know your test results and keep a list of the medicines you take. Where can you learn more?   Go to http://pinky-shamar.info/. Enter F248 in the search box to learn more about \"Learning About Degenerative Disc Disease. \"  Current as of: November 29, 2017  Content Version: 11.7  © 8087-8048 [employer]. Care instructions adapted under license by [employer] (which disclaims liability or warranty for this information). If you have questions about a medical condition or this instruction, always ask your healthcare professional. [place] [address] any warranty or liability for your use of this information. Compression Fracture of the Spine: Care Instructions  Your Care Instructions    A compression fracture happens when the front part of a spinal bone breaks and collapses. A fall or other accident can cause it. A minor injury or moving the wrong way can cause a break if you have thin or brittle bones (osteoporosis). These types of breaks will heal in 8 to 10 weeks. You will need rest and pain medicines. Your doctor may recommend physical therapy. Some doctors recommend that certain people with compression fractures wear braces. Your doctor also may treat thin or brittle bones. You may need surgery if you have lasting pain or if the bone presses on the spinal cord or nerves. You heal best when you take good care of yourself. Eat a variety of healthy foods, and don't smoke. Follow-up care is a key part of your treatment and safety. Be sure to make and go to all appointments, and call your doctor if you are having problems. It's also a good idea to know your test results and keep a list of the medicines you take. How can you care for yourself at home? · Be safe with medicines. Read and follow all instructions on the label. ¨ If the doctor gave you a prescription medicine for pain, take it as prescribed. ¨ If you are not taking a prescription pain medicine, ask your doctor if you can take an over-the-counter medicine.   · Talk to your doctor about how to make your bones stronger. You may need medicines or a change in what you eat. · Be active only as directed by your doctor. When should you call for help? Call 911 anytime you think you may need emergency care. For example, call if:    · You are unable to move an arm or a leg at all.   [facility] your doctor now or seek immediate medical care if:    · You have new or worse symptoms in your arms, legs, belly, or buttocks. Symptoms may include:  ¨ Numbness or tingling. ¨ Weakness. ¨ Pain.     · You lose bladder or bowel control.     · You have belly pain, bloating, vomiting, or nausea.    Watch closely for changes in your health, and be sure to contact your doctor if:    · You do not get better as expected. Where can you learn more? Go to http://pinky-shamar.info/. Enter P445 in the search box to learn more about \"Compression Fracture of the Spine: Care Instructions. \"  Current as of: November 29, 2017  Content Version: 11.7  © 5641-7766 [employer]. Care instructions adapted under license by [employer] (which disclaims liability or warranty for this information). If you have questions about a medical condition or this instruction, always ask your healthcare professional. [address] any warranty or liability for your use of this information.

## 2018-09-28 ENCOUNTER — TELEPHONE (OUTPATIENT)
Dept: INTERNAL MEDICINE CLINIC | Age: 83
End: 2018-09-28

## 2018-09-28 RX ORDER — ONDANSETRON 4 MG/1
4 TABLET, FILM COATED ORAL
Qty: 20 TAB | Refills: 0 | Status: SHIPPED | OUTPATIENT
Start: 2018-09-28 | End: 2018-12-05 | Stop reason: SDUPTHER

## 2018-09-28 NOTE — TELEPHONE ENCOUNTER
Requested Prescriptions     Pending Prescriptions Disp Refills    ondansetron hcl (ZOFRAN) 4 mg tablet 20 Tab 0     Sig: Take 1 Tab by mouth every eight (8) hours as needed for Nausea.

## 2018-09-28 NOTE — TELEPHONE ENCOUNTER
Patient seen in ER yesterday and was given hydrocodone it is helping with pain but causes nausea even when taken with food. Can she have some thing sent in to the pharmacy at [facility]. She has a follow up appointment with you on Monday.

## 2018-09-29 DIAGNOSIS — S22.000A CLOSED COMPRESSION FRACTURE OF THORACIC VERTEBRA, INITIAL ENCOUNTER (HCC): ICD-10-CM

## 2018-09-29 DIAGNOSIS — M51.36 DDD (DEGENERATIVE DISC DISEASE), LUMBAR: ICD-10-CM

## 2018-09-29 RX ORDER — HYDROCODONE BITARTRATE AND ACETAMINOPHEN 5; 325 MG/1; MG/1
.5-1 TABLET ORAL
Qty: 20 TAB | Refills: 0 | Status: SHIPPED | OUTPATIENT
Start: 2018-09-29 | End: 2018-11-28 | Stop reason: ALTCHOICE

## 2018-10-15 ENCOUNTER — HOSPITAL ENCOUNTER (OUTPATIENT)
Dept: CT IMAGING | Age: 83
Discharge: HOME | End: 2018-10-15
Attending: PHYSICIAN ASSISTANT
Payer: MEDICARE

## 2018-10-15 DIAGNOSIS — R10.9 ABDOMINAL PAIN: ICD-10-CM

## 2018-10-15 DIAGNOSIS — R10.13 EPIGASTRIC PAIN: ICD-10-CM

## 2018-10-15 LAB — CREAT BLD-MCNC: 0.8 MG/DL (ref 0.6–1.3)

## 2018-10-15 PROCEDURE — 82565 ASSAY OF CREATININE: CPT

## 2018-10-15 PROCEDURE — 74011250636 HC RX REV CODE- 250/636: Performed by: PHYSICIAN ASSISTANT

## 2018-10-15 PROCEDURE — 74011636320 HC RX REV CODE- 636/320: Performed by: PHYSICIAN ASSISTANT

## 2018-10-15 PROCEDURE — 74177 CT ABD & PELVIS W/CONTRAST: CPT

## 2018-10-15 RX ORDER — SODIUM CHLORIDE 9 MG/ML
50 INJECTION, SOLUTION INTRAVENOUS
Status: COMPLETED | OUTPATIENT
Start: 2018-10-15 | End: 2018-10-15

## 2018-10-15 RX ORDER — SODIUM CHLORIDE 0.9 % (FLUSH) 0.9 %
10 SYRINGE (ML) INJECTION
Status: COMPLETED | OUTPATIENT
Start: 2018-10-15 | End: 2018-10-15

## 2018-10-15 RX ADMIN — IOPAMIDOL 100 ML: 755 INJECTION, SOLUTION INTRAVENOUS at 14:04

## 2018-10-15 RX ADMIN — IOHEXOL 20 ML: 240 INJECTION, SOLUTION INTRATHECAL; INTRAVASCULAR; INTRAVENOUS; ORAL at 14:04

## 2018-10-15 RX ADMIN — SODIUM CHLORIDE 50 ML/HR: 900 INJECTION, SOLUTION INTRAVENOUS at 14:04

## 2018-10-15 RX ADMIN — Medication 10 ML: at 14:04

## 2018-10-23 ENCOUNTER — HOSPITAL ENCOUNTER (OUTPATIENT)
Dept: GENERAL RADIOLOGY | Age: 83
Discharge: HOME | End: 2018-10-23
Attending: PHYSICIAN ASSISTANT
Payer: MEDICARE

## 2018-10-23 DIAGNOSIS — K44.9 HIATAL HERNIA: ICD-10-CM

## 2018-10-23 PROCEDURE — 74241 XR UPPER GI SERIES W KUB: CPT

## 2018-11-01 ENCOUNTER — HOSPITAL ENCOUNTER (OUTPATIENT)
Dept: GENERAL RADIOLOGY | Age: 83
Discharge: HOME | End: 2018-11-01
Payer: MEDICARE

## 2018-11-01 DIAGNOSIS — R10.9 ABDOMINAL PAIN: ICD-10-CM

## 2018-11-01 DIAGNOSIS — K59.00 CONSTIPATION: ICD-10-CM

## 2018-11-01 DIAGNOSIS — K57.90 DIVERTICULOSIS: ICD-10-CM

## 2018-11-01 DIAGNOSIS — R19.7 DIARRHEA: ICD-10-CM

## 2018-11-01 PROCEDURE — 74018 RADEX ABDOMEN 1 VIEW: CPT

## 2018-11-06 ENCOUNTER — HOSPITAL ENCOUNTER (OUTPATIENT)
Dept: MRI IMAGING | Age: 83
Discharge: HOME | End: 2018-11-06
Attending: PHYSICAL MEDICINE & REHABILITATION
Payer: MEDICARE

## 2018-11-06 DIAGNOSIS — S22.000A THORACIC COMPRESSION FRACTURE, CLOSED, INITIAL ENCOUNTER (HCC): ICD-10-CM

## 2018-11-06 PROCEDURE — 72146 MRI CHEST SPINE W/O DYE: CPT

## 2018-11-12 ENCOUNTER — HOSPITAL ENCOUNTER (OUTPATIENT)
Dept: MRI IMAGING | Age: 83
Discharge: HOME | End: 2018-11-12
Attending: PHYSICAL MEDICINE & REHABILITATION
Payer: MEDICARE

## 2018-11-12 ENCOUNTER — TELEPHONE (OUTPATIENT)
Dept: INTERNAL MEDICINE CLINIC | Age: 83
End: 2018-11-12

## 2018-11-12 VITALS — WEIGHT: 117 LBS | BODY MASS INDEX: 21.4 KG/M2

## 2018-11-12 DIAGNOSIS — R93.7 MUSCULOSKELETAL SYSTEM IMAGING ABNORMALITY: ICD-10-CM

## 2018-11-12 DIAGNOSIS — R41.3 MEMORY LOSS: Primary | ICD-10-CM

## 2018-11-12 PROCEDURE — 72157 MRI CHEST SPINE W/O & W/DYE: CPT

## 2018-11-12 PROCEDURE — 72147 MRI CHEST SPINE W/DYE: CPT

## 2018-11-12 PROCEDURE — 74011250636 HC RX REV CODE- 250/636

## 2018-11-12 PROCEDURE — A9575 INJ GADOTERATE MEGLUMI 0.1ML: HCPCS

## 2018-11-12 PROCEDURE — 72158 MRI LUMBAR SPINE W/O & W/DYE: CPT

## 2018-11-12 RX ORDER — GADOTERATE MEGLUMINE 376.9 MG/ML
15 INJECTION INTRAVENOUS ONCE
Status: COMPLETED | OUTPATIENT
Start: 2018-11-12 | End: 2018-11-12

## 2018-11-12 RX ORDER — GADOTERATE MEGLUMINE 376.9 MG/ML
10 INJECTION INTRAVENOUS ONCE
Status: DISCONTINUED | OUTPATIENT
Start: 2018-11-12 | End: 2018-11-12 | Stop reason: SDUPTHER

## 2018-11-12 RX ORDER — GADOTERATE MEGLUMINE 376.9 MG/ML
INJECTION INTRAVENOUS
Status: COMPLETED
Start: 2018-11-12 | End: 2018-11-12

## 2018-11-12 RX ADMIN — GADOTERATE MEGLUMINE 10 ML: 376.9 INJECTION INTRAVENOUS at 13:47

## 2018-11-12 NOTE — TELEPHONE ENCOUNTER
Patients daughter, Mone Hahn is calling, she is requesting to have a order written for the patient to get a wheel chair. She states that the patient still uses her walker but for longer distances she needs a wheel chair, she also states that the patient will be going to an assistant living facility soon and will need one for this.     Best call back # for Mone Hahn: [phone]

## 2018-11-26 ENCOUNTER — TELEPHONE (OUTPATIENT)
Dept: INTERNAL MEDICINE CLINIC | Age: 83
End: 2018-11-26

## 2018-11-26 DIAGNOSIS — R41.3 MEMORY LOSS: Primary | ICD-10-CM

## 2018-11-26 RX ORDER — METOPROLOL TARTRATE 25 MG/1
TABLET, FILM COATED ORAL
Qty: 180 TAB | Refills: 2 | Status: SHIPPED | OUTPATIENT
Start: 2018-11-26 | End: 2018-12-05 | Stop reason: SDUPTHER

## 2018-11-26 NOTE — TELEPHONE ENCOUNTER
Patients daughter, Ron Hernandez is calling, she is requesting to have an order sent over to [employer] for a light weight wheel chair. She states that the other order for a regular wheel chair is not going to work for them as they need this for transportation back and forth to doctor appointments.     Best call back # for [employer]: [phone]  Fax # for [facility] [employer]: [phone] - they need insurance, demographic information, and LOV note

## 2018-11-26 NOTE — TELEPHONE ENCOUNTER
Pharmacy on file verified  Requested Prescriptions     Pending Prescriptions Disp Refills    metoprolol tartrate (LOPRESSOR) 25 mg tablet 180 Tab 2     Sig: TAKE 1 TABLET TWICE A DAY     LOV 7/23/18 NOV 11/27/2018

## 2018-11-27 ENCOUNTER — CLINICAL SUPPORT (OUTPATIENT)
Dept: INTERNAL MEDICINE CLINIC | Age: 83
End: 2018-11-27

## 2018-11-27 DIAGNOSIS — Z11.1 PPD SCREENING TEST: ICD-10-CM

## 2018-11-27 DIAGNOSIS — Z23 ENCOUNTER FOR IMMUNIZATION: Primary | ICD-10-CM

## 2018-11-27 NOTE — PROGRESS NOTES
After obtaining consent, and per orders of Dr. Keely Lin, injection of Fluad influenza vaccine  given by Migue Liang LPN. PPD Placement note  Isabel Vicente, 80 y.o. female is here today for placement of PPD test  Reason for PPD test: assisted livving facility  Pt taken PPD test before: no  Verified in allergy area and with patient that they are not allergic to the products PPD is made of (Phenol or Tween). Yes  Is patient taking any oral or IV steroid medication now or have they taken it in the last month? no  Has the patient ever received the BCG vaccine?: no  Has the patient been in recent contact with anyone known or suspected of having active TB disease?: no       Date of exposure (if applicable): n/a       Name of person they were exposed to (if applicable): n/a  Patient's Country of origin?: n/a  O: Alert and oriented in NAD. P:  PPD placed on 11/27/2018. Patient advised to return for reading within 48-72 hours.

## 2018-11-28 RX ORDER — TRAMADOL HYDROCHLORIDE 50 MG/1
50 TABLET ORAL
COMMUNITY
End: 2019-03-01

## 2018-11-29 LAB
MM INDURATION POC: 0 MM (ref 0–5)
PPD POC: NEGATIVE NEGATIVE

## 2018-11-29 NOTE — PROGRESS NOTES
PPD Reading Note  PPD read and results entered in [address]. Result: 0 mm induration.   Interpretation: negative   If test not read within 48-72 hours of initial placement, patient advised to repeat in other arm 1-3 weeks after this test.  Allergic reaction: no

## 2018-12-05 RX ORDER — TRIAMTERENE AND HYDROCHLOROTHIAZIDE 37.5; 25 MG/1; MG/1
CAPSULE ORAL
Qty: 30 CAP | Refills: 12 | Status: SHIPPED | OUTPATIENT
Start: 2018-12-05 | End: 2019-01-07 | Stop reason: SDUPTHER

## 2018-12-05 RX ORDER — PANTOPRAZOLE SODIUM 40 MG/1
40 TABLET, DELAYED RELEASE ORAL DAILY
Qty: 30 TAB | Refills: 12 | Status: SHIPPED | OUTPATIENT
Start: 2018-12-05 | End: 2020-11-18 | Stop reason: DRUGHIGH

## 2018-12-05 RX ORDER — ONDANSETRON 4 MG/1
4 TABLET, FILM COATED ORAL
Qty: 20 TAB | Refills: 3 | Status: SHIPPED | OUTPATIENT
Start: 2018-12-05 | End: 2020-01-30

## 2018-12-05 RX ORDER — DISOPYRAMIDE PHOSPHATE 100 MG/1
CAPSULE ORAL
Qty: 60 CAP | Refills: 12 | Status: SHIPPED | OUTPATIENT
Start: 2018-12-05 | End: 2018-12-31 | Stop reason: SDUPTHER

## 2018-12-05 RX ORDER — METOPROLOL TARTRATE 25 MG/1
TABLET, FILM COATED ORAL
Qty: 60 TAB | Refills: 12 | Status: SHIPPED | OUTPATIENT
Start: 2018-12-05 | End: 2019-12-30

## 2018-12-05 NOTE — TELEPHONE ENCOUNTER
Patients daughter needs written Rx's to take to the pharmacy call when ready for     Requested Prescriptions     Pending Prescriptions Disp Refills    disopyramide phosphate (NORPACE) 100 mg capsule 60 Cap 12     Sig: TAKE 1 CAPSULE TWICE A DAY    metoprolol tartrate (LOPRESSOR) 25 mg tablet 60 Tab 12     Sig: TAKE 1 TABLET TWICE A DAY    triamterene-hydroCHLOROthiazide (DYAZIDE) 37.5-25 mg per capsule 30 Cap 12     Sig: TAKE 1 CAPSULE EVERY OTHER DAY AS NEEDED    pantoprazole (PROTONIX) 40 mg tablet 30 Tab 12     Sig: Take 1 Tab by mouth daily.  ondansetron hcl (ZOFRAN) 4 mg tablet 20 Tab 3     Sig: Take 1 Tab by mouth every eight (8) hours as needed for Nausea.

## 2018-12-14 ENCOUNTER — OFFICE VISIT (OUTPATIENT)
Dept: NEUROLOGY | Age: 83
End: 2018-12-14

## 2018-12-14 VITALS
HEART RATE: 101 BPM | DIASTOLIC BLOOD PRESSURE: 70 MMHG | SYSTOLIC BLOOD PRESSURE: 120 MMHG | RESPIRATION RATE: 18 BRPM | HEIGHT: 62 IN | WEIGHT: 114 LBS | OXYGEN SATURATION: 96 % | BODY MASS INDEX: 20.98 KG/M2

## 2018-12-14 DIAGNOSIS — G25.0 BENIGN ESSENTIAL TREMOR: ICD-10-CM

## 2018-12-14 DIAGNOSIS — G20 PRIMARY PARKINSONISM (HCC): Primary | ICD-10-CM

## 2018-12-14 DIAGNOSIS — G47.52 REM SLEEP BEHAVIOR DISORDER: ICD-10-CM

## 2018-12-14 RX ORDER — CARBIDOPA AND LEVODOPA 25; 100 MG/1; MG/1
1 TABLET, EXTENDED RELEASE ORAL
Qty: 30 TAB | Refills: 5 | Status: SHIPPED | OUTPATIENT
Start: 2018-12-14 | End: 2019-01-24

## 2018-12-14 RX ORDER — CARBIDOPA AND LEVODOPA 25; 100 MG/1; MG/1
0.5 TABLET ORAL 3 TIMES DAILY
Qty: 135 TAB | Refills: 2 | Status: SHIPPED | OUTPATIENT
Start: 2018-12-14 | End: 2019-01-24

## 2018-12-14 NOTE — PATIENT INSTRUCTIONS
If there is no improvement in sleep issues after 2 weeks of taking carbidopa levodopa  mg 0.5 tabs 3 times daily, fill the second prescription for her carbidopa levodopa   mg CR (this is an extended release). This is to be taken just at bedtime. She can continue taking her other prescription 3 times daily with breakfast lunch and dinner    Please notify the office of any worsening hallucinations, paranoia, or worsening of nausea and constipation with these new medications      [facility]   Statement to Patients  April 1, 2014      In an effort to ensure the large volume of patient prescription refills is processed in the most efficient and expeditious manner, we are asking our patients to assist us by calling your Pharmacy for all prescription refills, this will include also your  Mail Order Pharmacy. The pharmacy will contact our office electronically to continue the refill process. Please do not wait until the last minute to call your pharmacy. We need at least 48 hours (2days) to fill prescriptions. We also encourage you to call your pharmacy before going to  your prescription to make sure it is ready. With regard to controlled substance prescription refill requests (narcotic refills) that need to be picked up at our office, we ask your cooperation by providing us with at least 72 hours (3days) notice that you will need a refill. We will not refill narcotic prescription refill requests after 4:00pm on any weekday, Monday through Thursday, or after 2:00pm on Fridays, or on the weekends. We encourage everyone to explore another way of getting your prescription refill request processed using [employer], our patient web portal through our electronic medical record system. [employer] is an efficient and effective way to communicate your medication request directly to the office and  downloadable as an lokesh on your smart phone .  [employer] also features a review functionality that allows you to view your medication list as well as leave messages for your physician. Are you ready to get connected? If so please review the attatched instructions or speak to any of our staff to get you set up right away! Thank you so much for your cooperation. Should you have any questions please contact our Practice Administrator. The Physicians and Staff,  [facility]                A Healthy Lifestyle: Care Instructions  Your Care Instructions    A healthy lifestyle can help you feel good, stay at a healthy weight, and have plenty of energy for both work and play. A healthy lifestyle is something you can share with your whole family. A healthy lifestyle also can lower your risk for serious health problems, such as high blood pressure, heart disease, and diabetes. You can follow a few steps listed below to improve your health and the health of your family. Follow-up care is a key part of your treatment and safety. Be sure to make and go to all appointments, and call your doctor if you are having problems. It's also a good idea to know your test results and keep a list of the medicines you take. How can you care for yourself at home? · Do not eat too much sugar, fat, or fast foods. You can still have dessert and treats now and then. The goal is moderation. · Start small to improve your eating habits. Pay attention to portion sizes, drink less juice and soda pop, and eat more fruits and vegetables. ? Eat a healthy amount of food. A 3-ounce serving of meat, for example, is about the size of a deck of cards. Fill the rest of your plate with vegetables and whole grains. ? Limit the amount of soda and sports drinks you have every day. Drink more water when you are thirsty. ? Eat at least 5 servings of fruits and vegetables every day.  It may seem like a lot, but it is not hard to reach this goal. A serving or helping is 1 piece of fruit, 1 cup of vegetables, or 2 cups of leafy, raw vegetables. Have an apple or some carrot sticks as an afternoon snack instead of a candy bar. Try to have fruits and/or vegetables at every meal.  · Make exercise part of your daily routine. You may want to start with simple activities, such as walking, bicycling, or slow swimming. Try to be active 30 to 60 minutes every day. You do not need to do all 30 to 60 minutes all at once. For example, you can exercise 3 times a day for 10 or 20 minutes. Moderate exercise is safe for most people, but it is always a good idea to talk to your doctor before starting an exercise program.  · Keep moving. Mikayla Adrian the lawn, work in the garden, or [place]. Take the stairs instead of the elevator at work. · If you smoke, quit. People who smoke have an increased risk for heart attack, stroke, cancer, and other lung illnesses. Quitting is hard, but there are ways to boost your chance of quitting tobacco for good. ? Use nicotine gum, patches, or lozenges. ? Ask your doctor about stop-smoking programs and medicines. ? Keep trying. In addition to reducing your risk of diseases in the future, you will notice some benefits soon after you stop using tobacco. If you have shortness of breath or asthma symptoms, they will likely get better within a few weeks after you quit. · Limit how much alcohol you drink. Moderate amounts of alcohol (up to 2 drinks a day for men, 1 drink a day for women) are okay. But drinking too much can lead to liver problems, high blood pressure, and other health problems. Family health  If you have a family, there are many things you can do together to improve your health. · Eat meals together as a family as often as possible. · Eat healthy foods. This includes fruits, vegetables, lean meats and dairy, and whole grains. · Include your family in your fitness plan.  Most people think of activities such as jogging or tennis as the way to fitness, but there are many ways you and your family can be more active. Anything that makes you breathe hard and gets your heart pumping is exercise. Here are some tips:  ? Walk to do errands or to take your child to school or the bus.  ? Go for a family bike ride after dinner instead of watching TV. Where can you learn more? Go to http://pinky-shamar.info/. Enter S530 in the search box to learn more about \"A Healthy Lifestyle: Care Instructions. \"  Current as of: December 7, 2017  Content Version: 11.8  © 4046-8016 [email or web address]. Care instructions adapted under license by [employer] (which disclaims liability or warranty for this information). If you have questions about a medical condition or this instruction, always ask your healthcare professional. [address] any warranty or liability for your use of this information.

## 2018-12-14 NOTE — LETTER
NOTIFICATION RETURN TO WORK / SCHOOL 
 
12/14/2018 1:49 PM 
 
Ms. Hua Lamb [address] [id number] To Whom It May Concern: 
 
Hua Lamb is currently under the care of [address] [place] [address]. She is prescribed carbidopalevodopa  mg tabs one half tab 3 times daily This medication needs to be administered on a full stomach, she should take it immediately after breakfast, lunch, and dinner. Sincerely, Uzma Cardozo NP

## 2018-12-14 NOTE — PROGRESS NOTES
Date:            18     Name:  Jared Alvarez  :  1926  MRN:  [id number]     PCP:  Fredy Nielsen MD    Chief Complaint   Patient presents with    Parkinsons Disease         HISTORY OF PRESENT ILLNESS:  Michelle Brumfield is a 80 y.o., female who presents today for follow up for suspected dementia, essential tremor. She is a patient of Dr. Augie Hassan, saw her for the first time in August and some signs of Parkinson's disease are noted in her history and exam.  Having hallucinations. She was referred to physical therapy for Parkinson's. She was given a trial of low-dose carbidopa levodopa, declined a DaTscan. She reports that Sinemet appeared to be helping, but it caused nausea. She was on some other medication and she was not sure what was causing the nausea. She has a lot of GI issues, gets diverticulitis when she is constipated and this seemed to cause constipation so she stopped it. She saw her doctor who put her on miralax daily and this has improved it. She went to PT three times, ended up with compression fractures in her back. Has been trying to recover from that, is on Tramadol. She has moved into assisted living. When she was living at home, she would have some issues with sleep-wake transition, seeing things. She has started to have intense dreams that carry over into her daytime behavior in her new facility. She has not been using her melatonin because she is on Tramadol, but had these on the melatonin as well. People are telling her to get up in the middle of the night, get dressed. She would like to try Sinemet again. Medications are administered by nursing staff. 2018 tracy  Berlin Gamboa is a 22-year-old woman who is a new patient to me. She is transferring care to me from [facility] neurology. She is here with her daughter. I reviewed the medical record.   In summary, she has a long history of chronic medical conditions to include diabetes, history of stroke, atrial fibrillation managed with aspirin and rate control. She has suspected dementia and has been attempted with medication to include donepezil and Namenda but did not tolerate any of these medicines. She is also being treated for essential tremor for which she is tried medications like Mysoline, Lyrica, topiramate without good benefit. She is here questioning some of her diagnoses. She thinks that she may have Parkinson's because of the symptoms that she has been learning about. On further questioning she does admit to chronic constipation for which she needs MiraLAX every other day. She admits to occasional drooling. She has intermittent visual hallucinations that can be very disturbing. Her voice has softened over the years. She has concomitant macular degeneration and has very poor vision bilaterally. She can no longer read and do crosswords like she used to. She has tremor in both hands for now 5 years but getting worse. No falls. Current Outpatient Medications   Medication Sig    Lactobacillus rhamnosus GG (CULTURELLE PO) Take  by mouth daily.  disopyramide phosphate (NORPACE) 100 mg capsule TAKE 1 CAPSULE TWICE A DAY    metoprolol tartrate (LOPRESSOR) 25 mg tablet TAKE 1 TABLET TWICE A DAY    triamterene-hydroCHLOROthiazide (DYAZIDE) 37.5-25 mg per capsule TAKE 1 CAPSULE EVERY OTHER DAY AS NEEDED    pantoprazole (PROTONIX) 40 mg tablet Take 1 Tab by mouth daily.  ondansetron hcl (ZOFRAN) 4 mg tablet Take 1 Tab by mouth every eight (8) hours as needed for Nausea.  traMADol (ULTRAM) 50 mg tablet Take 50 mg by mouth every six (6) hours as needed for Pain.  aspirin 81 mg chewable tablet Take 81 mg by mouth daily.  polyethylene glycol (MIRALAX) 17 gram/dose powder Take 17 g by mouth daily.  nitroglycerin (NITROSTAT) 0.4 mg SL tablet 1 Tab by SubLINGual route every five (5) minutes as needed for Chest Pain.     magnesium hydroxide (YOUNG MILK OF MAGNESIA) 400 mg/5 mL suspension Take 30 mL by mouth daily as needed for Constipation.  melatonin tab tablet Take 10 mg by mouth nightly.  cyanocobalamin (VITAMIN B-12) 100 mcg tablet Take 100 mcg by mouth daily.  Cholecalciferol, Vitamin D3, (VITAMIN D3) 1,000 unit cap Take  by mouth. No current facility-administered medications for this visit.       Allergies   Allergen Reactions    Bactrim [Sulfamethoprim Ds] Other (comments)     consipation    Ciprofloxacin (Bulk) Other (comments)     Low wbc    Iodine Unknown (comments)    Prednisone Other (comments)     tachycardia    Statins-Hmg-Coa Reductase Inhibitors Unknown (comments)     Stomach uipset and mild muscle aches     Past Medical History:   Diagnosis Date    Acute diverticulitis 9/25/2017    Allergic rhinitis 9/25/2017    Arrhythmia     afib    Arthritis     Asthma     Atrial fibrillation [facility]     Benign essential tremor 9/25/2017    Cancer ([facility] [address]     skin    Celiac disease 9/25/2017    Constipation 9/25/2017    Diabetic peripheral neuropathy associated with type 2 diabetes mellitus ([facility] [address] 4/21/2016    Diarrhea 9/25/2017    Diverticulosis of large intestine without hemorrhage 9/25/2017    Early satiety 9/25/2017    Fuchs' corneal dystrophy 9/25/2017    Gastroesophageal reflux disease without esophagitis 9/25/2017    GERD (gastroesophageal reflux disease)     High cholesterol     Hypercholesterolemia 9/25/2017    Hypertension     Irritable bowel syndrome with diarrhea 9/25/2017    Numbness 9/25/2017    Osteopenia 9/25/2017    Other ill-defined conditions(799.89)     collapsed lung prior to hernia repair surgery    Postmenopausal 7/23/2018    Ptosis, both eyelids 9/25/2017    Statin intolerance 9/25/2017    Unspecified adverse effect of anesthesia     pt states she went into cardiac arrest prior to hernia repair after the insertion of gas in stomach     Past Surgical History:   Procedure Laterality Date    HX APPENDECTOMY      HX CHOLECYSTECTOMY      HX GI      hemorrhoidectomy    HX HEENT      sinus surgery    HX HERNIA REPAIR      HX TONSILLECTOMY       Social History     Socioeconomic History    Marital status:      Spouse name: Not on file    Number of children: Not on file    Years of education: Not on file    Highest education level: Not on file   Social Needs    Financial resource strain: Not on file    Food insecurity - worry: Not on file    Food insecurity - inability: Not on file    Transportation needs - medical: Not on file   Simply Measured needs - non-medical: Not on file   Occupational History    Not on file   Tobacco Use    Smoking status: Former Smoker     Packs/day: 0.75     Years: 5.00     Pack years: 3.75     Last attempt to quit: 10/11/1967     Years since quittin.2    Smokeless tobacco: Never Used   Substance and Sexual Activity    Alcohol use: No    Drug use: No    Sexual activity: Not on file   Other Topics Concern    Not on file   Social History Narrative    Not on file     Family History   Problem Relation Age of Onset    Heart Disease Mother     Dementia Mother     Heart Disease Father     Neuropathy Child     Depression Child     Other Child         diverticulitis         PHYSICAL EXAMINATION:    Visit Vitals  /70   Pulse (!) 101   Resp 18   Ht 5' 2\" (1.575 m)   Wt 51.7 kg (114 lb)   SpO2 96%   BMI 20.85 kg/m²     General:  Well defined, nourished, and groomed individual in no acute distress. Neck: Supple, nontender, no bruits, no pain with resistance to active range of motion. Heart: Regular rate and rhythm, no murmurs, rub, or gallop. Normal S1S2. Lungs:  Clear to auscultation bilaterally with equal chest expansion, no cough, no wheeze  Musculoskeletal:  Extremities revealed no edema and had full range of motion of joints. Psych:  Good mood and bright affect    NEUROLOGICAL EXAMINATION:     Mental Status:   Alert and oriented to self, year, president.  Not to location. Cranial Nerves:    II, III, IV, VI:  Visual acuity grossly intact. Extra-ocular movements are full and fluid. No ptosis or nystagmus. V-XII: Hearing is grossly intact. Facial features are symmetric, with normal sensation and strength. The palate rises symmetrically and the tongue protrudes midline. Sternocleidomastoids 5/5. Motor Examination: Normal tone, bulk, and strength, 5/5 muscle strength throughout. No cogwheeling. Coordination:  Finger to nose testing was normal.   Mild left hand resting tremor, mild bilateral hand intention tremor. Mild bradykinesia  Gait and Station:  Steady while walking with walker. Normal arm swing. No pronator drift. No muscle wasting or fasciculations noted. ASSESSMENT AND PLAN    ICD-10-CM ICD-9-CM    1. Primary Parkinsonism (HCC) G20 332.0 carbidopa-levodopa (SINEMET)  mg per tablet      carbidopa-levodopa ER (SINEMET CR)  mg per tablet   2. Benign essential tremor G25.0 333.1    3. REM sleep behavior disorder G47.52 1.42      66-year-old female seen in follow-up for tremors. She has long-standing essential tremor, this is mild and not really problematic. She is here today to discuss parkinsonism. She saw Dr. Victor Manuel More in August, was put on low-dose of Sinemet for parkinsonism and did see some improvement in her rigidity. She then developed significant constipation, which is not a new problem for her. She discontinue medication as she thought it may be related. She wants to try again. She is having issues with the sleep-wake transition, nightmares, some hallucinations and some paranoia related to those. It is causing some problems in her assisted living, she is only been living there for 2 weeks. 1.  Resume Sinemet  mg one half tab 3 times daily with food for parkinsonism. Family will notify the office if she has any worsening of constipation, nausea, hallucinations  2.   Once established on this, if she is tolerating well but still having some issues with sleep at night a second prescription was provided for Sinemet CR  mg nightly   3. If Sinemet is ineffective in controlling REM sleep behavior disorder and associated hallucinations, can do can try low dose of 12.5 mg quetiapine. Discussed this in the associated risks with patient and her family, they understand and will call if they want to try it  4. No treatment for essential tremor at this time    Follow-up in 4 weeks with me, in February with Dr. Brittaney Castaneda NP    This note was created using voice recognition software. Despite editing, there may be syntax errors.

## 2018-12-18 ENCOUNTER — TELEPHONE (OUTPATIENT)
Dept: NEUROLOGY | Age: 83
End: 2018-12-18

## 2018-12-18 NOTE — TELEPHONE ENCOUNTER
[phone] Daughter   Pt is having delusional dreams and wanted to know how long the patient need to be on medication before she starts to see change. She was having them when she came in to see Primitivo Casper NP last week but she is continuing to have the dreams.  She will not be able to take calls between 2:30-3:30pm today 12/18/18

## 2018-12-19 DIAGNOSIS — F22 DELUSIONS (HCC): ICD-10-CM

## 2018-12-19 DIAGNOSIS — G47.52 REM SLEEP BEHAVIOR DISORDER: Primary | ICD-10-CM

## 2018-12-19 NOTE — TELEPHONE ENCOUNTER
Spoke with patient's daughter and informed her of Noelle's recommendations. Ms. Preet Johnson was given an opportunity to ask questions, repeated information, and verbalized understanding.

## 2018-12-19 NOTE — TELEPHONE ENCOUNTER
I cannot add quetiapine or any other antipsychotic because she is on the Norpace. It puts her at risk of an extremely dangerous heart rhythm. I think they should try the Sinemet CR at bedtime, they already have this prescription available. If that does not help, call back and we can discuss risk versus benefit of adding Nuplazid, although this carries a similar risk with the heart rhythm.

## 2018-12-19 NOTE — TELEPHONE ENCOUNTER
If they are really bothersome, she can skip adding the Sinemet CR at bedtime and I will send in quetiapine. This is an antipsychotic they can help with the abnormal dreams, we discussed risks associated with this medication at her visit.

## 2018-12-31 RX ORDER — DISOPYRAMIDE PHOSPHATE 100 MG/1
CAPSULE ORAL
Qty: 60 CAP | Refills: 0 | Status: SHIPPED | OUTPATIENT
Start: 2018-12-31 | End: 2019-12-30 | Stop reason: SDUPTHER

## 2019-01-02 ENCOUNTER — TELEPHONE (OUTPATIENT)
Dept: CARDIOLOGY CLINIC | Age: 84
End: 2019-01-02

## 2019-01-02 NOTE — TELEPHONE ENCOUNTER
Please call today to advise if patient can stop taking aspirin as it causes her to bruise easily. Also, [place], where she now lives is faxing a form over to confirm she can stop taking it. Thanks!

## 2019-01-03 NOTE — TELEPHONE ENCOUNTER
She is on aspirin because she has a history of atrial fibrillation as well as a history of stroke and plaque in her carotid arteries. Aspirin will reduce her risk of recurrent stroke. If it is not within her goals of care to prevent a stroke, she could consider stopping aspirin. If she wants to prevent stroke, she should continue 81 mg of aspirin especially if it is only mild to moderate bruising. If there is more serious bleeding, that may be an indication to stop aspirin. Let me know if any other questions.

## 2019-01-07 DIAGNOSIS — E11.42 DIABETIC PERIPHERAL NEUROPATHY ASSOCIATED WITH TYPE 2 DIABETES MELLITUS (HCC): ICD-10-CM

## 2019-01-07 DIAGNOSIS — R93.0 ABNORMAL MRI OF HEAD: ICD-10-CM

## 2019-01-07 DIAGNOSIS — G60.8 IDIOPATHIC SMALL AND LARGE FIBER SENSORY NEUROPATHY: ICD-10-CM

## 2019-01-07 DIAGNOSIS — R41.3 MEMORY LOSS: ICD-10-CM

## 2019-01-07 DIAGNOSIS — Z86.73 HISTORY OF STROKE: ICD-10-CM

## 2019-01-07 DIAGNOSIS — I67.9 CEREBROVASCULAR DISEASE, UNSPECIFIED: ICD-10-CM

## 2019-01-07 DIAGNOSIS — G25.0 BENIGN ESSENTIAL TREMOR: ICD-10-CM

## 2019-01-07 DIAGNOSIS — I65.23 STENOSIS OF BOTH CAROTID ARTERIES WITHOUT CEREBRAL INFARCTION: ICD-10-CM

## 2019-01-07 RX ORDER — GLUCOSAMINE SULFATE 1500 MG
5000 POWDER IN PACKET (EA) ORAL DAILY
Qty: 30 CAP | Refills: 6 | Status: SHIPPED | OUTPATIENT
Start: 2019-01-07 | End: 2019-03-18

## 2019-01-07 RX ORDER — LANOLIN ALCOHOL/MO/W.PET/CERES
500 CREAM (GRAM) TOPICAL DAILY
Qty: 30 TAB | Refills: 6 | Status: SHIPPED | OUTPATIENT
Start: 2019-01-07 | End: 2019-03-18

## 2019-01-07 RX ORDER — GUAIFENESIN 100 MG/5ML
81 LIQUID (ML) ORAL DAILY
Qty: 30 TAB | Refills: 6 | Status: SHIPPED | OUTPATIENT
Start: 2019-01-07 | End: 2019-08-30 | Stop reason: SDUPTHER

## 2019-01-07 RX ORDER — TRIAMTERENE AND HYDROCHLOROTHIAZIDE 37.5; 25 MG/1; MG/1
CAPSULE ORAL
Qty: 30 CAP | Refills: 12 | Status: SHIPPED | OUTPATIENT
Start: 2019-01-07 | End: 2019-04-15 | Stop reason: SDUPTHER

## 2019-01-07 NOTE — TELEPHONE ENCOUNTER
Patient needs Rx's for 30 day supply of vitamins and and Rx for her fluid pill that reads as needed in case she needs it more than every other day as written. Requested Prescriptions     Pending Prescriptions Disp Refills    cholecalciferol (VITAMIN D3) 1,000 unit cap 30 Cap 6     Sig: Take 5 Caps by mouth daily.  cyanocobalamin (VITAMIN B12) 500 mcg tablet 30 Tab 6     Sig: Take 1 Tab by mouth daily.  triamterene-hydroCHLOROthiazide (DYAZIDE) 37.5-25 mg per capsule 30 Cap 12     Sig: As needed    aspirin 81 mg chewable tablet 30 Tab 6     Sig: Take 1 Tab by mouth daily.        Send to [facility]

## 2019-01-10 ENCOUNTER — TELEPHONE (OUTPATIENT)
Dept: NEUROLOGY | Age: 84
End: 2019-01-10

## 2019-01-10 DIAGNOSIS — G60.8 IDIOPATHIC SMALL AND LARGE FIBER SENSORY NEUROPATHY: ICD-10-CM

## 2019-01-10 DIAGNOSIS — G25.0 BENIGN ESSENTIAL TREMOR: ICD-10-CM

## 2019-01-10 DIAGNOSIS — F98.8 ATTENTION DEFICIT DISORDER, UNSPECIFIED HYPERACTIVITY PRESENCE: ICD-10-CM

## 2019-01-10 DIAGNOSIS — E11.42 DIABETIC PERIPHERAL NEUROPATHY ASSOCIATED WITH TYPE 2 DIABETES MELLITUS (HCC): ICD-10-CM

## 2019-01-10 DIAGNOSIS — I65.23 STENOSIS OF BOTH CAROTID ARTERIES WITHOUT CEREBRAL INFARCTION: ICD-10-CM

## 2019-01-10 DIAGNOSIS — R41.3 MEMORY LOSS: ICD-10-CM

## 2019-01-10 DIAGNOSIS — R93.0 ABNORMAL MRI OF HEAD: ICD-10-CM

## 2019-01-10 RX ORDER — CHOLECALCIFEROL (VITAMIN D3) 125 MCG
10 CAPSULE ORAL
Qty: 60 TAB | Refills: 5 | Status: SHIPPED | OUTPATIENT
Start: 2019-01-10 | End: 2019-07-30 | Stop reason: SDUPTHER

## 2019-01-10 NOTE — TELEPHONE ENCOUNTER
Requested Prescriptions     Pending Prescriptions Disp Refills    melatonin tab tablet       Sig: Take 2 Tabs by mouth nightly.

## 2019-01-13 ENCOUNTER — HOSPITAL ENCOUNTER (EMERGENCY)
Age: 84
Discharge: HOME | End: 2019-01-14
Attending: EMERGENCY MEDICINE | Admitting: EMERGENCY MEDICINE
Payer: MEDICARE

## 2019-01-13 ENCOUNTER — APPOINTMENT (OUTPATIENT)
Dept: GENERAL RADIOLOGY | Age: 84
End: 2019-01-13
Attending: EMERGENCY MEDICINE
Payer: MEDICARE

## 2019-01-13 DIAGNOSIS — R53.1 WEAKNESS: Primary | ICD-10-CM

## 2019-01-13 DIAGNOSIS — R42 ORTHOSTATIC DIZZINESS: ICD-10-CM

## 2019-01-13 DIAGNOSIS — R79.89 ELEVATED TSH: ICD-10-CM

## 2019-01-13 LAB
ALBUMIN SERPL-MCNC: 3.6 G/DL (ref 3.5–5)
ALBUMIN/GLOB SERPL: 1.2 {RATIO} (ref 1.1–2.2)
ALP SERPL-CCNC: 61 U/L (ref 45–117)
ALT SERPL-CCNC: 10 U/L (ref 12–78)
ANION GAP SERPL CALC-SCNC: 6 MMOL/L (ref 5–15)
AST SERPL-CCNC: 20 U/L (ref 15–37)
BASOPHILS # BLD: 0.1 K/UL (ref 0–0.1)
BASOPHILS NFR BLD: 1 % (ref 0–1)
BILIRUB SERPL-MCNC: 0.6 MG/DL (ref 0.2–1)
BUN SERPL-MCNC: 19 MG/DL (ref 6–20)
BUN/CREAT SERPL: 22 (ref 12–20)
CALCIUM SERPL-MCNC: 9.4 MG/DL (ref 8.5–10.1)
CHLORIDE SERPL-SCNC: 99 MMOL/L (ref 97–108)
CO2 SERPL-SCNC: 31 MMOL/L (ref 21–32)
CREAT SERPL-MCNC: 0.88 MG/DL (ref 0.55–1.02)
DIFFERENTIAL METHOD BLD: ABNORMAL
EOSINOPHIL # BLD: 0.1 K/UL (ref 0–0.4)
EOSINOPHIL NFR BLD: 1 % (ref 0–7)
ERYTHROCYTE [DISTWIDTH] IN BLOOD BY AUTOMATED COUNT: 13.2 % (ref 11.5–14.5)
GLOBULIN SER CALC-MCNC: 2.9 G/DL (ref 2–4)
GLUCOSE SERPL-MCNC: 112 MG/DL (ref 65–100)
HCT VFR BLD AUTO: 40.9 % (ref 35–47)
HGB BLD-MCNC: 13.9 G/DL (ref 11.5–16)
IMM GRANULOCYTES # BLD AUTO: 0 K/UL (ref 0–0.04)
IMM GRANULOCYTES NFR BLD AUTO: 1 % (ref 0–0.5)
LYMPHOCYTES # BLD: 1 K/UL (ref 0.8–3.5)
LYMPHOCYTES NFR BLD: 18 % (ref 12–49)
MCH RBC QN AUTO: 30.3 PG (ref 26–34)
MCHC RBC AUTO-ENTMCNC: 34 G/DL (ref 30–36.5)
MCV RBC AUTO: 89.1 FL (ref 80–99)
MONOCYTES # BLD: 0.8 K/UL (ref 0–1)
MONOCYTES NFR BLD: 14 % (ref 5–13)
NEUTS SEG # BLD: 3.6 K/UL (ref 1.8–8)
NEUTS SEG NFR BLD: 66 % (ref 32–75)
NRBC # BLD: 0 K/UL (ref 0–0.01)
NRBC BLD-RTO: 0 PER 100 WBC
PLATELET # BLD AUTO: 230 K/UL (ref 150–400)
PMV BLD AUTO: 10.9 FL (ref 8.9–12.9)
POTASSIUM SERPL-SCNC: 3.8 MMOL/L (ref 3.5–5.1)
PROT SERPL-MCNC: 6.5 G/DL (ref 6.4–8.2)
RBC # BLD AUTO: 4.59 M/UL (ref 3.8–5.2)
SODIUM SERPL-SCNC: 136 MMOL/L (ref 136–145)
WBC # BLD AUTO: 5.6 K/UL (ref 3.6–11)

## 2019-01-13 PROCEDURE — 87086 URINE CULTURE/COLONY COUNT: CPT

## 2019-01-13 PROCEDURE — 93005 ELECTROCARDIOGRAM TRACING: CPT

## 2019-01-13 PROCEDURE — 81001 URINALYSIS AUTO W/SCOPE: CPT

## 2019-01-13 PROCEDURE — 99285 EMERGENCY DEPT VISIT HI MDM: CPT

## 2019-01-13 PROCEDURE — 82550 ASSAY OF CK (CPK): CPT

## 2019-01-13 PROCEDURE — 71046 X-RAY EXAM CHEST 2 VIEWS: CPT

## 2019-01-13 PROCEDURE — 36415 COLL VENOUS BLD VENIPUNCTURE: CPT

## 2019-01-13 PROCEDURE — 74011250636 HC RX REV CODE- 250/636: Performed by: EMERGENCY MEDICINE

## 2019-01-13 PROCEDURE — 85025 COMPLETE CBC W/AUTO DIFF WBC: CPT

## 2019-01-13 PROCEDURE — 84484 ASSAY OF TROPONIN QUANT: CPT

## 2019-01-13 PROCEDURE — 96360 HYDRATION IV INFUSION INIT: CPT

## 2019-01-13 PROCEDURE — 83735 ASSAY OF MAGNESIUM: CPT

## 2019-01-13 PROCEDURE — 80053 COMPREHEN METABOLIC PANEL: CPT

## 2019-01-13 PROCEDURE — 84443 ASSAY THYROID STIM HORMONE: CPT

## 2019-01-13 RX ADMIN — SODIUM CHLORIDE 500 ML: 900 INJECTION, SOLUTION INTRAVENOUS at 23:00

## 2019-01-14 VITALS
WEIGHT: 110 LBS | RESPIRATION RATE: 14 BRPM | SYSTOLIC BLOOD PRESSURE: 175 MMHG | HEIGHT: 62 IN | DIASTOLIC BLOOD PRESSURE: 74 MMHG | BODY MASS INDEX: 20.24 KG/M2 | HEART RATE: 67 BPM | OXYGEN SATURATION: 97 % | TEMPERATURE: 97.9 F

## 2019-01-14 LAB
APPEARANCE UR: CLEAR
ATRIAL RATE: 100 BPM
BACTERIA URNS QL MICRO: NEGATIVE /HPF
BILIRUB UR QL: NEGATIVE
CALCULATED P AXIS, ECG09: 51 DEGREES
CALCULATED R AXIS, ECG10: -20 DEGREES
CALCULATED T AXIS, ECG11: 17 DEGREES
CK MB CFR SERPL CALC: NORMAL % (ref 0–2.5)
CK MB SERPL-MCNC: <1 NG/ML (ref 5–25)
CK SERPL-CCNC: 29 U/L (ref 26–192)
COLOR UR: ABNORMAL
DIAGNOSIS, 93000: NORMAL
EPITH CASTS URNS QL MICRO: ABNORMAL /LPF
GLUCOSE UR STRIP.AUTO-MCNC: NEGATIVE MG/DL
HGB UR QL STRIP: NEGATIVE
HYALINE CASTS URNS QL MICRO: ABNORMAL /LPF (ref 0–5)
KETONES UR QL STRIP.AUTO: NEGATIVE MG/DL
LEUKOCYTE ESTERASE UR QL STRIP.AUTO: ABNORMAL
MAGNESIUM SERPL-MCNC: 2.2 MG/DL (ref 1.6–2.4)
NITRITE UR QL STRIP.AUTO: NEGATIVE
P-R INTERVAL, ECG05: 166 MS
PH UR STRIP: 6.5 [PH] (ref 5–8)
PROT UR STRIP-MCNC: NEGATIVE MG/DL
Q-T INTERVAL, ECG07: 368 MS
QRS DURATION, ECG06: 82 MS
QTC CALCULATION (BEZET), ECG08: 474 MS
RBC #/AREA URNS HPF: ABNORMAL /HPF (ref 0–5)
SP GR UR REFRACTOMETRY: 1.01 (ref 1–1.03)
TROPONIN I SERPL-MCNC: <0.05 NG/ML
TSH SERPL DL<=0.05 MIU/L-ACNC: 4.43 UIU/ML (ref 0.36–3.74)
UA: UC IF INDICATED,UAUC: ABNORMAL
UROBILINOGEN UR QL STRIP.AUTO: 0.2 EU/DL (ref 0.2–1)
VENTRICULAR RATE, ECG03: 100 BPM
WBC URNS QL MICRO: ABNORMAL /HPF (ref 0–4)

## 2019-01-14 PROCEDURE — 96361 HYDRATE IV INFUSION ADD-ON: CPT

## 2019-01-14 PROCEDURE — 74011250636 HC RX REV CODE- 250/636: Performed by: EMERGENCY MEDICINE

## 2019-01-14 RX ADMIN — SODIUM CHLORIDE 500 ML: 900 INJECTION, SOLUTION INTRAVENOUS at 02:30

## 2019-01-14 NOTE — ED TRIAGE NOTES
Pt arrived via EMS from [place]. Pt c/o intermittent dizziness and fatigue. Pt denies dizziness at this time but states she is concerned about being sleepy off and on.

## 2019-01-14 NOTE — ED PROVIDER NOTES
EMERGENCY DEPARTMENT HISTORY AND PHYSICAL EXAM 
 
 
Date: 1/13/2019 Patient Name: Luana William History of Presenting Illness Chief Complaint Patient presents with  Dizziness  Fatigue History Provided By: Patient HPI: Luana William, 80 y.o. female with PMHx significant for arrhythmia, HTN, asthma, skin cancer, A-Fib, high cholesterol, diabetic peripheral neuropathy, presents via EMS to the ED with cc of new onset moderate lightheadedness, ongoing for several minutes. Pt reports that she was concerned due to \"feeling too sleepy\". She denies any alleviating or exacerbating factors. Pt specifically denies any recent fever, chills, nausea, vomiting, diarrhea, abd pain, CP, SOB, dizziness, numbness, weakness, tingling, BLE swelling, HA, heart palpitations, urinary sxs, changes in BM, changes in PO intake, melena, hematochezia, cough, or congestion. Allergies: bactrim, ciprofloxacin, iodine, prednisone PMHx: Significant for arrhythmia, HTN, asthma, skin cancer, A-Fib, high cholesterol, diabetic peripheral neuropathy PSHx: Significant for appendectomy, cholecystectomy, hernia repair Social Hx: (+)former smoker: quit on 10/11/67, (-)EtOH use, (-)Illicit Drug use There are no other complaints, changes, or physical findings at this time. PCP: Nikki Tapia MD 
 
Current Outpatient Medications Medication Sig Dispense Refill  melatonin tab tablet Take 2 Tabs by mouth nightly. 60 Tab 5  cholecalciferol (VITAMIN D3) 1,000 unit cap Take 5 Caps by mouth daily. 30 Cap 6  cyanocobalamin (VITAMIN B12) 500 mcg tablet Take 1 Tab by mouth daily. 30 Tab 6  
 triamterene-hydroCHLOROthiazide (DYAZIDE) 37.5-25 mg per capsule As needed 30 Cap 12  
 aspirin 81 mg chewable tablet Take 1 Tab by mouth daily.  30 Tab 6  
 disopyramide phosphate (NORPACE) 100 mg capsule TAKE ONE CAPSULE BY MOUTH TWICE A DAY 60 Cap 0  
  Lactobacillus rhamnosus GG (CULTURELLE PO) Take  by mouth daily.  carbidopa-levodopa (SINEMET)  mg per tablet Take 0.5 Tabs by mouth three (3) times daily. Take after breakfast, lunch, dinner 135 Tab 2  carbidopa-levodopa ER (SINEMET CR)  mg per tablet Take 1 Tab by mouth nightly. 30 Tab 5  
 metoprolol tartrate (LOPRESSOR) 25 mg tablet TAKE 1 TABLET TWICE A DAY 60 Tab 12  
 pantoprazole (PROTONIX) 40 mg tablet Take 1 Tab by mouth daily. 30 Tab 12  
 ondansetron hcl (ZOFRAN) 4 mg tablet Take 1 Tab by mouth every eight (8) hours as needed for Nausea. 20 Tab 3  
 traMADol (ULTRAM) 50 mg tablet Take 50 mg by mouth every six (6) hours as needed for Pain.  polyethylene glycol (MIRALAX) 17 gram/dose powder Take 17 g by mouth daily.  nitroglycerin (NITROSTAT) 0.4 mg SL tablet 1 Tab by SubLINGual route every five (5) minutes as needed for Chest Pain. 25 Tab 1  
 magnesium hydroxide (YOUNG MILK OF MAGNESIA) 400 mg/5 mL suspension Take 30 mL by mouth daily as needed for Constipation. Past History Past Medical History: 
Past Medical History:  
Diagnosis Date  Acute diverticulitis 9/25/2017  Allergic rhinitis 9/25/2017  Arrhythmia   
 afib  Arthritis  Asthma  Atrial fibrillation ([facility] [address]  Benign essential tremor 9/25/2017  Cancer ([facility] [address] skin  Celiac disease 9/25/2017  Constipation 9/25/2017  Diabetic peripheral neuropathy associated with type 2 diabetes mellitus ([facility] [address] 4/21/2016  Diarrhea 9/25/2017  Diverticulosis of large intestine without hemorrhage 9/25/2017  Early satiety 9/25/2017  Fuchs' corneal dystrophy 9/25/2017  Gastroesophageal reflux disease without esophagitis 9/25/2017  GERD (gastroesophageal reflux disease)  High cholesterol  Hypercholesterolemia 9/25/2017  Hypertension  Irritable bowel syndrome with diarrhea 9/25/2017  Numbness 9/25/2017  Osteopenia 9/25/2017  Other ill-defined conditions(799.89)   
 collapsed lung prior to hernia repair surgery  Postmenopausal 2018  Ptosis, both eyelids 2017  Statin intolerance 2017  Unspecified adverse effect of anesthesia   
 pt states she went into cardiac arrest prior to hernia repair after the insertion of gas in stomach Past Surgical History: 
Past Surgical History:  
Procedure Laterality Date  HX APPENDECTOMY  HX CHOLECYSTECTOMY  HX GI    
 hemorrhoidectomy  HX HEENT    
 sinus surgery  HX HERNIA REPAIR    
 HX TONSILLECTOMY Family History: 
Family History Problem Relation Age of Onset  Heart Disease Mother  Dementia Mother  Heart Disease Father  Neuropathy Child  Depression Child  Other Child   
     diverticulitis Social History: 
Social History Tobacco Use  Smoking status: Former Smoker Packs/day: 0.75 Years: 5.00 Pack years: 3.75 Last attempt to quit: 10/11/1967 Years since quittin.2  Smokeless tobacco: Never Used Substance Use Topics  Alcohol use: No  
 Drug use: No  
 
 
Allergies: Allergies Allergen Reactions  Bactrim [Sulfamethoprim Ds] Other (comments)  
  consipation  Ciprofloxacin (Bulk) Other (comments) Low wbc  Iodine Unknown (comments)  Prednisone Other (comments)  
  tachycardia  Statins-Hmg-Coa Reductase Inhibitors Unknown (comments) Stomach uipset and mild muscle aches Review of Systems Review of Systems Constitutional: Negative. Negative for appetite change and fever. HENT: Negative. Negative for congestion and sore throat. Eyes: Negative. Negative for visual disturbance. Respiratory: Negative. Negative for cough and shortness of breath. Cardiovascular: Negative. Negative for chest pain, palpitations and leg swelling. Gastrointestinal: Negative. Negative for abdominal pain, constipation, diarrhea, nausea and vomiting. Genitourinary: Negative. Musculoskeletal: Negative. Skin: Negative. Neurological: Positive for light-headedness. Negative for dizziness, speech difficulty, weakness, numbness and headaches. Psychiatric/Behavioral: Negative. All other systems reviewed and are negative. Physical Exam  
General appearance - elderly, well nourished, well appearing, and in no distress Eyes - pupils equal and reactive, extraocular eye movements intact ENT - mucous membranes moist, pharynx normal without lesions Neck - supple, no significant adenopathy; non-tender to palpation Chest - clear to auscultation, no wheezes, rales or rhonchi; non-tender to palpation Heart - normal rate and regular rhythm, S1 and S2 normal, no murmurs noted Abdomen - soft, nontender, nondistended, no masses or organomegaly Musculoskeletal - no joint tenderness, deformity or swelling; normal ROM Extremities - peripheral pulses normal, no pedal edema Skin - normal coloration and turgor, no rashes Neurological - alert, oriented to person and place, confused on year, normal speech, no focal findings or movement disorder noted Diagnostic Study Results Labs - Recent Results (from the past 12 hour(s)) EKG, 12 LEAD, INITIAL Collection Time: 01/13/19 10:30 PM  
Result Value Ref Range Ventricular Rate 100 BPM  
 Atrial Rate 100 BPM  
 P-R Interval 166 ms  
 QRS Duration 82 ms Q-T Interval 368 ms QTC Calculation (Bezet) 474 ms Calculated P Axis 51 degrees Calculated R Axis -20 degrees Calculated T Axis 17 degrees Diagnosis Normal sinus rhythm Left atrial enlargement Inferior infarct , age undetermined Anterior infarct , age undetermined When compared with ECG of 04-DEC-2017 05:42, Inferior infarct is now present T wave inversion now evident in Inferior leads CBC WITH AUTOMATED DIFF Collection Time: 01/13/19 10:59 PM  
Result Value Ref Range WBC 5.6 3.6 - 11.0 K/uL RBC 4.59 3.80 - 5.20 M/uL  
 HGB 13.9 11.5 - 16.0 g/dL HCT 40.9 35.0 - 47.0 % MCV 89.1 80.0 - 99.0 FL  
 MCH 30.3 26.0 - 34.0 PG  
 MCHC 34.0 30.0 - 36.5 g/dL  
 RDW 13.2 11.5 - 14.5 % PLATELET 275 347 - 493 K/uL MPV 10.9 8.9 - 12.9 FL  
 NRBC 0.0 0  WBC ABSOLUTE NRBC 0.00 0.00 - 0.01 K/uL NEUTROPHILS 66 32 - 75 % LYMPHOCYTES 18 12 - 49 % MONOCYTES 14 (H) 5 - 13 % EOSINOPHILS 1 0 - 7 % BASOPHILS 1 0 - 1 % IMMATURE GRANULOCYTES 1 (H) 0.0 - 0.5 % ABS. NEUTROPHILS 3.6 1.8 - 8.0 K/UL  
 ABS. LYMPHOCYTES 1.0 0.8 - 3.5 K/UL  
 ABS. MONOCYTES 0.8 0.0 - 1.0 K/UL  
 ABS. EOSINOPHILS 0.1 0.0 - 0.4 K/UL  
 ABS. BASOPHILS 0.1 0.0 - 0.1 K/UL  
 ABS. IMM. GRANS. 0.0 0.00 - 0.04 K/UL  
 DF AUTOMATED METABOLIC PANEL, COMPREHENSIVE Collection Time: 01/13/19 10:59 PM  
Result Value Ref Range Sodium 136 136 - 145 mmol/L Potassium 3.8 3.5 - 5.1 mmol/L Chloride 99 97 - 108 mmol/L  
 CO2 31 21 - 32 mmol/L Anion gap 6 5 - 15 mmol/L Glucose 112 (H) 65 - 100 mg/dL BUN 19 6 - 20 MG/DL Creatinine 0.88 0.55 - 1.02 MG/DL  
 BUN/Creatinine ratio 22 (H) 12 - 20 GFR est AA >60 >60 ml/min/1.73m2 GFR est non-AA >60 >60 ml/min/1.73m2 Calcium 9.4 8.5 - 10.1 MG/DL Bilirubin, total 0.6 0.2 - 1.0 MG/DL  
 ALT (SGPT) 10 (L) 12 - 78 U/L  
 AST (SGOT) 20 15 - 37 U/L Alk. phosphatase 61 45 - 117 U/L Protein, total 6.5 6.4 - 8.2 g/dL Albumin 3.6 3.5 - 5.0 g/dL Globulin 2.9 2.0 - 4.0 g/dL A-G Ratio 1.2 1.1 - 2.2 URINALYSIS W/ REFLEX CULTURE Collection Time: 01/13/19 11:13 PM  
Result Value Ref Range Color YELLOW/STRAW Appearance CLEAR CLEAR Specific gravity 1.011 1.003 - 1.030    
 pH (UA) 6.5 5.0 - 8.0 Protein NEGATIVE  NEG mg/dL Glucose NEGATIVE  NEG mg/dL Ketone NEGATIVE  NEG mg/dL Bilirubin NEGATIVE  NEG Blood NEGATIVE  NEG Urobilinogen 0.2 0.2 - 1.0 EU/dL  Nitrites NEGATIVE  NEG    
 Leukocyte Esterase SMALL (A) NEG    
 WBC 10-20 0 - 4 /hpf  
 RBC 0-5 0 - 5 /hpf Epithelial cells MODERATE (A) FEW /lpf Bacteria NEGATIVE  NEG /hpf  
 UA:UC IF INDICATED URINE CULTURE ORDERED (A) CNI Hyaline cast 0-2 0 - 5 /lpf  
TSH 3RD GENERATION Collection Time: 01/13/19 11:13 PM  
Result Value Ref Range TSH 4.43 (H) 0.36 - 3.74 uIU/mL  
CK W/ CKMB & INDEX Collection Time: 01/13/19 11:15 PM  
Result Value Ref Range CK 29 26 - 192 U/L  
 CK - MB <1.0 <3.6 NG/ML  
 CK-MB Index Cannot be calculated 0 - 2.5    
TROPONIN I Collection Time: 01/13/19 11:15 PM  
Result Value Ref Range Troponin-I, Qt. <0.05 <0.05 ng/mL MAGNESIUM Collection Time: 01/13/19 11:15 PM  
Result Value Ref Range Magnesium 2.2 1.6 - 2.4 mg/dL Radiologic Studies - CXR Results  (Last 48 hours) 01/13/19 2328  XR CHEST PA LAT Final result Impression:  IMPRESSION:  
1. Small left effusion. 2. Age-indeterminate compression deformities. Narrative:  History: Weakness. 2 views of the chest demonstrate atherosclerosis of the aorta. Heart size is  
unremarkable. There is a small left pleural effusion. There age-indeterminate  
lower thoracic compression deformities. There are multiple calcified granulomata  
in the lungs compatible with prior histoplasmosis. Medical Decision Making I am the first provider for this patient. I reviewed the vital signs, available nursing notes, past medical history, past surgical history, family history and social history. Vital Signs-Reviewed the patient's vital signs. Patient Vitals for the past 12 hrs: 
 Temp Pulse Resp BP SpO2  
01/14/19 0130  67 14 151/71 97 % 01/13/19 2204 97.9 °F (36.6 °C) (!) 101 16 (!) 161/97 97 % 01/13/19 2200     97 % Pulse Oximetry Analysis - 97% on RA Cardiac Monitor:  
Rate: 100 bpm 
Rhythm: Normal Sinus Rhythm EKG interpretation: (Preliminary) 22:30 
 Rhythm: normal sinus rhythm; and regular . Rate (approx.): 100 bpm; Axis: normal; AL interval: normal; QRS interval: normal; QT/QTc: normal; ST/T wave: non-specific ST changes. Written by Sury Sevilla ED Scribe, as dictated by Olivier Cassidy MD. Records Reviewed: Nursing Notes, Old Medical Records and Ambulance Run Sheet Provider Notes (Medical Decision Making): DDx: dehydration, metabolic abnormality, UTI, arrhythmia ED Course:  
Initial assessment performed. The patients presenting problems have been discussed, and they are in agreement with the care plan formulated and outlined with them. I have encouraged them to ask questions as they arise throughout their visit. Medications Given in the ED: 
 
Medications  
sodium chloride 0.9 % bolus infusion 500 mL (0 mL IntraVENous IV Completed 1/14/19 0000)  
sodium chloride 0.9 % bolus infusion 500 mL (500 mL IntraVENous New Bag 1/14/19 0230) 2:25 AM 
Pt was still orthostatic after second bolus with systolic pressure dropping from 165 to 137. Will administer a third bolus. Progress note: 
3:38 AM 
Pt noted to be feeling better and is ready for discharge. Updated pt and/or family on all final lab and imaging findings. Will follow up as instructed. All questions have been answered, pt voiced understanding and agreement with plan. Specific return precautions provided as well as instructions to return to the ED should sx worsen at any time. Vital signs stable for discharge. Critical Care Time: 0 Disposition: 
Discharge Note: 
3:42 AM 
The pt is ready for discharge. The pt's signs, symptoms, diagnosis, and discharge instructions have been discussed and pt has conveyed their understanding. The pt is to follow up as recommended or return to ER should their symptoms worsen. Plan has been discussed and pt is in agreement. PLAN: 
1. Current Discharge Medication List  
  
 
2. Follow-up Information Follow up With Specialties Details Why Contact Info Briseida Tobin MD Internal Medicine In 2 days  [address] [facility] 
[phone] [facility] EMERGENCY DEPT Emergency Medicine  If symptoms worsen [address] [facility] 
[phone] Return to ED if worse Diagnosis Clinical Impression:  
1. Weakness 2. Orthostatic dizziness 3. Elevated TSH Attestations: This note is prepared by Faizan Louis, acting as Scribe for April Parul Colon MD. Jesi Hogue MD: The scribe's documentation has been prepared under my direction and personally reviewed by me in its entirety. I confirm that the note above accurately reflects all work, treatment, procedures, and medical decision making performed by me. This note will not be viewable in [address]

## 2019-01-14 NOTE — ED NOTES
Repeat orthostatics after 500cc bolus NS Lying  156/74 HR 75 Sitting 156/77 HR 79 Standing 129/82 hr 100

## 2019-01-14 NOTE — DISCHARGE INSTRUCTIONS
Patient Education        Dizziness: Care Instructions  Your Care Instructions  Dizziness is the feeling of unsteadiness or fuzziness in your head. It is different than having vertigo, which is a feeling that the room is spinning or that you are moving or falling. It is also different from lightheadedness, which is the feeling that you are about to faint. It can be hard to know what causes dizziness. Some people feel dizzy when they have migraine headaches. Sometimes bouts of flu can make you feel dizzy. Some medical conditions, such as heart problems or high blood pressure, can make you feel dizzy. Many medicines can cause dizziness, including medicines for high blood pressure, pain, or anxiety. If a medicine causes your symptoms, your doctor may recommend that you stop or change the medicine. If it is a problem with your heart, you may need medicine to help your heart work better. If there is no clear reason for your symptoms, your doctor may suggest watching and waiting for a while to see if the dizziness goes away on its own. Follow-up care is a key part of your treatment and safety. Be sure to make and go to all appointments, and call your doctor if you are having problems. It's also a good idea to know your test results and keep a list of the medicines you take. How can you care for yourself at home? · If your doctor recommends or prescribes medicine, take it exactly as directed. Call your doctor if you think you are having a problem with your medicine. · Do not drive while you feel dizzy. · Try to prevent falls. Steps you can take include:  ? Using nonskid mats, adding grab bars near the tub, and using night-lights. ? Clearing your home so that walkways are free of anything you might trip on.  ? Letting family and friends know that you have been feeling dizzy. This will help them know how to help you. When should you call for help? Call 911 anytime you think you may need emergency care.  For example, call if:    · You passed out (lost consciousness).     · You have dizziness along with symptoms of a heart attack. These may include:  ? Chest pain or pressure, or a strange feeling in the chest.  ? Sweating. ? Shortness of breath. ? Nausea or vomiting. ? Pain, pressure, or a strange feeling in the back, neck, jaw, or upper belly or in one or both shoulders or arms. ? Lightheadedness or sudden weakness. ? A fast or irregular heartbeat.     · You have symptoms of a stroke. These may include:  ? Sudden numbness, tingling, weakness, or loss of movement in your face, arm, or leg, especially on only one side of your body. ? Sudden vision changes. ? Sudden trouble speaking. ? Sudden confusion or trouble understanding simple statements. ? Sudden problems with walking or balance. ? A sudden, severe headache that is different from past headaches.    Call your doctor now or seek immediate medical care if:    · You feel dizzy and have a fever, headache, or ringing in your ears.     · You have new or increased nausea and vomiting.     · Your dizziness does not go away or comes back.    Watch closely for changes in your health, and be sure to contact your doctor if:    · You do not get better as expected. Where can you learn more? Go to http://pinky-shamar.info/. Enter F927 in the search box to learn more about \"Dizziness: Care Instructions. \"  Current as of: November 20, 2017  Content Version: 11.8  © 2401-7873 [employer]. Care instructions adapted under license by [employer] (which disclaims liability or warranty for this information). If you have questions about a medical condition or this instruction, always ask your healthcare professional. [place] [address] any warranty or liability for your use of this information.          Patient Education        Fatigue: Care Instructions  Your Care Instructions    Fatigue is a feeling of tiredness, exhaustion, or lack of energy. You may feel fatigue because of too much or not enough activity. It can also come from stress, lack of sleep, boredom, and poor diet. Many medical problems, such as viral infections, can cause fatigue. Emotional problems, especially depression, are often the cause of fatigue. Fatigue is most often a symptom of another problem. Treatment for fatigue depends on the cause. For example, if you have fatigue because you have a certain health problem, treating this problem also treats your fatigue. If depression or anxiety is the cause, treatment may help. Follow-up care is a key part of your treatment and safety. Be sure to make and go to all appointments, and call your doctor if you are having problems. It's also a good idea to know your test results and keep a list of the medicines you take. How can you care for yourself at home? · Get regular exercise. But don't overdo it. Go back and forth between rest and exercise. · Get plenty of rest.  · Eat a healthy diet. Do not skip meals, especially breakfast.  · Reduce your use of caffeine, tobacco, and alcohol. Caffeine is most often found in coffee, tea, cola drinks, and chocolate. · Limit medicines that can cause fatigue. This includes tranquilizers and cold and allergy medicines. When should you call for help? Watch closely for changes in your health, and be sure to contact your doctor if:    · You have new symptoms such as fever or a rash.     · Your fatigue gets worse.     · You have been feeling down, depressed, or hopeless. Or you may have lost interest in things that you usually enjoy.     · You are not getting better as expected. Where can you learn more? Go to http://pinky-shamar.info/. Enter W807 in the search box to learn more about \"Fatigue: Care Instructions. \"  Current as of: November 20, 2017  Content Version: 11.8  © 3285-9519 [employer].  Care instructions adapted under license by [employer] (which disclaims liability or warranty for this information). If you have questions about a medical condition or this instruction, always ask your healthcare professional. [address] any warranty or liability for your use of this information.

## 2019-01-14 NOTE — ED NOTES
Repeat Orthostatics after second 500cc NS bolus Lying 164/72 HR 70 Sitting 155/80 HR 69 Standing 136/84 HR 72

## 2019-01-15 ENCOUNTER — HOSPITAL ENCOUNTER (EMERGENCY)
Age: 84
Discharge: HOME | End: 2019-01-15
Attending: EMERGENCY MEDICINE | Admitting: EMERGENCY MEDICINE
Payer: MEDICARE

## 2019-01-15 ENCOUNTER — APPOINTMENT (OUTPATIENT)
Dept: GENERAL RADIOLOGY | Age: 84
End: 2019-01-15
Attending: EMERGENCY MEDICINE
Payer: MEDICARE

## 2019-01-15 ENCOUNTER — APPOINTMENT (OUTPATIENT)
Dept: CT IMAGING | Age: 84
End: 2019-01-15
Attending: EMERGENCY MEDICINE
Payer: MEDICARE

## 2019-01-15 VITALS
RESPIRATION RATE: 22 BRPM | OXYGEN SATURATION: 94 % | SYSTOLIC BLOOD PRESSURE: 146 MMHG | TEMPERATURE: 98 F | BODY MASS INDEX: 19.88 KG/M2 | WEIGHT: 108.03 LBS | HEART RATE: 81 BPM | HEIGHT: 62 IN | DIASTOLIC BLOOD PRESSURE: 79 MMHG

## 2019-01-15 DIAGNOSIS — R53.1 WEAKNESS: Primary | ICD-10-CM

## 2019-01-15 LAB
ALBUMIN SERPL-MCNC: 3.2 G/DL (ref 3.5–5)
ALBUMIN/GLOB SERPL: 1.2 {RATIO} (ref 1.1–2.2)
ALP SERPL-CCNC: 62 U/L (ref 45–117)
ALT SERPL-CCNC: 24 U/L (ref 12–78)
ANION GAP SERPL CALC-SCNC: 8 MMOL/L (ref 5–15)
APPEARANCE UR: CLEAR
AST SERPL-CCNC: 21 U/L (ref 15–37)
ATRIAL RATE: 80 BPM
BACTERIA SPEC CULT: NORMAL
BACTERIA URNS QL MICRO: NEGATIVE /HPF
BASOPHILS # BLD: 0 K/UL (ref 0–0.1)
BASOPHILS NFR BLD: 1 % (ref 0–1)
BILIRUB SERPL-MCNC: 0.9 MG/DL (ref 0.2–1)
BILIRUB UR QL: NEGATIVE
BUN SERPL-MCNC: 13 MG/DL (ref 6–20)
BUN/CREAT SERPL: 17 (ref 12–20)
CALCIUM SERPL-MCNC: 8.7 MG/DL (ref 8.5–10.1)
CALCULATED P AXIS, ECG09: 62 DEGREES
CALCULATED R AXIS, ECG10: -28 DEGREES
CALCULATED T AXIS, ECG11: 39 DEGREES
CC UR VC: NORMAL
CHLORIDE SERPL-SCNC: 100 MMOL/L (ref 97–108)
CO2 SERPL-SCNC: 28 MMOL/L (ref 21–32)
COLOR UR: ABNORMAL
CREAT SERPL-MCNC: 0.76 MG/DL (ref 0.55–1.02)
DIAGNOSIS, 93000: NORMAL
DIFFERENTIAL METHOD BLD: ABNORMAL
EOSINOPHIL # BLD: 0 K/UL (ref 0–0.4)
EOSINOPHIL NFR BLD: 0 % (ref 0–7)
EPITH CASTS URNS QL MICRO: ABNORMAL /LPF
ERYTHROCYTE [DISTWIDTH] IN BLOOD BY AUTOMATED COUNT: 13.2 % (ref 11.5–14.5)
FLUAV AG NPH QL IA: NEGATIVE
FLUBV AG NOSE QL IA: NEGATIVE
GLOBULIN SER CALC-MCNC: 2.6 G/DL (ref 2–4)
GLUCOSE SERPL-MCNC: 92 MG/DL (ref 65–100)
GLUCOSE UR STRIP.AUTO-MCNC: NEGATIVE MG/DL
HCT VFR BLD AUTO: 40.9 % (ref 35–47)
HGB BLD-MCNC: 13.9 G/DL (ref 11.5–16)
HGB UR QL STRIP: NEGATIVE
HYALINE CASTS URNS QL MICRO: ABNORMAL /LPF (ref 0–5)
IMM GRANULOCYTES # BLD AUTO: 0 K/UL (ref 0–0.04)
IMM GRANULOCYTES NFR BLD AUTO: 0 % (ref 0–0.5)
KETONES UR QL STRIP.AUTO: 15 MG/DL
LEUKOCYTE ESTERASE UR QL STRIP.AUTO: ABNORMAL
LYMPHOCYTES # BLD: 0.5 K/UL (ref 0.8–3.5)
LYMPHOCYTES NFR BLD: 10 % (ref 12–49)
MCH RBC QN AUTO: 30.3 PG (ref 26–34)
MCHC RBC AUTO-ENTMCNC: 34 G/DL (ref 30–36.5)
MCV RBC AUTO: 89.1 FL (ref 80–99)
MONOCYTES # BLD: 0.4 K/UL (ref 0–1)
MONOCYTES NFR BLD: 8 % (ref 5–13)
NEUTS SEG # BLD: 3.8 K/UL (ref 1.8–8)
NEUTS SEG NFR BLD: 81 % (ref 32–75)
NITRITE UR QL STRIP.AUTO: NEGATIVE
NRBC # BLD: 0 K/UL (ref 0–0.01)
NRBC BLD-RTO: 0 PER 100 WBC
P-R INTERVAL, ECG05: 148 MS
PH UR STRIP: 7 [PH] (ref 5–8)
PLATELET # BLD AUTO: 219 K/UL (ref 150–400)
PMV BLD AUTO: 10.2 FL (ref 8.9–12.9)
POTASSIUM SERPL-SCNC: 3.6 MMOL/L (ref 3.5–5.1)
PROT SERPL-MCNC: 5.8 G/DL (ref 6.4–8.2)
PROT UR STRIP-MCNC: NEGATIVE MG/DL
Q-T INTERVAL, ECG07: 404 MS
QRS DURATION, ECG06: 76 MS
QTC CALCULATION (BEZET), ECG08: 465 MS
RBC # BLD AUTO: 4.59 M/UL (ref 3.8–5.2)
RBC #/AREA URNS HPF: ABNORMAL /HPF (ref 0–5)
RBC MORPH BLD: ABNORMAL
SERVICE CMNT-IMP: NORMAL
SODIUM SERPL-SCNC: 136 MMOL/L (ref 136–145)
SP GR UR REFRACTOMETRY: <1.005 (ref 1–1.03)
UA: UC IF INDICATED,UAUC: ABNORMAL
UROBILINOGEN UR QL STRIP.AUTO: 0.2 EU/DL (ref 0.2–1)
VENTRICULAR RATE, ECG03: 80 BPM
WBC # BLD AUTO: 4.7 K/UL (ref 3.6–11)
WBC URNS QL MICRO: ABNORMAL /HPF (ref 0–4)

## 2019-01-15 PROCEDURE — 97161 PT EVAL LOW COMPLEX 20 MIN: CPT

## 2019-01-15 PROCEDURE — 71046 X-RAY EXAM CHEST 2 VIEWS: CPT

## 2019-01-15 PROCEDURE — 97116 GAIT TRAINING THERAPY: CPT

## 2019-01-15 PROCEDURE — 81001 URINALYSIS AUTO W/SCOPE: CPT

## 2019-01-15 PROCEDURE — 74176 CT ABD & PELVIS W/O CONTRAST: CPT

## 2019-01-15 PROCEDURE — 93005 ELECTROCARDIOGRAM TRACING: CPT

## 2019-01-15 PROCEDURE — 80053 COMPREHEN METABOLIC PANEL: CPT

## 2019-01-15 PROCEDURE — 87804 INFLUENZA ASSAY W/OPTIC: CPT

## 2019-01-15 PROCEDURE — 99285 EMERGENCY DEPT VISIT HI MDM: CPT

## 2019-01-15 PROCEDURE — 85025 COMPLETE CBC W/AUTO DIFF WBC: CPT

## 2019-01-15 PROCEDURE — 36415 COLL VENOUS BLD VENIPUNCTURE: CPT

## 2019-01-15 NOTE — DISCHARGE INSTRUCTIONS
Patient Education        Weakness: Care Instructions  Your Care Instructions    Weakness is a lack of physical or muscle strength. You may feel that you need to make extra effort to move your arms, legs, or other muscles. Generalized weakness means that you feel weak in most areas of your body. Another type of weakness may affect just one muscle or group of muscles. You may feel weak and tired after you have done too much activity, such as taking an extra-long hike. This is not a serious problem. It often goes away on its own. Feeling weak can also be caused by medical conditions like thyroid problems, depression, or a virus. Sometimes the cause can be serious. Your doctor may want to do more tests to try to find the cause of the weakness. The doctor has checked you carefully, but problems can develop later. If you notice any problems or new symptoms, get medical treatment right away. Follow-up care is a key part of your treatment and safety. Be sure to make and go to all appointments, and call your doctor if you are having problems. It's also a good idea to know your test results and keep a list of the medicines you take. How can you care for yourself at home? · Rest when you feel tired. · Be safe with medicines. If your doctor prescribed medicine, take it exactly as prescribed. Call your doctor if you think you are having a problem with your medicine. You will get more details on the specific medicines your doctor prescribes. · Do not skip meals. Eating a balanced diet may increase your energy level. · Get some physical activity every day, but do not get too tired. When should you call for help? Call your doctor now or seek immediate medical care if:    · You have new or worse weakness.     · You are dizzy or lightheaded, or you feel like you may faint.    Watch closely for changes in your health, and be sure to contact your doctor if:    · You do not get better as expected.    Where can you learn more?  Go to http://pinky-shamar.info/. Enter 638 [id number] 5154 in the search box to learn more about \"Weakness: Care Instructions. \"  Current as of: November 20, 2017  Content Version: 11.8  © 5066-9165 [employer]. Care instructions adapted under license by [employer] (which disclaims liability or warranty for this information). If you have questions about a medical condition or this instruction, always ask your healthcare professional. [place] [address] any warranty or liability for your use of this information.

## 2019-01-15 NOTE — PROGRESS NOTES
physical Therapy Emergency Department EVALUATION Patient: Sonya Hernandez (9 y.o. female) Date: 1/15/2019 Primary Diagnosis: No admission diagnoses are documented for this encounter. Precautions:     
ASSESSMENT : 
Based on the objective data described below, the patient presents with c/o feeling weak, fatigued and woozy with near baseline function per reported at [facility]. Asked by Dr. Ludin Ewing to see pt for eval. 
Pt lives at [facility]. She states she is normally mod I with use of her rollator but that staff assists with her ADLs and meds. Unsure of exact cognitive baseline. At this time, pt is oriented to person, general place but states it is \"across the street\" and cannot remember the name; she is oriented to month but not date and year. She shows good strength for age and no c/o numbness/tingling. Coordination is intact. She requires some min A with bed mobility. She is SBA for transfers. She amb with S with rollator with slow steady pace, no ataxia or path deviations, no LOB. She states her head feels \"tight\" or a little \"light\" but is vague in descriptions. She has no spinning dizziness or change with head turns/ movement. Orthostatics taken including after activity, see doc flow, and were found to be negative with diastolic however in the 87S. Pt returned to bed with call bell in reach; ED tech present. Pt appears to be at or near her functional baseline but with the general c/o wooziness, fatigue, weakness. Her strength is good and gait is steady. If no acute issues are found requiring admission, pt may benefit from PT safety eval/screen at facility. 1149: Met pt and dtr in [facility], dtr assisting pt to restroom. Introduced self and role with pt w/ eval earlier prior to dtr's arrival. Dtr states that memory issues and some disorientation are new over last week or so. She notes an upcoming neurology appt scheduled. Relayed info to DO.  Pt again noted steady with RW that dtr brought for pt with S only. Assisted pt with hygiene in bathroom. Dtr confirms pt does use mainly rollator at facility and has assist with ADLs. Further acute physical therapy in the ED is not indicated at this time. PLAN : 
Discharge Recommendations:  
 
[]   Home with family []   Skilled nursing facility []   Admission to hospital with rehab likely needed 
[]   Inpatient rehab referral 
[]   Outpatient physical therapy referral 
[x]   Other:If no acute issues found requiring admission, pt would benefit from PT safety eval at facility. Further Equipment Recommendations for Discharge:  
[]   Rolling walker with 5\" wheels 
[]   Crutches  
[]   Cane  
[]   Wheelchair  
[x]   Other: has rollator COMMUNICATION/EDUCATION:  
Communication/Collaboration: 
[]   Fall prevention education was provided and the patient/caregiver indicated understanding. []   Patient/family have participated as able and agree with findings and recommendations. []   Patient is unable to participate in plan of care at this time. Findings and recommendations were discussed with: MD/DO physician SUBJECTIVE:  
Patient stated My head feels tight. ..a little light.  OBJECTIVE DATA SUMMARY:  
HISTORY:   
Past Medical History:  
Diagnosis Date  Acute diverticulitis 9/25/2017  Allergic rhinitis 9/25/2017  Arrhythmia   
 afib  Arthritis  Asthma  Atrial fibrillation ([facility] [address]  Benign essential tremor 9/25/2017  Cancer ([facility] [address] skin  Celiac disease 9/25/2017  Constipation 9/25/2017  Diabetic peripheral neuropathy associated with type 2 diabetes mellitus ([facility] [address] 4/21/2016  Diarrhea 9/25/2017  Diverticulosis of large intestine without hemorrhage 9/25/2017  Early satiety 9/25/2017  Fuchs' corneal dystrophy 9/25/2017  Gastroesophageal reflux disease without esophagitis 9/25/2017  GERD (gastroesophageal reflux disease)  High cholesterol  Hypercholesterolemia 9/25/2017  Hypertension  Irritable bowel syndrome with diarrhea 9/25/2017  Numbness 9/25/2017  Osteopenia 9/25/2017  Other ill-defined conditions(799.89)   
 collapsed lung prior to hernia repair surgery  Postmenopausal 7/23/2018  Ptosis, both eyelids 9/25/2017  Statin intolerance 9/25/2017  Unspecified adverse effect of anesthesia   
 pt states she went into cardiac arrest prior to hernia repair after the insertion of gas in stomach Past Surgical History:  
Procedure Laterality Date  HX APPENDECTOMY  HX CHOLECYSTECTOMY  HX GI    
 hemorrhoidectomy  HX HEENT    
 sinus surgery  HX HERNIA REPAIR    
 HX TONSILLECTOMY Prior Level of Function/Home Situation: AFL at the [facility] Personal factors and/or comorbidities impacting plan of care:  
 
Home Situation Home Environment: Assisted living Care Facility Name: The [address] [place] [address] One/Two Story Residence: One story Living Alone: No 
Support Systems: Assisted living Patient Expects to be Discharged to[de-identified] Assisted living Current DME Used/Available at Home: Grab bars, Walker, rollator, Raised toilet seat EXAMINATION/PRESENTATION/DECISION MAKING: Critical Behavior: 
Neurologic State: Alert Orientation Level: Oriented to person, Oriented to place, Oriented to situation, Oriented to time(place with cues-diff remem name; +month but - year) Cognition: Follows commands Hearing: Auditory Auditory Impairment: Hard of hearing, bilateral 
 
Range Of Motion: 
AROM: Within functional limits PROM: Within functional limits Strength:   
Strength: Within functional limits Tone & Sensation:  
Tone: Normal 
  
  
  
  
Sensation: Intact Coordination: 
Coordination: Within functional limits Functional Mobility: 
Bed Mobility: 
  
Supine to Sit: Minimum assistance Sit to Supine: Minimum assistance Transfers: Sit to Stand: Stand-by assistance Stand to Sit: Stand-by assistance Balance:  
Sitting: Intact Standing: Impaired Standing - Static: Fair Standing - Dynamic : Fair Ambulation/Gait Training: 
Distance (ft): 150 Feet (ft) Assistive Device: Walker, rollator Ambulation - Level of Assistance: Supervision Gait Abnormalities: Decreased step clearance Speed/Brittni: Slow;Pace decreased (<100 feet/min) Step Length: Left shortened;Right shortened Special Tests: 
Barthel Index: 
 
Bathin Bladder: 10 Bowels: 10 
Groomin Dressin Feeding: 10 Mobility: 10 Stairs: 0 Toilet Use: 5 Transfer (Bed to Chair and Back): 10 Total: 65 The Barthel ADL Index: Guidelines 1. The index should be used as a record of what a patient does, not as a record of what a patient could do. 2. The main aim is to establish degree of independence from any help, physical or verbal, however minor and for whatever reason. 3. The need for supervision renders the patient not independent. 4. A patient's performance should be established using the best available evidence. Asking the patient, friends/relatives and nurses are the usual sources, but direct observation and common sense are also important. However direct testing is not needed. 5. Usually the patient's performance over the preceding 24-48 hours is important, but occasionally longer periods will be relevant. 6. Middle categories imply that the patient supplies over 50 per cent of the effort. 7. Use of aids to be independent is allowed. Chely Marcos., Barthel, D.W. (2470). Functional evaluation: the Barthel Index. [facility] (14)2. ASHLEY Aly, Tevin Hanson.Driss., Joana [place], [address] [facility] ().  Measuring the change indisability after inpatient rehabilitation; comparison of the responsiveness of the Barthel Index and Functional [place] Measure. Journal of Neurology, Neurosurgery, and Psychiatry, 664), [phone]. CHRISTINA Moe, CINTHIA Flores, & Екатерина Bergman M.A. (2004.) Assessment of post-stroke quality of life in cost-effectiveness studies: The usefulness of the Barthel Index and the EuroQoL-5D. [facility], 13, [phone] Physical Therapy Evaluation Charge Determination History Examination Presentation Decision-Making HIGH Complexity :3+ comorbidities / personal factors will impact the outcome/ POC  HIGH Complexity : 4+ Standardized tests and measures addressing body structure, function, activity limitation and / or participation in recreation  MEDIUM Complexity : Evolving with changing characteristics  LOW Complexity : FOTO score of  Based on the above components, the patient evaluation is determined to be of the following complexity level: LOW Pain: 
Pain Scale 1: Numeric (0 - 10) Pain Intensity 1: 0 Activity Tolerance:  
Orthostatics negative, see above narrative Please refer to the flowsheet for vital signs taken during this treatment. After treatment:  
[]         Patient left in no apparent distress sitting up in chair 
[x]         Patient left in no apparent distress in bed 
[x]         Call bell left within reach [x]         Nursing notified 
[x]         Caregiver present 
[]         Bed alarm activated Thank you for this referral. 
Lexus Walls, PT Time Calculation: 24 mins

## 2019-01-15 NOTE — ED PROVIDER NOTES
EMERGENCY DEPARTMENT HISTORY AND PHYSICAL EXAM 
 
 
Date: 1/15/2019 Patient Name: Yash Grijalva History of Presenting Illness Chief Complaint Patient presents with  Fatigue Pt states \"I have had chills and just felt really weak\". Pt seen yesterday for the same. History Provided By: Patient HPI: Yash Grijalva, 80 y.o. female with PMHx significant for HTN, arrhythmia, GERD, cancer and DM, presents via EMS to the ED with cc of gradual onset, constant, recurrent generalized weakness and night sweats x 3 days with associated chills, decreased urinary frequency and abd pain. Pain is described as a mild discomfort in the upper abdominal region and si unchanged by any position or activity. Pt was seen on 1/13 for the same weakness and night sweats with no significant findings. Her symptoms improved with fluids and she was discharged by to her home at the [facility]. This morning she woke up to a recurrence of her symptoms, prompting the return to the ED for further evaluation. She notes some concern about her pain medications, which she states she is given by the staff more frequently then she believes she needs it and makes her sleepy. She was originally prescribed it for a back injury. Pt ambulates with a walker. Pt specifically denies CP, SOB, nausea, vomiting or dysuria. There are no other complaints, changes, or physical findings at this time. PCP: Kassandra Eddy MD 
 
No current facility-administered medications on file prior to encounter. Current Outpatient Medications on File Prior to Encounter Medication Sig Dispense Refill  melatonin tab tablet Take 2 Tabs by mouth nightly. 60 Tab 5  cholecalciferol (VITAMIN D3) 1,000 unit cap Take 5 Caps by mouth daily. 30 Cap 6  cyanocobalamin (VITAMIN B12) 500 mcg tablet Take 1 Tab by mouth daily. 30 Tab 6  
 triamterene-hydroCHLOROthiazide (DYAZIDE) 37.5-25 mg per capsule As needed 30 Cap 12  aspirin 81 mg chewable tablet Take 1 Tab by mouth daily. 30 Tab 6  
 disopyramide phosphate (NORPACE) 100 mg capsule TAKE ONE CAPSULE BY MOUTH TWICE A DAY 60 Cap 0  
 Lactobacillus rhamnosus GG (CULTURELLE PO) Take  by mouth daily.  carbidopa-levodopa (SINEMET)  mg per tablet Take 0.5 Tabs by mouth three (3) times daily. Take after breakfast, lunch, dinner 135 Tab 2  carbidopa-levodopa ER (SINEMET CR)  mg per tablet Take 1 Tab by mouth nightly. 30 Tab 5  
 metoprolol tartrate (LOPRESSOR) 25 mg tablet TAKE 1 TABLET TWICE A DAY 60 Tab 12  
 pantoprazole (PROTONIX) 40 mg tablet Take 1 Tab by mouth daily. 30 Tab 12  
 ondansetron hcl (ZOFRAN) 4 mg tablet Take 1 Tab by mouth every eight (8) hours as needed for Nausea. 20 Tab 3  
 traMADol (ULTRAM) 50 mg tablet Take 50 mg by mouth every six (6) hours as needed for Pain.  polyethylene glycol (MIRALAX) 17 gram/dose powder Take 17 g by mouth daily.  nitroglycerin (NITROSTAT) 0.4 mg SL tablet 1 Tab by SubLINGual route every five (5) minutes as needed for Chest Pain. 25 Tab 1  
 magnesium hydroxide (YOUNG MILK OF MAGNESIA) 400 mg/5 mL suspension Take 30 mL by mouth daily as needed for Constipation. Past History Past Medical History: 
Past Medical History:  
Diagnosis Date  Acute diverticulitis 9/25/2017  Allergic rhinitis 9/25/2017  Arrhythmia   
 afib  Arthritis  Asthma  Atrial fibrillation ([facility] [address]  Benign essential tremor 9/25/2017  Cancer ([facility] [address] skin  Celiac disease 9/25/2017  Constipation 9/25/2017  Diabetic peripheral neuropathy associated with type 2 diabetes mellitus ([facility] [address] 4/21/2016  Diarrhea 9/25/2017  Diverticulosis of large intestine without hemorrhage 9/25/2017  Early satiety 9/25/2017  Fuchs' corneal dystrophy 9/25/2017  Gastroesophageal reflux disease without esophagitis 9/25/2017  GERD (gastroesophageal reflux disease)  High cholesterol  Hypercholesterolemia 2017  Hypertension  Irritable bowel syndrome with diarrhea 2017  Numbness 2017  Osteopenia 2017  Other ill-defined conditions(799.89)   
 collapsed lung prior to hernia repair surgery  Postmenopausal 2018  Ptosis, both eyelids 2017  Statin intolerance 2017  Unspecified adverse effect of anesthesia   
 pt states she went into cardiac arrest prior to hernia repair after the insertion of gas in stomach Past Surgical History: 
Past Surgical History:  
Procedure Laterality Date  HX APPENDECTOMY  HX CHOLECYSTECTOMY  HX GI    
 hemorrhoidectomy  HX HEENT    
 sinus surgery  HX HERNIA REPAIR    
 HX TONSILLECTOMY Family History: 
Family History Problem Relation Age of Onset  Heart Disease Mother  Dementia Mother  Heart Disease Father  Neuropathy Child  Depression Child  Other Child   
     diverticulitis Social History: 
Social History Tobacco Use  Smoking status: Former Smoker Packs/day: 0.75 Years: 5.00 Pack years: 3.75 Last attempt to quit: 10/11/1967 Years since quittin.2  Smokeless tobacco: Never Used Substance Use Topics  Alcohol use: No  
 Drug use: No  
 
 
Allergies: Allergies Allergen Reactions  Bactrim [Sulfamethoprim Ds] Other (comments)  
  consipation  Ciprofloxacin (Bulk) Other (comments) Low wbc  Iodine Unknown (comments)  Prednisone Other (comments)  
  tachycardia  Statins-Hmg-Coa Reductase Inhibitors Unknown (comments) Stomach uipset and mild muscle aches Review of Systems Review of Systems Constitutional: Positive for chills and diaphoresis. HENT: Negative. Eyes: Negative. Respiratory: Negative for shortness of breath and wheezing. Cardiovascular: Negative for chest pain and leg swelling. Gastrointestinal: Positive for abdominal pain.  Negative for blood in stool, constipation, diarrhea, nausea and vomiting. Endocrine: Negative. Genitourinary: Positive for frequency (decreased). Negative for difficulty urinating and dysuria. Musculoskeletal: Negative. Skin: Negative for rash. Allergic/Immunologic: Negative. Neurological: Positive for weakness. Negative for numbness. Hematological: Negative. Psychiatric/Behavioral: Negative. Physical Exam  
Physical Exam  
Constitutional: She is oriented to person, place, and time. She appears well-developed and well-nourished. HENT:  
Head: Normocephalic and atraumatic. Mouth/Throat: Mucous membranes are normal.  
Eyes: EOM are normal. Pupils are equal, round, and reactive to light. Neck: Normal range of motion. No JVD present. No tracheal deviation present. Cardiovascular: Normal rate, regular rhythm, normal heart sounds and intact distal pulses. Exam reveals no gallop and no friction rub. No murmur heard. Pulmonary/Chest: Effort normal and breath sounds normal. No stridor. She has no wheezes. She has no rales. Abdominal: Soft. Bowel sounds are normal. She exhibits no distension and no mass. There is no tenderness. There is no guarding. Musculoskeletal: Normal range of motion. She exhibits no edema or tenderness. Neurological: She is alert and oriented to person, place, and time. 5/5 muscle strength in the extremities Skin: Skin is warm and dry. No rash noted. Psychiatric: She has a normal mood and affect. Her behavior is normal. Judgment and thought content normal.  
 
 
Diagnostic Study Results Labs - Recent Results (from the past 12 hour(s)) CBC WITH AUTOMATED DIFF Collection Time: 01/15/19 10:23 AM  
Result Value Ref Range WBC 4.7 3.6 - 11.0 K/uL  
 RBC 4.59 3.80 - 5.20 M/uL  
 HGB 13.9 11.5 - 16.0 g/dL HCT 40.9 35.0 - 47.0 % MCV 89.1 80.0 - 99.0 FL  
 MCH 30.3 26.0 - 34.0 PG  
 MCHC 34.0 30.0 - 36.5 g/dL  
 RDW 13.2 11.5 - 14.5 % PLATELET 336 353 - 000 K/uL MPV 10.2 8.9 - 12.9 FL  
 NRBC 0.0 0  WBC ABSOLUTE NRBC 0.00 0.00 - 0.01 K/uL NEUTROPHILS 81 (H) 32 - 75 % LYMPHOCYTES 10 (L) 12 - 49 % MONOCYTES 8 5 - 13 % EOSINOPHILS 0 0 - 7 % BASOPHILS 1 0 - 1 % IMMATURE GRANULOCYTES 0 0.0 - 0.5 % ABS. NEUTROPHILS 3.8 1.8 - 8.0 K/UL  
 ABS. LYMPHOCYTES 0.5 (L) 0.8 - 3.5 K/UL  
 ABS. MONOCYTES 0.4 0.0 - 1.0 K/UL  
 ABS. EOSINOPHILS 0.0 0.0 - 0.4 K/UL  
 ABS. BASOPHILS 0.0 0.0 - 0.1 K/UL  
 ABS. IMM. GRANS. 0.0 0.00 - 0.04 K/UL  
 DF AUTOMATED    
 RBC COMMENTS NORMOCYTIC, NORMOCHROMIC INFLUENZA A & B AG (RAPID TEST) Collection Time: 01/15/19 10:23 AM  
Result Value Ref Range Influenza A Antigen NEGATIVE  NEG Influenza B Antigen NEGATIVE  NEG    
METABOLIC PANEL, COMPREHENSIVE Collection Time: 01/15/19 11:09 AM  
Result Value Ref Range Sodium 136 136 - 145 mmol/L Potassium 3.6 3.5 - 5.1 mmol/L Chloride 100 97 - 108 mmol/L  
 CO2 28 21 - 32 mmol/L Anion gap 8 5 - 15 mmol/L Glucose 92 65 - 100 mg/dL BUN 13 6 - 20 MG/DL Creatinine 0.76 0.55 - 1.02 MG/DL  
 BUN/Creatinine ratio 17 12 - 20 GFR est AA >60 >60 ml/min/1.73m2 GFR est non-AA >60 >60 ml/min/1.73m2 Calcium 8.7 8.5 - 10.1 MG/DL Bilirubin, total 0.9 0.2 - 1.0 MG/DL  
 ALT (SGPT) 24 12 - 78 U/L  
 AST (SGOT) 21 15 - 37 U/L Alk. phosphatase 62 45 - 117 U/L Protein, total 5.8 (L) 6.4 - 8.2 g/dL Albumin 3.2 (L) 3.5 - 5.0 g/dL Globulin 2.6 2.0 - 4.0 g/dL A-G Ratio 1.2 1.1 - 2.2 URINALYSIS W/ REFLEX CULTURE Collection Time: 01/15/19 11:30 AM  
Result Value Ref Range Color YELLOW/STRAW Appearance CLEAR CLEAR Specific gravity <1.005 1.003 - 1.030  
 pH (UA) 7.0 5.0 - 8.0 Protein NEGATIVE  NEG mg/dL Glucose NEGATIVE  NEG mg/dL Ketone 15 (A) NEG mg/dL Bilirubin NEGATIVE  NEG Blood NEGATIVE  NEG Urobilinogen 0.2 0.2 - 1.0 EU/dL  Nitrites NEGATIVE  NEG    
 Leukocyte Esterase TRACE (A) NEG    
 WBC 0-4 0 - 4 /hpf  
 RBC 0-5 0 - 5 /hpf Epithelial cells FEW FEW /lpf Bacteria NEGATIVE  NEG /hpf  
 UA:UC IF INDICATED CULTURE NOT INDICATED BY UA RESULT CNI Hyaline cast 0-2 0 - 5 /lpf Radiologic Studies -  
CT ABD PELV WO CONT Final Result IMPRESSION:  
No acute abdominal or pelvic pathology. Extensive colonic diverticulosis; no  
evidence of acute diverticulitis. Chronic bilateral pleural effusions and  
moderate hiatal hernia. XR CHEST PA LAT Final Result IMPRESSION: Chronic lung changes, bilateral effusions. Cardiomegaly. CT Results  (Last 48 hours) 01/15/19 1234  CT ABD PELV WO CONT Final result Impression:  IMPRESSION:  
No acute abdominal or pelvic pathology. Extensive colonic diverticulosis; no  
evidence of acute diverticulitis. Chronic bilateral pleural effusions and  
moderate hiatal hernia. Narrative:  EXAM: CT ABD PELV WO CONT INDICATION: Abdominal pain; contrast allergy COMPARISON: CT 10/15/2018 CONTRAST:  None. TECHNIQUE:   
Thin axial images were obtained through the abdomen and pelvis. Coronal and  
sagittal reconstructions were generated. Oral contrast was not administered. CT  
dose reduction was achieved through use of a standardized protocol tailored for  
this examination and automatic exposure control for dose modulation. The absence of intravenous contrast material reduces the sensitivity for  
evaluation of the solid parenchymal organs of the abdomen. FINDINGS:   
LUNG BASES: There are small chronic bilateral pleural effusions, left worse than  
right, with bibasilar atelectasis versus scarring. INCIDENTALLY IMAGED HEART AND MEDIASTINUM: Unremarkable. LIVER: Low-density liver lesion, right upper portion, not significantly changed. GALLBLADDER: Status post cholecystectomy SPLEEN: No mass. PANCREAS: No mass or ductal dilatation. ADRENALS: Unremarkable. KIDNEYS/URETERS: No mass, calculus, or hydronephrosis. STOMACH: There is a moderate hiatal hernia, not significantly changed. SMALL BOWEL: No dilatation or wall thickening. COLON: There is extensive diverticulosis throughout the colon, without evidence  
of superimposed acute diverticulitis. APPENDIX: Not visualized PERITONEUM: No ascites or pneumoperitoneum. RETROPERITONEUM: No lymphadenopathy or aortic aneurysm. There is diffuse  
calcific aortic atherosclerosis. REPRODUCTIVE ORGANS: The uterus is noted. There is no pelvic free fluid. URINARY BLADDER: No mass or calculus. BONES: There is a chronic severe T12 compression fracture, not significantly  
changed. ADDITIONAL COMMENTS: N/A  
   
  
  
 
CXR Results  (Last 48 hours) 01/15/19 1213  XR CHEST PA LAT Final result Impression:  IMPRESSION: Chronic lung changes, bilateral effusions. Cardiomegaly. Narrative:  Clinical indication: Arrhythmia, hypertension, asthma with shortness of breath. Frontal and lateral views of the chest obtained, comparison January 13. Persistent bilateral mild sized pleural effusions slightly larger on the left. Prior granulomatous disease, hyperexpansion. Heart size remains prominent. No  
focal infiltrate. Hiatal hernia. Stable compression deformity thoracic spine  
unchanged. 01/13/19 2328  XR CHEST PA LAT Final result Impression:  IMPRESSION:  
1. Small left effusion. 2. Age-indeterminate compression deformities. Narrative:  History: Weakness. 2 views of the chest demonstrate atherosclerosis of the aorta. Heart size is  
unremarkable. There is a small left pleural effusion. There age-indeterminate  
lower thoracic compression deformities. There are multiple calcified granulomata  
in the lungs compatible with prior histoplasmosis. Medical Decision Making I am the first provider for this patient. I reviewed the vital signs, available nursing notes, past medical history, past surgical history, family history and social history. Vital Signs-Reviewed the patient's vital signs. Patient Vitals for the past 12 hrs: 
 Temp Pulse Resp BP SpO2  
01/15/19 1100  74 15 144/85 96 % 01/15/19 0953  81  158/80   
01/15/19 0930    172/86 98 % 01/15/19 0919 98 °F (36.7 °C) 81 14 166/88 96 % Pulse Oximetry Analysis - 96% on RA Cardiac Monitor:  
Rate: 81 bpm 
Rhythm: Normal Sinus Rhythm EKG interpretation: (Preliminary) 922 Rhythm: normal sinus rhythm; and regular . Rate (approx.): 80; Axis: left axis deviation; GA interval: normal; QRS interval: normal ; ST/T wave: No ST changes. Written by Mariana Goode Rater, ED Scribe, as dictated by April Jefferson DO. Records Reviewed: Nursing Notes and Old Medical Records Provider Notes (Medical Decision Making): Pt was just seen in the ED 2 days ago for similar symptoms, had a negative work up and was found to be orthostatic. Pt felt better after fluids. DDx includes orthostatic hypotension, dehydration, influenza, viral syndrome, rebeca, metabolic abnormality. Will recheck labs and orthostatics, have PT assess gait. ED Course:  
Initial assessment performed. The patients presenting problems have been discussed, and they are in agreement with the care plan formulated and outlined with them. I have encouraged them to ask questions as they arise throughout their visit. ED Course as of Jackson 15 1325 Tue Jackson 15, 2019  
1007 Pt ambulated with minimal assistance. [AS] ED Course User Index [AS] Eliza Amador  
 
 
9:32 AM 
Pt was seen on the 13th with no significant findings including negative urine and blood with a small left sided effusion on XR. Pt was given fluids and discharged. 1:10 PM 
Pt has been re-evaluated and is feeling better. Pt is ready for discharge. Critical Care Time:  
0 Disposition: 
DISCHARGE NOTE: 
 1:25 PM 
The patient is ready for discharge. The patients signs, symptoms, diagnosis, and instructions for discharge have been discussed and the pt has conveyed their understanding. The patient is to follow up as recommended or return to the ER should their symptoms worsen. Plan has been discussed and patient has conveyed their agreement. PLAN: 
1. Discharge Current Discharge Medication List  
  
 
2. Follow-up Information Follow up With Specialties Details Why Contact Info Paola Garcia MD Internal Medicine Schedule an appointment as soon as possible for a visit  [address] 
[phone] [facility] EMERGENCY DEPT Emergency Medicine  As needed, If symptoms worsen [address] [facility] [address] [facility] 
[phone] Return to ED if worse Diagnosis Clinical Impression:  
1. Weakness Attestations: This note is prepared by Oscar Shultz, acting as Scribe for April Jefferson DO. April Jefferson DO: The scribe's documentation has been prepared under my direction and personally reviewed by me in its entirety. I confirm that the note above accurately reflects all work, treatment, procedures, and medical decision making performed by me.

## 2019-01-21 ENCOUNTER — TELEPHONE (OUTPATIENT)
Dept: NEUROLOGY | Age: 84
End: 2019-01-21

## 2019-01-21 ENCOUNTER — TELEPHONE (OUTPATIENT)
Dept: INTERNAL MEDICINE CLINIC | Age: 84
End: 2019-01-21

## 2019-01-21 ENCOUNTER — OFFICE VISIT (OUTPATIENT)
Dept: INTERNAL MEDICINE CLINIC | Age: 84
End: 2019-01-21

## 2019-01-21 VITALS
HEART RATE: 68 BPM | BODY MASS INDEX: 20.43 KG/M2 | HEIGHT: 62 IN | WEIGHT: 111 LBS | OXYGEN SATURATION: 100 % | DIASTOLIC BLOOD PRESSURE: 100 MMHG | SYSTOLIC BLOOD PRESSURE: 140 MMHG

## 2019-01-21 DIAGNOSIS — I15.9 SECONDARY HYPERTENSION: Primary | Chronic | ICD-10-CM

## 2019-01-21 RX ORDER — UREA 10 %
2 LOTION (ML) TOPICAL DAILY
COMMUNITY
End: 2019-03-01

## 2019-01-21 NOTE — PATIENT INSTRUCTIONS
Elevated Blood Pressure: Care Instructions Your Care Instructions Blood pressure is a measure of how hard the blood pushes against the walls of your arteries. It's normal for blood pressure to go up and down throughout the day. But if it stays up over time, you have high blood pressure. Two numbers tell you your blood pressure. The first number is the systolic pressure. It shows how hard the blood pushes when your heart is pumping. The second number is the diastolic pressure. It shows how hard the blood pushes between heartbeats, when your heart is relaxed and filling with blood. An ideal blood pressure in adults is less than 120/80 (say \"120 over 80\"). High blood pressure is 140/90 or higher. You have high blood pressure if your top number is 140 or higher or your bottom number is 90 or higher, or both. The main test for high blood pressure is simple, fast, and painless. To diagnose high blood pressure, your doctor will test your blood pressure at different times. After testing your blood pressure, your doctor may ask you to test it again when you are home. If you are diagnosed with high blood pressure, you can work with your doctor to make a long-term plan to manage it. Follow-up care is a key part of your treatment and safety. Be sure to make and go to all appointments, and call your doctor if you are having problems. It's also a good idea to know your test results and keep a list of the medicines you take. How can you care for yourself at home? · Do not smoke. Smoking increases your risk for heart attack and stroke. If you need help quitting, talk to your doctor about stop-smoking programs and medicines. These can increase your chances of quitting for good. · Stay at a healthy weight. · Try to limit how much sodium you eat to less than 2,300 milligrams (mg) a day. Your doctor may ask you to try to eat less than 1,500 mg a day. · Be physically active. Get at least 30 minutes of exercise on most days of the week. Walking is a good choice. You also may want to do other activities, such as running, swimming, cycling, or playing tennis or team sports. · Avoid or limit alcohol. Talk to your doctor about whether you can drink any alcohol. · Eat plenty of fruits, vegetables, and low-fat dairy products. Eat less saturated and total fats. · Learn how to check your blood pressure at home. When should you call for help? Call your doctor now or seek immediate medical care if: 
? · Your blood pressure is much higher than normal (such as 180/110 or higher). ? · You think high blood pressure is causing symptoms such as: ¨ Severe headache. ¨ Blurry vision. ? Watch closely for changes in your health, and be sure to contact your doctor if: 
? · You do not get better as expected. Where can you learn more? Go to http://pinky-shamar.info/. Enter N898 in the search box to learn more about \"Elevated Blood Pressure: Care Instructions. \" Current as of: September 21, 2016 Content Version: 11.4 © 1597-3241 [employer]. Care instructions adapted under license by [employer] (which disclaims liability or warranty for this information). If you have questions about a medical condition or this instruction, always ask your healthcare professional. [address] any warranty or liability for your use of this information.

## 2019-01-21 NOTE — PROGRESS NOTES
Reviewed record in preparation for visit and have obtained necessary documentation. Identified pt with two pt identifiers(name and ). Chief Complaint Patient presents with  Elevated Blood Pressure Coordination of Care Questionnaire: 
:  
 
1) Have you been to an emergency room, urgent care clinic since your last visit? Yes, 19 and 1/15/19 Hospitalized since your last visit? 2) Have you seen or consulted any other health care providers outside of [address] [place] [address] since your last visit?

## 2019-01-21 NOTE — PROGRESS NOTES
This note will not be viewable in [address] Subjective:  
 
Mrs. Nahomy Hameed presents the office today with complaints of elevated blood pressure at least twice last week. The patient lives at the [facility] where she is checked by the nurses at the assisted living facility. Her blood pressure evidently was 190/100. She stated that she felt weak and dizzy. She was sent to the emergency room where her blood pressure was elevated and there was evidence of orthostasis. She was hydrated at that point. She was then seen a couple of days later and blood pressure was slightly elevated but there was no evidence of orthostasis. Laboratory studies were unrevealing. The patient has not changed any of her medications today. She takes her metoprolol for her blood pressure and she does have Dyazide to take on a as needed basis for Dyazide but she generally does not get this at her assisted living facility. She has been drinking and eating appropriately. Past Medical History:  
Diagnosis Date  Acute diverticulitis 9/25/2017  Allergic rhinitis 9/25/2017  Arrhythmia   
 afib  Arthritis  Asthma  Atrial fibrillation ([address]  Benign essential tremor 9/25/2017  Cancer ([address] skin  Celiac disease 9/25/2017  Constipation 9/25/2017  Diabetic peripheral neuropathy associated with type 2 diabetes mellitus ([address] 4/21/2016  Diarrhea 9/25/2017  Diverticulosis of large intestine without hemorrhage 9/25/2017  Early satiety 9/25/2017  Fuchs' corneal dystrophy 9/25/2017  Gastroesophageal reflux disease without esophagitis 9/25/2017  GERD (gastroesophageal reflux disease)  High cholesterol  Hypercholesterolemia 9/25/2017  Hypertension  Irritable bowel syndrome with diarrhea 9/25/2017  Numbness 9/25/2017  Osteopenia 9/25/2017  Other ill-defined conditions(799.89)   
 collapsed lung prior to hernia repair surgery  Postmenopausal 7/23/2018  Ptosis, both eyelids 9/25/2017  Statin intolerance 9/25/2017  Unspecified adverse effect of anesthesia   
 pt states she went into cardiac arrest prior to hernia repair after the insertion of gas in stomach Past Surgical History:  
Procedure Laterality Date  HX APPENDECTOMY  HX CHOLECYSTECTOMY  HX GI    
 hemorrhoidectomy  HX HEENT    
 sinus surgery  HX HERNIA REPAIR    
 HX TONSILLECTOMY Allergies Allergen Reactions  Bactrim [Sulfamethoprim Ds] Other (comments)  
  consipation  Ciprofloxacin (Bulk) Other (comments) Low wbc  Iodine Unknown (comments)  Prednisone Other (comments)  
  tachycardia  Statins-Hmg-Coa Reductase Inhibitors Unknown (comments) Stomach uipset and mild muscle aches Current Outpatient Medications Medication Sig Dispense Refill  calcium carbonate (CALCIUM 500) 500 mg calcium (1,250 mg) chewable tablet Take 2 Tabs by mouth daily.  melatonin tab tablet Take 2 Tabs by mouth nightly. 60 Tab 5  cholecalciferol (VITAMIN D3) 1,000 unit cap Take 5 Caps by mouth daily. 30 Cap 6  cyanocobalamin (VITAMIN B12) 500 mcg tablet Take 1 Tab by mouth daily. 30 Tab 6  
 triamterene-hydroCHLOROthiazide (DYAZIDE) 37.5-25 mg per capsule As needed 30 Cap 12  
 aspirin 81 mg chewable tablet Take 1 Tab by mouth daily. 30 Tab 6  
 disopyramide phosphate (NORPACE) 100 mg capsule TAKE ONE CAPSULE BY MOUTH TWICE A DAY 60 Cap 0  carbidopa-levodopa (SINEMET)  mg per tablet Take 0.5 Tabs by mouth three (3) times daily. Take after breakfast, lunch, dinner 135 Tab 2  carbidopa-levodopa ER (SINEMET CR)  mg per tablet Take 1 Tab by mouth nightly. 30 Tab 5  
 metoprolol tartrate (LOPRESSOR) 25 mg tablet TAKE 1 TABLET TWICE A DAY 60 Tab 12  
 pantoprazole (PROTONIX) 40 mg tablet Take 1 Tab by mouth daily. 30 Tab 12  
 ondansetron hcl (ZOFRAN) 4 mg tablet Take 1 Tab by mouth every eight (8) hours as needed for Nausea.  20 Tab 3  
  traMADol (ULTRAM) 50 mg tablet Take 50 mg by mouth every six (6) hours as needed for Pain.  polyethylene glycol (MIRALAX) 17 gram/dose powder Take 17 g by mouth daily.  nitroglycerin (NITROSTAT) 0.4 mg SL tablet 1 Tab by SubLINGual route every five (5) minutes as needed for Chest Pain. 25 Tab 1  
 magnesium hydroxide (YOUNG MILK OF MAGNESIA) 400 mg/5 mL suspension Take 30 mL by mouth daily as needed for Constipation. Social History Socioeconomic History  Marital status:  Spouse name: Not on file  Number of children: Not on file  Years of education: Not on file  Highest education level: Not on file Tobacco Use  Smoking status: Former Smoker Packs/day: 0.75 Years: 5.00 Pack years: 3.75 Last attempt to quit: 10/11/1967 Years since quittin.3  Smokeless tobacco: Never Used Substance and Sexual Activity  Alcohol use: No  
 Drug use: No  
 
Family History Problem Relation Age of Onset  Heart Disease Mother  Dementia Mother  Heart Disease Father  Neuropathy Child  Depression Child  Other Child   
     diverticulitis Review of Systems: 
GEN: no weight loss, weight gain, fatigue or night sweats CV: no PND, orthopnea, or palpitations Resp: no dyspnea on exertion, no cough Abd: no nausea, vomiting or diarrhea EXT: denies edema, claudication Endocrine: no hair loss, excessive thirst or polyuria Neurological ROS: no TIA or stroke symptoms ROS otherwise negative Objective:  
 
Visit Vitals BP (!) 140/100 Pulse 68 Ht 5' 2\" (1.575 m) Wt 111 lb (50.3 kg) SpO2 100% BMI 20.30 kg/m² Body mass index is 20.3 kg/m². General:   alert, cooperative and no distress Eyes: conjunctivae/sclerae clear. PERRL, EOM's intact Mouth:  No oral lesions, no pharyngeal erythema, no exudates Neck: Trachea midline, no thyromegaly, no bruits Heart: S1 and S2 normal,no murmurs noted Lungs: Clear to auscultation bilaterally, no increased work of breathing Abdomen: Soft, nontender. Normal bowel sounds Extremities: No edema or cyanosis Neuro: ..alert, oriented x3,speech normal in context and clarity, cranial nerves II-XII intact,motor strength: full proximally and distally,gait: normal 
reflexes: full and symmetric Physical exam otherwise negative Assessment/Plan:  
 
Diagnoses and all orders for this visit: 
 
Secondary hypertension Other instructions:  
Her blood pressure is elevated today but I believe that she would do better with a slightly elevated blood pressure as compared to being orthostatic. We will continue on her on her same metoprolol and she may continue to use the Dyazide on a as needed basis which she rarely gets at this point anyway. Labs from 1/13 and 1/15 were reviewed from the emergency room. Follow-up here as previously scheduled Follow-up Disposition: 
Return for As previously scheduled.  
 
Latesha Richardson MD

## 2019-01-21 NOTE — TELEPHONE ENCOUNTER
Patients daughter, Heriberto Anderson is calling, she advises that the medication sheet that was printed with the patients AVS was incorrect. It says that the patient needs to stop taking her Culterelle, but she really needs to stop taking the Calcium medication. She also advises that the Melatonin medication needs to be 10mg tablets and this new medication list needs to be faxed to the crossings at [address] [facility] [address]     Best call back # for Heriberto Anderson: [phone]

## 2019-01-23 ENCOUNTER — DOCUMENTATION ONLY (OUTPATIENT)
Dept: NEUROLOGY | Age: 84
End: 2019-01-23

## 2019-01-23 NOTE — TELEPHONE ENCOUNTER
I will send a message to her cardiologist to ask if we can safely add a medication to help with her hallucinations and sleep, most of the ones that help with hallucinations can potentially prolong a part of her heart rhythm in an unsafe way.

## 2019-01-24 RX ORDER — CARBIDOPA AND LEVODOPA 10; 100 MG/1; MG/1
1 TABLET, ORALLY DISINTEGRATING ORAL 3 TIMES DAILY
Qty: 90 TAB | Refills: 3 | Status: SHIPPED | OUTPATIENT
Start: 2019-01-24 | End: 2019-02-12

## 2019-01-24 NOTE — TELEPHONE ENCOUNTER
Patients daughter, Handy is calling, she is requesting to have a letter written saying that the patient is no longer on her Calcium medication.     Best call back # for Handy: [phone]

## 2019-01-24 NOTE — TELEPHONE ENCOUNTER
I spoke with patient's daughter. I have discontinued her current carbidopa levodopa, sent in a new prescription of Parcopa  mg to [facility] daughter reports that she slept better when on that prescription.       I have printed a letter to fax the nursing home with instructions for the medication change, please fax to attention [facility] Hammond at the crossings at [address] [place] [address]

## 2019-01-24 NOTE — LETTER
1/24/2019 3:18 PM 
 
Ms. Lisa Peacock [address] [id number] To Whom It May Concern: 
 
Lisa Peacock is currently under the care of [address] [place] [address]. Please discontinue these medications: 
Carbidopa levodopa  mg tabs one half tab 3 times daily Carbidopa levodopa  mg CR at bedtime Start new medication: 
Carbidopa levodopa  mg (Parcopa) 3 times daily If there are questions or concerns please have the patient contact our office. Sincerely, Dirk Amaro NP

## 2019-01-25 ENCOUNTER — TELEPHONE (OUTPATIENT)
Dept: NEUROLOGY | Age: 84
End: 2019-01-25

## 2019-01-25 NOTE — PROGRESS NOTES
Tali Clark- based on normal ejection fraction on echo, no ischemia on stress Myoview, no QT prolongation on EKG, I would have the pharmacist review her medication profile for any other interacting medications and if there are no other interacting QT prolonging medications, she could try 1 of those medications. Thanks.

## 2019-01-25 NOTE — TELEPHONE ENCOUNTER
Pt's daughter calling re letter that needed to be sent to the's facility. They have no received it yet. Please call daughter back as soon as possible.

## 2019-01-30 ENCOUNTER — OFFICE VISIT (OUTPATIENT)
Dept: NEUROLOGY | Age: 84
End: 2019-01-30

## 2019-01-30 VITALS
DIASTOLIC BLOOD PRESSURE: 76 MMHG | BODY MASS INDEX: 20.06 KG/M2 | HEART RATE: 74 BPM | HEIGHT: 62 IN | RESPIRATION RATE: 18 BRPM | OXYGEN SATURATION: 99 % | SYSTOLIC BLOOD PRESSURE: 122 MMHG | WEIGHT: 109 LBS

## 2019-01-30 DIAGNOSIS — G47.01 INSOMNIA DUE TO MEDICAL CONDITION: ICD-10-CM

## 2019-01-30 DIAGNOSIS — G20 PRIMARY PARKINSONISM (HCC): Primary | ICD-10-CM

## 2019-01-30 DIAGNOSIS — G25.0 BENIGN ESSENTIAL TREMOR: ICD-10-CM

## 2019-01-30 DIAGNOSIS — F32.A DEPRESSION, UNSPECIFIED DEPRESSION TYPE: ICD-10-CM

## 2019-01-30 DIAGNOSIS — R44.3 HALLUCINATIONS: ICD-10-CM

## 2019-01-30 RX ORDER — QUETIAPINE FUMARATE 25 MG/1
TABLET, FILM COATED ORAL
Qty: 15 TAB | Refills: 5 | Status: SHIPPED | OUTPATIENT
Start: 2019-01-30 | End: 2019-02-12

## 2019-01-30 NOTE — PATIENT INSTRUCTIONS
The new medication that we may be starting is called quetiapine. I will call the pharmacist at [place] [phone] to check for any potential interactions. The dose would be one half of a 25 mg tab (12.5 mg) nightly     A Healthy Lifestyle: Care Instructions  Your Care Instructions    A healthy lifestyle can help you feel good, stay at a healthy weight, and have plenty of energy for both work and play. A healthy lifestyle is something you can share with your whole family. A healthy lifestyle also can lower your risk for serious health problems, such as high blood pressure, heart disease, and diabetes. You can follow a few steps listed below to improve your health and the health of your family. Follow-up care is a key part of your treatment and safety. Be sure to make and go to all appointments, and call your doctor if you are having problems. It's also a good idea to know your test results and keep a list of the medicines you take. How can you care for yourself at home? · Do not eat too much sugar, fat, or fast foods. You can still have dessert and treats now and then. The goal is moderation. · Start small to improve your eating habits. Pay attention to portion sizes, drink less juice and soda pop, and eat more fruits and vegetables. ? Eat a healthy amount of food. A 3-ounce serving of meat, for example, is about the size of a deck of cards. Fill the rest of your plate with vegetables and whole grains. ? Limit the amount of soda and sports drinks you have every day. Drink more water when you are thirsty. ? Eat at least 5 servings of fruits and vegetables every day. It may seem like a lot, but it is not hard to reach this goal. A serving or helping is 1 piece of fruit, 1 cup of vegetables, or 2 cups of leafy, raw vegetables. Have an apple or some carrot sticks as an afternoon snack instead of a candy bar. Try to have fruits and/or vegetables at every meal.  · Make exercise part of your daily routine.  You may want to start with simple activities, such as walking, bicycling, or slow swimming. Try to be active 30 to 60 minutes every day. You do not need to do all 30 to 60 minutes all at once. For example, you can exercise 3 times a day for 10 or 20 minutes. Moderate exercise is safe for most people, but it is always a good idea to talk to your doctor before starting an exercise program.  · Keep moving. [employer] the lawn, work in the garden, or [place]o. Take the stairs instead of the elevator at work. · If you smoke, quit. People who smoke have an increased risk for heart attack, stroke, cancer, and other lung illnesses. Quitting is hard, but there are ways to boost your chance of quitting tobacco for good. ? Use nicotine gum, patches, or lozenges. ? Ask your doctor about stop-smoking programs and medicines. ? Keep trying. In addition to reducing your risk of diseases in the future, you will notice some benefits soon after you stop using tobacco. If you have shortness of breath or asthma symptoms, they will likely get better within a few weeks after you quit. · Limit how much alcohol you drink. Moderate amounts of alcohol (up to 2 drinks a day for men, 1 drink a day for women) are okay. But drinking too much can lead to liver problems, high blood pressure, and other health problems. Family health  If you have a family, there are many things you can do together to improve your health. · Eat meals together as a family as often as possible. · Eat healthy foods. This includes fruits, vegetables, lean meats and dairy, and whole grains. · Include your family in your fitness plan. Most people think of activities such as jogging or tennis as the way to fitness, but there are many ways you and your family can be more active. Anything that makes you breathe hard and gets your heart pumping is exercise.  Here are some tips:  ? Walk to do errands or to take your child to school or the bus.  ? Go for a family bike ride after dinner instead of watching TV. Where can you learn more? Go to http://pinky-shamar.info/. Enter O454 in the search box to learn more about \"A Healthy Lifestyle: Care Instructions. \"  Current as of: September 11, 2018  Content Version: 11.9  © 5115-8578 [employer]. Care instructions adapted under license by [employer] (which disclaims liability or warranty for this information). If you have questions about a medical condition or this instruction, always ask your healthcare professional. [address] any warranty or liability for your use of this information.

## 2019-01-30 NOTE — PROGRESS NOTES
Patient here for follow up on tremors. Family reported that patient's carbiodpa-leveodopa was changed within the last week and has not noticed any difference as of yet. She is still having issues sleeping.

## 2019-01-30 NOTE — PROGRESS NOTES
Date:            19     Name:  Zhanna Mendez  :  1926  MRN:  [id number]     PCP:  Saroj Maldonado MD    Chief Complaint   Patient presents with    Tremors         HISTORY OF PRESENT ILLNESS:  Bia Gannon is a 80 y.o., female who presents today for follow up for suspected dementia, essential tremor, Parkinson's. She is a patient Dr. Nani Mejia, saw her for the first time in 2018 and saw some signs of Parkinson's disease. Having hallucinations. She was referred to physical therapy for Parkinson's and only went 3 times before she ended up with compression fractures in her spine. She was given a trial of Parcopa  mg 3 times daily, declined a DaTscan. At some point the Cristo Emms was discontinued due to some GI issues, and then she was put on carbidopa levodopa  mg when I saw her in December. At that time she was having great amount of difficulty sleeping and also hallucinations at night. Initially she was put on melatonin, then Sinemet CR at bedtime and neither of these helped with sleep issues. I spoke with her daughter last week, who thought that she was sleeping better when on the Parcopa so we agreed to try this again. She has been back on Parcopa since Saturday, no change in her sleep. She falls asleep well, stays asleep until midnight and then wakes up and is in and out of sleep. She worries about getting up and getting ready for breakfast on time. She used to be a good sleeper, thinks that she mostly started having trouble sleeping in the last year or so. Dreams started to wake her up, they were so real to her that she would continue to dream into the day. She still does see a family of people in her room at night. Never during the day. A little suspicious of staff at the assisted living. Does endorse depression, present since she moved into assisted living. She knows that it was the best decision to make but misses living with family.  She is not a very social person, does not really enjoy big social events. Has made some new friends and enjoys eating with them. 12.14.2018 tracy Hernandez is a 80 y.o., female who presents today for follow up for suspected dementia, essential tremor. She is a patient of Dr. Carlotta Ortiz, saw her for the first time in August and some signs of Parkinson's disease are noted in her history and exam.  Having hallucinations. She was referred to physical therapy for Parkinson's. She was given a trial of low-dose carbidopa levodopa, declined a DaTscan. She reports that Sinemet appeared to be helping, but it caused nausea. She was on some other medication and she was not sure what was causing the nausea. She has a lot of GI issues, gets diverticulitis when she is constipated and this seemed to cause constipation so she stopped it. She saw her doctor who put her on miralax daily and this has improved it. She went to PT three times, ended up with compression fractures in her back. Has been trying to recover from that, is on Tramadol. She has moved into assisted living. When she was living at home, she would have some issues with sleep-wake transition, seeing things. She has started to have intense dreams that carry over into her daytime behavior in her new facility. She has not been using her melatonin because she is on Tramadol, but had these on the melatonin as well. People are telling her to get up in the middle of the night, get dressed. She would like to try Sinemet again. Medications are administered by nursing staff. Current Outpatient Medications   Medication Sig    carbidopa-levodopa (PARCOPA)  mg rapid dissolve tablet Take 1 Tab by mouth three (3) times daily.  calcium carbonate (CALCIUM 500) 500 mg calcium (1,250 mg) chewable tablet Take 2 Tabs by mouth daily.  Lactobacillus rhamnosus GG (CULTURELLE PO) Take  by mouth.  melatonin tab tablet Take 2 Tabs by mouth nightly.     cholecalciferol (VITAMIN D3) 1,000 unit cap Take 5 Caps by mouth daily.  cyanocobalamin (VITAMIN B12) 500 mcg tablet Take 1 Tab by mouth daily.  triamterene-hydroCHLOROthiazide (DYAZIDE) 37.5-25 mg per capsule As needed    aspirin 81 mg chewable tablet Take 1 Tab by mouth daily.  disopyramide phosphate (NORPACE) 100 mg capsule TAKE ONE CAPSULE BY MOUTH TWICE A DAY    metoprolol tartrate (LOPRESSOR) 25 mg tablet TAKE 1 TABLET TWICE A DAY    pantoprazole (PROTONIX) 40 mg tablet Take 1 Tab by mouth daily.  ondansetron hcl (ZOFRAN) 4 mg tablet Take 1 Tab by mouth every eight (8) hours as needed for Nausea.  traMADol (ULTRAM) 50 mg tablet Take 50 mg by mouth every six (6) hours as needed for Pain.  polyethylene glycol (MIRALAX) 17 gram/dose powder Take 17 g by mouth daily.  nitroglycerin (NITROSTAT) 0.4 mg SL tablet 1 Tab by SubLINGual route every five (5) minutes as needed for Chest Pain.  magnesium hydroxide ([employer] MILK OF MAGNESIA) 400 mg/5 mL suspension Take 30 mL by mouth daily as needed for Constipation. No current facility-administered medications for this visit.       Allergies   Allergen Reactions    Bactrim [Sulfamethoprim Ds] Other (comments)     consipation    Ciprofloxacin (Bulk) Other (comments)     Low wbc    Iodine Unknown (comments)    Prednisone Other (comments)     tachycardia    Statins-Hmg-Coa Reductase Inhibitors Unknown (comments)     Stomach uipset and mild muscle aches     Past Medical History:   Diagnosis Date    Acute diverticulitis 9/25/2017    Allergic rhinitis 9/25/2017    Arrhythmia     afib    Arthritis     Asthma     Atrial fibrillation ([address]     Benign essential tremor 9/25/2017    Cancer ([address]     skin    Celiac disease 9/25/2017    Constipation 9/25/2017    Diabetic peripheral neuropathy associated with type 2 diabetes mellitus ([address] 4/21/2016    Diarrhea 9/25/2017    Diverticulosis of large intestine without hemorrhage 9/25/2017    Early satiety 9/25/2017    Fuchs' corneal dystrophy 2017    Gastroesophageal reflux disease without esophagitis 2017    GERD (gastroesophageal reflux disease)     High cholesterol     Hypercholesterolemia 2017    Hypertension     Irritable bowel syndrome with diarrhea 2017    Numbness 2017    Osteopenia 2017    Other ill-defined conditions(799.89)     collapsed lung prior to hernia repair surgery    Postmenopausal 2018    Ptosis, both eyelids 2017    Statin intolerance 2017    Unspecified adverse effect of anesthesia     pt states she went into cardiac arrest prior to hernia repair after the insertion of gas in stomach     Past Surgical History:   Procedure Laterality Date    HX APPENDECTOMY      HX CHOLECYSTECTOMY      HX GI      hemorrhoidectomy    HX HEENT      sinus surgery    HX HERNIA REPAIR      HX TONSILLECTOMY       Social History     Socioeconomic History    Marital status:      Spouse name: Not on file    Number of children: Not on file    Years of education: Not on file    Highest education level: Not on file   Social Needs    Financial resource strain: Not on file    Food insecurity - worry: Not on file    Food insecurity - inability: Not on file   [place] needs - medical: Not on file   [place] needs - non-medical: Not on file   Occupational History    Not on file   Tobacco Use    Smoking status: Former Smoker     Packs/day: 0.75     Years: 5.00     Pack years: 3.75     Last attempt to quit: 10/11/1967     Years since quittin.3    Smokeless tobacco: Never Used   Substance and Sexual Activity    Alcohol use: No    Drug use: No    Sexual activity: Not on file   Other Topics Concern    Not on file   Social History Narrative    Not on file     Family History   Problem Relation Age of Onset    Heart Disease Mother     Dementia Mother     Heart Disease Father     Neuropathy Child     Depression Child     Other Child diverticulitis         PHYSICAL EXAMINATION:    Visit Vitals  /76   Pulse 74   Resp 18   Ht 5' 2\" (1.575 m)   Wt 49.4 kg (109 lb)   SpO2 99%   BMI 19.94 kg/m²     General:  Well defined, nourished, and groomed individual in no acute distress. Neck: Supple, nontender, no bruits. Heart: Regular rate and rhythm, no murmurs, rub, or gallop. Normal S1S2. Lungs:  Clear to auscultation bilaterally with equal chest expansion, no cough, no wheeze  Musculoskeletal:  Extremities revealed no edema and had full range of motion of joints. Psych:  Good mood and bright affect    NEUROLOGICAL EXAMINATION:     Mental Status:   Alert and oriented to self, year, location, president. Attention span and concentration are normal. Speech is fluent with a full fund of knowledge. Cranial Nerves:    II, III, IV, VI:  Visual acuity grossly intact. Extra-ocular movements are full and fluid. No ptosis or nystagmus. V-XII: Hearing is grossly intact. Facial features are symmetric, with normal sensation and strength. The palate rises symmetrically and the tongue protrudes midline. Sternocleidomastoids 5/5. Motor Examination: Normal tone, bulk, and strength, 5/5 muscle strength throughout. No cogwheeling  Coordination:  Finger to nose testing was normal.   No resting tremor, mild bilateral hand intention tremor. Mild bradykinesia  Gait and Station:  Steady while walking with walker and arising from a chair. No pronator drift. No muscle wasting or fasciculations noted. ASSESSMENT AND PLAN    ICD-10-CM ICD-9-CM    1. Primary Parkinsonism ([facility] G20 332.0    2. Hallucinations R44.3 780.1 QUEtiapine (SEROQUEL) 25 mg tablet   3. Benign essential tremor G25.0 333.1    4. Insomnia due to medical condition G47.01 327.01    5. Depression, unspecified depression type F32.9 32      80year-old female put on Parcopa in August for parkinsonism, did seem to respond but had some GI issues.   GI issues are resolved and she went back on carbidopa levodopa in December. Has been struggling with sleep issues, hallucinations at night, difficulty with sleep-wake transitions. No improvement on melatonin 10 mg, Sinemet CR at bedtime. Family initially thought that [address] was better, so we resumed that. No change in her sleep pattern. Also complaining of depression. 1.  Continue Parcopa  mg 3 times daily for parkinsonism  2. Discussed safety of quetiapine with patient's cardiologist Dr. Facundo Pierce, who felt that it was okay as long as pharmacist did not note any other QTc prolonging medications. I spoke with the pharmacist at [place] [address] today, she was unable to find any. We will start 12.5 mg quetiapine nightly for insomnia, hallucinations and can increase if needed. Discussed potential cardiac risk with patient and her family member, they agree that potential benefit outweighs the risk  3. Declined referral for counseling for her depression, might consider adding a medication that does not prolonged QTC at another visit  4. No treatment for essential tremor as it is mild    Follow-up as scheduled in 2 weeks with Dr. Gail White to see how she is doing on quetiapine, call sooner with concerns      Dl Walton NP    This note was created using voice recognition software. Despite editing, there may be syntax errors.

## 2019-02-01 ENCOUNTER — TELEPHONE (OUTPATIENT)
Dept: NEUROLOGY | Age: 84
End: 2019-02-01

## 2019-02-01 ENCOUNTER — DOCUMENTATION ONLY (OUTPATIENT)
Dept: NEUROLOGY | Age: 84
End: 2019-02-01

## 2019-02-01 NOTE — TELEPHONE ENCOUNTER
Called patient's daughter for facility phone number. Patient is at [facility] at [place], [place]: [address]. She stated faxed needs to go to nurse Monique's attention. Called [place] at [facility] and fax number is [phone].

## 2019-02-01 NOTE — TELEPHONE ENCOUNTER
Letter faxed to nurse [facility] at [employer] at [address] [place] [address]. Paty Oakley

## 2019-02-01 NOTE — TELEPHONE ENCOUNTER
Patient's mother calling - Eliza is requesting fax that Quetiapine (Seroquel) med is ok to give patient - Need fax prior to giving pt Quetiapine. Patient's mother said that she doesn't have Eliza fax number but that we should.

## 2019-02-11 ENCOUNTER — TELEPHONE (OUTPATIENT)
Dept: NEUROLOGY | Age: 84
End: 2019-02-11

## 2019-02-11 NOTE — TELEPHONE ENCOUNTER
----- Message from Inocencia Cox sent at 2/11/2019  1:47 PM EST -----  Regarding: Dr. Vargas Goo: [phone]  YURIY (Daughter) would like a call back from  or Nurse Junior Alex concerning information they would like to give dr before apt for 2/12/19.

## 2019-02-11 NOTE — TELEPHONE ENCOUNTER
Pts other daughter is bringing her in for appt on Tuesday. She will have notes for you as pts paranoia has gotten so bad, they can't speak freely in front of her.

## 2019-02-12 ENCOUNTER — OFFICE VISIT (OUTPATIENT)
Dept: NEUROLOGY | Age: 84
End: 2019-02-12

## 2019-02-12 VITALS
HEART RATE: 84 BPM | SYSTOLIC BLOOD PRESSURE: 120 MMHG | RESPIRATION RATE: 18 BRPM | OXYGEN SATURATION: 90 % | DIASTOLIC BLOOD PRESSURE: 78 MMHG | WEIGHT: 109 LBS | HEIGHT: 62 IN | BODY MASS INDEX: 20.06 KG/M2

## 2019-02-12 DIAGNOSIS — G25.0 BENIGN ESSENTIAL TREMOR: Primary | ICD-10-CM

## 2019-02-12 DIAGNOSIS — R44.3 HALLUCINATIONS: ICD-10-CM

## 2019-02-12 RX ORDER — QUETIAPINE FUMARATE 25 MG/1
25 TABLET, FILM COATED ORAL
Qty: 90 TAB | Refills: 1 | Status: SHIPPED | OUTPATIENT
Start: 2019-02-12 | End: 2019-05-14 | Stop reason: ALTCHOICE
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
## 2019-03-01 ENCOUNTER — OFFICE VISIT (OUTPATIENT)
Dept: INTERNAL MEDICINE CLINIC | Age: 84
End: 2019-03-01

## 2019-03-01 ENCOUNTER — TELEPHONE (OUTPATIENT)
Dept: INTERNAL MEDICINE CLINIC | Age: 84
End: 2019-03-01

## 2019-03-01 ENCOUNTER — HOSPITAL ENCOUNTER (OUTPATIENT)
Dept: LAB | Age: 84
Discharge: HOME | End: 2019-03-01
Payer: MEDICARE

## 2019-03-01 VITALS
OXYGEN SATURATION: 96 % | HEART RATE: 71 BPM | WEIGHT: 107 LBS | BODY MASS INDEX: 19.69 KG/M2 | TEMPERATURE: 97.4 F | SYSTOLIC BLOOD PRESSURE: 144 MMHG | HEIGHT: 62 IN | RESPIRATION RATE: 16 BRPM | DIASTOLIC BLOOD PRESSURE: 84 MMHG

## 2019-03-01 DIAGNOSIS — I15.9 SECONDARY HYPERTENSION: Primary | ICD-10-CM

## 2019-03-01 DIAGNOSIS — S22.000A COMPRESSION FRACTURE OF BODY OF THORACIC VERTEBRA (HCC): ICD-10-CM

## 2019-03-01 DIAGNOSIS — Z23 ENCOUNTER FOR IMMUNIZATION: ICD-10-CM

## 2019-03-01 DIAGNOSIS — M81.0 AGE-RELATED OSTEOPOROSIS WITHOUT CURRENT PATHOLOGICAL FRACTURE: ICD-10-CM

## 2019-03-01 DIAGNOSIS — K21.9 GASTROESOPHAGEAL REFLUX DISEASE WITHOUT ESOPHAGITIS: ICD-10-CM

## 2019-03-01 DIAGNOSIS — R44.3 HALLUCINATION: ICD-10-CM

## 2019-03-01 DIAGNOSIS — E55.9 VITAMIN D DEFICIENCY: ICD-10-CM

## 2019-03-01 DIAGNOSIS — R60.0 LOCALIZED EDEMA: ICD-10-CM

## 2019-03-01 DIAGNOSIS — E78.00 HYPERCHOLESTEROLEMIA: ICD-10-CM

## 2019-03-01 DIAGNOSIS — I48.0 PAROXYSMAL ATRIAL FIBRILLATION (HCC): ICD-10-CM

## 2019-03-01 DIAGNOSIS — G25.0 BENIGN ESSENTIAL TREMOR: ICD-10-CM

## 2019-03-01 DIAGNOSIS — R73.01 IFG (IMPAIRED FASTING GLUCOSE): ICD-10-CM

## 2019-03-01 DIAGNOSIS — F32.A DEPRESSION, UNSPECIFIED DEPRESSION TYPE: ICD-10-CM

## 2019-03-01 PROCEDURE — 80061 LIPID PANEL: CPT

## 2019-03-01 PROCEDURE — 85027 COMPLETE CBC AUTOMATED: CPT

## 2019-03-01 PROCEDURE — 83036 HEMOGLOBIN GLYCOSYLATED A1C: CPT

## 2019-03-01 PROCEDURE — 80053 COMPREHEN METABOLIC PANEL: CPT

## 2019-03-01 PROCEDURE — 84443 ASSAY THYROID STIM HORMONE: CPT

## 2019-03-01 PROCEDURE — 36415 COLL VENOUS BLD VENIPUNCTURE: CPT

## 2019-03-01 PROCEDURE — 82306 VITAMIN D 25 HYDROXY: CPT

## 2019-03-01 RX ORDER — SERTRALINE HYDROCHLORIDE 25 MG/1
25 TABLET, FILM COATED ORAL DAILY
Qty: 30 TAB | Refills: 3 | Status: SHIPPED | OUTPATIENT
Start: 2019-03-01 | End: 2019-06-04

## 2019-03-01 RX ORDER — ACETAMINOPHEN 500 MG
TABLET ORAL
COMMUNITY
End: 2019-05-07 | Stop reason: SDUPTHER

## 2019-03-01 NOTE — PATIENT INSTRUCTIONS
Schedule appointment with dr Munira [facility] today Get shingrix and tdap (tetanus) Start zoloft in the evening

## 2019-03-01 NOTE — TELEPHONE ENCOUNTER
#[phone] [facility] a letter needs to be sent to [place], where pt resides directing the nurse to give pt new medication and what time of day. Please fax this letter to fax [phone]: Dean Price     Please call Ms. Ivey when this has been done and she ask if this can be done today? Thanks.

## 2019-03-01 NOTE — TELEPHONE ENCOUNTER
Spoke to [facility] (HIPAA). Two pt identifiers confirmed. Lyndsey Campbell informed letter for zoloft faxed to [place] w/ confirmation received. Pt verbalized understanding of information discussed w/ no further questions at this time.

## 2019-03-01 NOTE — PROGRESS NOTES
HISTORY OF PRESENT ILLNESS Lucy Dent is a 80 y.o. female. HPI Pt is here to establish care. BP is 148/87 Pt sometimes gets this checked at [place] Pt went to the ER a couple times for , but they were told this was OK for her age Usually runs with SBP in 140s Pt is on metoprolol 25mg Pt also takes dyazide 37.5-25 prn for her leg swelling Her daughter wonders whether pt should take this daily - discussed that this may not be good as it would drop her BP further Advised using compression hose Wt today is 107 lbs Her wt is in the nl ranges Ordered labs Pt follows wt Dr Jeff Cooley (neuro) for tremor Reviewed notes 312/19: 17-year-old woman who is being managed for parkinsonism versus worsening essential tremor or combination thereof. I am not sure if the carbidopa is giving her any benefit at this point so we are going to stop it for now and reassess. I am having tremor. Clinically she is not having shuffling gait. She is quite articulate in speech. Good eye contact with normal volume voice. I would urge that her facility ensure she is not receiving tramadol excessively as I can cause mental status changes in the elderly. I think we should increase Seroquel to 25 mg to help with her sleep and these hallucinations. I discussed with her daughter at length that there is a black box warning for this medication but this is also quality of life issue. Daughter agrees that we will increase Seroquel to a very small 25 mg nightly. Would like to reassess her at her next visit. Anything unusual please come to the hospital.  I will send this note to her facility attention to Becky Masters on the third floor. More than 40 minutes of time was spent face-to-face with the patient and her daughter discussing her overall condition, medications, plan moving forward. Pt had been being treated for Parkinson's but the neuro decided it may not be this so they stopped this treatment Has chronic tremors and hallucinations Pt is on seroquel for sleep and hallucinations - it is helping with both of these Pt follows with Dr Eduardo Rose (cardio) for a fib Reviewed notes 5/17/18: She is a 27-year-old male presenting with episodes of anterior chest discomfort/burning/pain with associated increased dyspnea on exertion and increasing fatigue and weakness. Cardiac risk factors include postmenopausal female, elevated blood pressure, type 2 diabetes and family history of heart disease. Will evaluate symptoms with Lexiscan nuclear stress test as she is ambulatory only with a walker, echocardiogram.  Add aspirin therapy, SL NTG PRN, follow-up in 1 month. If symptoms completely resolved after changing to a proton pump inhibitor and if testing is normal, she may move her appointment out to 6-12 months. Reviewed stress test 6/18: nl 
Reviewed ECHO 6/18: nl EF of 60% Pt is on norpace 100mg BID - discussed that this should come from her cardio Pt sees Dr Nathanael Bernheim (GI) for diverticulosis Pt states she also has a hiatal hernia Pt also has a gluten sensitivity and mostly avoids this Not completely gluten free Pt saw Dr Mikayla Rivas (neuropsych) in the past 
Reviewed notes 2015: in normal range, some impairments with attention and speed, some anxiety and depression, nl aging, no dementia Pt saw Dr Shwetha Gonzalez (ENT) for hearing loss Reviewed notes 12/18/18: recommended hearing aids, ordered MRI for asymmetry of hearing loss Pt decided not to get hearing aids Pt went to ER for pain Reviewed MRI T spine 11/18:  
Reviewed MRI L spine 11/18: Unchanged moderate compression deformities of T11 and T12 with associated edema. No definite underlying mass lesion. Pt sees Dr Hugo Velazquez (ortho) Reviewed notes 1/9/19: The problem was discussed at length with the patient and family. Tramadol (Ultram) will be continued at the current dose PRN.  The patient was instructed to continue the current home exercise program daily. Interventional procedures considered for the future: kyphoplasty. She declined the procedure again today. The patient had opportunity today to ask all questions, expressed understanding of the instructions provided, and agreed with the plan of treatment. Return in about 8 weeks (around 3/6/2019). However they would like to move over to [facility] in this area - provided info for Dr Sidney Epstein She had been taking tramadol but had SE so was switched to tylenol Discussed that pain in her back will likely be a chronic recurrent problem that waxes and wanes but does not resolve Reviewed DEXA report 8/18: osteoporosis Pt declined taking reclast in the past 
She tried taking calcium but this was difficult for her to take as the pills were large Pt is taking vit D 5000U daily Discussed fosamax and its SE Pt denies having much issue with swallowing, states she just sometimes chokes on spit Pt declines taking this medication Pt takes protonix 40mg Controls gerd Pt is on ASA 81mg daily Her daughter wonders about pt's easy bruising Discussed that the benefits of ASA are more important Pt gets itchy frequently, but states this is from her allergies Pt has some occasional depression She has not taken medication for this before Gets anxious as well Dr Calvin Orosco had stated that this was contributing to memory problems Discussed medication Will give zoloft 25mg Reviewed carotid duplex: stenosis in 16-49% range ACP on file. SDMs are her daughters, Alena Westbrook and Thomas Waters. PMHx: Osteopenia GERD Dicerticulosis Tremor IBS Hypercholesterolemia Statin intolerance Celiac disease Allergic rhinitis Fuchs' corneal dystrophy Ptosis Diabetic neuropathy A fib Arthritis Skin cancer FMHx: Father - heart disease Mother - heart disease PSHx: Appendectomy Cholecystectomy Hemorrhoidectomy Sinus surgery Hernia repair Tonsillectomy SHx: Former smoker, quit in the 60's No alcohol currently Lives alone at the [facility] [facility] Has 5 children PREVENTIVE: 
Colonoscopy: ~10 years ago with Dr Monica Lott, per pt the way her colon is formed makes it difficult to get COLOs, her declines further Pap: declines further Mammogram: declines further Dexa: 8/18, osteoporosis Tdap: advised pt to get this at her pharmacy Prevnar 13: 03/01/19 Shingrix: ordered 03/01/19 Flu shot: Fall 2018 Eye exam: Dr Shira Og ~summer 2018, Dr Antony Lamb 2/19 (q6 weeks) Patient Active Problem List  
 Diagnosis Date Noted  Postmenopausal 07/23/2018 Priority: 1 - One  
 History of stroke 06/11/2018  Epidermoid cyst of finger of left hand 02/02/2018  Osteopenia 09/25/2017  Gastroesophageal reflux disease without esophagitis 09/25/2017  Diverticulosis of large intestine without hemorrhage 09/25/2017  Benign essential tremor 09/25/2017  Irritable bowel syndrome with diarrhea 09/25/2017  Chest wall pain 09/25/2017  Statin intolerance 09/25/2017  Diarrhea 09/25/2017  Constipation 09/25/2017  Hypercholesterolemia 09/25/2017  Early satiety 09/25/2017  Celiac disease 09/25/2017  Allergic rhinitis 09/25/2017  Acute diverticulitis 09/25/2017  Numbness 09/25/2017  Fuchs' corneal dystrophy 09/25/2017  Ptosis, both eyelids 09/25/2017  Idiopathic small and large fiber sensory neuropathy 04/21/2016  Diabetic peripheral neuropathy associated with type 2 diabetes mellitus ([facility] [address] 04/21/2016  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 04/21/2016  Abnormal MRI of head 08/25/2015  Depression 07/24/2015  Anxiety 07/24/2015  Attention deficit disorder 07/24/2015  Cerebrovascular disease, unspecified 05/03/2015  Essential tremor 04/20/2015  B12 deficiency 04/20/2015  Osteoporosis 04/20/2015  Memory loss 04/20/2015  Celiac sprue 05/14/2013  Other and unspecified hyperlipidemia 05/14/2013  Atrial fibrillation ([facility] [address]  Hypertension Current Outpatient Medications Medication Sig Dispense Refill  acetaminophen (TYLENOL EXTRA STRENGTH) 500 mg tablet Take  by mouth every six (6) hours as needed for Pain.  QUEtiapine (SEROQUEL) 25 mg tablet Take 1 Tab by mouth nightly. 90 Tab 1  
 Lactobacillus rhamnosus GG (CULTURELLE PO) Take  by mouth daily.  melatonin tab tablet Take 2 Tabs by mouth nightly. 60 Tab 5  cholecalciferol (VITAMIN D3) 1,000 unit cap Take 5 Caps by mouth daily. 30 Cap 6  cyanocobalamin (VITAMIN B12) 500 mcg tablet Take 1 Tab by mouth daily. 30 Tab 6  
 triamterene-hydroCHLOROthiazide (DYAZIDE) 37.5-25 mg per capsule As needed 30 Cap 12  
 aspirin 81 mg chewable tablet Take 1 Tab by mouth daily. 30 Tab 6  
 disopyramide phosphate (NORPACE) 100 mg capsule TAKE ONE CAPSULE BY MOUTH TWICE A DAY 60 Cap 0  
 metoprolol tartrate (LOPRESSOR) 25 mg tablet TAKE 1 TABLET TWICE A DAY 60 Tab 12  
 pantoprazole (PROTONIX) 40 mg tablet Take 1 Tab by mouth daily. (Patient taking differently: Take 40 mg by mouth two (2) times a day.) 30 Tab 12  
 polyethylene glycol (MIRALAX) 17 gram/dose powder Take 17 g by mouth daily.  calcium carbonate (CALCIUM 500) 500 mg calcium (1,250 mg) chewable tablet Take 2 Tabs by mouth daily.  ondansetron hcl (ZOFRAN) 4 mg tablet Take 1 Tab by mouth every eight (8) hours as needed for Nausea. 20 Tab 3  
 traMADol (ULTRAM) 50 mg tablet Take 50 mg by mouth every six (6) hours as needed for Pain.  nitroglycerin (NITROSTAT) 0.4 mg SL tablet 1 Tab by SubLINGual route every five (5) minutes as needed for Chest Pain. 25 Tab 1  
 magnesium hydroxide (YOUNG MILK OF MAGNESIA) 400 mg/5 mL suspension Take 30 mL by mouth daily as needed for Constipation. Past Surgical History:  
Procedure Laterality Date  HX APPENDECTOMY  HX CHOLECYSTECTOMY  HX GI    
 hemorrhoidectomy  HX HEENT    
 sinus surgery  HX HERNIA REPAIR    
 HX TONSILLECTOMY Lab Results Component Value Date/Time WBC 4.7 01/15/2019 10:23 AM  
 WBC (POC) 5.6 07/23/2018 03:07 PM  
 HGB 13.9 01/15/2019 10:23 AM  
 HGB (POC) 15.0 07/23/2018 03:07 PM  
 HCT 40.9 01/15/2019 10:23 AM  
 HCT (POC) 45.9 07/23/2018 03:07 PM  
 PLATELET 290 60/59/5389 10:23 AM  
 PLATELET (POC) 688.9 07/23/2018 03:07 PM  
 MCV 89.1 01/15/2019 10:23 AM  
 MCV (POC) 90.0 07/23/2018 03:07 PM  
 
Lab Results Component Value Date/Time Cholesterol, total 267 (H) 07/23/2018 03:07 PM  
 HDL Cholesterol 81 07/23/2018 03:07 PM  
 LDL, calculated 168 (H) 07/23/2018 03:07 PM  
 Triglyceride 91 07/23/2018 03:07 PM  
 
Lab Results Component Value Date/Time GFR est non-AA >60 01/15/2019 11:09 AM  
 GFRNA, POC >60 10/15/2018 01:54 PM  
 GFR est AA >60 01/15/2019 11:09 AM  
 GFRAA, POC >60 10/15/2018 01:54 PM  
 Creatinine 0.76 01/15/2019 11:09 AM  
 Creatinine (POC) 0.8 10/15/2018 01:54 PM  
 BUN 13 01/15/2019 11:09 AM  
 Sodium 136 01/15/2019 11:09 AM  
 Potassium 3.6 01/15/2019 11:09 AM  
 Chloride 100 01/15/2019 11:09 AM  
 CO2 28 01/15/2019 11:09 AM  
 Magnesium 2.2 01/13/2019 11:15 PM  
  
Review of Systems Constitutional: Negative for chills and fever. HENT: Positive for hearing loss. Negative for tinnitus. Eyes: Positive for blurred vision. Negative for double vision. Respiratory: Negative for shortness of breath and wheezing. Cardiovascular: Negative for chest pain and palpitations. Gastrointestinal: Negative for nausea and vomiting. Genitourinary: Negative for dysuria and frequency. Musculoskeletal: Positive for back pain. Negative for falls. Skin: Positive for itching. Negative for rash. Neurological: Negative for dizziness, loss of consciousness and headaches. Psychiatric/Behavioral: Positive for depression. The patient is not nervous/anxious. Physical Exam  
Constitutional: She is oriented to person, place, and time.  She appears well-developed and well-nourished. No distress. HENT:  
Head: Normocephalic and atraumatic. Mouth/Throat: Oropharynx is clear and moist. No oropharyngeal exudate. Eyes: Conjunctivae and EOM are normal. Right eye exhibits no discharge. Left eye exhibits no discharge. Neck: Normal range of motion. Neck supple. Small bruit on the L Cardiovascular: Normal rate, regular rhythm and normal heart sounds. Exam reveals no gallop and no friction rub. No murmur heard. Pulmonary/Chest: Effort normal and breath sounds normal. No respiratory distress. She has no wheezes. She has no rales. She exhibits no tenderness. Abdominal: Soft. She exhibits no distension and no mass. There is no tenderness. There is no rebound and no guarding. Musculoskeletal: Normal range of motion. She exhibits edema (Slight). She exhibits no tenderness or deformity. Lymphadenopathy:  
  She has no cervical adenopathy. Neurological: She is alert and oriented to person, place, and time. Coordination normal.  
Skin: Skin is warm and dry. No rash noted. She is not diaphoretic. No erythema. No pallor. Psychiatric: She has a normal mood and affect. Her behavior is normal.  
 
 
ASSESSMENT and PLAN High Complex Medical Decision Making ICD-10-CM ICD-9-CM 1. Secondary hypertension Controlled for age on metoprolol, uses dyazide prn, will keep this as prn not on daily dosing as do not want to cause orthostasis and falls given her advanced age I15.9 405.99 LIPID PANEL  
   METABOLIC PANEL, COMPREHENSIVE  
   CBC W/O DIFF  
   TSH 3RD GENERATION  
   HEMOGLOBIN A1C WITH EAG  
   VITAMIN D, 25 HYDROXY 2. Compression fracture of body of thoracic vertebra (HCC) Has chronic pain from this, has seen ortho Dr Sirena Mukherjee in the past, overall her sx are managed with tylenol, would like the name for ortho around her, provided Dr Laxmi Drummond in case sx are progressive, discussed her pain is unlikely to completely resolve, overall it is manageable S22.000A 805.2 REFERRAL TO ORTHOPEDICS  
   LIPID PANEL  
   METABOLIC PANEL, COMPREHENSIVE  
   CBC W/O DIFF  
   TSH 3RD GENERATION  
   HEMOGLOBIN A1C WITH EAG  
   VITAMIN D, 25 HYDROXY 3. Paroxysmal atrial fibrillation (HCC) On norpace 100mg, this is an older medication, it is not used as much anymore, her prior PCP had been prescribing it but she does follow with cardio, she is overdue to see Dr Vitaly Gilbert, will have her schedule appointment and see if this is appropriate for continued prescribing, takes ASA for anticoagulation I48.0 427.31 LIPID PANEL  
   METABOLIC PANEL, COMPREHENSIVE  
   CBC W/O DIFF  
   TSH 3RD GENERATION  
   HEMOGLOBIN A1C WITH EAG  
   VITAMIN D, 25 HYDROXY 4. Depression, unspecified depression type Will start zoloft 25mg daily F32.9 311 LIPID PANEL  
   METABOLIC PANEL, COMPREHENSIVE  
   CBC W/O DIFF  
   TSH 3RD GENERATION  
   HEMOGLOBIN A1C WITH EAG  
   VITAMIN D, 25 HYDROXY 5. Hypercholesterolemia Diet controlled, check lipids, unlikely to start a statin d/t advanced age E78.00 272.0 LIPID PANEL  
   METABOLIC PANEL, COMPREHENSIVE  
   CBC W/O DIFF  
   TSH 3RD GENERATION  
   HEMOGLOBIN A1C WITH EAG  
   VITAMIN D, 25 HYDROXY 6. Gastroesophageal reflux disease without esophagitis Controlled with protonix, has h/o IBS as well and follows closely with Dr Luz Mccrary K21.9 530.81 LIPID PANEL  
   METABOLIC PANEL, COMPREHENSIVE  
   CBC W/O DIFF  
   TSH 3RD GENERATION  
   HEMOGLOBIN A1C WITH EAG  
   VITAMIN D, 25 HYDROXY 7. Benign essential tremor Follows with Dr Parvin Toth, there had been concern for Parkinson's, she was briefly on sinemet which did not help, she is now thought to have more of an essential tremor and this medication has been stopped G25.0 333.1 LIPID PANEL  
   METABOLIC PANEL, COMPREHENSIVE  
   CBC W/O DIFF  
   TSH 3RD GENERATION  
   HEMOGLOBIN A1C WITH EAG  
   VITAMIN D, 25 HYDROXY 8. Hallucination Improved with seroquel, continue R44.3 780.1 LIPID PANEL  
   METABOLIC PANEL, COMPREHENSIVE  
   CBC W/O DIFF  
   TSH 3RD GENERATION  
   HEMOGLOBIN A1C WITH EAG  
   VITAMIN D, 25 HYDROXY 9. Age-related osteoporosis without current pathological fracture Discussed this sx and tx options, for now she prefers just sticking with vit D, not interested in additional tx such as fosamax M81.0 733.01 LIPID PANEL  
   METABOLIC PANEL, COMPREHENSIVE  
   CBC W/O DIFF  
   TSH 3RD GENERATION  
   HEMOGLOBIN A1C WITH EAG  
   VITAMIN D, 25 HYDROXY 10. IFG (impaired fasting glucose) Check a1c 
 R73.01 790.21 LIPID PANEL  
   METABOLIC PANEL, COMPREHENSIVE  
   CBC W/O DIFF  
   TSH 3RD GENERATION  
   HEMOGLOBIN A1C WITH EAG  
   VITAMIN D, 25 HYDROXY 11. Vitamin D deficiency Check level and treat prn 
 E55.9 268.9 LIPID PANEL  
   METABOLIC PANEL, COMPREHENSIVE  
   CBC W/O DIFF  
   TSH 3RD GENERATION  
   HEMOGLOBIN A1C WITH EAG  
   VITAMIN D, 25 HYDROXY 12. Encounter for immunization Z23 V03.89 PNEUMOCOCCAL CONJ VACCINE 13 VALENT IM  
   ADMIN INFLUENZA VIRUS VAC Scribed by Nataliia Fermin of [address] [place] [address] [address], as dictated by Dr. Arnaldo Piper. Current diagnosis and concerns discussed with pt at length. Pt understands risks and benefits or current treatment plan and medications, and accepts the treatment and medication with any possible risks. Pt asks appropriate questions, which were answered. Pt was instructed to call with any concerns or problems. I have reviewed the note documented by the scribe. The services provided are my own. The documentation is accurate

## 2019-03-02 LAB
25(OH)D3+25(OH)D2 SERPL-MCNC: 53 NG/ML (ref 30–100)
ALBUMIN SERPL-MCNC: 4.3 G/DL (ref 3.2–4.6)
ALBUMIN/GLOB SERPL: 2 {RATIO} (ref 1.2–2.2)
ALP SERPL-CCNC: 60 IU/L (ref 39–117)
ALT SERPL-CCNC: 19 IU/L (ref 0–32)
AST SERPL-CCNC: 19 IU/L (ref 0–40)
BILIRUB SERPL-MCNC: 0.4 MG/DL (ref 0–1.2)
BUN SERPL-MCNC: 15 MG/DL (ref 10–36)
BUN/CREAT SERPL: 19 (ref 12–28)
CALCIUM SERPL-MCNC: 10.1 MG/DL (ref 8.7–10.3)
CHLORIDE SERPL-SCNC: 100 MMOL/L (ref 96–106)
CHOLEST SERPL-MCNC: 246 MG/DL (ref 100–199)
CO2 SERPL-SCNC: 28 MMOL/L (ref 20–29)
CREAT SERPL-MCNC: 0.78 MG/DL (ref 0.57–1)
ERYTHROCYTE [DISTWIDTH] IN BLOOD BY AUTOMATED COUNT: 14.5 % (ref 12.3–15.4)
EST. AVERAGE GLUCOSE BLD GHB EST-MCNC: 123 MG/DL
GLOBULIN SER CALC-MCNC: 2.1 G/DL (ref 1.5–4.5)
GLUCOSE SERPL-MCNC: 99 MG/DL (ref 65–99)
HBA1C MFR BLD: 5.9 % (ref 4.8–5.6)
HCT VFR BLD AUTO: 40.2 % (ref 34–46.6)
HDLC SERPL-MCNC: 76 MG/DL
HGB BLD-MCNC: 13.5 G/DL (ref 11.1–15.9)
LDLC SERPL CALC-MCNC: 154 MG/DL (ref 0–99)
MCH RBC QN AUTO: 29.9 PG (ref 26.6–33)
MCHC RBC AUTO-ENTMCNC: 33.6 G/DL (ref 31.5–35.7)
MCV RBC AUTO: 89 FL (ref 79–97)
PLATELET # BLD AUTO: 266 X10E3/UL (ref 150–379)
POTASSIUM SERPL-SCNC: 4.1 MMOL/L (ref 3.5–5.2)
PROT SERPL-MCNC: 6.4 G/DL (ref 6–8.5)
RBC # BLD AUTO: 4.52 X10E6/UL (ref 3.77–5.28)
SODIUM SERPL-SCNC: 142 MMOL/L (ref 134–144)
TRIGL SERPL-MCNC: 82 MG/DL (ref 0–149)
TSH SERPL DL<=0.005 MIU/L-ACNC: 3.19 UIU/ML (ref 0.45–4.5)
VLDLC SERPL CALC-MCNC: 16 MG/DL (ref 5–40)
WBC # BLD AUTO: 4.6 X10E3/UL (ref 3.4–10.8)

## 2019-03-18 ENCOUNTER — OFFICE VISIT (OUTPATIENT)
Dept: CARDIOLOGY CLINIC | Age: 84
End: 2019-03-18

## 2019-03-18 VITALS
HEART RATE: 74 BPM | WEIGHT: 110.2 LBS | HEIGHT: 60 IN | DIASTOLIC BLOOD PRESSURE: 82 MMHG | OXYGEN SATURATION: 95 % | BODY MASS INDEX: 21.64 KG/M2 | RESPIRATION RATE: 18 BRPM | SYSTOLIC BLOOD PRESSURE: 140 MMHG

## 2019-03-18 DIAGNOSIS — I10 ESSENTIAL HYPERTENSION: Chronic | ICD-10-CM

## 2019-03-18 DIAGNOSIS — R07.89 OTHER CHEST PAIN: ICD-10-CM

## 2019-03-18 DIAGNOSIS — R60.0 EDEMA OF BOTH LOWER EXTREMITIES: ICD-10-CM

## 2019-03-18 DIAGNOSIS — I48.0 PAROXYSMAL ATRIAL FIBRILLATION (HCC): Primary | Chronic | ICD-10-CM

## 2019-03-18 NOTE — PROGRESS NOTES
Subjective/HPI:     Ms. Harriette Jeans is a 80 y.o. female is here to discuss multiple concerns. She has a PMHx of PAF, HTN and HLD. She had an episode of chest pain that occurred 2-3 weeks ago, while at rest.  The pain was significant enough to take 1 NTG tablet. The pain did resolve with 1 dose. The pain occurred while at rest, sitting on her couch. She has not had recurrence of her symptoms or had to use NTG since this last episode. She also is concerned about increased lower extremity edema. The swelling is mild. She did start using compression stockings and notes improvement in swelling with this. She would like to know if she must replace her Norpace, which she takes for atrial fibrillation. She has been tolerating this medication well since she first started taking it many years ago. She is not very keen to replace it.          PCP Provider  Randy Tejeda MD  Past Medical History:   Diagnosis Date    Acute diverticulitis 9/25/2017    Allergic rhinitis 9/25/2017    Arrhythmia     afib    Arthritis     Asthma     Atrial fibrillation ([address]     Benign essential tremor 9/25/2017    Cancer ([address]     skin    Celiac disease 9/25/2017    Constipation 9/25/2017    Diabetic peripheral neuropathy associated with type 2 diabetes mellitus ([address] 4/21/2016    Diarrhea 9/25/2017    Diverticulosis of large intestine without hemorrhage 9/25/2017    Early satiety 9/25/2017    Fuchs' corneal dystrophy 9/25/2017    Gastroesophageal reflux disease without esophagitis 9/25/2017    GERD (gastroesophageal reflux disease)     High cholesterol     Hypercholesterolemia 9/25/2017    Hypertension     Irritable bowel syndrome with diarrhea 9/25/2017    Numbness 9/25/2017    Osteopenia 9/25/2017    Other ill-defined conditions(799.89)     collapsed lung prior to hernia repair surgery    Postmenopausal 7/23/2018    Ptosis, both eyelids 9/25/2017    Statin intolerance 9/25/2017    Unspecified adverse effect of anesthesia     pt states she went into cardiac arrest prior to hernia repair after the insertion of gas in stomach      Past Surgical History:   Procedure Laterality Date    HX APPENDECTOMY      HX CHOLECYSTECTOMY      HX GI      hemorrhoidectomy    HX HEENT      sinus surgery    HX HERNIA REPAIR      HX TONSILLECTOMY       Family History   Problem Relation Age of Onset    Heart Disease Mother     Dementia Mother     Heart Disease Father     Neuropathy Child     Depression Child     Other Child         diverticulitis    Lung Cancer Sister 80        lung ca     Social History     Socioeconomic History    Marital status:      Spouse name: Not on file    Number of children: Not on file    Years of education: Not on file    Highest education level: Not on file   Social Needs    Financial resource strain: Not on file    Food insecurity - worry: Not on file    Food insecurity - inability: Not on file   [employer] needs - medical: Not on file   [employer] needs - non-medical: Not on file   Occupational History    Not on file   Tobacco Use    Smoking status: Former Smoker     Packs/day: 0.75     Years: 5.00     Pack years: 3.75     Last attempt to quit: 10/11/1967     Years since quittin.4    Smokeless tobacco: Never Used   Substance and Sexual Activity    Alcohol use: No    Drug use: No    Sexual activity: Not on file   Other Topics Concern    Not on file   Social History Narrative    Not on file       Allergies   Allergen Reactions    Bactrim [Sulfamethoprim Ds] Other (comments)     consipation    Ciprofloxacin (Bulk) Other (comments)     Low wbc    Iodine Unknown (comments)    Prednisone Other (comments)     tachycardia    Statins-Hmg-Coa Reductase Inhibitors Unknown (comments)     Stomach uipset and mild muscle aches        Current Outpatient Medications   Medication Sig    OTHER cuturelle capsule daily    acetaminophen (TYLENOL EXTRA STRENGTH) 500 mg tablet Take  by mouth every six (6) hours as needed for Pain.  sertraline (ZOLOFT) 25 mg tablet Take 1 Tab by mouth daily.  QUEtiapine (SEROQUEL) 25 mg tablet Take 1 Tab by mouth nightly.  Lactobacillus rhamnosus GG (CULTURELLE PO) Take  by mouth daily.  melatonin tab tablet Take 2 Tabs by mouth nightly.  triamterene-hydroCHLOROthiazide (DYAZIDE) 37.5-25 mg per capsule As needed    aspirin 81 mg chewable tablet Take 1 Tab by mouth daily.  disopyramide phosphate (NORPACE) 100 mg capsule TAKE ONE CAPSULE BY MOUTH TWICE A DAY    metoprolol tartrate (LOPRESSOR) 25 mg tablet TAKE 1 TABLET TWICE A DAY    pantoprazole (PROTONIX) 40 mg tablet Take 1 Tab by mouth daily. (Patient taking differently: Take 40 mg by mouth two (2) times a day.)    ondansetron hcl (ZOFRAN) 4 mg tablet Take 1 Tab by mouth every eight (8) hours as needed for Nausea.  polyethylene glycol (MIRALAX) 17 gram/dose powder Take 17 g by mouth daily.  nitroglycerin (NITROSTAT) 0.4 mg SL tablet 1 Tab by SubLINGual route every five (5) minutes as needed for Chest Pain.  magnesium hydroxide (YOUNG MILK OF MAGNESIA) 400 mg/5 mL suspension Take 30 mL by mouth daily as needed for Constipation. No current facility-administered medications for this visit. I have reviewed the problem list, allergy list, medical history, family, social history and medications. Review of Symptoms:    Review of Systems   Constitutional: Negative for chills, fever and weight loss. HENT: Negative for nosebleeds. Eyes: Negative for blurred vision and double vision. Respiratory: Negative for cough, shortness of breath and wheezing. Cardiovascular: Positive for leg swelling. Negative for chest pain, palpitations, orthopnea and PND. Gastrointestinal: Negative for abdominal pain, blood in stool, diarrhea, nausea and vomiting. Musculoskeletal: Negative for joint pain. Skin: Negative for rash.    Neurological: Negative for dizziness, tingling and loss of consciousness. Endo/Heme/Allergies: Does not bruise/bleed easily. Physical Exam:      General: Well developed, in no acute distress, cooperative and alert  HEENT: No carotid bruits, no JVD, trach is midline. Neck Supple, PEERL, EOM intact. Heart:  reg rate and rhythm; normal S1/S2; no murmurs, gallops or rubs.   Respiratory: Clear bilaterally x 4, no wheezing or rales  Abdomen:   Soft, non-tender, no distention, no masses. + BS.   Extremities:  Normal cap refill, no cyanosis, atraumatic. No edema. Bilateral compression stockings LE. Neuro: A&Ox3, speech clear, gait stable. Skin: Skin color is normal. No rashes or lesions. Non diaphoretic  Vascular: 2+ pulses symmetric in all extremities    Vitals:    03/18/19 1433   BP: 140/82   Pulse: 74   Resp: 18   SpO2: 95%   Weight: 110 lb 3.2 oz (50 kg)   Height: 5' (1.524 m)       Cardiographics    ECG: sinus rhythm  Results for orders placed or performed during the hospital encounter of 01/15/19   EKG, 12 LEAD, INITIAL   Result Value Ref Range    Ventricular Rate 80 BPM    Atrial Rate 80 BPM    P-R Interval 148 ms    QRS Duration 76 ms    Q-T Interval 404 ms    QTC Calculation (Bezet) 465 ms    Calculated P Axis 62 degrees    Calculated R Axis -28 degrees    Calculated T Axis 39 degrees    Diagnosis       Normal sinus rhythm  Possible Left atrial enlargement  Poor R-wave Progression  Leftward axis  When compared with ECG of 13-JAN-2019 22:30,  T wave inversion no longer evident in Anterior leads  less prominent inf q waves.   Confirmed by Mario Kelly ([id number]) on 1/15/2019 8:06:09 PM     Results for orders placed or performed in visit on 10/28/16   CARDIAC HOLTER MONITOR, 24 HOURS    Narrative    ECG Monitor/24 hours, Complete    Reason for Holter Monitor  PAC'S    Heartbeat    Slowest 52  Average 64  Fastest  87    Results:   Underlying Rhythm: Normal sinus rhythm      Atrial Arrhythmias: premature atrial contractions; occasional AV Conduction: normal    Ventricular Arrhythmias: premature ventricular contractions; rare     ST Segment Analysis:non-specific changes     Symptom Correlation:  Specific symptoms not reported. Comment:   No evidence of recurrent paroxysmal atrial fibrillation. Rhonda Dior MD           Cardiology Labs:  Lab Results   Component Value Date/Time    Cholesterol, total 246 (H) 03/01/2019 10:15 AM    HDL Cholesterol 76 03/01/2019 10:15 AM    LDL, calculated 154 (H) 03/01/2019 10:15 AM    Triglyceride 82 03/01/2019 10:15 AM       Lab Results   Component Value Date/Time    Sodium 142 03/01/2019 10:15 AM    Potassium 4.1 03/01/2019 10:15 AM    Chloride 100 03/01/2019 10:15 AM    CO2 28 03/01/2019 10:15 AM    Anion gap 8 01/15/2019 11:09 AM    Glucose 99 03/01/2019 10:15 AM    BUN 15 03/01/2019 10:15 AM    Creatinine 0.78 03/01/2019 10:15 AM    BUN/Creatinine ratio 19 03/01/2019 10:15 AM    GFR est AA 76 03/01/2019 10:15 AM    GFR est non-AA 66 03/01/2019 10:15 AM    Calcium 10.1 03/01/2019 10:15 AM    Bilirubin, total 0.4 03/01/2019 10:15 AM    AST (SGOT) 19 03/01/2019 10:15 AM    Alk. phosphatase 60 03/01/2019 10:15 AM    Protein, total 6.4 03/01/2019 10:15 AM    Albumin 4.3 03/01/2019 10:15 AM    Globulin 2.6 01/15/2019 11:09 AM    A-G Ratio 2.0 03/01/2019 10:15 AM    ALT (SGPT) 19 03/01/2019 10:15 AM           Assessment:     Assessment:       ICD-10-CM ICD-9-CM    1. Paroxysmal atrial fibrillation (HCC) I48.0 427.31 AMB POC EKG ROUTINE W/ 12 LEADS, INTER & REP   2. Edema of both lower extremities R60.0 782.3 AMB POC EKG ROUTINE W/ 12 LEADS, INTER & REP   3. Essential hypertension I10 401.9 AMB POC EKG ROUTINE W/ 12 LEADS, INTER & REP   4. Other chest pain R07.89 786.59         Plan:     1. Paroxysmal atrial fibrillation (HCC)  Maintaining sinus rhythm; continue Norpace and ASA only for anti-coagulation given no recent recurrence, and age and fraily.   She has tolerated this medication well, without recurrence of A fib, so would not make adjustments to this medication. 2. Edema of both lower extremities  Echo in 6/2018 with preserved LVEF 60-65% with grade 1 DD. Would continue with conservative therapy to avoid dehydration, which can result in increased falls and further injury. Recommended low sodium diet, between 1869-5330 mg/day. Continue using compression stockings bilaterally and keeping legs elevated to avoid dependent edema. 3. Essential hypertension  BP controlled; continue anti-hypertensive regimen and encouraged low sodium diet. 4.  Other chest pain  Recent lexiscan in 6/2018 was negative for ischemia; echocardiogram with preserved LVEF 60-65% in 5/2018. She has a hiatal hernia along with GERD that may be the primary etiology, rather than cardiac. Would continue with present management.   If symptoms persist in similar fashion, could discuss further workup with PCP or GI specialist.      Tawnya Shelton NP

## 2019-03-18 NOTE — LETTER
3/18/2019 Ms. Arlene Clayton [address] [id number] To Whom It May Concern: 
 
Arlene Clayton is currently under the care of Dr. Arielle Bal at [facility]. The patient will require to follow a low sodium diet, between 3893-8648 mg/day. If there are questions or concerns please have the patient contact our office.  
 
 
 
Sincerely, 
 
 
Shante Hickman NP

## 2019-04-02 ENCOUNTER — TELEPHONE (OUTPATIENT)
Dept: INTERNAL MEDICINE CLINIC | Age: 84
End: 2019-04-02

## 2019-04-02 NOTE — TELEPHONE ENCOUNTER
Received call from Aydee Garcia at [facility] at [facility]. Willam Renee states that the facility were conducting monthly vital signs. Willam Renee states that pt's BP was 179/103 P09-evayohnk x3. Willam Tehlma states that pt \"feels fine\". Pt is taking metoprolol 25mg BID and 3/29/19 triam-hctz prn.   Willam Thelma states having  past readings 134/70 3/24/19. Willam Thelma states pt c/o slight POOLE. Willam Renee advised to repeat BP while pt is at rest in at least 15-20 minutes and advised to have pt evaluated if systolic reaches 175. Willam Renee asking if pt should take a tablet of triam-hctz 37.5-25mg. Willam Renee states repeat BP was 181/85 P83. Willam Renee states that pt has not received second dose of metoprolol but is due around 2000; as early as 200. Willam Renee advised per PharmD TT to have pt take the second dose of metoprolol at 1900 and repeat BP 1hr later. Willam Renee advised to contact on-call if needed but if systolic goes 294<, have pt seen at ED; if BP is continuing to go down, monitor BP. Willam Renee verbalized understanding of information discussed w/ no further questions at this time.

## 2019-04-02 NOTE — TELEPHONE ENCOUNTER
Kaylyn Delaney with the crossings at [facility] is calling, she would like to report the patient BP reading today as 179/103 and her pulse as 94. If there is something Dr. Kailee Trevino would like to do about this she can be contacted at the listed phone number and she is on the 3rd floor.     Best call back # for Nadia Hutton: [phone]

## 2019-04-03 ENCOUNTER — HOSPITAL ENCOUNTER (EMERGENCY)
Age: 84
Discharge: HOME | End: 2019-04-03
Attending: EMERGENCY MEDICINE
Payer: MEDICARE

## 2019-04-03 ENCOUNTER — TELEPHONE (OUTPATIENT)
Dept: INTERNAL MEDICINE CLINIC | Age: 84
End: 2019-04-03

## 2019-04-03 ENCOUNTER — APPOINTMENT (OUTPATIENT)
Dept: GENERAL RADIOLOGY | Age: 84
End: 2019-04-03
Attending: EMERGENCY MEDICINE
Payer: MEDICARE

## 2019-04-03 ENCOUNTER — APPOINTMENT (OUTPATIENT)
Dept: CT IMAGING | Age: 84
End: 2019-04-03
Attending: EMERGENCY MEDICINE
Payer: MEDICARE

## 2019-04-03 VITALS
WEIGHT: 110.89 LBS | RESPIRATION RATE: 19 BRPM | HEART RATE: 72 BPM | BODY MASS INDEX: 20.41 KG/M2 | HEIGHT: 62 IN | OXYGEN SATURATION: 96 % | SYSTOLIC BLOOD PRESSURE: 153 MMHG | DIASTOLIC BLOOD PRESSURE: 72 MMHG | TEMPERATURE: 97.6 F

## 2019-04-03 DIAGNOSIS — I10 HYPERTENSION, UNSPECIFIED TYPE: ICD-10-CM

## 2019-04-03 DIAGNOSIS — J90 PLEURAL EFFUSION: Primary | ICD-10-CM

## 2019-04-03 LAB
ALBUMIN SERPL-MCNC: 3.5 G/DL (ref 3.5–5)
ALBUMIN/GLOB SERPL: 1.2 {RATIO} (ref 1.1–2.2)
ALP SERPL-CCNC: 57 U/L (ref 45–117)
ALT SERPL-CCNC: 26 U/L (ref 12–78)
ANION GAP SERPL CALC-SCNC: 7 MMOL/L (ref 5–15)
AST SERPL-CCNC: 19 U/L (ref 15–37)
BASOPHILS # BLD: 0.1 K/UL (ref 0–0.1)
BASOPHILS NFR BLD: 1 % (ref 0–1)
BILIRUB SERPL-MCNC: 0.5 MG/DL (ref 0.2–1)
BUN SERPL-MCNC: 16 MG/DL (ref 6–20)
BUN/CREAT SERPL: 21 (ref 12–20)
CALCIUM SERPL-MCNC: 9 MG/DL (ref 8.5–10.1)
CHLORIDE SERPL-SCNC: 104 MMOL/L (ref 97–108)
CK MB CFR SERPL CALC: 3.8 % (ref 0–2.5)
CK MB SERPL-MCNC: 1.2 NG/ML (ref 5–25)
CK SERPL-CCNC: 32 U/L (ref 26–192)
CO2 SERPL-SCNC: 29 MMOL/L (ref 21–32)
CREAT SERPL-MCNC: 0.75 MG/DL (ref 0.55–1.02)
DIFFERENTIAL METHOD BLD: ABNORMAL
EOSINOPHIL # BLD: 0.1 K/UL (ref 0–0.4)
EOSINOPHIL NFR BLD: 2 % (ref 0–7)
ERYTHROCYTE [DISTWIDTH] IN BLOOD BY AUTOMATED COUNT: 13.4 % (ref 11.5–14.5)
GLOBULIN SER CALC-MCNC: 2.9 G/DL (ref 2–4)
GLUCOSE SERPL-MCNC: 120 MG/DL (ref 65–100)
HCT VFR BLD AUTO: 41.2 % (ref 35–47)
HGB BLD-MCNC: 13.4 G/DL (ref 11.5–16)
IMM GRANULOCYTES # BLD AUTO: 0 K/UL (ref 0–0.04)
IMM GRANULOCYTES NFR BLD AUTO: 0 % (ref 0–0.5)
LYMPHOCYTES # BLD: 0.9 K/UL (ref 0.8–3.5)
LYMPHOCYTES NFR BLD: 24 % (ref 12–49)
MCH RBC QN AUTO: 30.1 PG (ref 26–34)
MCHC RBC AUTO-ENTMCNC: 32.5 G/DL (ref 30–36.5)
MCV RBC AUTO: 92.6 FL (ref 80–99)
MONOCYTES # BLD: 0.6 K/UL (ref 0–1)
MONOCYTES NFR BLD: 16 % (ref 5–13)
NEUTS SEG # BLD: 2 K/UL (ref 1.8–8)
NEUTS SEG NFR BLD: 57 % (ref 32–75)
NRBC # BLD: 0 K/UL (ref 0–0.01)
NRBC BLD-RTO: 0 PER 100 WBC
PLATELET # BLD AUTO: 224 K/UL (ref 150–400)
PMV BLD AUTO: 10.9 FL (ref 8.9–12.9)
POTASSIUM SERPL-SCNC: 3.4 MMOL/L (ref 3.5–5.1)
PROT SERPL-MCNC: 6.4 G/DL (ref 6.4–8.2)
RBC # BLD AUTO: 4.45 M/UL (ref 3.8–5.2)
SODIUM SERPL-SCNC: 140 MMOL/L (ref 136–145)
TROPONIN I SERPL-MCNC: <0.05 NG/ML
WBC # BLD AUTO: 3.6 K/UL (ref 3.6–11)

## 2019-04-03 PROCEDURE — 70450 CT HEAD/BRAIN W/O DYE: CPT

## 2019-04-03 PROCEDURE — 84484 ASSAY OF TROPONIN QUANT: CPT

## 2019-04-03 PROCEDURE — 93005 ELECTROCARDIOGRAM TRACING: CPT

## 2019-04-03 PROCEDURE — 82550 ASSAY OF CK (CPK): CPT

## 2019-04-03 PROCEDURE — 36415 COLL VENOUS BLD VENIPUNCTURE: CPT

## 2019-04-03 PROCEDURE — 99285 EMERGENCY DEPT VISIT HI MDM: CPT

## 2019-04-03 PROCEDURE — 85025 COMPLETE CBC W/AUTO DIFF WBC: CPT

## 2019-04-03 PROCEDURE — 80053 COMPREHEN METABOLIC PANEL: CPT

## 2019-04-03 PROCEDURE — 71045 X-RAY EXAM CHEST 1 VIEW: CPT

## 2019-04-03 NOTE — TELEPHONE ENCOUNTER
Received call from José Manuel Donovan at [facility] at [facility]. /110 (manually) P32. Pt states feeling ok but noted some LATHAM (walking). BP last night 151/73 after metoprolol. Pt reports no other sx per Vivienne Forrester. Pt due for next dose metoprolol around 1900. [facility] informed Dr. Chari Espitia will be notified.

## 2019-04-03 NOTE — TELEPHONE ENCOUNTER
Hughes Severance, MD Orpha Roller, LPN   Caller: Unspecified (Today,  3:41 PM)             Pulse too low     Needs eval, send to ED

## 2019-04-03 NOTE — TELEPHONE ENCOUNTER
Spoke to Brooklyn Amador at [facility] at [facility]. Brooklyn Amador informed per Dr. Tracee Carrillo that pt's pulse is too low and should go to the ED for eval.  Brooklyn Amador will inform pt's daughter.

## 2019-04-04 ENCOUNTER — TELEPHONE (OUTPATIENT)
Dept: CARDIOLOGY CLINIC | Age: 84
End: 2019-04-04

## 2019-04-04 LAB
ATRIAL RATE: 93 BPM
CALCULATED P AXIS, ECG09: 81 DEGREES
CALCULATED R AXIS, ECG10: -54 DEGREES
CALCULATED T AXIS, ECG11: 84 DEGREES
DIAGNOSIS, 93000: NORMAL
P-R INTERVAL, ECG05: 160 MS
Q-T INTERVAL, ECG07: 376 MS
QRS DURATION, ECG06: 78 MS
QTC CALCULATION (BEZET), ECG08: 467 MS
VENTRICULAR RATE, ECG03: 93 BPM

## 2019-04-04 NOTE — ED NOTES
Patient discharge instructions reviewed with patient and daughter by MD Tomeka Gupta. Patient verbalized understanding. Patient declined wheelchair, ambulatory off unit with daughter.

## 2019-04-04 NOTE — ED NOTES
Patient presents to ED with complaint of high blood pressure. Patient lives at the [facility], and had her blood pressure checked last night and it was noted to be elevated. Patient daughter stated that they checked patient's blood pressure again this morning and it was 186/106 with a pulse rate of 32. Patient stated that her only symptom was \"feeling funny in the head\". Patient is alert answering questions appropriately. Patient daughter at bedside.

## 2019-04-04 NOTE — ED NOTES
Patient ambulatory in cobb up to Doc box. Patient complained of feeling a little short winded while walking back to bed. Patient denied having difficulty ambulating with her walker. MD Santana aware.

## 2019-04-04 NOTE — TELEPHONE ENCOUNTER
Pts daughter called b/c they had to take her mother to the ER last night and they took Xray and said she has fluid on her lungs and need to take her fluid pill more often. They want Dr. Miguel Marinelli to look at the Xray and confirm what was said and either adjust her fluid pill or give advice on what to do.  Please give daughter Annie Foster a call    thanks

## 2019-04-04 NOTE — DISCHARGE INSTRUCTIONS
Patient Education        Learning About Diuretics for High Blood Pressure  Introduction  Diuretics help to lower blood pressure. This reduces your risk of a heart attack and stroke. It also reduces your risk of kidney disease. Diuretics cause your kidneys to remove sodium and water. They also relax the blood vessel walls. These help lower your blood pressure. Examples  · Chlorthalidone  · Hydrochlorothiazide  Possible side effects  There are some common side effects. They are:  · Too little potassium. · Feeling dizzy. · Rash. · Urinating a lot. · High blood sugar. (But this is not common.)  You may have other side effects. Check the information that comes with your medicine. What to know about taking this medicine  · You may take other medicines for blood pressure. Diuretics can help those work better. They can also prevent extra fluid in your body. · You may need to take potassium pills. Or you may have to watch how much potassium is in your food. Ask your doctor about this. · You may need blood tests to check your kidneys and your potassium level. · Take your medicines exactly as prescribed. Call your doctor if you think you are having a problem with your medicine. · Check with your doctor or pharmacist before you use any other medicines. This includes over-the-counter medicines. Make sure your doctor knows all of the medicines, vitamins, herbal products, and supplements you take. Taking some medicines together can cause problems. Where can you learn more? Go to http://pinky-shamar.info/. Enter T314 in the search box to learn more about \"Learning About Diuretics for High Blood Pressure. \"  Current as of: July 22, 2018  Content Version: 11.9  © 9152-9920 [employer]. Care instructions adapted under license by [employer] (which disclaims liability or warranty for this information).  If you have questions about a medical condition or this instruction, always ask your healthcare professional. [address] any warranty or liability for your use of this information. Patient Education        Learning About a Pleural Effusion  What is it? A pleural effusion (say \"PLER-agustin zf-NBCO-geur\") is the buildup of fluid in the space between tissues lining the lungs and the chest wall. Because of the fluid buildup, the lungs may not be able to expand completely. This can make it hard to breathe. A pleural empyema (say \"ig-dl-ZU-muh\") is a problem that can happen with pleural effusion. Bacteria or other infections cause pus to form in the pleural fluid. But most pleural effusions don't become infected. What causes it? A pleural effusion has many causes. They include pneumonia, cancer, inflammation of the tissues around the lungs, and heart failure. What are the symptoms? Symptoms of a pleural effusion may include:  · Trouble breathing. · Shortness of breath. · Chest pain. · Fever. · A cough. A minor pleural effusion may not cause any symptoms. How is it diagnosed? A pleural effusion is usually diagnosed with an X-ray and a physical exam. The doctor listens to the airflow in your lungs. How is it treated? A pleural effusion can be treated by removing fluid from the space between the tissues around the lungs. This is done with a needle that's put into the chest (thoracentesis). A small amount of the fluid may be sent to a lab to find out what is causing the buildup of fluid. Removing the fluid may help to relieve symptoms, such as shortness of breath and chest pain. It can help the lungs to expand more fully. If the pleural effusion doesn't get better, a catheter may be placed in the chest. This is a flexible tube that allows fluid to drain from the lungs. The catheter stays in the chest until the doctor removes it. Some people may get a treatment that removes the fluid and then puts a medicine into the chest cavity.  This helps to prevent too much fluid from building up again. A minor pleural effusion often goes away on its own. Doctors may need to treat the condition that is causing the pleural effusion. For example, you may get medicines to treat pneumonia or congestive heart failure. When the condition is treated, the effusion usually goes away. For a pleural empyema, the pus needs to be drained. It may drain from a flexible tube placed in the chest. Or you may have surgery to drain it. You also will get antibiotics. Follow-up care is a key part of your treatment and safety. Be sure to make and go to all appointments, and call your doctor if you are having problems. It's also a good idea to know your test results and keep a list of the medicines you take. Where can you learn more? Go to http://pinky-shamar.info/. Enter A920 in the search box to learn more about \"Learning About a Pleural Effusion. \"  Current as of: September 5, 2018  Content Version: 11.9  © 0070-6341 [employer]. Care instructions adapted under license by "[employer] (which disclaims liability or warranty for this information). If you have questions about a medical condition or this instruction, always ask your healthcare professional. [address] any warranty or liability for your use of this information. PLEASE TAKE YOUR DYAZIDE MEDICATION DAILY FOR THE NEXT 4 DAYS.

## 2019-04-04 NOTE — ED PROVIDER NOTES
EMERGENCY DEPARTMENT HISTORY AND PHYSICAL EXAM      Date: 4/3/2019  Patient Name: Catalina Francisco    History of Presenting Illness     Chief Complaint   Patient presents with    Hypertension     onset last night; was sent from [facility]; family reports pt's pulse was 32 per staff       History Provided By: Patient and Patient's Daughter    HPI: Catalina Francisco, 80 y.o. female with PMHx significant for hypertension, atrial fibrillation, diabetes, hyperlipidemia, and cerebrovascular disease, presents to the ED with cc of elevated blood pressures and dyspnea on exertion over the last 24 hours. Patient lives in assisted living facility where they checked her blood pressures today which measured 184/70. Patient reports taking her blood pressure medications as prescribed. She also felt that she was slightly less steady when walking with her walker. She denies any headache or focal neurologic deficits. No recent fevers, chills, or any systemic symptoms of infection. She has no lower extremity edema. She sees Dr. Cara Burch as her cardiologist.  She has no other associated symptoms. She describes her symptoms as mild in intensity. She has no other exacerbating or ameliorating factors. There are no other complaints, changes, or physical findings at this time. PCP: Tye Alcantara MD    No current facility-administered medications on file prior to encounter. Current Outpatient Medications on File Prior to Encounter   Medication Sig Dispense Refill    OTHER cuturelle capsule daily      acetaminophen (TYLENOL EXTRA STRENGTH) 500 mg tablet Take  by mouth every six (6) hours as needed for Pain.  sertraline (ZOLOFT) 25 mg tablet Take 1 Tab by mouth daily. 30 Tab 3    QUEtiapine (SEROQUEL) 25 mg tablet Take 1 Tab by mouth nightly. 90 Tab 1    Lactobacillus rhamnosus GG (CULTURELLE PO) Take  by mouth daily.  melatonin tab tablet Take 2 Tabs by mouth nightly.  60 Tab 5    triamterene-hydroCHLOROthiazide (DYAZIDE) 37.5-25 mg per capsule As needed 30 Cap 12    aspirin 81 mg chewable tablet Take 1 Tab by mouth daily. 30 Tab 6    disopyramide phosphate (NORPACE) 100 mg capsule TAKE ONE CAPSULE BY MOUTH TWICE A DAY 60 Cap 0    metoprolol tartrate (LOPRESSOR) 25 mg tablet TAKE 1 TABLET TWICE A DAY 60 Tab 12    pantoprazole (PROTONIX) 40 mg tablet Take 1 Tab by mouth daily. (Patient taking differently: Take 40 mg by mouth two (2) times a day.) 30 Tab 12    ondansetron hcl (ZOFRAN) 4 mg tablet Take 1 Tab by mouth every eight (8) hours as needed for Nausea. 20 Tab 3    polyethylene glycol (MIRALAX) 17 gram/dose powder Take 17 g by mouth daily.  nitroglycerin (NITROSTAT) 0.4 mg SL tablet 1 Tab by SubLINGual route every five (5) minutes as needed for Chest Pain. 25 Tab 1    magnesium hydroxide ([employer] MILK OF MAGNESIA) 400 mg/5 mL suspension Take 30 mL by mouth daily as needed for Constipation.          Past History     Past Medical History:  Past Medical History:   Diagnosis Date    Acute diverticulitis 9/25/2017    Allergic rhinitis 9/25/2017    Arrhythmia     afib    Arthritis     Asthma     [facility]rial fibrillation [facility] [address]     Benign essential tremor 9/25/2017    Cancer ([facility] [address]     skin    Celiac disease 9/25/2017    Constipation 9/25/2017    Diabetic peripheral neuropathy associated with type 2 diabetes mellitus ([facility] [address] 4/21/2016    Diarrhea 9/25/2017    Diverticulosis of large intestine without hemorrhage 9/25/2017    Early satiety 9/25/2017    Fuchs' corneal dystrophy 9/25/2017    Gastroesophageal reflux disease without esophagitis 9/25/2017    GERD (gastroesophageal reflux disease)     High cholesterol     Hypercholesterolemia 9/25/2017    Hypertension     Irritable bowel syndrome with diarrhea 9/25/2017    Numbness 9/25/2017    Osteopenia 9/25/2017    Other ill-defined conditions(799.89)     collapsed lung prior to hernia repair surgery    Postmenopausal 2018    Ptosis, both eyelids 2017    Statin intolerance 2017    Unspecified adverse effect of anesthesia     pt states she went into cardiac arrest prior to hernia repair after the insertion of gas in stomach       Past Surgical History:  Past Surgical History:   Procedure Laterality Date    HX APPENDECTOMY      HX CHOLECYSTECTOMY      HX GI      hemorrhoidectomy    HX HEENT      sinus surgery    HX HERNIA REPAIR      HX TONSILLECTOMY         Family History:  Family History   Problem Relation Age of Onset    Heart Disease Mother     Dementia Mother     Heart Disease Father     Neuropathy Child     Depression Child     Other Child         diverticulitis    Lung Cancer Sister 80        lung ca       Social History:  Social History     Tobacco Use    Smoking status: Former Smoker     Packs/day: 0.75     Years: 5.00     Pack years: 3.75     Last attempt to quit: 10/11/1967     Years since quittin.5    Smokeless tobacco: Never Used   Substance Use Topics    Alcohol use: No    Drug use: No       Allergies: Allergies   Allergen Reactions    Bactrim [Sulfamethoprim Ds] Other (comments)     consipation    Ciprofloxacin (Bulk) Other (comments)     Low wbc    Iodine Unknown (comments)    Prednisone Other (comments)     tachycardia    Statins-Hmg-Coa Reductase Inhibitors Unknown (comments)     Stomach uipset and mild muscle aches         Review of Systems   Review of Systems   Constitutional: Positive for fatigue. Negative for chills, diaphoresis and fever. HENT: Negative for ear pain and sore throat. Eyes: Negative for pain and redness. Respiratory: Positive for shortness of breath. Negative for cough. Gastrointestinal: Negative for abdominal pain, diarrhea, nausea and vomiting. Endocrine: Negative for cold intolerance and heat intolerance. Genitourinary: Negative for flank pain and hematuria. Musculoskeletal: Negative for back pain and neck stiffness.    Skin: Negative for rash and wound. Neurological: Positive for weakness. Negative for dizziness, syncope and headaches. All other systems reviewed and are negative. Physical Exam   Physical Exam   Constitutional: She is oriented to person, place, and time. She appears well-developed and well-nourished. HENT:   Head: Normocephalic and atraumatic. Mouth/Throat: Oropharynx is clear and moist. No oropharyngeal exudate. Eyes: Pupils are equal, round, and reactive to light. Conjunctivae and EOM are normal.   Neck: Normal range of motion. Cardiovascular: Normal rate and regular rhythm. No murmur heard. Pulmonary/Chest: Effort normal and breath sounds normal. No respiratory distress. She has no wheezes. Abdominal: Soft. Bowel sounds are normal. She exhibits no distension. There is no tenderness. Musculoskeletal: Normal range of motion. She exhibits no edema or deformity. Neurological: She is alert and oriented to person, place, and time. Coordination normal.   Skin: Skin is warm and dry. No rash noted. Psychiatric: She has a normal mood and affect. Her behavior is normal.   Nursing note and vitals reviewed.       Diagnostic Study Results     Labs -     Recent Results (from the past 24 hour(s))   EKG, 12 LEAD, INITIAL    Collection Time: 04/03/19  4:45 PM   Result Value Ref Range    Ventricular Rate 93 BPM    Atrial Rate 93 BPM    P-R Interval 160 ms    QRS Duration 78 ms    Q-T Interval 376 ms    QTC Calculation (Bezet) 467 ms    Calculated P Axis 81 degrees    Calculated R Axis -54 degrees    Calculated T Axis 84 degrees    Diagnosis       Normal sinus rhythm with sinus arrhythmia  Possible Left atrial enlargement  Left axis deviation  Cannot rule out Inferior infarct , age undetermined  Possible Anterior infarct (cited on or before 03-APR-2019)  When compared with ECG of 15-ROSI-2019 09:22,  Nonspecific T wave abnormality now evident in Lateral leads     CBC WITH AUTOMATED DIFF    Collection Time: 04/03/19  5:02 PM   Result Value Ref Range    WBC 3.6 3.6 - 11.0 K/uL    RBC 4.45 3.80 - 5.20 M/uL    HGB 13.4 11.5 - 16.0 g/dL    HCT 41.2 35.0 - 47.0 %    MCV 92.6 80.0 - 99.0 FL    MCH 30.1 26.0 - 34.0 PG    MCHC 32.5 30.0 - 36.5 g/dL    RDW 13.4 11.5 - 14.5 %    PLATELET 627 603 - 265 K/uL    MPV 10.9 8.9 - 12.9 FL    NRBC 0.0 0  WBC    ABSOLUTE NRBC 0.00 0.00 - 0.01 K/uL    NEUTROPHILS 57 32 - 75 %    LYMPHOCYTES 24 12 - 49 %    MONOCYTES 16 (H) 5 - 13 %    EOSINOPHILS 2 0 - 7 %    BASOPHILS 1 0 - 1 %    IMMATURE GRANULOCYTES 0 0.0 - 0.5 %    ABS. NEUTROPHILS 2.0 1.8 - 8.0 K/UL    ABS. LYMPHOCYTES 0.9 0.8 - 3.5 K/UL    ABS. MONOCYTES 0.6 0.0 - 1.0 K/UL    ABS. EOSINOPHILS 0.1 0.0 - 0.4 K/UL    ABS. BASOPHILS 0.1 0.0 - 0.1 K/UL    ABS. IMM. GRANS. 0.0 0.00 - 0.04 K/UL    DF AUTOMATED     METABOLIC PANEL, COMPREHENSIVE    Collection Time: 04/03/19  5:02 PM   Result Value Ref Range    Sodium 140 136 - 145 mmol/L    Potassium 3.4 (L) 3.5 - 5.1 mmol/L    Chloride 104 97 - 108 mmol/L    CO2 29 21 - 32 mmol/L    Anion gap 7 5 - 15 mmol/L    Glucose 120 (H) 65 - 100 mg/dL    BUN 16 6 - 20 MG/DL    Creatinine 0.75 0.55 - 1.02 MG/DL    BUN/Creatinine ratio 21 (H) 12 - 20      GFR est AA >60 >60 ml/min/1.73m2    GFR est non-AA >60 >60 ml/min/1.73m2    Calcium 9.0 8.5 - 10.1 MG/DL    Bilirubin, total 0.5 0.2 - 1.0 MG/DL    ALT (SGPT) 26 12 - 78 U/L    AST (SGOT) 19 15 - 37 U/L    Alk.  phosphatase 57 45 - 117 U/L    Protein, total 6.4 6.4 - 8.2 g/dL    Albumin 3.5 3.5 - 5.0 g/dL    Globulin 2.9 2.0 - 4.0 g/dL    A-G Ratio 1.2 1.1 - 2.2     CK W/ CKMB & INDEX    Collection Time: 04/03/19  5:02 PM   Result Value Ref Range    CK 32 26 - 192 U/L    CK - MB 1.2 <3.6 NG/ML    CK-MB Index 3.8 (H) 0.0 - 2.5     TROPONIN I    Collection Time: 04/03/19  5:02 PM   Result Value Ref Range    Troponin-I, Qt. <0.05 <0.05 ng/mL       Radiologic Studies -   XR CHEST PORT   Final Result   IMPRESSION: Bilateral pleural effusions and overlying atelectasis. Hiatal   hernia. No acute findings otherwise. CT HEAD WO CONT   Final Result   IMPRESSION:      1. No acute intracranial hemorrhage, mass or infarct. 2. Asymmetric enlargement of right foramen semiovale may reflect 5th nerve   neurinoma. 3.  Stable pattern of atrophy and chronic white matter change most compatible   with small vessel ischemic and/or senescent change. 4. Ischemic change and chronic right basal ganglia lacune and right cerebellar   infarct. 5.  Intracranial atherosclerosis. CT Results  (Last 48 hours)               04/03/19 1730  CT HEAD WO CONT Final result    Impression:  IMPRESSION:       1. No acute intracranial hemorrhage, mass or infarct. 2. Asymmetric enlargement of right foramen semiovale may reflect 5th nerve   neurinoma. 3.  Stable pattern of atrophy and chronic white matter change most compatible   with small vessel ischemic and/or senescent change. 4. Ischemic change and chronic right basal ganglia lacune and right cerebellar   infarct. 5.  Intracranial atherosclerosis. Narrative:  EXAM: CT HEAD WO CONT       INDICATION: Headache, hypertension, and blurred vision with onset last night. COMPARISON: CT 12/4/2017. CONTRAST: None. TECHNIQUE: Unenhanced CT of the head was performed using 5 mm images. Brain and   bone windows were generated. CT dose reduction was achieved through use of a   standardized protocol tailored for this examination and automatic exposure   control for dose modulation. FINDINGS: There is no acute intracranial hemorrhage, mass, mass effect or   herniation. No significant change in appearance of chronic small right   cerebellar and small right basal ganglia infarcts. Ventricles and sulci show no   significant change in proportionate and symmetric prominence. There is no   significant change in pattern of periventricular white matter hypodensity.  The   gray-white matter differentiation is well-preserved. Atherosclerotic   calcifications are seen within the carotid siphons and vertebral arteries. The   mastoid air cells are well pneumatized. The visualized paranasal sinuses are   normal. There is asymmetric enlargement of the right foramen ovale. CXR Results  (Last 48 hours)               04/03/19 2049  XR CHEST PORT Final result    Impression:  IMPRESSION: Bilateral pleural effusions and overlying atelectasis. Hiatal   hernia. No acute findings otherwise. Narrative:  EXAM: XR CHEST PORT       INDICATION: shortness of breath       COMPARISON: Chest x-ray 1/15/2019. FINDINGS: A portable AP radiograph of the chest was obtained at 20:35 hours. The   patient is on a cardiac monitor. There are left rib right pleural effusions,   similar to prior. There is a large hiatal hernia. The cardiac and mediastinal   contours and pulmonary vascularity are normal. Atherosclerotic calcifications   affect the aortic arch. The chest wall structures and visualized upper abdomen   show no acute findings with incidental note of degenerative spine changes. Medical Decision Making   I am the first provider for this patient. I reviewed the vital signs, available nursing notes, past medical history, past surgical history, family history and social history. Vital Signs-Reviewed the patient's vital signs. Patient Vitals for the past 24 hrs:   Temp Pulse Resp BP SpO2   04/03/19 2100  72 19 153/72 96 %   04/03/19 2030  67 12 141/65 96 %   04/03/19 2000  69 15 146/72 97 %   04/03/19 1945    149/78 97 %   04/03/19 1930    138/67 97 %   04/03/19 1636 97.6 °F (36.4 °C) 95 16 (!) 178/111 99 %       Pulse Oximetry Analysis -98 % on room air    Cardiac Monitor:   Rate: 86 bpm  Rhythm: Normal Sinus Rhythm    EKG: Normal sinus rhythm, possible left atrial enlargement, left axis deviation.   Nonspecific specific T wave abnormalities in the lateral leads. Records Reviewed: Nursing Notes, Old Medical Records and Previous electrocardiograms    Differential Diagnosis:    Patient presents with acute dyspnea. DDx: asthma, copd, pna, pulmonary edema, acute bronchitis, ACS, ptx, pna. Will obtain EKG, labs, CXR, provide O2 as needed for hypoxia, treat symptomatically and reassess. Will continue to monitor closely in ED. Provider Notes (Medical Decision Making):   Reviewed patient's CT findings with the radiologist and determine her findings were all chronic in nature. I do not believe patient's symptoms are representative acute CVA. Patient has mild pleural effusions on her chest x-ray but is not hypoxic and is ambulating around the ED at her baseline. I do believe this is due to some increasing pressure on the heart due to elevated blood pressure. She does not take her Dyazide medications on a regular basis and I suggested she do this for the next 4 days. She will then call Dr. Maryanne Morgan to arrange for a follow-up appointment and medication adjustment. ED Course:     Initial assessment performed. The patients presenting problems have been discussed, and they are in agreement with the care plan formulated and outlined with them. I have encouraged them to ask questions as they arise throughout their visit. Critical Care Time:     None    Disposition:  6:28 AM  Lasalle Sandifer Messick's  results have been reviewed with her. She has been counseled regarding her diagnosis. She verbally conveys understanding and agreement of the signs, symptoms, diagnosis, treatment and prognosis and additionally agrees to follow up as recommended with Dr. Aniyah Jarvis MD in 24 - 48 hours. She also agrees with the care-plan and conveys that all of her questions have been answered.   I have also put together some discharge instructions for her that include: 1) educational information regarding their diagnosis, 2) how to care for their diagnosis at home, as well a 3) list of reasons why they would want to return to the ED prior to their follow-up appointment, should their condition change. PLAN:  1. Discharge Medication List as of 4/3/2019  9:28 PM        2. Follow-up Information     Follow up With Specialties Details Why Contact Info    Sharmin Carlos MD Internal Medicine In 3 days  [address] [facility]  [address] [place]      Christiano Amaya MD Cardiology, [facility] Surgery, Internal Medicine In 1 week  [address] [facility]  [phone]          Return to ED if worse     Diagnosis     Clinical Impression:   1. Pleural effusion    2.  Hypertension, unspecified type

## 2019-04-04 NOTE — TELEPHONE ENCOUNTER
Daughter informed faxed this request to [facility] @ [phone] Att: Demetrio Corona Higginson will you call to schedule appt with Dr Quang Mims thanks.

## 2019-04-09 ENCOUNTER — TELEPHONE (OUTPATIENT)
Dept: INTERNAL MEDICINE CLINIC | Age: 84
End: 2019-04-09

## 2019-04-09 ENCOUNTER — HOSPITAL ENCOUNTER (OUTPATIENT)
Dept: LAB | Age: 84
Discharge: HOME | End: 2019-04-09
Payer: MEDICARE

## 2019-04-09 ENCOUNTER — OFFICE VISIT (OUTPATIENT)
Dept: INTERNAL MEDICINE CLINIC | Age: 84
End: 2019-04-09

## 2019-04-09 VITALS
TEMPERATURE: 97.9 F | DIASTOLIC BLOOD PRESSURE: 74 MMHG | OXYGEN SATURATION: 96 % | WEIGHT: 106 LBS | HEART RATE: 90 BPM | SYSTOLIC BLOOD PRESSURE: 107 MMHG | HEIGHT: 62 IN | BODY MASS INDEX: 19.51 KG/M2 | RESPIRATION RATE: 16 BRPM

## 2019-04-09 DIAGNOSIS — F32.A DEPRESSION, UNSPECIFIED DEPRESSION TYPE: ICD-10-CM

## 2019-04-09 DIAGNOSIS — K44.9 LARGE HIATAL HERNIA: ICD-10-CM

## 2019-04-09 DIAGNOSIS — M54.5 LOW BACK PAIN, UNSPECIFIED BACK PAIN LATERALITY, UNSPECIFIED CHRONICITY, WITH SCIATICA PRESENCE UNSPECIFIED: Primary | ICD-10-CM

## 2019-04-09 DIAGNOSIS — M54.5 CHRONIC LOW BACK PAIN, UNSPECIFIED BACK PAIN LATERALITY, WITH SCIATICA PRESENCE UNSPECIFIED: ICD-10-CM

## 2019-04-09 DIAGNOSIS — G89.29 CHRONIC BILATERAL LOW BACK PAIN WITHOUT SCIATICA: ICD-10-CM

## 2019-04-09 DIAGNOSIS — R44.3 HALLUCINATIONS: ICD-10-CM

## 2019-04-09 DIAGNOSIS — M54.50 CHRONIC BILATERAL LOW BACK PAIN WITHOUT SCIATICA: ICD-10-CM

## 2019-04-09 DIAGNOSIS — N30.00 ACUTE CYSTITIS WITHOUT HEMATURIA: ICD-10-CM

## 2019-04-09 DIAGNOSIS — I10 ESSENTIAL HYPERTENSION: ICD-10-CM

## 2019-04-09 DIAGNOSIS — R60.0 LOCALIZED EDEMA: ICD-10-CM

## 2019-04-09 DIAGNOSIS — J90 PLEURAL EFFUSION ON LEFT: Primary | ICD-10-CM

## 2019-04-09 DIAGNOSIS — G25.0 ESSENTIAL TREMOR: ICD-10-CM

## 2019-04-09 DIAGNOSIS — G89.29 CHRONIC LOW BACK PAIN, UNSPECIFIED BACK PAIN LATERALITY, WITH SCIATICA PRESENCE UNSPECIFIED: ICD-10-CM

## 2019-04-09 LAB
BILIRUB UR QL STRIP: NEGATIVE
GLUCOSE UR-MCNC: NEGATIVE MG/DL
KETONES P FAST UR STRIP-MCNC: NEGATIVE MG/DL
PH UR STRIP: 7 [PH] (ref 4.6–8)
PROT UR QL STRIP: NEGATIVE
SP GR UR STRIP: 1.01 (ref 1–1.03)
UA UROBILINOGEN AMB POC: NORMAL (ref 0.2–1)
URINALYSIS CLARITY POC: NORMAL
URINALYSIS COLOR POC: YELLOW
URINE BLOOD POC: NEGATIVE
URINE LEUKOCYTES POC: NORMAL
URINE NITRITES POC: NEGATIVE

## 2019-04-09 PROCEDURE — 87086 URINE CULTURE/COLONY COUNT: CPT

## 2019-04-09 PROCEDURE — 36415 COLL VENOUS BLD VENIPUNCTURE: CPT

## 2019-04-09 PROCEDURE — 80048 BASIC METABOLIC PNL TOTAL CA: CPT

## 2019-04-09 RX ORDER — NITROFURANTOIN 25; 75 MG/1; MG/1
100 CAPSULE ORAL 2 TIMES DAILY
Qty: 10 CAP | Refills: 0 | Status: SHIPPED | OUTPATIENT
Start: 2019-04-09 | End: 2019-04-14

## 2019-04-09 RX ORDER — SERTRALINE HYDROCHLORIDE 50 MG/1
50 TABLET, FILM COATED ORAL DAILY
Qty: 30 TAB | Refills: 3 | Status: SHIPPED | OUTPATIENT
Start: 2019-04-09 | End: 2019-07-30 | Stop reason: SDUPTHER

## 2019-04-09 NOTE — LETTER
4/9/2019 4:30 PM 
 
Ms. Saima Barba [address] [id number] To whom this may concern. Have Ms. Angela take nitrofurantoin, macrocrystal-monohydrate, (MACROBID) 100 mg capsule: Take 1 Cap by mouth two (2) times a day for 5 days by mouth. If there are any questions or concerns, feel free to contact the office. Thank you.   
 
 
 
 
 
Sincerely, 
 
 
Griselda Simmer, MD

## 2019-04-09 NOTE — TELEPHONE ENCOUNTER
Patient's daughter, Delio Beltre state an order for patient's UTI medication to be given needs to be faxed over to the Patient's assisted Living, [place] at [facility]. Fax# is [phone]   ATTN: Murphy Luevano      Please call if any questions or when this has been done.  Thank you

## 2019-04-09 NOTE — TELEPHONE ENCOUNTER
Left vm for Evangelist Lazcano (HIPAA) that order has been faxed to [facility]. Callback prn. Faxed to [place] [phone] Per Pretty w/ confirmation received.

## 2019-04-09 NOTE — PROGRESS NOTES
HISTORY OF PRESENT ILLNESS  Natividad Alexander is a 80 y.o. female. HPI  Last here 3/1/19. Pt is here to f/u on chronic conditions. Pt was in the ER 4/3/19  Received multiple calls from nursing home for elevated BP in 170s-190s SBP  Pt was sent to ER as they reported HR was in 30s - in ER her HR was in 70s and 80s  Reviewed CXR 4/19: Bilateral pleural effusions and overlying atelectasis. Hiatal hernia. No acute findings otherwise. They called her cardio and was told to increase dyazide to daily (rather than prn) - has had no problems with this  She has lost a few lbs since doing this    Reviewed CT abd 10/18: Clear. Large hiatal hernia. Small left pleural effusion. Reviewed CXR 1/13/19: 1. Small left effusion. 2. Age-indeterminate compression deformities. Reviewed CXR 1/15/19: Chronic lung changes, bilateral effusions. Cardiomegaly. Reviewed CT abd 1/15/19: No acute abdominal or pelvic pathology. Extensive colonic diverticulosis; no evidence of acute diverticulitis. Chronic bilateral pleural effusions and moderate hiatal hernia.   Reviewed ECHO 6/18: EF 60-65%  Ordered repeat CXR     BP is 107/74, and HR 90  Pt gets this checked at [facility]  BP has been 145/80, 156/45, 129/82, 148/95  Continues on metoprolol 25mg   Pt also takes dyazide 37.5-25 now daily for her leg swelling     Wt today is 106 lbs --stable x lov  Her wt is in the nl ranges    Ordered labs     Pt follows wt Dr Angel Smith (neuro) for tremor  Reviewed notes 2/12/19  Pt had been being treated for Parkinson's but the neuro decided it may not be this so they stopped this treatment  Has chronic tremors and hallucinations  Pt is on seroquel for sleep and hallucinations - it is helping with both of these  Pt states that her hallucinations have been stable  However her tremor has been worse, and giving her difficulty with cutting food and eating  Her voice has also been weaker recently - advised discussing this with neuro     Pt follows with  [facility] (cardio) for a fib  Reviewed notes 5/17/18  Pt is on norpace 100mg BID - discussed that this should come from her cardio  Pt saw Dr Facundo Davis (cardio)  Reviewed notes 3/18/19: 1. Paroxysmal atrial fibrillation (HCC)  Maintaining sinus rhythm; continue Norpace and ASA only for anti-coagulation given no recent recurrence, and age and fraily. She has tolerated this medication well, without recurrence of A fib, so would not make adjustments to this medication. 2. Edema of both lower extremities  Echo in 6/2018 with preserved LVEF 60-65% with grade 1 DD. Would continue with conservative therapy to avoid dehydration, which can result in increased falls and further injury. Recommended low sodium diet, between 3215-5076 mg/day. Continue using compression stockings bilaterally and keeping legs elevated to avoid dependent edema. 3. Essential hypertension  BP controlled; continue anti-hypertensive regimen and encouraged low sodium diet. 4.  Other chest pain  Recent lexiscan in 6/2018 was negative for ischemia; echocardiogram with preserved LVEF 60-65% in 5/2018. She has a hiatal hernia along with GERD that may be the primary etiology, rather than cardiac. Would continue with present management.   If symptoms persist in similar fashion, could discuss further workup with PCP or GI specialist.    Pt sees Dr Leonidas Fam (GI) for diverticulosis  Pt also has a large hiatal hernia  Pt also has a gluten sensitivity and mostly avoids this  Not completely gluten free     Pt saw Dr Erika Orr (neuropsych) in the past  Last visit was 2015 - no dementia     Pt saw Dr Nancye Rubinstein (ENT) for hearing loss  Last visit was 12/18/18  Pt decided not to get hearing aids     Pt sees Dr Vivar (ortho)  Last visit was 1/9/19  Lov, provided info for Dr Velma Rajan  She had been taking tramadol but had SE so was switched to tylenol  However tylenol has not been doing as much good recently  Pt also c/o her legs being weaker recently  Reviewed MRI T spine 11/18: 1. Unchanged compression fractures of T10-T12 with associated edema likely indicating that they are subacute. No definite underlying mass lesion. 2. Small lesion in T6 most likely representing a lipid poor hemangioma. 3. Heterogeneity of the bone marrow which could be related to incidental red marrow from increased oxygen demand/anemia. Myeloma or metastases is considered less likely, but not excluded, given that the lumbar spine and other visualized bones appear more homogenous with expected yellow marrow. Clinical correlation is recommended. Reviewed MRI L spine 11/18: Unchanged moderate compression deformities of T11 and T12 with associated edema. No definite underlying mass lesion. Ordered XR spine     Pt declined taking reclast and fosamax in the past  Pt is taking vit D 5000U daily     Pt takes protonix 40mg  Controls gerd     Pt is on ASA 81mg daily    Pt takes miralax daily  She has had some issues with constipation in the past few days but this is getting better  Discussed that she can use an extra dose of miralax prn     Lov, pt c/o some depression  Lov, started zoloft 25mg  Pt is taking this but it does not seem to be making much difference  She has not had any SE  Will increase zoloft to 100mg    Pt c/o some discomfort with urination and inability to urinate full amounts  Will check UA today     ACP on file.  SDMs are her daughters, Sergio Herrmann and Christiano Gerardo.     PREVENTIVE:  Colonoscopy: ~10 years ago with Dr Chloe Serrano, per pt the way her colon is formed makes it difficult to get COLOs, her declines further  Pap: declines further  Mammogram: declines further  Dexa: 8/18, osteoporosis  Tdap: advised pt to get this at her pharmacy  Prevnar 13: 03/01/19   Shingrix: ordered 03/01/19   Flu shot: Fall 2018  Eye exam: Dr Hetal Lai ~summer 2018, Dr Oakley Kehr 2/19 (q6 weeks)    Patient Active Problem List    Diagnosis Date Noted    Postmenopausal 07/23/2018     Priority: 1 - One    History of stroke 06/11/2018    Epidermoid cyst of finger of left hand 02/02/2018    Osteopenia 09/25/2017    Gastroesophageal reflux disease without esophagitis 09/25/2017    Diverticulosis of large intestine without hemorrhage 09/25/2017    Benign essential tremor 09/25/2017    Irritable bowel syndrome with diarrhea 09/25/2017    Chest wall pain 09/25/2017    Statin intolerance 09/25/2017    Diarrhea 09/25/2017    Constipation 09/25/2017    Hypercholesterolemia 09/25/2017    Early satiety 09/25/2017    Celiac disease 09/25/2017    Allergic rhinitis 09/25/2017    Acute diverticulitis 09/25/2017    Numbness 09/25/2017    Fuchs' corneal dystrophy 09/25/2017    Ptosis, both eyelids 09/25/2017    Idiopathic small and large fiber sensory neuropathy 04/21/2016    Diabetic peripheral neuropathy associated with type 2 diabetes mellitus ([facility] [address] 04/21/2016    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 04/21/2016    Abnormal MRI of head 08/25/2015    Depression 07/24/2015    Anxiety 07/24/2015    Attention deficit disorder 07/24/2015    Cerebrovascular disease, unspecified 05/03/2015    Essential tremor 04/20/2015    B12 deficiency 04/20/2015    Osteoporosis 04/20/2015    Memory loss 04/20/2015    Celiac sprue 05/14/2013    Other and unspecified hyperlipidemia 05/14/2013    Atrial fibrillation (HCC)     Hypertension      Current Outpatient Medications   Medication Sig Dispense Refill    OTHER cuturelle capsule daily      acetaminophen (TYLENOL EXTRA STRENGTH) 500 mg tablet Take  by mouth every six (6) hours as needed for Pain.  sertraline (ZOLOFT) 25 mg tablet Take 1 Tab by mouth daily. 30 Tab 3    QUEtiapine (SEROQUEL) 25 mg tablet Take 1 Tab by mouth nightly. 90 Tab 1    Lactobacillus rhamnosus GG (CULTURELLE PO) Take  by mouth daily.  melatonin tab tablet Take 2 Tabs by mouth nightly.  60 Tab 5    triamterene-hydroCHLOROthiazide (DYAZIDE) 37.5-25 mg per capsule As needed (Patient taking differently: Take 1 Cap by mouth daily.) 30 Cap 12    aspirin 81 mg chewable tablet Take 1 Tab by mouth daily. 30 Tab 6    disopyramide phosphate (NORPACE) 100 mg capsule TAKE ONE CAPSULE BY MOUTH TWICE A DAY 60 Cap 0    metoprolol tartrate (LOPRESSOR) 25 mg tablet TAKE 1 TABLET TWICE A DAY 60 Tab 12    pantoprazole (PROTONIX) 40 mg tablet Take 1 Tab by mouth daily. (Patient taking differently: Take 40 mg by mouth two (2) times a day.) 30 Tab 12    ondansetron hcl (ZOFRAN) 4 mg tablet Take 1 Tab by mouth every eight (8) hours as needed for Nausea. 20 Tab 3    polyethylene glycol (MIRALAX) 17 gram/dose powder Take 17 g by mouth daily.  nitroglycerin (NITROSTAT) 0.4 mg SL tablet 1 Tab by SubLINGual route every five (5) minutes as needed for Chest Pain. 25 Tab 1    magnesium hydroxide (YOUNG MILK OF MAGNESIA) 400 mg/5 mL suspension Take 30 mL by mouth daily as needed for Constipation.        Past Surgical History:   Procedure Laterality Date    HX APPENDECTOMY      HX CHOLECYSTECTOMY      HX GI      hemorrhoidectomy    HX HEENT      sinus surgery    HX HERNIA REPAIR      HX TONSILLECTOMY        Lab Results   Component Value Date/Time    WBC 3.6 04/03/2019 05:02 PM    WBC (POC) 5.6 07/23/2018 03:07 PM    HGB 13.4 04/03/2019 05:02 PM    HGB (POC) 15.0 07/23/2018 03:07 PM    HCT 41.2 04/03/2019 05:02 PM    HCT (POC) 45.9 07/23/2018 03:07 PM    PLATELET 441 74/55/4624 05:02 PM    PLATELET (POC) 136.7 07/23/2018 03:07 PM    MCV 92.6 04/03/2019 05:02 PM    MCV (POC) 90.0 07/23/2018 03:07 PM     Lab Results   Component Value Date/Time    Cholesterol, total 246 (H) 03/01/2019 10:15 AM    HDL Cholesterol 76 03/01/2019 10:15 AM    LDL, calculated 154 (H) 03/01/2019 10:15 AM    Triglyceride 82 03/01/2019 10:15 AM     Lab Results   Component Value Date/Time    GFR est non-AA >60 04/03/2019 05:02 PM    GFRNA, POC >60 10/15/2018 01:54 PM    GFR est AA >60 04/03/2019 05:02 PM    GFRAA, POC >60 10/15/2018 01:54 PM Creatinine 0.75 04/03/2019 05:02 PM    Creatinine (POC) 0.8 10/15/2018 01:54 PM    BUN 16 04/03/2019 05:02 PM    Sodium 140 04/03/2019 05:02 PM    Potassium 3.4 (L) 04/03/2019 05:02 PM    Chloride 104 04/03/2019 05:02 PM    CO2 29 04/03/2019 05:02 PM    Magnesium 2.2 01/13/2019 11:15 PM        Review of Systems   Respiratory: Negative for shortness of breath. Cardiovascular: Negative for chest pain. Gastrointestinal: Positive for constipation. Musculoskeletal: Positive for back pain. Physical Exam   Constitutional: She is oriented to person, place, and time. She appears well-developed and well-nourished. No distress. HENT:   Head: Normocephalic and atraumatic. Mouth/Throat: Oropharynx is clear and moist.   Eyes: Conjunctivae and EOM are normal. Right eye exhibits no discharge. Left eye exhibits no discharge. Neck: Normal range of motion. Neck supple. Cardiovascular: Normal rate, regular rhythm and normal heart sounds. Exam reveals no gallop and no friction rub. No murmur heard. Pulmonary/Chest: Effort normal. No respiratory distress. She has no wheezes. She has no rales. She exhibits no tenderness. Breath sounds slightly decreased on L compared to R   Abdominal: Soft. She exhibits no distension and no mass. There is no tenderness. There is no rebound and no guarding. Musculoskeletal: Normal range of motion. She exhibits edema (1+ pitting). She exhibits no tenderness or deformity. Lymphadenopathy:     She has no cervical adenopathy. Neurological: She is alert and oriented to person, place, and time. Coordination normal.   Skin: Skin is warm and dry. No rash noted. She is not diaphoretic. No erythema. No pallor. Psychiatric: She has a normal mood and affect. Her behavior is normal.       ASSESSMENT and PLAN    ICD-10-CM ICD-9-CM    1.  Pleural effusion on left    Pt has had persistent pleural effusion, slightly enlarging x 10/18, now mild and BL, she has chronic SOB but this appears relatively stable, her dyazide has recently changed from prn to daily in the last week, will repeat CXR in a month, if effusion still present will get CT chest for further eval   J90 511.9 XR CHEST PA LAT   2. Large hiatal hernia    Very large hiatal hernia, has some difficulty swallowing but can get food down, given age and comorbidities she is not a good candidate for surgical correction at this time, addressed this with her, of note she also follows with GI Dr Colin Cooney   K44.9 553.3    3. Chronic bilateral low back pain without sciatica    Pt has chronic compression deformities in her T spine, has been having worse pain in last week or two, unclear if this is contributing to her pleural effusions, she had been taking tylenol which helps her pain, will repeat imaging to ensure no new fractures, she previously saw Dr Catalino Islas (ortho) but has info for Dr Sandip Faye who is local if sx progress   M54.5 724.2 XR SPINE THORAC 3 V    G89.29 338.29 XR SPINE LUMB 2 OR 3 V   4. Essential hypertension    Pt is on metoprolol 25mg daily and now on dyazide daily as well, had been having high pressures leading to ER visit, now very well controlled a bit on lower side, no change, continue to monitor   J53 942.1 METABOLIC PANEL, BASIC   5. Essential tremor    F/u with Dr Gideon Nettles, tremors have worsened of late   G25.0 333.1    6. Hallucinations    Improved on seroquel, follows with [facility]   R44.3 780.1    7. Depression, unspecified depression type    Increase zoloft to 50mg daily   F32.9 311    8. Localized edema    Stable, now on dyazide every day rather than prn   R60.0 782.3     9 uti--macrobid for 5 days, allergy to cipro and bactrim, cr /gfr fine    Scribed by Ricardo Hu of [address], as dictated by Dr. Danny Tobar. Current diagnosis and concerns discussed with pt at length. Pt understands risks and benefits or current treatment plan and medications, and accepts the treatment and medication with any possible risks.  Pt asks appropriate questions, which were answered. Pt was instructed to call with any concerns or problems. I have reviewed the note documented by the scribe. The services provided are my own.   The documentation is accurate

## 2019-04-10 ENCOUNTER — TELEPHONE (OUTPATIENT)
Dept: INTERNAL MEDICINE CLINIC | Age: 84
End: 2019-04-10

## 2019-04-10 LAB
BACTERIA UR CULT: NO GROWTH
BUN SERPL-MCNC: 21 MG/DL (ref 10–36)
BUN/CREAT SERPL: 22 (ref 12–28)
CALCIUM SERPL-MCNC: 10.1 MG/DL (ref 8.7–10.3)
CHLORIDE SERPL-SCNC: 96 MMOL/L (ref 96–106)
CO2 SERPL-SCNC: 29 MMOL/L (ref 20–29)
CREAT SERPL-MCNC: 0.94 MG/DL (ref 0.57–1)
GLUCOSE SERPL-MCNC: 95 MG/DL (ref 65–99)
POTASSIUM SERPL-SCNC: 4.2 MMOL/L (ref 3.5–5.2)
SODIUM SERPL-SCNC: 138 MMOL/L (ref 134–144)

## 2019-04-10 NOTE — TELEPHONE ENCOUNTER
Called, spoke to [employer]. Two pt identifiers confirmed. Luis Felipe Ray asking if pt can take allegra prn.   Luis Felipe Ray wants to make sure that it would not affect any of pt's medications. Luis Felipe Ray informed that pharmacist TT will be informed for further recommendation. Luis Felipe Ray verbalized understanding of information discussed w/ no further questions at this time.

## 2019-04-10 NOTE — TELEPHONE ENCOUNTER
Patient's Daughter, Laisha Manzano, states she's returning your call from this morning in reference to test results. Please call.  Thank you

## 2019-04-10 NOTE — TELEPHONE ENCOUNTER
Spoke to [employer] (HIPAA). Annie Foster informed per PharmD TT, pt can take allegra but it will stay in the body longer since pt is on seroquel. Annie Foster informed per PharmD TT that pt can try claritin. Annie Foster verbalized understanding of information discussed w/ no further questions at this time.

## 2019-04-15 ENCOUNTER — TELEPHONE (OUTPATIENT)
Dept: INTERNAL MEDICINE CLINIC | Age: 84
End: 2019-04-15

## 2019-04-15 RX ORDER — TRIAMTERENE AND HYDROCHLOROTHIAZIDE 37.5; 25 MG/1; MG/1
1 CAPSULE ORAL DAILY
Qty: 90 CAP | Refills: 3 | Status: SHIPPED | OUTPATIENT
Start: 2019-04-15 | End: 2020-01-30

## 2019-04-15 NOTE — TELEPHONE ENCOUNTER
----- Message from Osiris Bird sent at 4/15/2019 11:08 AM EDT -----  Regarding: Michaelle Robledo MD./ telephone  Rui Matson Pts daughter would like the results of the urinalysis. Pt's daughter states the pt is having diarrhea  and would need to know if the Pt should continue taking the antibiotic.  Pts daughter's contact [phone]    Copy/paste Melina

## 2019-04-15 NOTE — TELEPHONE ENCOUNTER
Jemima can you cancel Shane's message? , Tonie Muro is going to handle refill, Lily Em is out all day. The script just needs to be changed to daily other than as needed.  Thanks

## 2019-04-15 NOTE — TELEPHONE ENCOUNTER
Called, spoke to [employer]. Two pt identifiers confirmed. Sergio Gannon informed per Dr. Tuttle that pt's urine culture was negative and pt can stop abx per Dr. Ruben Kwon asking for an order be faxed to [place] [facility] faxed to d/c abx per Dr. Ruben Kwon verbalized understanding of information discussed w/ no further questions at this time.

## 2019-04-15 NOTE — LETTER
4/15/2019 12:52 PM 
 
Ms. Terrance Brittle [address] [id number] To whom this may concern, please have Ms. Angela discontinue the nitrofurantoin, macrocrystal-monohydrate, (MACROBID) 100 mg capsule. If there are any questions or concerns, feel free to contact the office. Thank you.    
 
 
 
 
Sincerely, 
 
 
Kristin Jarvis MD

## 2019-04-18 ENCOUNTER — OFFICE VISIT (OUTPATIENT)
Dept: CARDIOLOGY CLINIC | Age: 84
End: 2019-04-18

## 2019-04-18 VITALS
WEIGHT: 104.8 LBS | BODY MASS INDEX: 19.29 KG/M2 | SYSTOLIC BLOOD PRESSURE: 104 MMHG | HEART RATE: 71 BPM | RESPIRATION RATE: 16 BRPM | OXYGEN SATURATION: 100 % | HEIGHT: 62 IN | DIASTOLIC BLOOD PRESSURE: 68 MMHG

## 2019-04-18 DIAGNOSIS — I48.0 PAROXYSMAL ATRIAL FIBRILLATION (HCC): Chronic | ICD-10-CM

## 2019-04-18 DIAGNOSIS — I10 ESSENTIAL HYPERTENSION: Primary | ICD-10-CM

## 2019-04-18 PROBLEM — J90 PLEURAL EFFUSION: Status: ACTIVE | Noted: 2019-04-18

## 2019-04-18 RX ORDER — UREA 10 %
100 LOTION (ML) TOPICAL DAILY
COMMUNITY
End: 2020-03-09 | Stop reason: SDUPTHER

## 2019-04-18 RX ORDER — GLUCOSAMINE SULFATE 1500 MG
2000 POWDER IN PACKET (EA) ORAL DAILY
COMMUNITY
End: 2020-03-09 | Stop reason: SDUPTHER

## 2019-04-18 NOTE — PROGRESS NOTES
Miri Ly DNP, ANP-BC  Subjective/HPI:     Desiree Serrano is a 80 y.o. female is here for emergency room follow-up where she presented hypertensive and shortness of breath. In reviewing chest x-ray direct viewing of the film she does have bilateral pleural effusions right greater than left. She was started back on Dyazide daily, her blood pressure has returned back to normal, her edema has resolved and there is improvement in her dyspnea. She has been seen by Dr. Harika Valedz primary care and is pending repeat chest x-ray in a few weeks. Reviewed sodium intake she does not add salt to her food, she does not report using snack foods that would have high levels of sodium.       PCP Provider  Citlali Cervantes MD  Past Medical History:   Diagnosis Date    Acute diverticulitis 9/25/2017    Allergic rhinitis 9/25/2017    Arrhythmia     afib    Arthritis     Asthma     Atrial fibrillation ([address]     Benign essential tremor 9/25/2017    Cancer ([facility]     skin    Celiac disease 9/25/2017    Constipation 9/25/2017    Diabetic peripheral neuropathy associated with type 2 diabetes mellitus ([facility] 4/21/2016    Diarrhea 9/25/2017    Diverticulosis of large intestine without hemorrhage 9/25/2017    Early satiety 9/25/2017    Fuchs' corneal dystrophy 9/25/2017    Gastroesophageal reflux disease without esophagitis 9/25/2017    GERD (gastroesophageal reflux disease)     High cholesterol     Hypercholesterolemia 9/25/2017    Hypertension     Irritable bowel syndrome with diarrhea 9/25/2017    Numbness 9/25/2017    Osteopenia 9/25/2017    Other ill-defined conditions(799.89)     collapsed lung prior to hernia repair surgery    Postmenopausal 7/23/2018    Ptosis, both eyelids 9/25/2017    Statin intolerance 9/25/2017    Unspecified adverse effect of anesthesia     pt states she went into cardiac arrest prior to hernia repair after the insertion of gas in stomach      Past Surgical History:   Procedure Laterality Date    HX APPENDECTOMY      HX CHOLECYSTECTOMY      HX GI      hemorrhoidectomy    HX HEENT      sinus surgery    HX HERNIA REPAIR      HX TONSILLECTOMY       Allergies   Allergen Reactions    Bactrim [Sulfamethoprim Ds] Other (comments)     consipation    Ciprofloxacin (Bulk) Other (comments)     Low wbc    Iodine Unknown (comments)    Prednisone Other (comments)     tachycardia    Statins-Hmg-Coa Reductase Inhibitors Unknown (comments)     Stomach uipset and mild muscle aches      Family History   Problem Relation Age of Onset    Heart Disease Mother     Dementia Mother     Heart Disease Father     Neuropathy Child     Depression Child     Other Child         diverticulitis    Lung Cancer Sister 80        lung ca      Current Outpatient Medications   Medication Sig    cholecalciferol (VITAMIN D3) 1,000 unit cap Take  by mouth daily.  cyanocobalamin (VITAMIN B-12) 100 mcg tablet Take 100 mcg by mouth daily.  triamterene-hydroCHLOROthiazide (DYAZIDE) 37.5-25 mg per capsule Take 1 Cap by mouth daily.  sertraline (ZOLOFT) 50 mg tablet Take 1 Tab by mouth daily.  acetaminophen (TYLENOL EXTRA STRENGTH) 500 mg tablet Take  by mouth every six (6) hours as needed for Pain.  QUEtiapine (SEROQUEL) 25 mg tablet Take 1 Tab by mouth nightly.  Lactobacillus rhamnosus GG (CULTURELLE PO) Take  by mouth daily.  melatonin tab tablet Take 2 Tabs by mouth nightly.  aspirin 81 mg chewable tablet Take 1 Tab by mouth daily.  disopyramide phosphate (NORPACE) 100 mg capsule TAKE ONE CAPSULE BY MOUTH TWICE A DAY    metoprolol tartrate (LOPRESSOR) 25 mg tablet TAKE 1 TABLET TWICE A DAY    pantoprazole (PROTONIX) 40 mg tablet Take 1 Tab by mouth daily. (Patient taking differently: Take 40 mg by mouth two (2) times a day.)    ondansetron hcl (ZOFRAN) 4 mg tablet Take 1 Tab by mouth every eight (8) hours as needed for Nausea.     polyethylene glycol (MIRALAX) 17 gram/dose powder Take 17 g by mouth daily.  nitroglycerin (NITROSTAT) 0.4 mg SL tablet 1 Tab by SubLINGual route every five (5) minutes as needed for Chest Pain.  magnesium hydroxide (YOUNG MILK OF MAGNESIA) 400 mg/5 mL suspension Take 30 mL by mouth daily as needed for Constipation.  sertraline (ZOLOFT) 25 mg tablet Take 1 Tab by mouth daily. No current facility-administered medications for this visit.        Vitals:    19 1036 19 1052   BP: 110/70 104/68   Pulse: 71    Resp: 16    SpO2: 100%    Weight: 104 lb 12.8 oz (47.5 kg)    Height: 5' 2\" (1.575 m)      Social History     Socioeconomic History    Marital status:      Spouse name: Not on file    Number of children: Not on file    Years of education: Not on file    Highest education level: Not on file   Occupational History    Not on file   Social Needs    Financial resource strain: Not on file    Food insecurity:     Worry: Not on file     Inability: Not on file    Transportation needs:     Medical: Not on file     Non-medical: Not on file   Tobacco Use    Smoking status: Former Smoker     Packs/day: 0.75     Years: 5.00     Pack years: 3.75     Last attempt to quit: 10/11/1967     Years since quittin.5    Smokeless tobacco: Never Used   Substance and Sexual Activity    Alcohol use: No    Drug use: No    Sexual activity: Not on file   Lifestyle    Physical activity:     Days per week: Not on file     Minutes per session: Not on file    Stress: Not on file   Relationships    Social connections:     Talks on phone: Not on file     Gets together: Not on file     Attends Zoroastrian service: Not on file     Active member of club or organization: Not on file     Attends meetings of clubs or organizations: Not on file     Relationship status: Not on file    Intimate partner violence:     Fear of current or ex partner: Not on file     Emotionally abused: Not on file     Physically abused: Not on file     Forced sexual activity: Not on file   Other Topics Concern    Not on file   Social History Narrative    Not on file       I have reviewed the nurses notes, vitals, problem list, allergy list, medical history, family, social history and medications. Review of Symptoms:    General: Pt denies excessive weight gain or loss. Pt is able to conduct ADL's  HEENT: Denies blurred vision, headaches, epistaxis and difficulty swallowing. Respiratory: Denies shortness of breath, LATHAM, wheezing or stridor. Cardiovascular: Denies precordial pain, palpitations, edema or PND  Gastrointestinal: Denies poor appetite, indigestion, abdominal pain or blood in stool  Musculoskeletal: Denies pain or swelling from muscles or joints  Neurologic: Denies tremor, paresthesias, or sensory motor disturbance  Skin: Denies rash, itching or texture change. Physical Exam:      General: Well developed, in no acute distress, cooperative and alert  HEENT: No carotid bruits, no JVD, trach is midline. Neck Supple,   Heart:  Normal S1/S2 negative S3 or S4. Regular, no murmur, gallop or rub.   Respiratory: Clear bilaterally x 4, no wheezing or rales  Abdomen:   Soft, non-tender, no masses, bowel sounds are active.   Extremities:  No edema, normal cap refill, no cyanosis, atraumatic. Neuro: A&Ox3, speech clear, gait stable. Skin: Skin color is normal. No rashes or lesions.  Non diaphoretic  Vascular: 2+ pulses symmetric in all extremities    Cardiographics    ECG: nsr   Results for orders placed or performed during the hospital encounter of 04/03/19   EKG, 12 LEAD, INITIAL   Result Value Ref Range    Ventricular Rate 93 BPM    Atrial Rate 93 BPM    P-R Interval 160 ms    QRS Duration 78 ms    Q-T Interval 376 ms    QTC Calculation (Bezet) 467 ms    Calculated P Axis 81 degrees    Calculated R Axis -54 degrees    Calculated T Axis 84 degrees    Diagnosis       Normal sinus rhythm with sinus arrhythmia  Possible Left atrial enlargement  Left axis deviation  Poor R-wave Progression (consider lead placement or loss of anterior forces)    Confirmed by Parul Barney MD, Blanca Napoles ([id number]) on 4/4/2019 2:39:37 PM     Results for orders placed or performed in visit on 10/28/16   CARDIAC HOLTER MONITOR, 24 HOURS    Narrative    ECG Monitor/24 hours, Complete    Reason for Holter Monitor  PAC'S    Heartbeat    Slowest 52  Average 64  Fastest  87    Results:   Underlying Rhythm: Normal sinus rhythm      Atrial Arrhythmias: premature atrial contractions; occasional            AV Conduction: normal    Ventricular Arrhythmias: premature ventricular contractions; rare     ST Segment Analysis:non-specific changes     Symptom Correlation:  Specific symptoms not reported. Comment:   No evidence of recurrent paroxysmal atrial fibrillation. Flaca Samson MD             Cardiology Labs:  Lab Results   Component Value Date/Time    Cholesterol, total 246 (H) 03/01/2019 10:15 AM    HDL Cholesterol 76 03/01/2019 10:15 AM    LDL, calculated 154 (H) 03/01/2019 10:15 AM    Triglyceride 82 03/01/2019 10:15 AM       Lab Results   Component Value Date/Time    Sodium 138 04/09/2019 11:07 AM    Potassium 4.2 04/09/2019 11:07 AM    Chloride 96 04/09/2019 11:07 AM    CO2 29 04/09/2019 11:07 AM    Anion gap 7 04/03/2019 05:02 PM    Glucose 95 04/09/2019 11:07 AM    BUN 21 04/09/2019 11:07 AM    Creatinine 0.94 04/09/2019 11:07 AM    BUN/Creatinine ratio 22 04/09/2019 11:07 AM    GFR est AA 61 04/09/2019 11:07 AM    GFR est non-AA 53 (L) 04/09/2019 11:07 AM    Calcium 10.1 04/09/2019 11:07 AM    Bilirubin, total 0.5 04/03/2019 05:02 PM    AST (SGOT) 19 04/03/2019 05:02 PM    Alk. phosphatase 57 04/03/2019 05:02 PM    Protein, total 6.4 04/03/2019 05:02 PM    Albumin 3.5 04/03/2019 05:02 PM    Globulin 2.9 04/03/2019 05:02 PM    A-G Ratio 1.2 04/03/2019 05:02 PM    ALT (SGPT) 26 04/03/2019 05:02 PM           Assessment:     Assessment:     Diagnoses and all orders for this visit:    1.  Essential hypertension  - AMB POC EKG ROUTINE W/ 12 LEADS, INTER & REP    2. Paroxysmal atrial fibrillation (HCC)        ICD-10-CM ICD-9-CM    1. Essential hypertension I10 401.9 AMB POC EKG ROUTINE W/ 12 LEADS, INTER & REP   2. Paroxysmal atrial fibrillation (HCC) I48.0 427.31      Orders Placed This Encounter    AMB POC EKG ROUTINE W/ 12 LEADS, INTER & REP     Order Specific Question:   Reason for Exam:     Answer:   Routine    cholecalciferol (VITAMIN D3) 1,000 unit cap     Sig: Take  by mouth daily.  cyanocobalamin (VITAMIN B-12) 100 mcg tablet     Sig: Take 100 mcg by mouth daily. Plan:     1. Paroxysmal atrial fibrillation: Maintaining sinus rhythm, anticoagulated with aspirin only she has not had breakthrough A. fib in quite a long time, she is a frail 80-year-old female assessing risk versus benefit at this time if anticoagulation with aspirin is appropriate. 2.  Hypertension: Controlled 104/68, she will need to remain on Dyazide daily long-term  3. Pleural effusion: Lungs are clear to auscultation, weight has trended down, dependent edema resolved continue Dyazide. She has a chest x-ray ordered from Dr. Vero Beltre which will be done in approximately 2 to 3 weeks. If she continues to have pleural effusion would consider changing Dyazide to furosemide. Follow-up with Dr. Debbie Ansari in 3 months. Sabrina Coleman NP    This note was created using voice recognition software. Despite editing, there may be syntax errors.

## 2019-04-18 NOTE — PROGRESS NOTES
1. Have you been to the ER, urgent care clinic since your last visit? Hospitalized since your last visit? Yes Where: at [facility] for pleural effusion on 4/3/19    2. Have you seen or consulted any other health care providers outside of the [address] [place] [address] since your last visit? Include any pap smears or colon screening.  No    Chief Complaint   Patient presents with   [facility] Follow Up     for pleural effusion    Shortness of Breath     occasionally     Palpitations    Leg Swelling     bilateral

## 2019-05-07 RX ORDER — ACETAMINOPHEN 500 MG
500 TABLET ORAL
Qty: 100 TAB | Refills: 2 | Status: SHIPPED | OUTPATIENT
Start: 2019-05-07 | End: 2020-03-09 | Stop reason: SDUPTHER

## 2019-05-07 NOTE — TELEPHONE ENCOUNTER
Patient's daughter, Magda Garcia, states she needs to get refill for patient that is in [address] for Over the Counter medication to be done thru [facility] [facility] [employer] indicated. Please call if any questions or when done.  Thank you

## 2019-05-09 ENCOUNTER — TELEPHONE (OUTPATIENT)
Dept: NEUROLOGY | Age: 84
End: 2019-05-09

## 2019-05-09 DIAGNOSIS — J90 EXUDATIVE PLEURISY: Primary | ICD-10-CM

## 2019-05-09 NOTE — TELEPHONE ENCOUNTER
Pt's daughter calling, the pt has become extremely paranoid, sometimes thinks people are talking about about her. She said it gets to the point the pt is really emotional about it. She would like Dr. Liliam Edouard to ask questions re that at her next appt 5/14 but doesn't want the pt to know she called. Please call back if there are any questions.

## 2019-05-10 ENCOUNTER — TELEPHONE (OUTPATIENT)
Dept: INTERNAL MEDICINE CLINIC | Age: 84
End: 2019-05-10

## 2019-05-14 ENCOUNTER — OFFICE VISIT (OUTPATIENT)
Dept: NEUROLOGY | Age: 84
End: 2019-05-14

## 2019-05-14 VITALS
BODY MASS INDEX: 19.51 KG/M2 | DIASTOLIC BLOOD PRESSURE: 60 MMHG | HEIGHT: 62 IN | RESPIRATION RATE: 16 BRPM | OXYGEN SATURATION: 96 % | HEART RATE: 75 BPM | SYSTOLIC BLOOD PRESSURE: 102 MMHG | WEIGHT: 106 LBS

## 2019-05-14 DIAGNOSIS — F02.80 LEWY BODY PARKINSON DISEASE (HCC): Primary | ICD-10-CM

## 2019-05-14 DIAGNOSIS — R44.3 HALLUCINATIONS: ICD-10-CM

## 2019-05-14 DIAGNOSIS — G31.83 LEWY BODY PARKINSON DISEASE (HCC): Primary | ICD-10-CM

## 2019-05-14 NOTE — PATIENT INSTRUCTIONS
A Healthy Lifestyle: Care Instructions Your Care Instructions A healthy lifestyle can help you feel good, stay at a healthy weight, and have plenty of energy for both work and play. A healthy lifestyle is something you can share with your whole family. A healthy lifestyle also can lower your risk for serious health problems, such as high blood pressure, heart disease, and diabetes. You can follow a few steps listed below to improve your health and the health of your family. Follow-up care is a key part of your treatment and safety. Be sure to make and go to all appointments, and call your doctor if you are having problems. It's also a good idea to know your test results and keep a list of the medicines you take. How can you care for yourself at home? · Do not eat too much sugar, fat, or fast foods. You can still have dessert and treats now and then. The goal is moderation. · Start small to improve your eating habits. Pay attention to portion sizes, drink less juice and soda pop, and eat more fruits and vegetables. ? Eat a healthy amount of food. A 3-ounce serving of meat, for example, is about the size of a deck of cards. Fill the rest of your plate with vegetables and whole grains. ? Limit the amount of soda and sports drinks you have every day. Drink more water when you are thirsty. ? Eat at least 5 servings of fruits and vegetables every day. It may seem like a lot, but it is not hard to reach this goal. A serving or helping is 1 piece of fruit, 1 cup of vegetables, or 2 cups of leafy, raw vegetables. Have an apple or some carrot sticks as an afternoon snack instead of a candy bar. Try to have fruits and/or vegetables at every meal. 
· Make exercise part of your daily routine. You may want to start with simple activities, such as walking, bicycling, or slow swimming. Try to be active 30 to 60 minutes every day.  You do not need to do all 30 to 60 minutes all at once. For example, you can exercise 3 times a day for 10 or 20 minutes. Moderate exercise is safe for most people, but it is always a good idea to talk to your doctor before starting an exercise program. 
· Keep moving. Gayla Boys the lawn, work in the garden, or [employer]. Take the stairs instead of the elevator at work. · If you smoke, quit. People who smoke have an increased risk for heart attack, stroke, cancer, and other lung illnesses. Quitting is hard, but there are ways to boost your chance of quitting tobacco for good. ? Use nicotine gum, patches, or lozenges. ? Ask your doctor about stop-smoking programs and medicines. ? Keep trying. In addition to reducing your risk of diseases in the future, you will notice some benefits soon after you stop using tobacco. If you have shortness of breath or asthma symptoms, they will likely get better within a few weeks after you quit. · Limit how much alcohol you drink. Moderate amounts of alcohol (up to 2 drinks a day for men, 1 drink a day for women) are okay. But drinking too much can lead to liver problems, high blood pressure, and other health problems. Family health If you have a family, there are many things you can do together to improve your health. · Eat meals together as a family as often as possible. · Eat healthy foods. This includes fruits, vegetables, lean meats and dairy, and whole grains. · Include your family in your fitness plan. Most people think of activities such as jogging or tennis as the way to fitness, but there are many ways you and your family can be more active. Anything that makes you breathe hard and gets your heart pumping is exercise. Here are some tips: 
? Walk to do errands or to take your child to school or the bus. 
? Go for a family bike ride after dinner instead of watching TV. Where can you learn more? Go to http://pinky-shamar.info/. Enter B068 in the search box to learn more about \"A Healthy Lifestyle: Care Instructions. \" Current as of: September 11, 2018 Content Version: 11.9 © 4054-8298 [employer]. Care instructions adapted under license by [employer] (which disclaims liability or warranty for this information). If you have questions about a medical condition or this instruction, always ask your healthcare professional. [place] [id number] any warranty or liability for your use of this information.

## 2019-05-14 NOTE — PROGRESS NOTES
Chief Complaint Patient presents with  Parkinsons Disease HPI Latia Velásquez is a 80-year-old woman I have been seen for possible parkinsonism versus worsening essential tremor. [facility] declined. Since I saw her last her tremor is getting worse and she is showing more of a left hand resting tremor now. Her family is present and also reports increasing paranoia and hallucinations. Latia Velásquez tells me that she has nightly hallucinations described as seeing a woman's face on the ceiling that comes close to her. She also might see birds. Sometimes she hears voices. She tells me that the hallucinations do not disturb her but she would like them reduced if possible. We tried low-dose Seroquel without much change. She has already failed carbidopa in the past.  She has chronic constipation and requires MiraLAX daily. No choking but she feels like she is creating more salivation. No falls. She lives independent living. She has A. fib protected only with antiplatelets. She is rate controlled. Review of Systems Gastrointestinal: Positive for constipation. Musculoskeletal: Negative for falls. Neurological: Positive for tremors. Psychiatric/Behavioral: Positive for hallucinations. Negative for depression. The patient has insomnia. All other systems reviewed and are negative. Past Medical History:  
Diagnosis Date  Acute diverticulitis 9/25/2017  Allergic rhinitis 9/25/2017  Arrhythmia   
 afib  Arthritis  Asthma  Atrial fibrillation ([address]  Benign essential tremor 9/25/2017  Cancer ([address] skin  Celiac disease 9/25/2017  Constipation 9/25/2017  Diabetic peripheral neuropathy associated with type 2 diabetes mellitus ([address] 4/21/2016  Diarrhea 9/25/2017  Diverticulosis of large intestine without hemorrhage 9/25/2017  Early satiety 9/25/2017  Fuchs' corneal dystrophy 9/25/2017  Gastroesophageal reflux disease without esophagitis 9/25/2017  GERD (gastroesophageal reflux disease)  High cholesterol  Hypercholesterolemia 2017  Hypertension  Irritable bowel syndrome with diarrhea 2017  Numbness 2017  Osteopenia 2017  Other ill-defined conditions(799.89)   
 collapsed lung prior to hernia repair surgery  Postmenopausal 2018  Ptosis, both eyelids 2017  Statin intolerance 2017  Unspecified adverse effect of anesthesia   
 pt states she went into cardiac arrest prior to hernia repair after the insertion of gas in stomach Family History Problem Relation Age of Onset  Heart Disease Mother  Dementia Mother  Heart Disease Father  Neuropathy Child  Depression Child  Other Child   
     diverticulitis  Lung Cancer Sister 80  
     lung ca Social History Socioeconomic History  Marital status:  Spouse name: Not on file  Number of children: Not on file  Years of education: Not on file  Highest education level: Not on file Occupational History  Not on file Social Needs  Financial resource strain: Not on file  Food insecurity:  
  Worry: Not on file Inability: Not on file  Transportation needs:  
  Medical: Not on file Non-medical: Not on file Tobacco Use  Smoking status: Former Smoker Packs/day: 0.75 Years: 5.00 Pack years: 3.75 Last attempt to quit: 10/11/1967 Years since quittin.6  Smokeless tobacco: Never Used Substance and Sexual Activity  Alcohol use: No  
 Drug use: No  
 Sexual activity: Not on file Lifestyle  Physical activity:  
  Days per week: Not on file Minutes per session: Not on file  Stress: Not on file Relationships  Social connections:  
  Talks on phone: Not on file Gets together: Not on file Attends Yarsani service: Not on file Active member of club or organization: Not on file Attends meetings of clubs or organizations: Not on file Relationship status: Not on file  Intimate partner violence:  
  Fear of current or ex partner: Not on file Emotionally abused: Not on file Physically abused: Not on file Forced sexual activity: Not on file Other Topics Concern  Not on file Social History Narrative  Not on file Allergies Allergen Reactions  Bactrim [Sulfamethoprim Ds] Other (comments)  
  consipation  Ciprofloxacin (Bulk) Other (comments) Low wbc  Iodine Unknown (comments)  Prednisone Other (comments)  
  tachycardia  Statins-Hmg-Coa Reductase Inhibitors Unknown (comments) Stomach uipset and mild muscle aches Current Outpatient Medications Medication Sig  
 acetaminophen (TYLENOL EXTRA STRENGTH) 500 mg tablet Take 1 Tab by mouth every eight (8) hours as needed for Pain.  cholecalciferol (VITAMIN D3) 1,000 unit cap Take  by mouth daily. Indications: taking 5,000u  cyanocobalamin (VITAMIN B-12) 100 mcg tablet Take 100 mcg by mouth daily. Indications: 500mcg  triamterene-hydroCHLOROthiazide (DYAZIDE) 37.5-25 mg per capsule Take 1 Cap by mouth daily. (Patient taking differently: Take 1 Cap by mouth two (2) times a day.)  sertraline (ZOLOFT) 50 mg tablet Take 1 Tab by mouth daily.  Lactobacillus rhamnosus GG (CULTURELLE PO) Take  by mouth daily.  melatonin tab tablet Take 2 Tabs by mouth nightly.  aspirin 81 mg chewable tablet Take 1 Tab by mouth daily.  disopyramide phosphate (NORPACE) 100 mg capsule TAKE ONE CAPSULE BY MOUTH TWICE A DAY  metoprolol tartrate (LOPRESSOR) 25 mg tablet TAKE 1 TABLET TWICE A DAY  pantoprazole (PROTONIX) 40 mg tablet Take 1 Tab by mouth daily. (Patient taking differently: Take 40 mg by mouth two (2) times a day.)  ondansetron hcl (ZOFRAN) 4 mg tablet Take 1 Tab by mouth every eight (8) hours as needed for Nausea.  polyethylene glycol (MIRALAX) 17 gram/dose powder Take 17 g by mouth daily.  nitroglycerin (NITROSTAT) 0.4 mg SL tablet 1 Tab by SubLINGual route every five (5) minutes as needed for Chest Pain.  magnesium hydroxide (YOUNG MILK OF MAGNESIA) 400 mg/5 mL suspension Take 30 mL by mouth daily as needed for Constipation.  sertraline (ZOLOFT) 25 mg tablet Take 1 Tab by mouth daily. (Patient taking differently: Take 50 mg by mouth daily.) No current facility-administered medications for this visit. Neurologic Exam  
 
Mental Status WD/WN adult in NAD, normal grooming VSS 
A&O, talkative, good eye contact PERRL, nonicteric, reduced blink rate Face is symmetric, tongue midline, masklike face Speech is soft but clear No limb ataxia. Left hand more than right hand resting tremor Moving all extemities spontaneously and symmetric Good tempo non-shuffling gait with Rollator CVS RRR Lungs nonlabored Skin is warm and dry Visit Vitals /60 Pulse 75 Resp 16 Ht 5' 2\" (1.575 m) Wt 48.1 kg (106 lb) SpO2 96% BMI 19.39 kg/m² Assessment and Plan Diagnoses and all orders for this visit: 1. Lewy body Parkinson disease 2. Hallucinations 70-year-old woman I suspect probably has Lewy body disease manifesting with parkinsonian symptoms and hallucinations. Unfortunately there is no clear adequate treatment for this condition. Carbidopa typically is ineffective. Seroquel is being used at low dose so were not sure how effective it is and I hesitate to increase any further due to the black box warning and her history of A. fib. I think at this point we should discontinue Seroquel and see if it was helping at all. We talked about Nuplazid but unfortunately she cannot have that medication due to her concurrent cardiac issues. At this point we will monitor for any changes. Increase her daily walking. 40 minutes of time was spent face-to-face with the patient and her family discussing her overall condition, current symptoms, risks of medications and plans moving forward. I reviewed and decided to continue the current medications. [facility],  
NEUROLOGIST Diplomate WILIN

## 2019-05-14 NOTE — PROGRESS NOTES
Ms. Schmitz Kaylie presents today to follow up Parkinson's. She reported tremor in bilateral hands. She also has decreased flexibility in left hand.

## 2019-05-15 ENCOUNTER — TELEPHONE (OUTPATIENT)
Dept: INTERNAL MEDICINE CLINIC | Age: 84
End: 2019-05-15

## 2019-05-15 NOTE — TELEPHONE ENCOUNTER
Patient's daughter/POA, Kim Antunez, states she needs a call back in reference to seeing if Dr. Osbaldo Palafox can Write a letter stating the patient is not capable of handling her Own affairs so this can be given to the bank for POA to be able to take care of her finances. Please call to advise.  Thank you

## 2019-05-15 NOTE — TELEPHONE ENCOUNTER
Spoke to [employer] (HIPAA). Two pt identifiers confirmed. Carlos Enrique Mccollum asking if a letter be drafted for the pt not being able to make financial decisions d/t memory and dementia. Carlos Enrique Mccollum states that she is the POA but the attorneys stated a letter would need to be drafted. Carlos Enrique Mccollum informed Dr. Vero Beltre will be notified. Carlos Enrique Mccollum verbalized understanding of information discussed w/ no further questions at this time.

## 2019-05-16 ENCOUNTER — TELEPHONE (OUTPATIENT)
Dept: NEUROLOGY | Age: 84
End: 2019-05-16

## 2019-05-16 NOTE — TELEPHONE ENCOUNTER
MD Dee Dee Simpson LPN   Caller: Unspecified Abbi Pradhan,  3:28 PM)             That would come from her neurologist

## 2019-05-16 NOTE — TELEPHONE ENCOUNTER
----- Message from Cassi Eden sent at 5/16/2019 12:11 PM EDT -----  Regarding: Dr. Juan Perez(pt's daughter/POA) is requesting a call back from Dr. Clinton Landaverde or nurse in reference to pt's health. Need a letter from Dr. Clinton Landaverde indicating that pt is no longer able to make financial decisions on her own. Deshawn York best contact [phone].

## 2019-05-16 NOTE — TELEPHONE ENCOUNTER
Called, spoke to pt. Two pt identifiers confirmed. Karoline Medina informed per Dr. Yamel Driscoll that Neuro should write the letter. Karoline Medina verbalized understanding of information discussed w/ no further questions at this time.

## 2019-05-17 NOTE — TELEPHONE ENCOUNTER
Mailed letter to patient's daughter. [address], [address] [facility], [address].  (HIPPA verified)

## 2019-05-20 ENCOUNTER — TELEPHONE (OUTPATIENT)
Dept: NEUROLOGY | Age: 84
End: 2019-05-20

## 2019-05-20 NOTE — TELEPHONE ENCOUNTER
Pt's daughter notified we will resume the Seroquel. I faxed copy of this note to [facility], Attn: Ruth Cardoza (nurse) at [phone].

## 2019-05-20 NOTE — TELEPHONE ENCOUNTER
Okay to resume Seroquel. 25 mg at bedtime for hallucinations and sleep  Please send this documentation to the facility.

## 2019-05-20 NOTE — TELEPHONE ENCOUNTER
Seroquel was discontinued at last visit and this started this past week. If we put her back on it, they have the meds, just need a note to nursing home to restart it.

## 2019-05-20 NOTE — TELEPHONE ENCOUNTER
Pt's daughter said pt has not been sleeping well at night and the hallucinations are becoming more frequent. She isn't sure if it is because Dr. Anastacio Lala changed pt's medication.  Please call back

## 2019-06-04 ENCOUNTER — OFFICE VISIT (OUTPATIENT)
Dept: INTERNAL MEDICINE CLINIC | Age: 84
End: 2019-06-04

## 2019-06-04 VITALS
WEIGHT: 106 LBS | DIASTOLIC BLOOD PRESSURE: 82 MMHG | HEIGHT: 62 IN | HEART RATE: 95 BPM | OXYGEN SATURATION: 98 % | SYSTOLIC BLOOD PRESSURE: 120 MMHG | TEMPERATURE: 97.9 F | BODY MASS INDEX: 19.51 KG/M2 | RESPIRATION RATE: 16 BRPM

## 2019-06-04 DIAGNOSIS — F02.80 LEWY BODY DEMENTIA WITHOUT BEHAVIORAL DISTURBANCE (HCC): ICD-10-CM

## 2019-06-04 DIAGNOSIS — I48.0 PAROXYSMAL ATRIAL FIBRILLATION (HCC): ICD-10-CM

## 2019-06-04 DIAGNOSIS — F32.A DEPRESSION, UNSPECIFIED DEPRESSION TYPE: Primary | ICD-10-CM

## 2019-06-04 DIAGNOSIS — I10 ESSENTIAL HYPERTENSION: ICD-10-CM

## 2019-06-04 DIAGNOSIS — M54.6 CHRONIC BILATERAL THORACIC BACK PAIN: ICD-10-CM

## 2019-06-04 DIAGNOSIS — G25.0 ESSENTIAL TREMOR: ICD-10-CM

## 2019-06-04 DIAGNOSIS — E78.2 MIXED HYPERLIPIDEMIA: ICD-10-CM

## 2019-06-04 DIAGNOSIS — E11.21 TYPE 2 DIABETES WITH NEPHROPATHY (HCC): ICD-10-CM

## 2019-06-04 DIAGNOSIS — K59.03 DRUG-INDUCED CONSTIPATION: ICD-10-CM

## 2019-06-04 DIAGNOSIS — J90 PLEURAL EFFUSION: ICD-10-CM

## 2019-06-04 DIAGNOSIS — G31.83 LEWY BODY DEMENTIA WITHOUT BEHAVIORAL DISTURBANCE (HCC): ICD-10-CM

## 2019-06-04 DIAGNOSIS — R44.3 HALLUCINATION: ICD-10-CM

## 2019-06-04 DIAGNOSIS — G89.29 CHRONIC BILATERAL THORACIC BACK PAIN: ICD-10-CM

## 2019-06-04 PROBLEM — F33.0 DEPRESSION, MAJOR, RECURRENT, MILD (HCC): Status: ACTIVE | Noted: 2019-06-04

## 2019-06-04 NOTE — PROGRESS NOTES
HISTORY OF PRESENT ILLNESS  Desiree Serrano is a 80 y.o. female. HPI  Last here 4/9/19. Pt is here to f/u on chronic conditions. BP is 107/74, and HR 90  SBP has been in 140s/70s in general  Continues on metoprolol 25mg bid  Pt also takes dyazide 37.5-25 now daily for her leg swelling and bp and fluid in lung     Wt today is 106 lbs --stable x lov  Her wt is in the nl ranges    Ordered labs     Pt follows wt Dr Naya Tolbert (neuro) for tremor  Reviewed notes 1114/19: 22-year-old woman I suspect probably has Lewy body disease manifesting with parkinsonian symptoms and hallucinations. Unfortunately there is no clear adequate treatment for this condition. Carbidopa typically is ineffective. Seroquel is being used at low dose so were not sure how effective it is and I hesitate to increase any further due to the black box warning and her history of A. fib. I think at this point we should discontinue Seroquel and see if it was helping at all. We talked about Nuplazid but unfortunately she cannot have that medication due to her concurrent cardiac issues. At this point we will monitor for any changes. Increase her daily walking.   Pt had been being treated for Parkinson's but the neuro decided it may not be this so they stopped this treatment  Has chronic tremors and hallucinations  Pt is on seroquel 25mg for sleep and hallucinations - it is helping with both of these, stable  Pt tried having her seroquel stopped but had sx so this was restarted b/c her hallucinations worsened this helps a lot      Pt follows with Dr Madonna Leal (cardio) for a fib  Last visit was 3/18/19 with NP Mcguiness  Pt is on norpace 100mg BID      Pt sees Dr Scott Quispe (GI) for diverticulosis  Pt also has a large hiatal hernia  Pt also has a gluten sensitivity and mostly avoids this  Not completely gluten free     Pt saw Dr Carissa Ashby (neuropsych) in the past  Last visit was 2015 - no dementia     Pt saw Dr Taylor Mtz (ENT) for hearing loss  Last visit was 12/18/18  Pt decided not to get hearing aids     Pt sees Dr Alicia Acevedo (ortho)  Last visit was 1/9/19  Previously, provided info for Dr Jenniffer Barnett  She had been taking tramadol but had SE so was switched to tylenol which helps somewhat  Pt c/o still having pain, now located in L back/side  Reviewed XR L spine 4/19: 1. Osteopenia with chronic wedging of T10-T12. An acute fracture is not identified. Given degree of osteopenia if clinical concern is high MR imaging is recommended to evaluate for occult fracture  Her pain is better when she lays down at night  Declines PT     Pt declined taking reclast and fosamax in the past  Pt is taking vit D 5000U daily     Pt takes protonix 40mg bid   Controls gerd has large HH     Pt is on ASA 81mg daily     Pt takes miralax for constipation  This works well as long as she takes it daily occasionally takes extra dose     Lov, increased zoloft to 50mg (from 25) - per pt, this has been helping not depressed happy with dose     Pt wonders if her L ear is burned  She had a hairdressed use a hot blowdryer a lot 1 month ago and this area has been painful to lay on since then  Discussed that her ear looks nl on exam    Reviewed repeat CXR 5/19:  1. Resolution of the previous seen right pleural effusion. 2. Decreased mild left pleural effusion     ACP on file.  SDMs are her daughters, Sergio Herrmann and Christiano Gerardo.     PREVENTIVE:  Colonoscopy: ~10 years ago with Dr Chloe Serrano, per pt the way her colon is formed makes it difficult to get COLOs, her declines further  Pap: declines further  Mammogram: declines further  Dexa: 8/18, osteoporosis  Tdap: advised pt to get this at her pharmacy  Prevnar 13: 03/01/19   Shingrix: ordered 03/01/19   Flu shot: Fall 2018  A1C: 3/19 5.9  Eye exam: Dr Hetal Lai ~summer 2018, Dr Oakley Kehr 2/19 (q6 weeks)  Lipids: 3/19     Patient Active Problem List    Diagnosis Date Noted    Postmenopausal 07/23/2018     Priority: 1 - One    Pleural effusion 04/18/2019    History of stroke 06/11/2018    Epidermoid cyst of finger of left hand 02/02/2018    Osteopenia 09/25/2017    Gastroesophageal reflux disease without esophagitis 09/25/2017    Diverticulosis of large intestine without hemorrhage 09/25/2017    Benign essential tremor 09/25/2017    Irritable bowel syndrome with diarrhea 09/25/2017    Chest wall pain 09/25/2017    Statin intolerance 09/25/2017    Diarrhea 09/25/2017    Constipation 09/25/2017    Hypercholesterolemia 09/25/2017    Early satiety 09/25/2017    Celiac disease 09/25/2017    Allergic rhinitis 09/25/2017    Acute diverticulitis 09/25/2017    Numbness 09/25/2017    Fuchs' corneal dystrophy 09/25/2017    Ptosis, both eyelids 09/25/2017    Idiopathic small and large fiber sensory neuropathy 04/21/2016    Diabetic peripheral neuropathy associated with type 2 diabetes mellitus ([facility] 04/21/2016    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 04/21/2016    Abnormal MRI of head 08/25/2015    Depression 07/24/2015    Anxiety 07/24/2015    Attention deficit disorder 07/24/2015    Cerebrovascular disease, unspecified 05/03/2015    Essential tremor 04/20/2015    B12 deficiency 04/20/2015    Osteoporosis 04/20/2015    Memory loss 04/20/2015    Celiac sprue 05/14/2013    Other and unspecified hyperlipidemia 05/14/2013    Atrial fibrillation (HCC)     Hypertension      Current Outpatient Medications   Medication Sig Dispense Refill    acetaminophen (TYLENOL EXTRA STRENGTH) 500 mg tablet Take 1 Tab by mouth every eight (8) hours as needed for Pain. 100 Tab 2    cholecalciferol (VITAMIN D3) 1,000 unit cap Take  by mouth daily. Indications: taking 5,000u      cyanocobalamin (VITAMIN B-12) 100 mcg tablet Take 100 mcg by mouth daily. Indications: 500mcg      triamterene-hydroCHLOROthiazide (DYAZIDE) 37.5-25 mg per capsule Take 1 Cap by mouth daily.  (Patient taking differently: Take 1 Cap by mouth two (2) times a day.) 90 Cap 3    sertraline (ZOLOFT) 50 mg tablet Take 1 Tab by mouth daily. 30 Tab 3    sertraline (ZOLOFT) 25 mg tablet Take 1 Tab by mouth daily. (Patient taking differently: Take 50 mg by mouth daily.) 30 Tab 3    Lactobacillus rhamnosus GG (CULTURELLE PO) Take  by mouth daily.  melatonin tab tablet Take 2 Tabs by mouth nightly. 60 Tab 5    aspirin 81 mg chewable tablet Take 1 Tab by mouth daily. 30 Tab 6    disopyramide phosphate (NORPACE) 100 mg capsule TAKE ONE CAPSULE BY MOUTH TWICE A DAY 60 Cap 0    metoprolol tartrate (LOPRESSOR) 25 mg tablet TAKE 1 TABLET TWICE A DAY 60 Tab 12    pantoprazole (PROTONIX) 40 mg tablet Take 1 Tab by mouth daily. (Patient taking differently: Take 40 mg by mouth two (2) times a day.) 30 Tab 12    ondansetron hcl (ZOFRAN) 4 mg tablet Take 1 Tab by mouth every eight (8) hours as needed for Nausea. 20 Tab 3    polyethylene glycol (MIRALAX) 17 gram/dose powder Take 17 g by mouth daily.  nitroglycerin (NITROSTAT) 0.4 mg SL tablet 1 Tab by SubLINGual route every five (5) minutes as needed for Chest Pain. 25 Tab 1    magnesium hydroxide (YOUNG MILK OF MAGNESIA) 400 mg/5 mL suspension Take 30 mL by mouth daily as needed for Constipation.        Past Surgical History:   Procedure Laterality Date    HX APPENDECTOMY      HX CHOLECYSTECTOMY      HX GI      hemorrhoidectomy    HX HEENT      sinus surgery    HX HERNIA REPAIR      HX TONSILLECTOMY        Lab Results   Component Value Date/Time    WBC 3.6 04/03/2019 05:02 PM    WBC (POC) 5.6 07/23/2018 03:07 PM    HGB 13.4 04/03/2019 05:02 PM    HGB (POC) 15.0 07/23/2018 03:07 PM    HCT 41.2 04/03/2019 05:02 PM    HCT (POC) 45.9 07/23/2018 03:07 PM    PLATELET 212 05/24/6556 05:02 PM    PLATELET (POC) 882.9 07/23/2018 03:07 PM    MCV 92.6 04/03/2019 05:02 PM    MCV (POC) 90.0 07/23/2018 03:07 PM     Lab Results   Component Value Date/Time    Cholesterol, total 246 (H) 03/01/2019 10:15 AM    HDL Cholesterol 76 03/01/2019 10:15 AM    LDL, calculated 154 (H) 03/01/2019 10:15 AM    Triglyceride 82 03/01/2019 10:15 AM     Lab Results   Component Value Date/Time    GFR est non-AA 53 (L) 04/09/2019 11:07 AM    GFRNA, POC >60 10/15/2018 01:54 PM    GFR est AA 61 04/09/2019 11:07 AM    GFRAA, POC >60 10/15/2018 01:54 PM    Creatinine 0.94 04/09/2019 11:07 AM    Creatinine (POC) 0.8 10/15/2018 01:54 PM    BUN 21 04/09/2019 11:07 AM    Sodium 138 04/09/2019 11:07 AM    Potassium 4.2 04/09/2019 11:07 AM    Chloride 96 04/09/2019 11:07 AM    CO2 29 04/09/2019 11:07 AM    Magnesium 2.2 01/13/2019 11:15 PM        Review of Systems   Respiratory: Negative for shortness of breath. Cardiovascular: Negative for chest pain. Physical Exam   Constitutional: She is oriented to person, place, and time. She appears well-developed and well-nourished. No distress. HENT:   Head: Normocephalic and atraumatic. Mouth/Throat: Oropharynx is clear and moist. No oropharyngeal exudate. Eyes: Conjunctivae and EOM are normal. Right eye exhibits no discharge. Left eye exhibits no discharge. Neck: Normal range of motion. Neck supple. Cardiovascular: Normal rate, regular rhythm and normal heart sounds. Exam reveals no gallop and no friction rub. No murmur heard. Pulmonary/Chest: Effort normal and breath sounds normal. No respiratory distress. She has no wheezes. She has no rales. She exhibits no tenderness. Musculoskeletal: She exhibits edema (Trace BLE, compression hose in place). She exhibits no tenderness or deformity. Lymphadenopathy:     She has no cervical adenopathy. Neurological: She is alert and oriented to person, place, and time. Coordination abnormal.   Skin: Skin is warm and dry. No rash noted. She is not diaphoretic. No erythema. No pallor. Psychiatric: She has a normal mood and affect. Her behavior is normal.       ASSESSMENT and PLAN    ICD-10-CM ICD-9-CM    1.  Depression, unspecified depression type    Much improved on zoloft 50mg daily, continue   F32.9 311    2. Type 2 diabetes with nephropathy ([facility] [address]    Really more prediabetic, diet controlled, monitor a1cs   E11.21 250.40      583.81    3. Essential tremor    Following with neuro for this, ultimately she is felt to have a Lewy body dementia, has tried and failed several meds, currently on seroquel again for hallucinations as this worked best   G25.0 333.1    4. Paroxysmal atrial fibrillation (HCC)    On norpace BID, follows with HARRY Kenyon, on ASA for anticoagulation   I48.0 427.31    5. Pleural effusion    Improved with daily dyazide on CXR   J90 511.9    6. Hallucination    See above   R44.3 780.1    7. Lewy body dementia without behavioral disturbance    See above   G31.83 331.82     F02.80 294.10    8. Mixed hyperlipidemia    Diet controlled   E78.2 272.2    9. Essential hypertension    Controlled on metoprolol   I10 401.9    10. Chronic bilateral thoracic back pain    Musculoskeletal, tylenol prn   M54.6 724.1     G89.29 338.29    11. Drug-induced constipation    miralax daily K59.03 564.09      E980.5       Scribed by Wendy Howell, as dictated by Dr. Marnie Wu. Current diagnosis and concerns discussed with pt at length. Pt understands risks and benefits or current treatment plan and medications, and accepts the treatment and medication with any possible risks. Pt asks appropriate questions, which were answered. Pt was instructed to call with any concerns or problems. I have reviewed the note documented by the scribe. The services provided are my own.   The documentation is accurate

## 2019-07-05 ENCOUNTER — HOSPITAL ENCOUNTER (OUTPATIENT)
Dept: LAB | Age: 84
Discharge: HOME | End: 2019-07-05
Payer: MEDICARE

## 2019-07-05 ENCOUNTER — OFFICE VISIT (OUTPATIENT)
Dept: INTERNAL MEDICINE CLINIC | Age: 84
End: 2019-07-05

## 2019-07-05 VITALS
HEART RATE: 94 BPM | WEIGHT: 106.4 LBS | HEIGHT: 62 IN | SYSTOLIC BLOOD PRESSURE: 116 MMHG | TEMPERATURE: 98 F | BODY MASS INDEX: 19.58 KG/M2 | OXYGEN SATURATION: 98 % | DIASTOLIC BLOOD PRESSURE: 75 MMHG | RESPIRATION RATE: 14 BRPM

## 2019-07-05 DIAGNOSIS — R30.0 DYSURIA: Primary | ICD-10-CM

## 2019-07-05 LAB
BILIRUB UR QL STRIP: NEGATIVE
GLUCOSE UR-MCNC: NEGATIVE MG/DL
KETONES P FAST UR STRIP-MCNC: NEGATIVE MG/DL
PH UR STRIP: 6.5 [PH] (ref 4.6–8)
PROT UR QL STRIP: NEGATIVE
SP GR UR STRIP: 1.01 (ref 1–1.03)
UA UROBILINOGEN AMB POC: NORMAL (ref 0.2–1)
URINALYSIS CLARITY POC: CLEAR
URINALYSIS COLOR POC: YELLOW
URINE BLOOD POC: NEGATIVE
URINE LEUKOCYTES POC: NORMAL
URINE NITRITES POC: NEGATIVE

## 2019-07-05 PROCEDURE — 87086 URINE CULTURE/COLONY COUNT: CPT

## 2019-07-05 RX ORDER — NITROFURANTOIN 25; 75 MG/1; MG/1
100 CAPSULE ORAL 2 TIMES DAILY
Qty: 14 CAP | Refills: 0 | Status: SHIPPED | OUTPATIENT
Start: 2019-07-05 | End: 2019-07-12

## 2019-07-05 NOTE — PROGRESS NOTES
1. Have you been to the ER, urgent care clinic since your last visit? Hospitalized since your last visit?no    2. Have you seen or consulted any other health care providers outside of the [address] [place] [address] since your last visit? Include any pap smears or colon screening.  no

## 2019-07-05 NOTE — PROGRESS NOTES
SUBJECTIVE:   Ms. Shelton Holly is a 80 y.o. female who is here c/o dysuria, urinary frequency, and urgency for 3-4 weeks. She presents with her daughter today who helps to provide history. She reports small amounts of urine and sensation of incomplete emptying. Pt was seen on 04/09/2019 for similar sx, at which time her UA showed only a small amount of leukocytes and her urine culture was negative. She was tx prophylactically with macrobid at that time for 5 days. She does not recall completely, but believes that her sx improved afterwards before worsening again in the past few weeks. Her PCP is Dr. Aquiles Melendez      At this time, she is otherwise doing well and has brought no other complaints to my attention today.       PMH:   Past Medical History:   Diagnosis Date    Acute diverticulitis 9/25/2017    Allergic rhinitis 9/25/2017    Arrhythmia     afib    Arthritis     Asthma     Atrial fibrillation ([address]     Benign essential tremor 9/25/2017    Cancer ([facility]     skin    Celiac disease 9/25/2017    Constipation 9/25/2017    Diabetic peripheral neuropathy associated with type 2 diabetes mellitus ([address] 4/21/2016    Diarrhea 9/25/2017    Diverticulosis of large intestine without hemorrhage 9/25/2017    Early satiety 9/25/2017    Fuchs' corneal dystrophy 9/25/2017    Gastroesophageal reflux disease without esophagitis 9/25/2017    GERD (gastroesophageal reflux disease)     High cholesterol     Hypercholesterolemia 9/25/2017    Hypertension     Irritable bowel syndrome with diarrhea 9/25/2017    Numbness 9/25/2017    Osteopenia 9/25/2017    Other ill-defined conditions(799.89)     collapsed lung prior to hernia repair surgery    Postmenopausal 7/23/2018    Ptosis, both eyelids 9/25/2017    Statin intolerance 9/25/2017    Unspecified adverse effect of anesthesia     pt states she went into cardiac arrest prior to hernia repair after the insertion of gas in stomach     PSH:  has a past surgical history that includes hx appendectomy; hx cholecystectomy; hx hernia repair; hx gi; hx tonsillectomy; and hx heent. All: is allergic to bactrim [sulfamethoprim ds]; ciprofloxacin (bulk); iodine; prednisone; and statins-hmg-coa reductase inhibitors. MEDS:   Current Outpatient Medications   Medication Sig    nitrofurantoin, macrocrystal-monohydrate, (MACROBID) 100 mg capsule Take 1 Cap by mouth two (2) times a day for 7 days.  acetaminophen (TYLENOL EXTRA STRENGTH) 500 mg tablet Take 1 Tab by mouth every eight (8) hours as needed for Pain.  cholecalciferol (VITAMIN D3) 1,000 unit cap Take  by mouth daily. Indications: taking 5,000u    cyanocobalamin (VITAMIN B-12) 100 mcg tablet Take 100 mcg by mouth daily. Indications: 500mcg    triamterene-hydroCHLOROthiazide (DYAZIDE) 37.5-25 mg per capsule Take 1 Cap by mouth daily.  sertraline (ZOLOFT) 50 mg tablet Take 1 Tab by mouth daily.  Lactobacillus rhamnosus GG (CULTURELLE PO) Take  by mouth daily.  melatonin tab tablet Take 2 Tabs by mouth nightly.  aspirin 81 mg chewable tablet Take 1 Tab by mouth daily.  disopyramide phosphate (NORPACE) 100 mg capsule TAKE ONE CAPSULE BY MOUTH TWICE A DAY    metoprolol tartrate (LOPRESSOR) 25 mg tablet TAKE 1 TABLET TWICE A DAY    pantoprazole (PROTONIX) 40 mg tablet Take 1 Tab by mouth daily. (Patient taking differently: Take 40 mg by mouth two (2) times a day.)    ondansetron hcl (ZOFRAN) 4 mg tablet Take 1 Tab by mouth every eight (8) hours as needed for Nausea.  polyethylene glycol (MIRALAX) 17 gram/dose powder Take 17 g by mouth daily.  nitroglycerin (NITROSTAT) 0.4 mg SL tablet 1 Tab by SubLINGual route every five (5) minutes as needed for Chest Pain.  magnesium hydroxide (YOUNG MILK OF MAGNESIA) 400 mg/5 mL suspension Take 30 mL by mouth daily as needed for Constipation. No current facility-administered medications for this visit.         FH: family history includes Dementia in her mother; Depression in her child; Heart Disease in her father and mother; Lung Cancer (age of onset: 80) in her sister; Neuropathy in her child; Other in her child. SH:  reports that she quit smoking about 51 years ago. She has a 3.75 pack-year smoking history. She has never used smokeless tobacco. She reports that she does not drink alcohol or use drugs. Review of Systems - History obtained from the patient  General ROS: negative  Psychological ROS: negative  Ophthalmic ROS: negative  ENT ROS: negative  Respiratory ROS: no cough, shortness of breath, or wheezing  Cardiovascular ROS: no chest pain or dyspnea on exertion  Gastrointestinal ROS: no abdominal pain, change in bowel habits, or black or bloody stools  Genito-Urinary ROS: negative  Musculoskeletal ROS: negative  Neurological ROS: negative  Dermatological ROS: negative    OBJECTIVE:   Vitals:   Visit Vitals  /75 (BP 1 Location: Left arm, BP Patient Position: Sitting)   Pulse 94   Temp 98 °F (36.7 °C) (Oral)   Resp 14   Ht 5' 2\" (1.575 m)   Wt 106 lb 6.4 oz (48.3 kg)   SpO2 98%   BMI 19.46 kg/m²      Gen: Pleasant 80 y.o.  female in NAD. HEENT: NC/AT. HEART: RRR, No M/G/R.   LUNGS: CTAB No W/R.   ABD: S, NT, NL BS, No G/R  EXTREMITIES: Warm. No C/C/E.   NEURO: Alert and oriented x 3. Cranial nerves grossly intact. No focal sensory or motor deficits noted. SKIN: Warm. Dry. No rashes or other lesions noted. ASSESSMENT/ PLAN:     Diagnoses and all orders for this visit:    1. Dysuria  -     AMB POC URINALYSIS DIP STICK AUTO W/O MICRO  -     CULTURE, URINE  -     nitrofurantoin, macrocrystal-monohydrate, (MACROBID) 100 mg capsule; Take 1 Cap by mouth two (2) times a day for 7 days.  -     REFERRAL TO UROLOGY      1. Dysuria  POC UA showed a small amount of leukocytes, otherwise normal. Ordered urine culture.  Prescribed macrobid as pt seems to have gotten some relief with this in the past. I also provided referral for  Gracie Frankel, urology. Follow-up and Dispositions           I have reviewed the patient's medications and risks/side effects/benefits were discussed. Diagnosis(-es) explained to patient and questions answered. Literature provided where appropriate.      Written by Tammy Vincent, as dictated by Tabitha Gardner MD.

## 2019-07-06 LAB — BACTERIA UR CULT: NO GROWTH

## 2019-07-09 ENCOUNTER — TELEPHONE (OUTPATIENT)
Dept: INTERNAL MEDICINE CLINIC | Age: 84
End: 2019-07-09

## 2019-07-09 NOTE — TELEPHONE ENCOUNTER
Called, spoke to [employer] (HIPAA). Two pt identifiers confirmed. Camille Nino informed that order to d/c abx faxed to the [facility] w/ confirmation received. Camille Nino verbalized understanding of information discussed w/ no further questions at this time.

## 2019-07-09 NOTE — TELEPHONE ENCOUNTER
Patient's daughter, Husam Lucia states Dr. Ankit Lockwood patient last seen by [facility] on Friday July 5th for UTI was prescribed antibiotics & per Laci Davis the results have come back negative & orders need to be sent to patient's Assisted Living to stop the antibiotics that were prescribed. Please call Laci Davis when this has been done.  Thank you

## 2019-07-09 NOTE — LETTER
7/9/2019 4:42 PM 
 
Ms. Jing Geiger [address] [place] [phone] To whom this may concern. Please have Ms. Coreen Valverde. If there are any questions or concerns, feel free to contact the office. Thank you. Sincerely, Claudia Busch MD

## 2019-07-24 ENCOUNTER — OFFICE VISIT (OUTPATIENT)
Dept: CARDIOLOGY CLINIC | Age: 84
End: 2019-07-24

## 2019-07-24 VITALS
BODY MASS INDEX: 19.91 KG/M2 | OXYGEN SATURATION: 98 % | DIASTOLIC BLOOD PRESSURE: 78 MMHG | WEIGHT: 108.2 LBS | RESPIRATION RATE: 16 BRPM | HEIGHT: 62 IN | SYSTOLIC BLOOD PRESSURE: 118 MMHG | HEART RATE: 71 BPM

## 2019-07-24 DIAGNOSIS — R07.89 OTHER CHEST PAIN: ICD-10-CM

## 2019-07-24 DIAGNOSIS — I48.0 PAROXYSMAL ATRIAL FIBRILLATION (HCC): Primary | Chronic | ICD-10-CM

## 2019-07-24 DIAGNOSIS — Z78.9 STATIN INTOLERANCE: ICD-10-CM

## 2019-07-24 DIAGNOSIS — I10 ESSENTIAL HYPERTENSION: ICD-10-CM

## 2019-07-24 NOTE — PROGRESS NOTES
Chief Complaint   Patient presents with    Irregular Heart Beat     3m f/u     1. Have you been to the ER, urgent care clinic since your last visit? Hospitalized since your last visit? No    2. Have you seen or consulted any other health care providers outside of the [address] [place] [address] since your last visit? Include any pap smears or colon screening. No    Pt c/o slight chest pain that can last several days before it stops. Pt does not take the nitroglycerin tablets when this happens.

## 2019-07-24 NOTE — PROGRESS NOTES
[address] [place] [address] [address] [facility]  [phone]     Subjective:      Mariluz Purdy is a 80 y.o. female is here for routine f/u. Reports improved sob since ED presentation 4/19. Has occasional nonexertional cp, unchanged from previous. She has a hiatal hernia along with GERD that is followed by Dr Shoshana Shell, and per pt and daughter, too risky to undergo surgery,    The patient denies  orthopnea, PND, LE edema, palpitations, syncope, or presyncope.        Patient Active Problem List    Diagnosis Date Noted    Postmenopausal 07/23/2018     Priority: 1 - One    Type 2 diabetes with nephropathy ([facility] 06/04/2019    Depression, major, recurrent, mild ([address] 06/04/2019    Pleural effusion 04/18/2019    History of stroke 06/11/2018    Epidermoid cyst of finger of left hand 02/02/2018    Osteopenia 09/25/2017    Gastroesophageal reflux disease without esophagitis 09/25/2017    Diverticulosis of large intestine without hemorrhage 09/25/2017    Benign essential tremor 09/25/2017    Irritable bowel syndrome with diarrhea 09/25/2017    Chest wall pain 09/25/2017    Statin intolerance 09/25/2017    Diarrhea 09/25/2017    Constipation 09/25/2017    Hypercholesterolemia 09/25/2017    Early satiety 09/25/2017    Celiac disease 09/25/2017    Allergic rhinitis 09/25/2017    Acute diverticulitis 09/25/2017    Numbness 09/25/2017    Fuchs' corneal dystrophy 09/25/2017    Ptosis, both eyelids 09/25/2017    Idiopathic small and large fiber sensory neuropathy 04/21/2016    Diabetic peripheral neuropathy associated with type 2 diabetes mellitus ([facility] 04/21/2016    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 04/21/2016    Abnormal MRI of head 08/25/2015    Depression 07/24/2015    Anxiety 07/24/2015    Attention deficit disorder 07/24/2015    Cerebrovascular disease, unspecified 05/03/2015    Essential tremor 04/20/2015    B12 deficiency 04/20/2015    Osteoporosis 04/20/2015    Memory loss 04/20/2015    Celiac sprue 05/14/2013    Other and unspecified hyperlipidemia 05/14/2013    Atrial fibrillation ([facility]     Hypertension       Frank Morales MD  Past Medical History:   Diagnosis Date    Acute diverticulitis 9/25/2017    Allergic rhinitis 9/25/2017    Arrhythmia     afib    Arthritis     Asthma     Atrial fibrillation ([facility]     Benign essential tremor 9/25/2017    Cancer ([facility]     skin    Celiac disease 9/25/2017    Constipation 9/25/2017    Diabetic peripheral neuropathy associated with type 2 diabetes mellitus ([facility] 4/21/2016    Diarrhea 9/25/2017    Diverticulosis of large intestine without hemorrhage 9/25/2017    Early satiety 9/25/2017    Fuchs' corneal dystrophy 9/25/2017    Gastroesophageal reflux disease without esophagitis 9/25/2017    GERD (gastroesophageal reflux disease)     High cholesterol     Hypercholesterolemia 9/25/2017    Hypertension     Irritable bowel syndrome with diarrhea 9/25/2017    Numbness 9/25/2017    Osteopenia 9/25/2017    Other ill-defined conditions(799.89)     collapsed lung prior to hernia repair surgery    Postmenopausal 7/23/2018    Ptosis, both eyelids 9/25/2017    Statin intolerance 9/25/2017    Unspecified adverse effect of anesthesia     pt states she went into cardiac arrest prior to hernia repair after the insertion of gas in stomach      Past Surgical History:   Procedure Laterality Date    HX APPENDECTOMY      HX CHOLECYSTECTOMY      HX GI      hemorrhoidectomy    HX HEENT      sinus surgery    HX HERNIA REPAIR      HX TONSILLECTOMY       Allergies   Allergen Reactions    Bactrim [Sulfamethoprim Ds] Other (comments)     consipation    Ciprofloxacin (Bulk) Other (comments)     Low wbc    Iodine Unknown (comments)    Prednisone Other (comments)     tachycardia    Statins-Hmg-Coa Reductase Inhibitors Unknown (comments)     Stomach uipset and mild muscle aches      Family History   Problem Relation Age of Onset    Heart Disease Mother     Dementia Mother     Heart Disease Father     Neuropathy Child     Depression Child     Other Child         diverticulitis    Lung Cancer Sister 80        lung ca      Social History     Socioeconomic History    Marital status:      Spouse name: Not on file    Number of children: Not on file    Years of education: Not on file    Highest education level: Not on file   Occupational History    Not on file   Social Needs    Financial resource strain: Not on file    Food insecurity:     Worry: Not on file     Inability: Not on file    Transportation needs:     Medical: Not on file     Non-medical: Not on file   Tobacco Use    Smoking status: Former Smoker     Packs/day: 0.75     Years: 5.00     Pack years: 3.75     Last attempt to quit: 10/11/1967     Years since quittin.8    Smokeless tobacco: Never Used   Substance and Sexual Activity    Alcohol use: No    Drug use: No    Sexual activity: Not on file   Lifestyle    Physical activity:     Days per week: Not on file     Minutes per session: Not on file    Stress: Not on file   Relationships    Social connections:     Talks on phone: Not on file     Gets together: Not on file     Attends Church service: Not on file     Active member of club or organization: Not on file     Attends meetings of clubs or organizations: Not on file     Relationship status: Not on file    Intimate partner violence:     Fear of current or ex partner: Not on file     Emotionally abused: Not on file     Physically abused: Not on file     Forced sexual activity: Not on file   Other Topics Concern    Not on file   Social History Narrative    Not on file      Current Outpatient Medications   Medication Sig    acetaminophen (TYLENOL EXTRA STRENGTH) 500 mg tablet Take 1 Tab by mouth every eight (8) hours as needed for Pain.     cholecalciferol (VITAMIN D3) 1,000 unit cap Take 2,000 Units by mouth daily. Indications: taking 5,000u    cyanocobalamin (VITAMIN B-12) 100 mcg tablet Take 100 mcg by mouth daily. Indications: 500mcg    triamterene-hydroCHLOROthiazide (DYAZIDE) 37.5-25 mg per capsule Take 1 Cap by mouth daily.  sertraline (ZOLOFT) 50 mg tablet Take 1 Tab by mouth daily.  Lactobacillus rhamnosus GG (CULTURELLE PO) Take  by mouth daily.  melatonin tab tablet Take 2 Tabs by mouth nightly.  aspirin 81 mg chewable tablet Take 1 Tab by mouth daily.  disopyramide phosphate (NORPACE) 100 mg capsule TAKE ONE CAPSULE BY MOUTH TWICE A DAY    metoprolol tartrate (LOPRESSOR) 25 mg tablet TAKE 1 TABLET TWICE A DAY    pantoprazole (PROTONIX) 40 mg tablet Take 1 Tab by mouth daily. (Patient taking differently: Take 40 mg by mouth two (2) times a day.)    ondansetron hcl (ZOFRAN) 4 mg tablet Take 1 Tab by mouth every eight (8) hours as needed for Nausea.  polyethylene glycol (MIRALAX) 17 gram/dose powder Take 17 g by mouth daily.  nitroglycerin (NITROSTAT) 0.4 mg SL tablet 1 Tab by SubLINGual route every five (5) minutes as needed for Chest Pain. No current facility-administered medications for this visit. Review of Symptoms:  11 systems reviewed, negative other than as stated in the HPI    Physical ExamPhysical Exam:    Vitals:    07/24/19 1500 07/24/19 1517   BP: 128/78 118/78   Pulse: 71    Resp: 16    SpO2: 98%    Weight: 108 lb 3.2 oz (49.1 kg)    Height: 5' 2\" (1.575 m)      Body mass index is 19.79 kg/m². General PE  Gen:  NAD  Mental Status - Alert. General Appearance - Not in acute distress. HEENT:  PERRL, no carotid bruits or JVD  Chest and Lung Exam   Inspection: Accessory muscles - No use of accessory muscles in breathing. Auscultation:   Breath sounds: - Normal.   Cardiovascular   Inspection: Jugular vein - Bilateral - Inspection Normal.   Palpation/Percussion:   Apical Impulse: - Normal.   Auscultation: Rhythm - Regular. Heart Sounds - S1 WNL and S2 WNL. No S3 or S4. Murmurs & Other Heart Sounds: Auscultation of the heart reveals - No Murmurs. Peripheral Vascular   Upper Extremity: Inspection - Bilateral - No Cyanotic nailbeds or Digital clubbing. Lower Extremity:   Palpation: Edema - Bilateral - No edema. Abdomen:   Soft, non-tender, bowel sounds are active. Neuro: A&O times 3, CN and motor grossly WNL    Labs:   Lab Results   Component Value Date/Time    Cholesterol, total 246 (H) 03/01/2019 10:15 AM    Cholesterol, total 267 (H) 07/23/2018 03:07 PM    Cholesterol, total 260 (H) 08/20/2013 08:16 AM    Cholesterol, total 263 (H) 02/15/2013 08:49 AM    HDL Cholesterol 76 03/01/2019 10:15 AM    HDL Cholesterol 81 07/23/2018 03:07 PM    HDL Cholesterol 71 08/20/2013 08:16 AM    HDL Cholesterol 67 02/15/2013 08:49 AM    LDL, calculated 154 (H) 03/01/2019 10:15 AM    LDL, calculated 168 (H) 07/23/2018 03:07 PM    LDL, calculated 172 (H) 08/20/2013 08:16 AM    LDL, calculated 174 (H) 02/15/2013 08:49 AM    Triglyceride 82 03/01/2019 10:15 AM    Triglyceride 91 07/23/2018 03:07 PM    Triglyceride 84 08/20/2013 08:16 AM    Triglyceride 112 02/15/2013 08:49 AM     Lab Results   Component Value Date/Time    CK 32 04/03/2019 05:02 PM     Lab Results   Component Value Date/Time    Sodium 138 04/09/2019 11:07 AM    Potassium 4.2 04/09/2019 11:07 AM    Chloride 96 04/09/2019 11:07 AM    CO2 29 04/09/2019 11:07 AM    Anion gap 7 04/03/2019 05:02 PM    Glucose 95 04/09/2019 11:07 AM    BUN 21 04/09/2019 11:07 AM    Creatinine 0.94 04/09/2019 11:07 AM    BUN/Creatinine ratio 22 04/09/2019 11:07 AM    GFR est AA 61 04/09/2019 11:07 AM    GFR est non-AA 53 (L) 04/09/2019 11:07 AM    Calcium 10.1 04/09/2019 11:07 AM    Bilirubin, total 0.5 04/03/2019 05:02 PM    AST (SGOT) 19 04/03/2019 05:02 PM    Alk.  phosphatase 57 04/03/2019 05:02 PM    Protein, total 6.4 04/03/2019 05:02 PM    Albumin 3.5 04/03/2019 05:02 PM    Globulin 2.9 04/03/2019 05:02 PM    A-G Ratio 1.2 04/03/2019 05:02 PM    ALT (SGPT) 26 04/03/2019 05:02 PM       EKG:  NSR      Assessment:     Assessment:      1. Paroxysmal atrial fibrillation (HCC)    2. Essential hypertension    3. Statin intolerance    4. Other chest pain        Orders Placed This Encounter    AMB POC EKG ROUTINE W/ 12 LEADS, INTER & REP     Order Specific Question:   Reason for Exam:     Answer:   afib        Plan:     Patient presents for 3 mos follow up. Reports improved sob since ED presentation 4/19. Has occasional nonexertional cp, unchanged from previous. She has a hiatal hernia along with GERD that is followed by Dr Shoshana Shell, and per pt and daughter, too risky to undergo surgery,    Paroxysmal atrial fibrillation:   Maintaining sinus rhythm, anticoagulated with aspirin only she has not had breakthrough A. fib in quite a long time  she is a frail 66-year-old female assessing risk versus benefit at this time if anticoagulation with aspirin is appropriate. Hypertension:   Controlled 118/78  She will need to remain on Dyazide daily long-term (was in the ED 4/19 for sob and elevated BP)    Edema of both lower extremities, improved  Echo in 6/2018 with preserved LVEF 60-65% with grade 1 DD. Continue compression stockings which has helped her symptoms    Hx Pleural effusion 4/19 in ED  Repeat CXR 5/19 by PCP  1. Resolution of the previous seen right pleural effusion. 2. Decreased mild left pleural effusion    Hx Other chest pain  Recent lexiscan in 6/2018 was negative for ischemia; echocardiogram with preserved LVEF 60-65% in 5/2018  The patient knows of a small chance of false negative stress test and to go to the ED if any progression of symptoms or symptoms not relieved immediately by antacids/ rest/ NTG. Continue current care and f/u in 6 months, sooner PRN.     Myrna Pagan MD

## 2019-07-30 DIAGNOSIS — E11.42 DIABETIC PERIPHERAL NEUROPATHY ASSOCIATED WITH TYPE 2 DIABETES MELLITUS (HCC): ICD-10-CM

## 2019-07-30 DIAGNOSIS — G25.0 BENIGN ESSENTIAL TREMOR: ICD-10-CM

## 2019-07-30 DIAGNOSIS — G60.8 IDIOPATHIC SMALL AND LARGE FIBER SENSORY NEUROPATHY: ICD-10-CM

## 2019-07-30 DIAGNOSIS — F98.8 ATTENTION DEFICIT DISORDER, UNSPECIFIED HYPERACTIVITY PRESENCE: ICD-10-CM

## 2019-07-30 DIAGNOSIS — R93.0 ABNORMAL MRI OF HEAD: ICD-10-CM

## 2019-07-30 DIAGNOSIS — I65.23 STENOSIS OF BOTH CAROTID ARTERIES WITHOUT CEREBRAL INFARCTION: ICD-10-CM

## 2019-07-30 DIAGNOSIS — R41.3 MEMORY LOSS: ICD-10-CM

## 2019-07-30 RX ORDER — CHOLECALCIFEROL (VITAMIN D3) 125 MCG
10 CAPSULE ORAL
Qty: 60 TAB | Refills: 5 | Status: SHIPPED | OUTPATIENT
Start: 2019-07-30 | End: 2020-05-20

## 2019-07-30 RX ORDER — SERTRALINE HYDROCHLORIDE 50 MG/1
TABLET, FILM COATED ORAL
Qty: 30 TAB | Refills: 0 | Status: SHIPPED | OUTPATIENT
Start: 2019-07-30 | End: 2019-08-28 | Stop reason: SDUPTHER

## 2019-07-30 NOTE — TELEPHONE ENCOUNTER
#[phone]  Rena Scriver states Pt takes 2 tabs of melatonin per night. She is asking if these mg's can be combined into one pill so pt doesn't have to swallow so many pills? A new script would need to be written and sent to [place] if you change this. Please send both scripts to [facility]

## 2019-08-28 RX ORDER — SERTRALINE HYDROCHLORIDE 50 MG/1
TABLET, FILM COATED ORAL
Qty: 30 TAB | Refills: 5 | Status: SHIPPED | OUTPATIENT
Start: 2019-08-28 | End: 2019-08-29 | Stop reason: SDUPTHER

## 2019-08-28 NOTE — TELEPHONE ENCOUNTER
PCP: Anderson Olmedo MD    Last appt: 7/5/2019  Future Appointments   Date Time Provider Barbara Ramey   10/7/2019  1:30 PM Anderson Olmedo MD [address]   11/13/2019  1:20 PM Michael Carmen  [facility]   1/30/2020 11:00 AM Candy Kenyon MD [address] [facility]       Requested Prescriptions     Pending Prescriptions Disp Refills    sertraline (ZOLOFT) 50 mg tablet [Pharmacy Med Name: SERTRALINE HCL 50 MG TABLET] 30 Tab 5     Sig: TAKE ONE TABLET BY MOUTH EVERY DAY

## 2019-08-28 NOTE — TELEPHONE ENCOUNTER
Pt's daughter Karen Dee is requesting a refill on \"zoloft 50 mg\" to be sent to the [facility] on file. She request if the refill can be for 6 months to a year on the refill. Best contact number [phone].        Message received & copied from [employer]

## 2019-08-29 RX ORDER — SERTRALINE HYDROCHLORIDE 50 MG/1
50 TABLET, FILM COATED ORAL DAILY
Qty: 30 TAB | Refills: 11 | Status: SHIPPED | OUTPATIENT
Start: 2019-08-29 | End: 2020-01-30 | Stop reason: SDUPTHER

## 2019-08-29 NOTE — TELEPHONE ENCOUNTER
PCP: Christy Nobles MD    Last appt: 7/5/2019  Future Appointments   Date Time Provider Barbara Ramey   10/7/2019  1:30 PM Christy Nobles MD [address]   11/13/2019  1:20 PM DO Benji Escobar   1/30/2020 11:00 AM Melina Ureña MD [address] [facility]       Requested Prescriptions     Pending Prescriptions Disp Refills    sertraline (ZOLOFT) 50 mg tablet 30 Tab 11     Sig: Take 1 Tab by mouth daily.

## 2019-08-30 DIAGNOSIS — R93.0 ABNORMAL MRI OF HEAD: ICD-10-CM

## 2019-08-30 DIAGNOSIS — R41.3 MEMORY LOSS: ICD-10-CM

## 2019-08-30 DIAGNOSIS — Z86.73 HISTORY OF STROKE: ICD-10-CM

## 2019-08-30 DIAGNOSIS — I65.23 STENOSIS OF BOTH CAROTID ARTERIES WITHOUT CEREBRAL INFARCTION: ICD-10-CM

## 2019-08-30 DIAGNOSIS — G60.8 IDIOPATHIC SMALL AND LARGE FIBER SENSORY NEUROPATHY: ICD-10-CM

## 2019-08-30 DIAGNOSIS — G25.0 BENIGN ESSENTIAL TREMOR: ICD-10-CM

## 2019-08-30 DIAGNOSIS — I67.9 CEREBROVASCULAR DISEASE, UNSPECIFIED: ICD-10-CM

## 2019-08-30 DIAGNOSIS — E11.42 DIABETIC PERIPHERAL NEUROPATHY ASSOCIATED WITH TYPE 2 DIABETES MELLITUS (HCC): ICD-10-CM

## 2019-08-30 NOTE — TELEPHONE ENCOUNTER
Kendell Woodson called to advise they would like to get these refills approved today please. Please call when done.  Thank you

## 2019-08-30 NOTE — TELEPHONE ENCOUNTER
PCP: Adeline Leyva MD    Last appt: 7/5/2019  Future Appointments   Date Time Provider Barbara Ramey   10/7/2019  1:30 PM Adeline Leyva MD [address]   11/13/2019  1:20 PM Gilford Baston, [address]   1/30/2020 11:00 AM Sonu Singleton MD [address] [facility]       Requested Prescriptions     Pending Prescriptions Disp Refills    cyanocobalamin (VITAMIN B12) 500 mcg tablet 60 Tab 5     Sig: Take 1 Tab by mouth daily.  cholecalciferol, VITAMIN D3, (VITAMIN D3) 5,000 unit tab tablet 60 Tab 5     Sig: Take 1 Tab by mouth daily.  aspirin 81 mg chewable tablet 120 Tab 5     Sig: Take 1 Tab by mouth daily.

## 2019-08-31 RX ORDER — GUAIFENESIN 100 MG/5ML
81 LIQUID (ML) ORAL DAILY
Qty: 120 TAB | Refills: 5 | Status: SHIPPED | OUTPATIENT
Start: 2019-08-31

## 2019-08-31 RX ORDER — CHOLECALCIFEROL TAB 125 MCG (5000 UNIT) 125 MCG
5000 TAB ORAL DAILY
Qty: 60 TAB | Refills: 5 | Status: SHIPPED | OUTPATIENT
Start: 2019-08-31 | End: 2020-10-27 | Stop reason: SDUPTHER

## 2019-08-31 RX ORDER — LANOLIN ALCOHOL/MO/W.PET/CERES
500 CREAM (GRAM) TOPICAL DAILY
Qty: 60 TAB | Refills: 5 | Status: SHIPPED | OUTPATIENT
Start: 2019-08-31 | End: 2020-01-30 | Stop reason: DRUGHIGH

## 2019-09-04 ENCOUNTER — TELEPHONE (OUTPATIENT)
Dept: INTERNAL MEDICINE CLINIC | Age: 84
End: 2019-09-04

## 2019-09-04 NOTE — TELEPHONE ENCOUNTER
Called, spoke to [employer] (HIPAA). Two pt identifiers confirmed. Michael Momin inquiring about the directions of the vitamin D3 for the patient. Michael Momin informed for once daily. Michael Momin is informing of needing an order to be faxed over to the [address] at [address] [place] [address]. Michael Momin informed order will be faxed. Michael Momin verbalized understanding of information discussed w/ no further questions at this time. Order faxed to [employer] at [address] [place] [address] [phone] w/ confirmation received.

## 2019-09-04 NOTE — LETTER
9/4/2019 2:21 PM 
 
Ms. Velma Stewart [address] [phone] To whom this may concern, 
 
Mrs Veroinca Andersen is to take Vitamin D3 5,000 units by mouth once daily. If there are any questions or concerns, feel free to contact the office. Thank you.  
 
 
 
 
Sincerely, 
 
 
Lauren Pierce MD

## 2019-09-04 NOTE — TELEPHONE ENCOUNTER
#[phone] Wendy needs to speak with you pertaining to the vit D 3 to clarify directions. Re direct to correct nurse.

## 2019-09-10 ENCOUNTER — TELEPHONE (OUTPATIENT)
Dept: INTERNAL MEDICINE CLINIC | Age: 84
End: 2019-09-10

## 2019-09-10 NOTE — TELEPHONE ENCOUNTER
Called, spoke to [employer] (HIPAA). Two pt identifiers confirmed. Antonieta Boast offered and accepted appt for pt 9/11/19 1500. Cancelled 9/13/19 appt. Pt verbalized understanding of information discussed w/ no further questions at this time.

## 2019-09-10 NOTE — TELEPHONE ENCOUNTER
Called, spoke to [employer] (HIPAA). Two pt identifiers confirmed. Antonieta Boast offered and accepted appt for 9/13/19 1100. Antonieta Boast verbalized understanding of information discussed w/ no further questions at this time.

## 2019-09-11 ENCOUNTER — HOSPITAL ENCOUNTER (OUTPATIENT)
Dept: LAB | Age: 84
Discharge: HOME | End: 2019-09-11
Payer: MEDICARE

## 2019-09-11 ENCOUNTER — OFFICE VISIT (OUTPATIENT)
Dept: INTERNAL MEDICINE CLINIC | Age: 84
End: 2019-09-11

## 2019-09-11 ENCOUNTER — TELEPHONE (OUTPATIENT)
Dept: INTERNAL MEDICINE CLINIC | Age: 84
End: 2019-09-11

## 2019-09-11 ENCOUNTER — DOCUMENTATION ONLY (OUTPATIENT)
Dept: INTERNAL MEDICINE CLINIC | Age: 84
End: 2019-09-11

## 2019-09-11 VITALS
TEMPERATURE: 98.2 F | HEIGHT: 62 IN | BODY MASS INDEX: 20.24 KG/M2 | RESPIRATION RATE: 16 BRPM | SYSTOLIC BLOOD PRESSURE: 114 MMHG | WEIGHT: 110 LBS | HEART RATE: 67 BPM | OXYGEN SATURATION: 98 % | DIASTOLIC BLOOD PRESSURE: 74 MMHG

## 2019-09-11 DIAGNOSIS — I67.9 CEREBROVASCULAR DISEASE, UNSPECIFIED: ICD-10-CM

## 2019-09-11 DIAGNOSIS — R41.3 MEMORY LOSS: ICD-10-CM

## 2019-09-11 DIAGNOSIS — F33.0 DEPRESSION, MAJOR, RECURRENT, MILD (HCC): Primary | ICD-10-CM

## 2019-09-11 DIAGNOSIS — E11.21 TYPE 2 DIABETES WITH NEPHROPATHY (HCC): ICD-10-CM

## 2019-09-11 DIAGNOSIS — I48.0 PAROXYSMAL ATRIAL FIBRILLATION (HCC): ICD-10-CM

## 2019-09-11 DIAGNOSIS — R19.05 PERIUMBILICAL MASS: ICD-10-CM

## 2019-09-11 DIAGNOSIS — J90 PLEURAL EFFUSION: ICD-10-CM

## 2019-09-11 DIAGNOSIS — R44.3 HALLUCINATIONS: ICD-10-CM

## 2019-09-11 DIAGNOSIS — I10 ESSENTIAL HYPERTENSION: ICD-10-CM

## 2019-09-11 DIAGNOSIS — Z00.00 MEDICARE ANNUAL WELLNESS VISIT, SUBSEQUENT: ICD-10-CM

## 2019-09-11 DIAGNOSIS — M81.0 AGE-RELATED OSTEOPOROSIS WITHOUT CURRENT PATHOLOGICAL FRACTURE: ICD-10-CM

## 2019-09-11 DIAGNOSIS — K58.0 IRRITABLE BOWEL SYNDROME WITH DIARRHEA: ICD-10-CM

## 2019-09-11 PROBLEM — R68.81 EARLY SATIETY: Status: RESOLVED | Noted: 2017-09-25 | Resolved: 2019-09-11

## 2019-09-11 PROBLEM — Z78.0 POSTMENOPAUSAL: Status: RESOLVED | Noted: 2018-07-23 | Resolved: 2019-09-11

## 2019-09-11 PROBLEM — Z78.9 STATIN INTOLERANCE: Status: RESOLVED | Noted: 2017-09-25 | Resolved: 2019-09-11

## 2019-09-11 PROBLEM — K57.92 ACUTE DIVERTICULITIS: Status: RESOLVED | Noted: 2017-09-25 | Resolved: 2019-09-11

## 2019-09-11 PROBLEM — R19.7 DIARRHEA: Status: RESOLVED | Noted: 2017-09-25 | Resolved: 2019-09-11

## 2019-09-11 PROBLEM — K90.0 CELIAC DISEASE: Status: RESOLVED | Noted: 2017-09-25 | Resolved: 2019-09-11

## 2019-09-11 PROCEDURE — 36415 COLL VENOUS BLD VENIPUNCTURE: CPT

## 2019-09-11 PROCEDURE — 83036 HEMOGLOBIN GLYCOSYLATED A1C: CPT

## 2019-09-11 PROCEDURE — 80053 COMPREHEN METABOLIC PANEL: CPT

## 2019-09-11 PROCEDURE — 85027 COMPLETE CBC AUTOMATED: CPT

## 2019-09-11 PROCEDURE — 82043 UR ALBUMIN QUANTITATIVE: CPT

## 2019-09-11 RX ORDER — SERTRALINE HYDROCHLORIDE 50 MG/1
75 TABLET, FILM COATED ORAL DAILY
Qty: 45 TAB | Refills: 3 | Status: SHIPPED | OUTPATIENT
Start: 2019-09-11 | End: 2020-02-25

## 2019-09-11 NOTE — PROGRESS NOTES
HISTORY OF PRESENT ILLNESS  Corinn Gilford is a 80 y.o. female. HPI   Last here 6/4/19. Pt is here to f/u on chronic conditions.     BP is 114/74  Continues on metoprolol 25mg bid  Pt also takes dyazide 37.5-25 now daily for her leg swelling and bp and fluid in lung     Wt today is 110 lbs-- up 4 lbs x lov   Her wt is in the nl ranges    Will get labs today      Pt follows wt Dr Mauri Vela (neuro) for tremor  Last visit was 5/14/19   Had appt scheduled 9/12/19 but this was rescheduled   Pt had been being treated for Parkinson's but the neuro decided it may not be this so they stopped this treatment  Has chronic tremors and hallucinations  Pt states she is on seroquel 25mg for sleep and hallucinations   However this medication is not on her list, asked pt's daughter to check on this   Pt is not sure if she is having hallucinations but is having some disturbance when she goes to sleep        Pt follows with Dr Randy Ag (cardio) for a fib  Last visit was 7/24/19: Patient presents for 3 mos follow up. Reports improved sob since ED presentation 4/19. Has occasional nonexertional cp, unchanged from previous. She has a hiatal hernia along with GERD that is followed by Dr Christina Rojas, and per pt and daughter, too risky to undergo surgery,  Paroxysmal atrial fibrillation:   Maintaining sinus rhythm, anticoagulated with aspirin only she has not had breakthrough A. fib in quite a long time  she is a frail 70-year-old female assessing risk versus benefit at this time if anticoagulation with aspirin is appropriate. Hypertension:   Controlled 118/78  She will need to remain on Dyazide daily long-term (was in the ED 4/19 for sob and elevated BP)  Edema of both lower extremities, improved  Echo in 6/2018 with preserved LVEF 60-65% with grade 1 DD.    Continue compression stockings which has helped her symptoms  Hx Pleural effusion 4/19 in ED  Repeat CXR 5/19 by PCP  1. Resolution of the previous seen right pleural effusion.   2. Decreased mild left pleural effusion  Hx Other chest pain  Recent lexiscan in 6/2018 was negative for ischemia; echocardiogram with preserved LVEF 60-65% in 5/2018  The patient knows of a small chance of false negative stress test and to go to the ED if any progression of symptoms or symptoms not relieved immediately by antacids/ rest/ NTG. Pt is on norpace 100mg BID     Reviewed CXR 5/9/19: 1. Resolution of the previous seen right pleural effusion. 2. Decreased mild left pleural effusion     Pt sees Dr Shima Flores (GI) for diverticulosis  Pt also has a large hiatal hernia  Pt also has a gluten sensitivity and mostly avoids this  Not following gluten free diet   Pt takes protonix 40mg bid   Controls gerd has large [facility]     Pt saw Dr Calos Mosher (neuropsych) in the past  Last visit was 2015 - no dementia     Pt saw Dr Aydee Santillan (ENT) for hearing loss  Last visit was 12/18/18  Pt decided not to get hearing aids     Pt sees Dr Charlie Hernandes (ortho)  Last visit was 1/9/19  Previously, provided info for Dr Dasia Cooney  She had been taking tramadol but had SE so was switched to tylenol which helps somewhat  Pt c/o still having pain, now located in L back/side  Declines PT again today   Continues on tylenol for this        Pt declined taking reclast and fosamax in the past  Pt is taking vit D 5000U daily         Pt is on ASA 81mg daily     Pt takes miralax for constipation  This works well as long as she takes it daily occasionally takes extra dose       Depression screen reviewed and positive (+1).    Previously increased zoloft to 50mg   Pt states this has been somewhat helpful but still feeling down   Discussed increasing dose   Will increase to 75 mg       Noticed some discharge from her navel x 1 week   Mentioned this to her daughter who thought it should be checked out   The discharge was dry and crusty    States the discharge goes up in between her breasts   Denies any f/c   State she has some nausea that has gotten worse in the last week No recent diarrhea or constipation   States she has a hiatal hernia and her stomach feels full like there isn't enough room   Has zofran at home which she uses prn   Crust found on exam   Does not look infected   No abx needed   Discussed will give 1-2 weeks  to see if it resolves or falls off on it own   Discussed if it stays like that she could get this removed with surg   Provided info for surg     Pt lives at the [address]     Pt has housekeeping  and her daughters take care of laundry    [facility]  Helps with her showers              ACP on file.  SDMs are her daughters, Johnson Tee and Lucien Nicole.     PREVENTIVE:  Colonoscopy: ~10 years ago with Dr Alexa Casper, per pt the way her colon is formed makes it difficult to get COLOs, her declines further  Pap: declines further  Mammogram: declines further  Dexa: 8/18, osteoporosis  Tdap: advised pt to get this at her pharmacy  Prevnar 13: 03/01/19   Pneumovax: due 3/20  Shingrix: ordered 03/01/19   Flu shot: Fall 2018, pt declines today   A1C: 3/19 5.9  Eye exam: Dr Abelardo Ramos ~summer 2018, Dr Mini Carr 2/19 (q6 weeks),  last visit was 8/19    Lipids: 3/19        Patient Active Problem List    Diagnosis Date Noted    Postmenopausal 07/23/2018     Priority: 1 - One    Type 2 diabetes with nephropathy ([address] 06/04/2019    Depression, major, recurrent, mild ([address] 06/04/2019    Pleural effusion 04/18/2019    History of stroke 06/11/2018    Epidermoid cyst of finger of left hand 02/02/2018    Osteopenia 09/25/2017    Gastroesophageal reflux disease without esophagitis 09/25/2017    Diverticulosis of large intestine without hemorrhage 09/25/2017    Benign essential tremor 09/25/2017    Irritable bowel syndrome with diarrhea 09/25/2017    Chest wall pain 09/25/2017    Statin intolerance 09/25/2017    Diarrhea 09/25/2017    Constipation 09/25/2017    Hypercholesterolemia 09/25/2017    Early satiety 09/25/2017    Celiac disease 09/25/2017    Allergic rhinitis 09/25/2017    Acute diverticulitis 09/25/2017    Numbness 09/25/2017    Fuchs' corneal dystrophy 09/25/2017    Ptosis, both eyelids 09/25/2017    Idiopathic small and large fiber sensory neuropathy 04/21/2016    Diabetic peripheral neuropathy associated with type 2 diabetes mellitus ([facility] 04/21/2016    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 04/21/2016    Abnormal MRI of head 08/25/2015    Depression 07/24/2015    Anxiety 07/24/2015    Attention deficit disorder 07/24/2015    Cerebrovascular disease, unspecified 05/03/2015    Essential tremor 04/20/2015    B12 deficiency 04/20/2015    Osteoporosis 04/20/2015    Memory loss 04/20/2015    Celiac sprue 05/14/2013    Other and unspecified hyperlipidemia 05/14/2013    Atrial fibrillation (HCC)     Hypertension      Current Outpatient Medications   Medication Sig Dispense Refill    cyanocobalamin (VITAMIN B12) 500 mcg tablet Take 1 Tab by mouth daily. 60 Tab 5    cholecalciferol, VITAMIN D3, (VITAMIN D3) 5,000 unit tab tablet Take 1 Tab by mouth daily. 60 Tab 5    aspirin 81 mg chewable tablet Take 1 Tab by mouth daily. 120 Tab 5    sertraline (ZOLOFT) 50 mg tablet Take 1 Tab by mouth daily. 30 Tab 11    melatonin tab tablet Take 2 Tabs by mouth nightly. 60 Tab 5    acetaminophen (TYLENOL EXTRA STRENGTH) 500 mg tablet Take 1 Tab by mouth every eight (8) hours as needed for Pain. 100 Tab 2    cholecalciferol (VITAMIN D3) 1,000 unit cap Take 2,000 Units by mouth daily. Indications: taking 5,000u      cyanocobalamin (VITAMIN B-12) 100 mcg tablet Take 100 mcg by mouth daily. Indications: 500mcg      triamterene-hydroCHLOROthiazide (DYAZIDE) 37.5-25 mg per capsule Take 1 Cap by mouth daily. 90 Cap 3    Lactobacillus rhamnosus GG (CULTURELLE PO) Take  by mouth daily.       disopyramide phosphate (NORPACE) 100 mg capsule TAKE ONE CAPSULE BY MOUTH TWICE A DAY 60 Cap 0    metoprolol tartrate (LOPRESSOR) 25 mg tablet TAKE 1 TABLET TWICE A DAY 60 Tab 12    pantoprazole (PROTONIX) 40 mg tablet Take 1 Tab by mouth daily. (Patient taking differently: Take 40 mg by mouth two (2) times a day.) 30 Tab 12    ondansetron hcl (ZOFRAN) 4 mg tablet Take 1 Tab by mouth every eight (8) hours as needed for Nausea. 20 Tab 3    polyethylene glycol (MIRALAX) 17 gram/dose powder Take 17 g by mouth daily.  nitroglycerin (NITROSTAT) 0.4 mg SL tablet 1 Tab by SubLINGual route every five (5) minutes as needed for Chest Pain. 25 Tab 1     Past Surgical History:   Procedure Laterality Date    HX APPENDECTOMY      HX CHOLECYSTECTOMY      HX GI      hemorrhoidectomy    HX HEENT      sinus surgery    HX HERNIA REPAIR      HX TONSILLECTOMY        Lab Results   Component Value Date/Time    WBC 3.6 04/03/2019 05:02 PM    WBC (POC) 5.6 07/23/2018 03:07 PM    HGB 13.4 04/03/2019 05:02 PM    HGB (POC) 15.0 07/23/2018 03:07 PM    HCT 41.2 04/03/2019 05:02 PM    HCT (POC) 45.9 07/23/2018 03:07 PM    PLATELET 296 28/88/9928 05:02 PM    PLATELET (POC) 978.2 07/23/2018 03:07 PM    MCV 92.6 04/03/2019 05:02 PM    MCV (POC) 90.0 07/23/2018 03:07 PM     Lab Results   Component Value Date/Time    Cholesterol, total 246 (H) 03/01/2019 10:15 AM    HDL Cholesterol 76 03/01/2019 10:15 AM    LDL, calculated 154 (H) 03/01/2019 10:15 AM    Triglyceride 82 03/01/2019 10:15 AM     Lab Results   Component Value Date/Time    GFR est non-AA 53 (L) 04/09/2019 11:07 AM    GFRNA, POC >60 10/15/2018 01:54 PM    GFR est AA 61 04/09/2019 11:07 AM    GFRAA, POC >60 10/15/2018 01:54 PM    Creatinine 0.94 04/09/2019 11:07 AM    Creatinine (POC) 0.8 10/15/2018 01:54 PM    BUN 21 04/09/2019 11:07 AM    Sodium 138 04/09/2019 11:07 AM    Potassium 4.2 04/09/2019 11:07 AM    Chloride 96 04/09/2019 11:07 AM    CO2 29 04/09/2019 11:07 AM    Magnesium 2.2 01/13/2019 11:15 PM        Review of Systems   Respiratory: Negative for shortness of breath.     Cardiovascular: Negative for chest pain. Physical Exam   Constitutional: She is oriented to person, place, and time. She appears well-developed and well-nourished. No distress. HENT:   Head: Normocephalic and atraumatic. Mouth/Throat: Oropharynx is clear and moist. No oropharyngeal exudate. Eyes: Conjunctivae and EOM are normal. Right eye exhibits no discharge. Left eye exhibits no discharge. Neck: Normal range of motion. Neck supple. Cardiovascular: Normal rate, regular rhythm and normal heart sounds. Exam reveals no gallop and no friction rub. No murmur heard. Pulmonary/Chest: Effort normal and breath sounds normal. No respiratory distress. She has no wheezes. She has no rales. She exhibits no tenderness. Abdominal: Soft. She exhibits no distension and no mass. There is no tenderness. There is no rebound and no guarding. Dried crust in navel   Hiatal hernia    Musculoskeletal: She exhibits no edema, tenderness or deformity. Lymphadenopathy:     She has no cervical adenopathy. Neurological: She is alert and oriented to person, place, and time. Coordination abnormal.   Skin: Skin is warm and dry. No rash noted. She is not diaphoretic. No erythema. No pallor. Psychiatric: She has a normal mood and affect. Her behavior is normal.       ASSESSMENT and PLAN    ICD-10-CM ICD-9-CM    1. Medicare annual wellness visit, subsequent F18.52 H42.2 METABOLIC PANEL, COMPREHENSIVE      HEMOGLOBIN A1C WITH EAG      MICROALBUMIN, UR, RAND W/ MICROALB/CREAT RATIO      CBC W/O DIFF   2. Depression, major, recurrent, mild (HCC)              Will increase zoloft to 75 mg daily  T71.4 666.86 METABOLIC PANEL, COMPREHENSIVE      HEMOGLOBIN A1C WITH EAG      MICROALBUMIN, UR, RAND W/ MICROALB/CREAT RATIO      CBC W/O DIFF   3.  Essential hypertension                Controlled with dyazide and metropolo needs to stay on dyzide due to hx of pleural effusion which was resolved on last cxr  J45 228.5 METABOLIC PANEL, COMPREHENSIVE HEMOGLOBIN A1C WITH EAG      MICROALBUMIN, UR, RAND W/ MICROALB/CREAT RATIO      CBC W/O DIFF   4. Irritable bowel syndrome with diarrhea                Has had issued with diarrhea on and off tries to follow gluten free diet which improves her sxs  N93.4 734.9 METABOLIC PANEL, COMPREHENSIVE      HEMOGLOBIN A1C WITH EAG      MICROALBUMIN, UR, RAND W/ MICROALB/CREAT RATIO      CBC W/O DIFF   5. Type 2 diabetes with nephropathy (HCC)              Sugars have been running more in prediabetes range diet controlled will check a1c today  M59.90 496.25 METABOLIC PANEL, COMPREHENSIVE     583.81 HEMOGLOBIN A1C WITH EAG      MICROALBUMIN, UR, RAND W/ MICROALB/CREAT RATIO      CBC W/O DIFF   6. Paroxysmal atrial fibrillation (HCC)                  Stable and normal rhythm on norpace utd with dr caldwell  N10.9 988.16 METABOLIC PANEL, COMPREHENSIVE      HEMOGLOBIN A1C WITH EAG      MICROALBUMIN, UR, RAND W/ MICROALB/CREAT RATIO      CBC W/O DIFF   7. Memory loss              Was evaluated by dr Yashira Pennington in the past in 2015 no dementia found at that time follows with dr Dain Oquendo for possible parkinson type syndrome as well  P33.0 164.55 METABOLIC PANEL, COMPREHENSIVE      HEMOGLOBIN A1C WITH EAG      MICROALBUMIN, UR, RAND W/ MICROALB/CREAT RATIO      CBC W/O DIFF   8. Cerebrovascular disease, unspecified                  On asa for stroke prevention    I68.9 799.5 METABOLIC PANEL, COMPREHENSIVE      HEMOGLOBIN A1C WITH EAG      MICROALBUMIN, UR, RAND W/ MICROALB/CREAT RATIO      CBC W/O DIFF   9. Age-related osteoporosis without current pathological fracture                  We discussed this previously and she is not interested in fosamax at this time vit D for tx  S36.6 537.72 METABOLIC PANEL, COMPREHENSIVE      HEMOGLOBIN A1C WITH EAG      MICROALBUMIN, UR, RAND W/ MICROALB/CREAT RATIO      CBC W/O DIFF   10.  Hallucinations                Pt still with some mild hallucination she has started and stopped Seroquel many times she does not seem to be on seroquel at this time has f/u with dr Ivan Raymundo pending  I19.3 875.8 METABOLIC PANEL, COMPREHENSIVE      HEMOGLOBIN A1C WITH EAG      MICROALBUMIN, UR, RAND W/ MICROALB/CREAT RATIO      CBC W/O DIFF   11. Periumbilical mass                  No evidence of infection or abscess pt has pea sized crust in umbilicus maybe healing scab if this does not fall of or resolve in 1-2 wks will have her see surg for excision no abx needed  X12.68 103.32 METABOLIC PANEL, COMPREHENSIVE      HEMOGLOBIN A1C WITH EAG      MICROALBUMIN, UR, RAND W/ MICROALB/CREAT RATIO      CBC W/O DIFF      REFERRAL TO GENERAL SURGERY          Depression screen reviewed and positive (+1). Will increase zoloft to 75 mg   Scribed by Steffen Olvera, as dictated by Dr. Serge Maravilla. Current diagnosis and concerns discussed with pt at length. Pt understands risks and benefits or current treatment plan and medications, and accepts the treatment and medication with any possible risks. Pt asks appropriate questions, which were answered. Pt was instructed to call with any concerns or problems. I have reviewed the note documented by the scribe. The services provided are my own.   The documentation is accurate

## 2019-09-11 NOTE — PROGRESS NOTES
This is the Subsequent Medicare Annual Wellness Exam, performed 12 months or more after the Initial AWV or the last Subsequent AWV    I have reviewed the patient's medical history in detail and updated the computerized patient record. History     Past Medical History:   Diagnosis Date    Acute diverticulitis 9/25/2017    Allergic rhinitis 9/25/2017    Arrhythmia     afib    Arthritis     Asthma     Atrial fibrillation ([facility] [address]     Benign essential tremor 9/25/2017    Cancer ([facility] [address]     skin    Celiac disease 9/25/2017    Constipation 9/25/2017    Diabetic peripheral neuropathy associated with type 2 diabetes mellitus ([facility] [address] 4/21/2016    Diarrhea 9/25/2017    Diverticulosis of large intestine without hemorrhage 9/25/2017    Early satiety 9/25/2017    Fuchs' corneal dystrophy 9/25/2017    Gastroesophageal reflux disease without esophagitis 9/25/2017    GERD (gastroesophageal reflux disease)     High cholesterol     Hypercholesterolemia 9/25/2017    Hypertension     Irritable bowel syndrome with diarrhea 9/25/2017    Numbness 9/25/2017    Osteopenia 9/25/2017    Other ill-defined conditions(799.89)     collapsed lung prior to hernia repair surgery    Postmenopausal 7/23/2018    Ptosis, both eyelids 9/25/2017    Statin intolerance 9/25/2017    Unspecified adverse effect of anesthesia     pt states she went into cardiac arrest prior to hernia repair after the insertion of gas in stomach      Past Surgical History:   Procedure Laterality Date    HX APPENDECTOMY      HX CHOLECYSTECTOMY      HX GI      hemorrhoidectomy    HX HEENT      sinus surgery    HX HERNIA REPAIR      HX TONSILLECTOMY       Current Outpatient Medications   Medication Sig Dispense Refill    varicella-zoster recombinant, PF, (SHINGRIX, PF,) 50 mcg/0.5 mL susr injection 0.5mL by IntraMUSCular route once now and then repeat in 2-6 months 0.5 mL 1    cyanocobalamin (VITAMIN B12) 500 mcg tablet Take 1 Tab by mouth daily. 60 Tab 5    cholecalciferol, VITAMIN D3, (VITAMIN D3) 5,000 unit tab tablet Take 1 Tab by mouth daily. 60 Tab 5    aspirin 81 mg chewable tablet Take 1 Tab by mouth daily. 120 Tab 5    sertraline (ZOLOFT) 50 mg tablet Take 1 Tab by mouth daily. 30 Tab 11    melatonin tab tablet Take 2 Tabs by mouth nightly. 60 Tab 5    acetaminophen (TYLENOL EXTRA STRENGTH) 500 mg tablet Take 1 Tab by mouth every eight (8) hours as needed for Pain. 100 Tab 2    cholecalciferol (VITAMIN D3) 1,000 unit cap Take 2,000 Units by mouth daily. Indications: taking 5,000u      cyanocobalamin (VITAMIN B-12) 100 mcg tablet Take 100 mcg by mouth daily. Indications: 500mcg      triamterene-hydroCHLOROthiazide (DYAZIDE) 37.5-25 mg per capsule Take 1 Cap by mouth daily. 90 Cap 3    Lactobacillus rhamnosus GG (CULTURELLE PO) Take  by mouth daily.  disopyramide phosphate (NORPACE) 100 mg capsule TAKE ONE CAPSULE BY MOUTH TWICE A DAY 60 Cap 0    metoprolol tartrate (LOPRESSOR) 25 mg tablet TAKE 1 TABLET TWICE A DAY 60 Tab 12    pantoprazole (PROTONIX) 40 mg tablet Take 1 Tab by mouth daily. (Patient taking differently: Take 40 mg by mouth two (2) times a day.) 30 Tab 12    ondansetron hcl (ZOFRAN) 4 mg tablet Take 1 Tab by mouth every eight (8) hours as needed for Nausea. 20 Tab 3    polyethylene glycol (MIRALAX) 17 gram/dose powder Take 17 g by mouth daily.  nitroglycerin (NITROSTAT) 0.4 mg SL tablet 1 Tab by SubLINGual route every five (5) minutes as needed for Chest Pain.  25 Tab 1     Allergies   Allergen Reactions    Bactrim [Sulfamethoprim Ds] Other (comments)     consipation    Ciprofloxacin (Bulk) Other (comments)     Low wbc    Iodine Unknown (comments)    Prednisone Other (comments)     tachycardia    Statins-Hmg-Coa Reductase Inhibitors Unknown (comments)     Stomach uipset and mild muscle aches     Family History   Problem Relation Age of Onset    Heart Disease Mother     Dementia Mother     Heart Disease Father     Neuropathy Child     Depression Child     Other Child         diverticulitis    Lung Cancer Sister 80        lung ca     Social History     Tobacco Use    Smoking status: Former Smoker     Packs/day: 0.75     Years: 5.00     Pack years: 3.75     Last attempt to quit: 10/11/1967     Years since quittin.9    Smokeless tobacco: Never Used   Substance Use Topics    Alcohol use: No     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 I65.23    Osteopenia M85.80    Gastroesophageal reflux disease without esophagitis K21.9    Diverticulosis of large intestine without hemorrhage K57.30    Benign essential tremor G25.0    Irritable bowel syndrome with diarrhea K58.0    Chest wall pain R07.89    Statin intolerance Z78.9    Diarrhea R19.7    Constipation K59.00    Hypercholesterolemia E78.00    Early satiety R68.81    Celiac disease K90.0    Allergic rhinitis J30.9    Acute diverticulitis K57.92    Numbness R20.0    Fuchs' corneal dystrophy H18.51    Ptosis, both eyelids H02.403    Epidermoid cyst of finger of left hand L72.0    History of stroke Z86.73    Postmenopausal Z78.0    Pleural effusion J90    Type 2 diabetes with nephropathy ([facility] [address] E11.21    Depression, major, recurrent, mild ([facility]) F33.0       Depression Risk Factor Screening:     3 most recent PHQ Screens 2019   Little interest or pleasure in doing things Several days   Feeling down, depressed, irritable, or hopeless Not at all   Total Score PHQ 2 1   Trouble falling or staying asleep, or sleeping too much -   Feeling tired or having little energy -   Poor appetite, weight loss, or overeating -   Feeling bad about yourself - or that you are a failure or have let yourself or your family down -   Trouble concentrating on things such as school, work, reading, or watching TV -   Moving or speaking so slowly that other people could have noticed; or the opposite being so fidgety that others notice -   Thoughts of being better off dead, or hurting yourself in some way -   PHQ 9 Score -   adjusting zoloft  Alcohol Risk Factor Screening: You do not drink alcohol or very rarely. Functional Ability and Level of Safety:   Hearing Loss  The patient should wear hearing aids. --declines    Activities of Daily Living  The home contains: handrails and grab bars  Patient needs help with:  transportation, shopping, preparing meals, laundry, housework, managing medications, managing money, dressing and bathing    Fall Risk  Fall Risk Assessment, last 12 mths 9/11/2019   Able to walk? Yes   Fall in past 12 months?  No       Abuse Screen  Patient is not abused  [facility] at [facility]   Cognitive Screening   Evaluation of Cognitive Function:  Has your family/caregiver stated any concerns about your memory: yes  Normal  Stable sees neuro   Patient Care Team   Patient Care Team:  Sylvester Roy MD as PCP - General (Internal Medicine)  Jessica Hess PsyD (Psychology)  Lluvia Calix MD as Physician (Cardiology)  Rosa Capps MD (Ophthalmology)  Yash Roach MD (Ophthalmology)  Tello Cartagena MD (Gastroenterology)  Lula Cody DO (Neurology)  Tracie Brumfield MD (Physical Medicine and Rehab)  Joshua He MD (Otolaryngology)  Donita Muniz NP (Nurse Practitioner)   updated    Assessment/Plan   Education and counseling provided:  Are appropriate based on today's review and evaluation  End-of-Life planning (with patient's consent)  Influenza Vaccine  Screening Mammography  Screening Pap and pelvic (covered once every 2 years)  Colorectal cancer screening tests  Cardiovascular screening blood test  Bone mass measurement (DEXA)  Screening for glaucoma  Diabetes screening test    Diagnoses and all orders for this visit:    1. Medicare annual wellness visit, subsequent    Other orders  -     varicella-zoster recombinant, PF, (SHINGRIX, PF,) 50 mcg/0.5 mL susr injection; 0.5mL by IntraMUSCular route once now and then repeat in 2-6 months        Health Maintenance Due   Topic Date Due    DTaP/Tdap/Td series (1 - Tdap) [birth year]/1947    Shingrix Vaccine Age 50> (1 of 2) 06/22/1976    FOOT EXAM Q1  07/23/2019    MICROALBUMIN Q1  07/23/2019    MEDICARE YEARLY EXAM  07/24/2019    Influenza Age 9 to Adult  08/01/2019    HEMOGLOBIN A1C Q6M  09/01/2019        ACP on file. SDMs are her daughters, Diane Funez and Scott Zheng.       Colonoscopy: ~10 years ago with Dr Danisha Mckinney, per pt the way her colon is formed makes it difficult to get COLOs, her declines further  Pap: declines further  Mammogram: declines further  Dexa: 8/18, osteoporosis none further    Tdap: advised pt to get this at her pharmacy  Prevnar 13: 03/01/19   Pneumovax: due 3/20  Shingrix: ordered 03/01/19   Flu shot: Fall 2018, pt declines today     A1C: 3/19 5.9 due    Eye exam: Dr Braden Briones ~summer 2018, Dr Billy Quintana 2/19 (q6 weeks),  last visit was 8/19       Lipids: 3/19  annual       Discussed with patient about advance medical directive. Provided patient blank AMD and Your Right to Decide Booklet. Requested that if completed to provide a copy of AMD to PCP office.

## 2019-09-11 NOTE — PATIENT INSTRUCTIONS
Medicare Part B Preventive Services Guidelines/Limitations Date last completed and Frequency Due Date   Bone Mass Measurement  (age 72 & older, biennial) Requires diagnosis related to osteoporosis or estrogen deficiency. Biennial benefit unless patient has history of long-term glucocorticoid tx or baseline is needed because initial test was by other method Completed 8/9/2018        Recommended every 2 years Due 8/2020   Cardiovascular Screening Blood Tests (every 5 years)  Total cholesterol, HDL, Triglycerides Order as a panel if possible Completed 3/2019     Recommended annually Due 3/2020   Colorectal Cancer Screening  -Fecal occult blood test (annual)  -Flexible sigmoidoscopy (5y)  -Screening colonoscopy (10y)  -Barium Enema   Completed ~10 years ago     Recommended every  years  Declines further    Counseling to Prevent Tobacco Use (up to 8 sessions per year)  - Counseling greater than 3 and up to 10 minutes  - Counseling greater than 10 minutes Patients must be asymptomatic of tobacco-related conditions to receive as preventive service N/A N/A   Diabetes Screening Tests (at least every 3 years, Medicare covers annually or at 6-month intervals for prediabetic patients)     Fasting blood sugar (FBS) or glucose tolerance test (GTT) Patient must be diagnosed with one of the following:  -Hypertension, Dyslipidemia, obesity, previous impaired FBS or GTT  Or any two of the following: overweight, FH of diabetes, age ? 72, history of gestational diabetes, birth of baby weighing more than 9 pounds Completed: 3/1/19 with a1c 5.9        Recommended every 3 years for non-diabetics     Recommended every 3-6 months for Pre-Diabetics and Diabetics Due now   Diabetes Self-Management Training (DSMT) (no USPSTF recommendation) Requires referral by treating physician for patient with diabetes or renal disease. 10 hours of initial DSMT session of no less than 30 minutes each in a continuous 12-month period.   2 hours of follow-up DSMT in subsequent years.       Glaucoma Screening (no USPSTF recommendation) Diabetes mellitus, family history, , age 48 or over,  American, age 72 or over Completed 8/19 with Dr Mayelin Ontiveros      Recommended annually Due q 6 weeks   Human Immunodeficiency Virus (HIV) Screening (annually for increased risk patients)  HIV-1 and HIV-2 by EIA, RIZWAN, rapid antibody test, or oral mucosa transudate Patient must be at increased risk for HIV infection per USPSTF guidelines or pregnant. Tests covered annually for patients at increased risk. Pregnant patients may receive up to 3 test during pregnancy. N/A N/A   Medical Nutrition Therapy (MNT) (for diabetes or renal disease not recommended schedule) Requires referral by treating physician for patient with diabetes or renal disease. Can be provided in same year as diabetes self-management training (DSMT), and CMS recommends medical nutrition therapy take place after DSMT. Up to 3 hours for initial year and 2 hours in subsequent years. N/A N/A             Seasonal Influenza Vaccination (annually)   Completed  Fall 2018     Recommended annually Due Fall 2019   Pneumococcal Vaccination (once after 72)   Pneumococcal 23 - due  Recommended once over the age of 72     Prevnar 15 - 3/1/19 Recommended once over the age of 72 Due  3/20           Complete   Hepatitis B Vaccinations (if medium/high risk) Medium/high risk factors:  End-stage renal disease,  Hemophiliacs who received Factor VIII or IX concentrates, Clients of institutions for the mentally retarded, Persons who live in the same house as a HepB virus carrier, Homosexual men, Illicit injectable drug abusers.       Screening Mammography (biennial age 54-69)?  Annually (age 36 or over) Completed 10/2011    Declines further    Screening Pap Tests and Pelvic Examination (up to age 79 and after 79 if unknown history or abnormal study last 10 years) Every 24 months except high risk Completed in the past Declines further    Ultrasound Screening for Abdominal Aortic Aneurysm (AAA) (once) Patient must be referred through IPPE and not have had a screening for abdominal aortic aneurysm before under Medicare. Limited to patients who meet one of the following criteria:  - Men who are 73-68 years old and have smoked more than 100 cigarettes in their lifetime.  -Anyone with a FH of AAA  -Anyone recommended for screening by USPSTF Completed 2/2017 CT abd- negative  Completed         Medicare Wellness Visit, Female     The best way to live healthy is to have a lifestyle where you eat a well-balanced diet, exercise regularly, limit alcohol use, and quit all forms of tobacco/nicotine, if applicable. Regular preventive services are another way to keep healthy. Preventive services (vaccines, screening tests, monitoring & exams) can help personalize your care plan, which helps you manage your own care. Screening tests can find health problems at the earliest stages, when they are easiest to treat. [facility] follows the current, evidence-based guidelines published by the [facility] (USPSTF) when recommending preventive services for our patients. Because we follow these guidelines, sometimes recommendations change over time as research supports it. (For example, mammograms used to be recommended annually. Even though Medicare will still pay for an annual mammogram, the newer guidelines recommend a mammogram every two years for women of average risk.)  Of course, you and your doctor may decide to screen more often for some diseases, based on your risk and your health status. Preventive services for you include:  - Medicare offers their members a free annual wellness visit, which is time for you and your primary care provider to discuss and plan for your preventive service needs.  Take advantage of this benefit every year!  -All adults over the age of 72 should receive the recommended pneumonia vaccines. Current USPSTF guidelines recommend a series of two vaccines for the best pneumonia protection.   -All adults should have a flu vaccine yearly and a tetanus vaccine every 10 years. All adults age 61 and older should receive a shingles vaccine once in their lifetime.    -A bone mass density test is recommended when a woman turns 65 to screen for osteoporosis. This test is only recommended one time, as a screening. Some providers will use this same test as a disease monitoring tool if you already have osteoporosis. -All adults age 38-68 who are overweight should have a diabetes screening test once every three years.   -Other screening tests and preventive services for persons with diabetes include: an eye exam to screen for diabetic retinopathy, a kidney function test, a foot exam, and stricter control over your cholesterol.   -Cardiovascular screening for adults with routine risk involves an electrocardiogram (ECG) at intervals determined by your doctor.   -Colorectal cancer screenings should be done for adults age 54-65 with no increased risk factors for colorectal cancer. There are a number of acceptable methods of screening for this type of cancer. Each test has its own benefits and drawbacks. Discuss with your doctor what is most appropriate for you during your annual wellness visit. The different tests include: colonoscopy (considered the best screening method), a fecal occult blood test, a fecal DNA test, and sigmoidoscopy. -Breast cancer screenings are recommended every other year for women of normal risk, age 54-69.  -Cervical cancer screenings for women over age 72 are only recommended with certain risk factors.   -All adults born between [facility] should be screened once for Hepatitis C.      Here is a list of your current Health Maintenance items (your personalized list of preventive services) with a due date:  Health Maintenance Due   Topic Date Due    DTaP/Tdap/Td  (1 - Tdap) 06[birth year]22/1947    Shingles Vaccine (1 of 2) 06/22/1976    Diabetic Foot Care  07/23/2019    Albumin Urine Test  07/23/2019    Annual Well Visit  07/24/2019    Flu Vaccine  08/01/2019    Hemoglobin A1C    09/01/2019       Check if you are taking seroquel at night for hallucinations    Increase zoloft to 75mg    See surgeon if belly button not improving     Cancel October

## 2019-09-11 NOTE — PROGRESS NOTES
Medicare Part B Preventive Services Guidelines/Limitations Date last completed and Frequency Due Date   Bone Mass Measurement  (age 72 & older, biennial) Requires diagnosis related to osteoporosis or estrogen deficiency. Biennial benefit unless patient has history of long-term glucocorticoid tx or baseline is needed because initial test was by other method Completed 8/9/2018      Recommended every 2 years Due 8/2020   Cardiovascular Screening Blood Tests (every 5 years)  Total cholesterol, HDL, Triglycerides Order as a panel if possible Completed 3/2019    Recommended annually Due 3/2020   Colorectal Cancer Screening  -Fecal occult blood test (annual)  -Flexible sigmoidoscopy (5y)  -Screening colonoscopy (10y)  -Barium Enema  Completed ~10 years ago    Recommended every  years  Declines further    Counseling to Prevent Tobacco Use (up to 8 sessions per year)  - Counseling greater than 3 and up to 10 minutes  - Counseling greater than 10 minutes Patients must be asymptomatic of tobacco-related conditions to receive as preventive service N/A N/A   Diabetes Screening Tests (at least every 3 years, Medicare covers annually or at 6-month intervals for prediabetic patients)    Fasting blood sugar (FBS) or glucose tolerance test (GTT) Patient must be diagnosed with one of the following:  -Hypertension, Dyslipidemia, obesity, previous impaired FBS or GTT  Or any two of the following: overweight, FH of diabetes, age ? 72, history of gestational diabetes, birth of baby weighing more than 9 pounds Completed: 3/1/19 with a1c 5.9      Recommended every 3 years for non-diabetics    Recommended every 3-6 months for Pre-Diabetics and Diabetics Due now   Diabetes Self-Management Training (DSMT) (no USPSTF recommendation) Requires referral by treating physician for patient with diabetes or renal disease. 10 hours of initial DSMT session of no less than 30 minutes each in a continuous 12-month period.   2 hours of follow-up DSMT in subsequent years. Glaucoma Screening (no USPSTF recommendation) Diabetes mellitus, family history, , age 48 or over,  American, age 72 or over Completed 2/19 with Dr Belcher Numbers     Recommended annually Due 2/2020   Human Immunodeficiency Virus (HIV) Screening (annually for increased risk patients)  HIV-1 and HIV-2 by EIA, RIZWAN, rapid antibody test, or oral mucosa transudate Patient must be at increased risk for HIV infection per USPSTF guidelines or pregnant. Tests covered annually for patients at increased risk. Pregnant patients may receive up to 3 test during pregnancy. N/A N/A   Medical Nutrition Therapy (MNT) (for diabetes or renal disease not recommended schedule) Requires referral by treating physician for patient with diabetes or renal disease. Can be provided in same year as diabetes self-management training (DSMT), and CMS recommends medical nutrition therapy take place after DSMT. Up to 3 hours for initial year and 2 hours in subsequent years. N/A N/A         Seasonal Influenza Vaccination (annually)  Completed  Fall 2018    Recommended annually Due Fall 2019   Pneumococcal Vaccination (once after 72)  Pneumococcal 23 - due  Recommended once over the age of 72    Prevnar 15 - 3/1/19 Recommended once over the age of 72 Due         Complete   Hepatitis B Vaccinations (if medium/high risk) Medium/high risk factors:  End-stage renal disease,  Hemophiliacs who received Factor VIII or IX concentrates, Clients of institutions for the mentally retarded, Persons who live in the same house as a HepB virus carrier, Homosexual men, Illicit injectable drug abusers. Screening Mammography (biennial age 54-69)?  Annually (age 36 or over) Completed 10/2011   Declines further    Screening Pap Tests and Pelvic Examination (up to age 79 and after 79 if unknown history or abnormal study last 10 years) Every 24 months except high risk Completed in the past  Declines further    Ultrasound Screening for Abdominal Aortic Aneurysm (AAA) (once) Patient must be referred through IPPE and not have had a screening for abdominal aortic aneurysm before under Medicare.   Limited to patients who meet one of the following criteria:  - Men who are 73-68 years old and have smoked more than 100 cigarettes in their lifetime.  -Anyone with a FH of AAA  -Anyone recommended for screening by USPSTF Completed 2/2017 CT abd- negative  Completed

## 2019-09-11 NOTE — TELEPHONE ENCOUNTER
[facility] Office Pool             General Message/Vendor Calls     Caller's first and last name:Katty,daughter       Reason for call:Katty advising the Dr her sister is currently coming back to drop off the order for the bone density test.       Callback required yes/no and why:No       Best contact number(s):[phone]     Details to clarify the request:       Chris Bradford

## 2019-09-12 LAB
ALBUMIN SERPL-MCNC: 4 G/DL (ref 3.2–4.6)
ALBUMIN/CREAT UR: 9.6 MG/G CREAT (ref 0–30)
ALBUMIN/GLOB SERPL: 2 {RATIO} (ref 1.2–2.2)
ALP SERPL-CCNC: 54 IU/L (ref 39–117)
ALT SERPL-CCNC: 11 IU/L (ref 0–32)
AST SERPL-CCNC: 14 IU/L (ref 0–40)
BILIRUB SERPL-MCNC: 0.4 MG/DL (ref 0–1.2)
BUN SERPL-MCNC: 20 MG/DL (ref 10–36)
BUN/CREAT SERPL: 19 (ref 12–28)
CALCIUM SERPL-MCNC: 10 MG/DL (ref 8.7–10.3)
CHLORIDE SERPL-SCNC: 92 MMOL/L (ref 96–106)
CO2 SERPL-SCNC: 28 MMOL/L (ref 20–29)
CREAT SERPL-MCNC: 1.03 MG/DL (ref 0.57–1)
CREAT UR-MCNC: 32.3 MG/DL
ERYTHROCYTE [DISTWIDTH] IN BLOOD BY AUTOMATED COUNT: 14.1 % (ref 12.3–15.4)
EST. AVERAGE GLUCOSE BLD GHB EST-MCNC: 117 MG/DL
GLOBULIN SER CALC-MCNC: 2 G/DL (ref 1.5–4.5)
GLUCOSE SERPL-MCNC: 86 MG/DL (ref 65–99)
HBA1C MFR BLD: 5.7 % (ref 4.8–5.6)
HCT VFR BLD AUTO: 36.7 % (ref 34–46.6)
HGB BLD-MCNC: 12.5 G/DL (ref 11.1–15.9)
MCH RBC QN AUTO: 30.3 PG (ref 26.6–33)
MCHC RBC AUTO-ENTMCNC: 34.1 G/DL (ref 31.5–35.7)
MCV RBC AUTO: 89 FL (ref 79–97)
MICROALBUMIN UR-MCNC: 3.1 UG/ML
PLATELET # BLD AUTO: 230 X10E3/UL (ref 150–450)
POTASSIUM SERPL-SCNC: 4.7 MMOL/L (ref 3.5–5.2)
PROT SERPL-MCNC: 6 G/DL (ref 6–8.5)
RBC # BLD AUTO: 4.13 X10E6/UL (ref 3.77–5.28)
SODIUM SERPL-SCNC: 133 MMOL/L (ref 134–144)
WBC # BLD AUTO: 4.4 X10E3/UL (ref 3.4–10.8)

## 2019-10-04 ENCOUNTER — TELEPHONE (OUTPATIENT)
Dept: INTERNAL MEDICINE CLINIC | Age: 84
End: 2019-10-04

## 2019-10-04 NOTE — TELEPHONE ENCOUNTER
MD Sumi Gonzales LPN   Caller: Unspecified (Today, 11:08 AM)             10mg melatonin should be sufficient     Work on sleep hygeine

## 2019-10-04 NOTE — TELEPHONE ENCOUNTER
Called, spoke to Wendy. Two pt identifiers confirmed. Wendy informed per Dr. Dayana Mejia 10 mg of melatonin should be fine and to work on sleep hygiene. Wendy verbalized understanding of information discussed w/ no further questions at this time.

## 2019-10-04 NOTE — TELEPHONE ENCOUNTER
#[phone] García Cantor states pt is not sleeping at night. This has gone on for several weeks now. Pt takes 10 mg of melatonin and García Cantor would like to know if this could be increased to 20 mg per night? If so there needs to be a note faxed to [facility] where pt resides. Please all García Cantor to let her know. Thanks.

## 2019-10-21 ENCOUNTER — OFFICE VISIT (OUTPATIENT)
Dept: SURGERY | Age: 84
End: 2019-10-21

## 2019-10-21 VITALS
OXYGEN SATURATION: 96 % | WEIGHT: 109.8 LBS | DIASTOLIC BLOOD PRESSURE: 63 MMHG | HEIGHT: 62 IN | HEART RATE: 89 BPM | SYSTOLIC BLOOD PRESSURE: 107 MMHG | TEMPERATURE: 98.5 F | RESPIRATION RATE: 20 BRPM | BODY MASS INDEX: 20.2 KG/M2

## 2019-10-21 DIAGNOSIS — Q89.9 UMBILICAL ABNORMALITY: Primary | ICD-10-CM

## 2019-10-21 NOTE — PROGRESS NOTES
To: Kane Lewis MD    From: Rakel Crump MD    Thank you for sending Nathan Malcolm to see us. Enjoyed meeting her. Encounter Date: 10/21/2019    Subjective:      Paulette Noble is a 80 y.o. female presents for evaluation of recurrent umbilical discharge. Has had occasional fluid from belly button over last 2 months. Lives at long term facility. Get a shower only MWF's. Objective:     Visit Vitals  /63 (BP 1 Location: Left arm, BP Patient Position: Sitting)   Pulse 89   Temp 98.5 °F (36.9 °C) (Oral)   Resp 20   Ht 5' 2\" (1.575 m)   Wt 49.8 kg (109 lb 12.8 oz)   SpO2 96%   BMI 20.08 kg/m²       General:  alert, cooperative, no distress, appears stated age   Abdomen: soft, bowel sounds active, non-tender    Umbilical sebaceous material.  Skin irritation at the base. Narrow outlet to umbilicus. Assessment:     Due to the narrowness of her umbilical outlet and decrease in daily washing, the normal oils and skin cell debris from the base of the umbilicus has become trapped and has caused localized skin irritation. Plan:     Debris removed. Umbilicus washed until no further debris present. Placed gauze into the depth of the umbilicus. Rec remove it on Wednesday at next shower. With each shower, shower head should be used to wash out the umbilicus. Told daughter to call with future concerns.       Rakel Crump MD

## 2019-10-21 NOTE — Clinical Note
10/21/19 Patient: Tabitha Mathias YOB: 1926 Date of Visit: 10/21/2019 Yamile Talley MD 
[address] [address] VIA In Basket Dear Yamile Talley MD, Thank you for referring Ms. Rachel Marks to  [facility] for evaluation. My notes for this consultation are attached. If you have questions, please do not hesitate to call me. I look forward to following your patient along with you.  
 
 
Sincerely, 
 
Frankie Chapman MD

## 2019-11-04 ENCOUNTER — APPOINTMENT (OUTPATIENT)
Dept: CT IMAGING | Age: 84
End: 2019-11-04
Attending: STUDENT IN AN ORGANIZED HEALTH CARE EDUCATION/TRAINING PROGRAM
Payer: MEDICARE

## 2019-11-04 ENCOUNTER — HOSPITAL ENCOUNTER (EMERGENCY)
Age: 84
Discharge: NURSING, REHAB OR LONG-TERM CARE | End: 2019-11-05
Attending: EMERGENCY MEDICINE
Payer: MEDICARE

## 2019-11-04 DIAGNOSIS — E87.1 HYPONATREMIA: Primary | ICD-10-CM

## 2019-11-04 DIAGNOSIS — N89.8 VAGINAL DISCHARGE: ICD-10-CM

## 2019-11-04 LAB
ALBUMIN SERPL-MCNC: 3.5 G/DL (ref 3.5–5)
ALBUMIN/GLOB SERPL: 1.2 {RATIO} (ref 1.1–2.2)
ALP SERPL-CCNC: 54 U/L (ref 45–117)
ALT SERPL-CCNC: 19 U/L (ref 12–78)
ANION GAP SERPL CALC-SCNC: 6 MMOL/L (ref 5–15)
AST SERPL-CCNC: 14 U/L (ref 15–37)
BASOPHILS # BLD: 0.1 K/UL (ref 0–0.1)
BASOPHILS NFR BLD: 1 % (ref 0–1)
BILIRUB SERPL-MCNC: 0.5 MG/DL (ref 0.2–1)
BUN SERPL-MCNC: 28 MG/DL (ref 6–20)
BUN/CREAT SERPL: 26 (ref 12–20)
CALCIUM SERPL-MCNC: 9.4 MG/DL (ref 8.5–10.1)
CHLORIDE SERPL-SCNC: 94 MMOL/L (ref 97–108)
CO2 SERPL-SCNC: 29 MMOL/L (ref 21–32)
CREAT SERPL-MCNC: 1.07 MG/DL (ref 0.55–1.02)
DIFFERENTIAL METHOD BLD: ABNORMAL
EOSINOPHIL # BLD: 0.1 K/UL (ref 0–0.4)
EOSINOPHIL NFR BLD: 2 % (ref 0–7)
ERYTHROCYTE [DISTWIDTH] IN BLOOD BY AUTOMATED COUNT: 13.1 % (ref 11.5–14.5)
GLOBULIN SER CALC-MCNC: 2.9 G/DL (ref 2–4)
GLUCOSE SERPL-MCNC: 106 MG/DL (ref 65–100)
HCT VFR BLD AUTO: 38.4 % (ref 35–47)
HGB BLD-MCNC: 12.7 G/DL (ref 11.5–16)
IMM GRANULOCYTES # BLD AUTO: 0 K/UL (ref 0–0.04)
IMM GRANULOCYTES NFR BLD AUTO: 0 % (ref 0–0.5)
INR PPP: 1.1 (ref 0.9–1.1)
LYMPHOCYTES # BLD: 0.8 K/UL (ref 0.8–3.5)
LYMPHOCYTES NFR BLD: 15 % (ref 12–49)
MCH RBC QN AUTO: 29.7 PG (ref 26–34)
MCHC RBC AUTO-ENTMCNC: 33.1 G/DL (ref 30–36.5)
MCV RBC AUTO: 89.7 FL (ref 80–99)
MONOCYTES # BLD: 0.8 K/UL (ref 0–1)
MONOCYTES NFR BLD: 16 % (ref 5–13)
NEUTS SEG # BLD: 3.3 K/UL (ref 1.8–8)
NEUTS SEG NFR BLD: 66 % (ref 32–75)
NRBC # BLD: 0 K/UL (ref 0–0.01)
NRBC BLD-RTO: 0 PER 100 WBC
PLATELET # BLD AUTO: 204 K/UL (ref 150–400)
PMV BLD AUTO: 9.9 FL (ref 8.9–12.9)
POTASSIUM SERPL-SCNC: 4 MMOL/L (ref 3.5–5.1)
PROT SERPL-MCNC: 6.4 G/DL (ref 6.4–8.2)
PROTHROMBIN TIME: 10.9 SEC (ref 9–11.1)
RBC # BLD AUTO: 4.28 M/UL (ref 3.8–5.2)
RBC MORPH BLD: ABNORMAL
SODIUM SERPL-SCNC: 129 MMOL/L (ref 136–145)
WBC # BLD AUTO: 5.1 K/UL (ref 3.6–11)

## 2019-11-04 PROCEDURE — 80053 COMPREHEN METABOLIC PANEL: CPT

## 2019-11-04 PROCEDURE — 36415 COLL VENOUS BLD VENIPUNCTURE: CPT

## 2019-11-04 PROCEDURE — 93005 ELECTROCARDIOGRAM TRACING: CPT

## 2019-11-04 PROCEDURE — 99285 EMERGENCY DEPT VISIT HI MDM: CPT

## 2019-11-04 PROCEDURE — 74011636320 HC RX REV CODE- 636/320: Performed by: EMERGENCY MEDICINE

## 2019-11-04 PROCEDURE — 74011636320 HC RX REV CODE- 636/320: Performed by: STUDENT IN AN ORGANIZED HEALTH CARE EDUCATION/TRAINING PROGRAM

## 2019-11-04 PROCEDURE — 85610 PROTHROMBIN TIME: CPT

## 2019-11-04 PROCEDURE — 86900 BLOOD TYPING SEROLOGIC ABO: CPT

## 2019-11-04 PROCEDURE — 74177 CT ABD & PELVIS W/CONTRAST: CPT

## 2019-11-04 PROCEDURE — 85025 COMPLETE CBC W/AUTO DIFF WBC: CPT

## 2019-11-04 RX ORDER — SODIUM CHLORIDE 0.9 % (FLUSH) 0.9 %
10 SYRINGE (ML) INJECTION
Status: COMPLETED | OUTPATIENT
Start: 2019-11-04 | End: 2019-11-04

## 2019-11-04 RX ADMIN — IOHEXOL 50 ML: 240 INJECTION, SOLUTION INTRATHECAL; INTRAVASCULAR; INTRAVENOUS; ORAL at 22:27

## 2019-11-04 RX ADMIN — IOPAMIDOL 100 ML: 755 INJECTION, SOLUTION INTRAVENOUS at 23:52

## 2019-11-04 RX ADMIN — Medication 10 ML: at 23:52

## 2019-11-05 VITALS
SYSTOLIC BLOOD PRESSURE: 110 MMHG | HEART RATE: 81 BPM | TEMPERATURE: 97.9 F | OXYGEN SATURATION: 95 % | RESPIRATION RATE: 13 BRPM | BODY MASS INDEX: 20.24 KG/M2 | WEIGHT: 110 LBS | DIASTOLIC BLOOD PRESSURE: 63 MMHG | HEIGHT: 62 IN

## 2019-11-05 LAB
ABO + RH BLD: NORMAL
APPEARANCE UR: CLEAR
ATRIAL RATE: 86 BPM
BACTERIA URNS QL MICRO: NEGATIVE /HPF
BILIRUB UR QL: NEGATIVE
BLOOD GROUP ANTIBODIES SERPL: NORMAL
CALCULATED P AXIS, ECG09: 51 DEGREES
CALCULATED R AXIS, ECG10: -7 DEGREES
CALCULATED T AXIS, ECG11: 29 DEGREES
COLOR UR: ABNORMAL
DIAGNOSIS, 93000: NORMAL
EPITH CASTS URNS QL MICRO: ABNORMAL /LPF
GLUCOSE UR STRIP.AUTO-MCNC: NEGATIVE MG/DL
HGB UR QL STRIP: NEGATIVE
HYALINE CASTS URNS QL MICRO: ABNORMAL /LPF (ref 0–5)
KETONES UR QL STRIP.AUTO: NEGATIVE MG/DL
LEUKOCYTE ESTERASE UR QL STRIP.AUTO: ABNORMAL
NITRITE UR QL STRIP.AUTO: NEGATIVE
P-R INTERVAL, ECG05: 170 MS
PH UR STRIP: 6.5 [PH] (ref 5–8)
PROT UR STRIP-MCNC: NEGATIVE MG/DL
Q-T INTERVAL, ECG07: 392 MS
QRS DURATION, ECG06: 80 MS
QTC CALCULATION (BEZET), ECG08: 469 MS
RBC #/AREA URNS HPF: ABNORMAL /HPF (ref 0–5)
SP GR UR REFRACTOMETRY: 1.02 (ref 1–1.03)
SPECIMEN EXP DATE BLD: NORMAL
UA: UC IF INDICATED,UAUC: ABNORMAL
UROBILINOGEN UR QL STRIP.AUTO: 1 EU/DL (ref 0.2–1)
VENTRICULAR RATE, ECG03: 86 BPM
WBC URNS QL MICRO: ABNORMAL /HPF (ref 0–4)

## 2019-11-05 PROCEDURE — 81001 URINALYSIS AUTO W/SCOPE: CPT

## 2019-11-05 NOTE — ED NOTES
Carlos SMITH reviewed discharge instructions with the patient. The patient verbalized understanding. All questions and concerns were addressed. The patient is to be discharged and transported back to nursing home by [facility] has instructions in hand. Pt is alert and oriented x 4. Respirations are clear and unlabored.

## 2019-11-05 NOTE — ED NOTES
TRANSFER - OUT REPORT:    Verbal report given to [facility] at [facility] on Lisa Haranna marie being transferred back to home facility after discharge. Report consisted of patients Situation, Background, Assessment and   Recommendations(SBAR). Information from the following report(s) SBAR, ED Summary, [facility] and Recent Results was reviewed with the receiving nurse. Opportunity for questions and clarification was provided. Patient to be transported back by [facility] momentarily, all paperwork complete and patient ready for discharge.

## 2019-11-05 NOTE — ED NOTES
Patient coming via EMS, states she has been having back pain and abdominal pain all day and this evening when she went to the bathroom before bed she had diarrhea that seemed to be coming out of her vagina.

## 2019-11-05 NOTE — ED PROVIDER NOTES
EMERGENCY DEPARTMENT HISTORY AND PHYSICAL EXAM          Date: 11/4/2019  Patient Name: Jasper Fam  Attending of Record: Sarah    History of Presenting Illness     Chief Complaint   Patient presents with    Abdominal Pain     Patient has been having abdominal and back pain, presents with diarrhea starting this evening, states she'd wipe and wipe and it just kept coming. Patient is pretty sure the feces is coming out of her vagina. History Provided By: Patient and Patient's Daughter    HPI: Jasper Fam is a 80 y.o. female, pmhx Atrial fibrillation, HTN, Depression, hiatal hernia, who presents via EMS to the ED c/o stool from her vagina. Reports this began suddenly at approximately 730 this evening. Had what she describes as a continuous flow of stool from her vagina. She attempted to wipe however did not stop eventually she cleaned herself up and went to bed however became concerned and asked for help when they called EMS. She denies acute change of abdominal pain was not attempting to have a bowel movement at the time reports she is occasionally had a few streaks of stool that she has noticed previously anteriorly but never had anything like this before. Denies other complaints and states she was in generally her usual state of health earlier this evening. Is no blood in the stool was not dark. She is not certain whether is coming from her urethra or from her vaginal cavity. She does have some mild abdominal pain, but no specific area. Has had a significant surgical history including cholecystectomy appendectomy left inguinal hernia repair hiatal hernia. Denies fever, chills, chest pain, shortness of breath,     PCP: Chuck Eli MD    Social Hx: Denies tobacco, Denies EtOH, Denies Illicit Drugs. Lives at an assisted living facility    There are no other complaints, changes, or physical findings at this time.      Current Outpatient Medications   Medication Sig Dispense Refill    varicella-zoster recombinant, PF, (SHINGRIX, PF,) 50 mcg/0.5 mL susr injection 0.5mL by IntraMUSCular route once now and then repeat in 2-6 months 0.5 mL 1    sertraline (ZOLOFT) 50 mg tablet Take 1.5 Tabs by mouth daily. 45 Tab 3    cyanocobalamin (VITAMIN B12) 500 mcg tablet Take 1 Tab by mouth daily. 60 Tab 5    cholecalciferol, VITAMIN D3, (VITAMIN D3) 5,000 unit tab tablet Take 1 Tab by mouth daily. 60 Tab 5    aspirin 81 mg chewable tablet Take 1 Tab by mouth daily. 120 Tab 5    sertraline (ZOLOFT) 50 mg tablet Take 1 Tab by mouth daily. 30 Tab 11    melatonin tab tablet Take 2 Tabs by mouth nightly. 60 Tab 5    acetaminophen (TYLENOL EXTRA STRENGTH) 500 mg tablet Take 1 Tab by mouth every eight (8) hours as needed for Pain. 100 Tab 2    cholecalciferol (VITAMIN D3) 1,000 unit cap Take 2,000 Units by mouth daily. Indications: taking 5,000u      cyanocobalamin (VITAMIN B-12) 100 mcg tablet Take 100 mcg by mouth daily. Indications: 500mcg      triamterene-hydroCHLOROthiazide (DYAZIDE) 37.5-25 mg per capsule Take 1 Cap by mouth daily. 90 Cap 3    Lactobacillus rhamnosus GG (CULTURELLE PO) Take  by mouth daily.  disopyramide phosphate (NORPACE) 100 mg capsule TAKE ONE CAPSULE BY MOUTH TWICE A DAY 60 Cap 0    metoprolol tartrate (LOPRESSOR) 25 mg tablet TAKE 1 TABLET TWICE A DAY 60 Tab 12    pantoprazole (PROTONIX) 40 mg tablet Take 1 Tab by mouth daily. (Patient taking differently: Take 40 mg by mouth two (2) times a day.) 30 Tab 12    ondansetron hcl (ZOFRAN) 4 mg tablet Take 1 Tab by mouth every eight (8) hours as needed for Nausea. 20 Tab 3    polyethylene glycol (MIRALAX) 17 gram/dose powder Take 17 g by mouth daily.  nitroglycerin (NITROSTAT) 0.4 mg SL tablet 1 Tab by SubLINGual route every five (5) minutes as needed for Chest Pain.  25 Tab 1       Past History     Past Medical History:  Past Medical History:   Diagnosis Date    Acute diverticulitis 9/25/2017    Allergic rhinitis 2017    Arrhythmia     afib    Arthritis     Asthma     Atrial fibrillation (HCC)     Benign essential tremor 2017    Cancer ([facility]     skin    Celiac disease 2017    Constipation 2017    Diabetic peripheral neuropathy associated with type 2 diabetes mellitus ([facility] 2016    Diarrhea 2017    Diverticulosis of large intestine without hemorrhage 2017    Early satiety 2017    Fuchs' corneal dystrophy 2017    Gastroesophageal reflux disease without esophagitis 2017    GERD (gastroesophageal reflux disease)     High cholesterol     Hypercholesterolemia 2017    Hypertension     Irritable bowel syndrome with diarrhea 2017    Numbness 2017    Osteopenia 2017    Other ill-defined conditions(799.89)     collapsed lung prior to hernia repair surgery    Postmenopausal 2018    Ptosis, both eyelids 2017    Statin intolerance 2017    Unspecified adverse effect of anesthesia     pt states she went into cardiac arrest prior to hernia repair after the insertion of gas in stomach       Past Surgical History:  Past Surgical History:   Procedure Laterality Date    HX APPENDECTOMY      HX CHOLECYSTECTOMY      HX GI      hemorrhoidectomy    HX HEENT      sinus surgery    HX HERNIA REPAIR      HX TONSILLECTOMY         Family History:  Family History   Problem Relation Age of Onset    Heart Disease Mother     Dementia Mother     Heart Disease Father     Neuropathy Child     Depression Child     Other Child         diverticulitis    Lung Cancer Sister 80        lung ca       Social History:  Social History     Tobacco Use    Smoking status: Former Smoker     Packs/day: 0.75     Years: 5.00     Pack years: 3.75     Last attempt to quit: 10/11/1967     Years since quittin.1    Smokeless tobacco: Never Used   Substance Use Topics    Alcohol use: No    Drug use: No       Allergies:   Allergies   Allergen Reactions    Bactrim [Sulfamethoprim Ds] Other (comments)     consipation    Ciprofloxacin (Bulk) Other (comments)     Low wbc    Prednisone Other (comments)     tachycardia    Shellfish Derived Other (comments)     Abdominal pain, Carried epi pen for this at one point.  Statins-Hmg-Coa Reductase Inhibitors Unknown (comments)     Stomach uipset and mild muscle aches         Review of Systems   Review of Systems   Constitutional: Negative for activity change, appetite change, chills and fever. HENT: Negative for trouble swallowing and voice change. Eyes: Negative. Chronic vision changes   Respiratory: Negative for cough, chest tightness, shortness of breath and wheezing. Cardiovascular: Negative for chest pain, palpitations and leg swelling. Gastrointestinal:        Mild abdominal pain, minimal distention, denies bleeding, inappropriate stooling, denies nausea or vomiting   Endocrine: Negative. Genitourinary: Positive for vaginal discharge. Negative for difficulty urinating, hematuria, vaginal bleeding and vaginal pain. Musculoskeletal:        Right arm tremor   Skin:        Old appearing bruises   Allergic/Immunologic: Negative. Neurological: Negative for dizziness, weakness, numbness and headaches. Psychiatric/Behavioral: Negative for agitation and confusion. Physical Exam   Physical Exam   Constitutional: She is oriented to person, place, and time. She appears well-developed and well-nourished. No distress. HENT:   Mouth/Throat: Oropharynx is clear and moist. No oropharyngeal exudate. Eyes: Pupils are equal, round, and reactive to light. No scleral icterus. Neck: No tracheal deviation present. Cardiovascular:   Normal rate, regular rhythm, 2/6 systolic murmur   Pulmonary/Chest: No respiratory distress. She has no wheezes. Abdominal: Soft. Bowel sounds are normal. She exhibits distension. There is no tenderness. There is no rebound and no guarding.    Musculoskeletal: Normal range of motion. She exhibits no edema. Neurological: She is alert and oriented to person, place, and time. Skin: Skin is warm and dry. Ecchymosis on right forearm   Psychiatric: She has a normal mood and affect. Her behavior is normal.   Vitals reviewed. Diagnostic Study Results     Labs -     Recent Results (from the past 12 hour(s))   TYPE & SCREEN    Collection Time: 11/04/19  9:42 PM   Result Value Ref Range    Crossmatch Expiration 11/07/2019     ABO/Rh(D) A POSITIVE     Antibody screen NEG    PROTHROMBIN TIME + INR    Collection Time: 11/04/19  9:42 PM   Result Value Ref Range    INR 1.1 0.9 - 1.1      Prothrombin time 10.9 9.0 - 11.1 sec   CBC WITH AUTOMATED DIFF    Collection Time: 11/04/19  9:42 PM   Result Value Ref Range    WBC 5.1 3.6 - 11.0 K/uL    RBC 4.28 3.80 - 5.20 M/uL    HGB 12.7 11.5 - 16.0 g/dL    HCT 38.4 35.0 - 47.0 %    MCV 89.7 80.0 - 99.0 FL    MCH 29.7 26.0 - 34.0 PG    MCHC 33.1 30.0 - 36.5 g/dL    RDW 13.1 11.5 - 14.5 %    PLATELET 561 304 - 740 K/uL    MPV 9.9 8.9 - 12.9 FL    NRBC 0.0 0  WBC    ABSOLUTE NRBC 0.00 0.00 - 0.01 K/uL    NEUTROPHILS 66 32 - 75 %    LYMPHOCYTES 15 12 - 49 %    MONOCYTES 16 (H) 5 - 13 %    EOSINOPHILS 2 0 - 7 %    BASOPHILS 1 0 - 1 %    IMMATURE GRANULOCYTES 0 0.0 - 0.5 %    ABS. NEUTROPHILS 3.3 1.8 - 8.0 K/UL    ABS. LYMPHOCYTES 0.8 0.8 - 3.5 K/UL    ABS. MONOCYTES 0.8 0.0 - 1.0 K/UL    ABS. EOSINOPHILS 0.1 0.0 - 0.4 K/UL    ABS. BASOPHILS 0.1 0.0 - 0.1 K/UL    ABS. IMM.  GRANS. 0.0 0.00 - 0.04 K/UL    DF AUTOMATED      RBC COMMENTS NORMOCYTIC, NORMOCHROMIC     METABOLIC PANEL, COMPREHENSIVE    Collection Time: 11/04/19  9:42 PM   Result Value Ref Range    Sodium 129 (L) 136 - 145 mmol/L    Potassium 4.0 3.5 - 5.1 mmol/L    Chloride 94 (L) 97 - 108 mmol/L    CO2 29 21 - 32 mmol/L    Anion gap 6 5 - 15 mmol/L    Glucose 106 (H) 65 - 100 mg/dL    BUN 28 (H) 6 - 20 MG/DL    Creatinine 1.07 (H) 0.55 - 1.02 MG/DL    BUN/Creatinine ratio 26 (H) 12 - 20      GFR est AA 58 (L) >60 ml/min/1.73m2    GFR est non-AA 48 (L) >60 ml/min/1.73m2    Calcium 9.4 8.5 - 10.1 MG/DL    Bilirubin, total 0.5 0.2 - 1.0 MG/DL    ALT (SGPT) 19 12 - 78 U/L    AST (SGOT) 14 (L) 15 - 37 U/L    Alk. phosphatase 54 45 - 117 U/L    Protein, total 6.4 6.4 - 8.2 g/dL    Albumin 3.5 3.5 - 5.0 g/dL    Globulin 2.9 2.0 - 4.0 g/dL    A-G Ratio 1.2 1.1 - 2.2     EKG, 12 LEAD, INITIAL    Collection Time: 11/04/19  9:50 PM   Result Value Ref Range    Ventricular Rate 86 BPM    Atrial Rate 86 BPM    P-R Interval 170 ms    QRS Duration 80 ms    Q-T Interval 392 ms    QTC Calculation (Bezet) 469 ms    Calculated P Axis 51 degrees    Calculated R Axis -7 degrees    Calculated T Axis 29 degrees    Diagnosis       Normal sinus rhythm  Possible Left atrial enlargement  Left ventricular hypertrophy  Inferior infarct (cited on or before 04-NOV-2019)  When compared with ECG of 03-APR-2019 16:45,  QRS axis shifted right  Nonspecific T wave abnormality no longer evident in Lateral leads     URINALYSIS W/ REFLEX CULTURE    Collection Time: 11/05/19 12:53 AM   Result Value Ref Range    Color YELLOW/STRAW      Appearance CLEAR CLEAR      Specific gravity 1.020 1.003 - 1.030      pH (UA) 6.5 5.0 - 8.0      Protein NEGATIVE  NEG mg/dL    Glucose NEGATIVE  NEG mg/dL    Ketone NEGATIVE  NEG mg/dL    Bilirubin NEGATIVE  NEG      Blood NEGATIVE  NEG      Urobilinogen 1.0 0.2 - 1.0 EU/dL    Nitrites NEGATIVE  NEG      Leukocyte Esterase TRACE (A) NEG      WBC 0-4 0 - 4 /hpf    RBC 0-5 0 - 5 /hpf    Epithelial cells FEW FEW /lpf    Bacteria NEGATIVE  NEG /hpf    UA:UC IF INDICATED CULTURE NOT INDICATED BY UA RESULT CNI      Hyaline cast 0-2 0 - 5 /lpf       Radiologic Studies -   CT ABD PELV W CONT   Final Result   Addendum 1 of 1   Addendum:       There has been interval decrease in amount of gas in the vagina compared    to   January 2019.       Final        CT Results  (Last 48 hours)    None        CXR Results (Last 48 hours)    None            Medical Decision Making   I am the first provider for this patient. I reviewed the vital signs, available nursing notes, past medical history, past surgical history, family history and social history. Vital Signs-Reviewed the patient's vital signs. Patient Vitals for the past 12 hrs:   Temp Pulse Resp BP SpO2   11/04/19 2330 97.9 °F (36.6 °C) 81 13 110/63 95 %   11/04/19 2315    127/63 97 %   11/04/19 2300    127/63 96 %   11/04/19 2245    135/70 97 %       Pulse Oximetry Analysis - 95% on RA    Cardiac Monitor:   Rate: 81 bpm  Rhythm: Normal Sinus Rhythm      Records Reviewed: Old Medical Records, Previous Radiology Studies and Previous Laboratory Studies    Provider Notes (Medical Decision Making):     DDx: Colovaginal fistula, colovesicular fistula, Bowel perforation    Presenting with stool from her vagina. Unclear whether this is coming from the vagina or the urethra however either way concern for a fistula. Will obtain pelvic exam to further elucidate the source of stool will obtain CT abdomen pelvis patient reports she has tolerated these in the past although she has a listed allergy to iodine she states this is to shellfish. Labs including type and cross INR basic labs in preparation for possible procedure. This testing will consult either OB/GYN or colorectal surgery. Most likely require admission for further evaluation of this problem. ED Course:   Initial assessment performed. The patients presenting problems have been discussed, and they are in agreement with the care plan formulated and outlined with them. I have encouraged them to ask questions as they arise throughout their visit. ED Course as of Nov 05 0417   Mon Nov 04, 2019   2231 Patient with mild hyponatremia and stable creatinine.  Will continue to await pelvic exam and CT imaging    []   Luis Ee Nov 05, 2019   0041 [facility]: 33.1 []   0057 Discussed results of testing with patient not demonstrating any fistula between her vagina and colon or bladder and colon. Will discharge to follow up with urogynecology for further evaluation given negative exam and imaging. Encouraged patient to return if recurrent episodes. Also discussed hyponatremia with patient and daughter and recommended that they follow up with her primary care physician for that. [JH]      ED Course User Index  [JH] Raven Astorga MD         EKG interpretation: (Preliminary) Rate 86, Normal sinus rhythm, normal intervals, no specific ST changes        Diagnosis     Clinical Impression:   1. Hyponatremia    2. Vaginal discharge        PLAN:  1. Will discharge to follow up with urogyn and with her PCP. Discharge Medication List as of 11/5/2019  2:32 AM        2.    Follow-up Information     Follow up With Specialties Details Why Contact Info    Mely Santos MD Internal Medicine In 1 week To discuss hyponatremia [address] [facility]  [address] [place] [address]      Chandan Pizarro MD Urology In 1 week for further evaluation of possible fistula [id number] [address] [facility]  [phone]          Return to ED if worse     Disposition:    Merlin Alter, MD

## 2019-11-05 NOTE — DISCHARGE INSTRUCTIONS
Patient Education      Make sure that you follow up with your primary physician and have your sodium repeated in the next week. They may need to adjust your medications if you have worsening hyponatremia or symptoms including confusion, weakness, or seizures. Please also return to the emergency department if you have any of these symptoms. Hyponatremia: Care Instructions  Your Care Instructions    Hyponatremia (say \"cm-se-riz-TREE-erika-uh\") means that you don't have enough sodium in your blood. It can cause nausea, vomiting, and headaches. Or you may not feel hungry. In serious cases, it can cause seizures, a coma, or even death. Hyponatremia is not a disease. It is a problem caused by something else, such as medicines or exercising for a long time in hot weather. You can get hyponatremia if you lose a lot of fluids and then you drink a lot of water or other liquids that don't have much sodium. You can also get it if you have kidney, liver, heart, or other health problems. Treatment is focused on getting your sodium levels back to normal.  Follow-up care is a key part of your treatment and safety. Be sure to make and go to all appointments, and call your doctor if you are having problems. It's also a good idea to know your test results and keep a list of the medicines you take. How can you care for yourself at home? · If your doctor recommends it, drink fluids that have sodium. Sports drinks are a good choice. Or you can eat salty foods. · If your doctor recommends it, limit the amount of water you drink. And limit fluids that are mostly water. These include tea, coffee, and juice. · Take your medicines exactly as prescribed. Call your doctor if you have any problems with your medicine. · Get your sodium levels tested when your doctor tells you to. When should you call for help? Call 911 anytime you think you may need emergency care.  For example, call if:    · You have a seizure.     · You passed out (lost consciousness).    Call your doctor now or seek immediate medical care if:    · You are confused or it is hard to focus.     · You have little or no appetite.     · You feel sick to your stomach or you vomit.     · You have a headache.     · You have mood changes.     · You feel more tired than usual.    Watch closely for changes in your health, and be sure to contact your doctor if:    · You do not get better as expected. Where can you learn more? Go to http://pinky-shamar.info/. Enter R320 in the search box to learn more about \"Hyponatremia: Care Instructions. \"  Current as of: March 28, 2019  Content Version: 12.2  © 5898-0758 [employer]. Care instructions adapted under license by [employer] (which disclaims liability or warranty for this information). If you have questions about a medical condition or this instruction, always ask your healthcare professional. [place] [address] any warranty or liability for your use of this information. Patient Education   We did not find any evidence of a fistula on our exam or imaging today, however you should follow up with urogynecology for further evaluation and return to the emergency department if you have another episode. Injury to the Rectum and Vagina: Care Instructions  Your Care Instructions  Injury to the rectum and vagina can cause many problems. These problems may include rectal or vaginal bleeding, infection, constipation, pain, or leaking of stool. The injury can be caused by an accident, childbirth, or physical or sexual abuse. Medicines can treat pain. They can also prevent infection. Surgery may be needed to treat severe injuries. Follow-up care is a key part of your treatment and safety. Be sure to make and go to all appointments, and call your doctor if you are having problems. It's also a good idea to know your test results and keep a list of the medicines you take.   How can you care for yourself at home? · Be safe with medicines. Read and follow all instructions on the label. ? If the doctor gave you a prescription medicine for pain, take it as prescribed. ? If you are not taking a prescription pain medicine, ask your doctor if you can take an over-the-counter medicine. · If your doctor suggests, sit in a few inches of water (sitz bath) 3 times a day and after bowel movements. The warm water helps with pain and itching. · If you do not have a safe place to stay, tell your doctor. When should you call for help? Call 911 anytime you think you may need emergency care. For example, call if:    · You passed out (lost consciousness).     · You feel that you are in danger.    Call your doctor now or seek immediate medical care if:    · You have new or worse rectal or vaginal bleeding.     · You are dizzy or lightheaded, or you feel like you may faint.     · You have a fever.     · You have sudden, severe pain in your belly or pelvis.    Watch closely for changes in your health, and be sure to contact your doctor if:    · You need support for domestic violence or sexual abuse.     · You do not get better as expected. Where can you learn more? Go to http://pinky-shamar.info/. Enter B712 in the search box to learn more about \"Injury to the Rectum and Vagina: Care Instructions. \"  Current as of: June 26, 2019  Content Version: 12.2  © 0118-3757 [employer]. Care instructions adapted under license by [employer] (which disclaims liability or warranty for this information). If you have questions about a medical condition or this instruction, always ask your healthcare professional. [place] [address] any warranty or liability for your use of this information.

## 2019-11-05 NOTE — ED NOTES
Arranged EMS ride for the patient back to [facility] in [address] [place] [address] 60 to 90min

## 2019-11-08 ENCOUNTER — TELEPHONE (OUTPATIENT)
Dept: INTERNAL MEDICINE CLINIC | Age: 84
End: 2019-11-08

## 2019-11-08 ENCOUNTER — OFFICE VISIT (OUTPATIENT)
Dept: INTERNAL MEDICINE CLINIC | Age: 84
End: 2019-11-08

## 2019-11-08 VITALS
WEIGHT: 110 LBS | RESPIRATION RATE: 16 BRPM | DIASTOLIC BLOOD PRESSURE: 63 MMHG | OXYGEN SATURATION: 100 % | BODY MASS INDEX: 20.24 KG/M2 | SYSTOLIC BLOOD PRESSURE: 98 MMHG | HEIGHT: 62 IN | TEMPERATURE: 97.9 F | HEART RATE: 84 BPM

## 2019-11-08 DIAGNOSIS — J90 PLEURAL EFFUSION: ICD-10-CM

## 2019-11-08 DIAGNOSIS — F33.0 DEPRESSION, MAJOR, RECURRENT, MILD (HCC): ICD-10-CM

## 2019-11-08 DIAGNOSIS — J06.9 URI, ACUTE: ICD-10-CM

## 2019-11-08 DIAGNOSIS — I10 ESSENTIAL HYPERTENSION: ICD-10-CM

## 2019-11-08 DIAGNOSIS — I48.0 PAROXYSMAL ATRIAL FIBRILLATION (HCC): ICD-10-CM

## 2019-11-08 DIAGNOSIS — E87.1 HYPONATREMIA: Primary | ICD-10-CM

## 2019-11-08 DIAGNOSIS — N82.4 COLOVAGINAL FISTULA: ICD-10-CM

## 2019-11-08 PROBLEM — H02.403 PTOSIS, BOTH EYELIDS: Status: RESOLVED | Noted: 2017-09-25 | Resolved: 2019-11-08

## 2019-11-08 PROBLEM — R07.89 CHEST WALL PAIN: Status: RESOLVED | Noted: 2017-09-25 | Resolved: 2019-11-08

## 2019-11-08 PROBLEM — R20.0 NUMBNESS: Status: RESOLVED | Noted: 2017-09-25 | Resolved: 2019-11-08

## 2019-11-08 RX ORDER — FUROSEMIDE 20 MG/1
20 TABLET ORAL DAILY
Qty: 30 TAB | Refills: 1 | Status: SHIPPED | OUTPATIENT
Start: 2019-11-08 | End: 2019-12-30 | Stop reason: SDUPTHER

## 2019-11-08 RX ORDER — BENZONATATE 200 MG/1
200 CAPSULE ORAL
Qty: 30 CAP | Refills: 0 | Status: SHIPPED | OUTPATIENT
Start: 2019-11-08 | End: 2019-11-15

## 2019-11-08 NOTE — TELEPHONE ENCOUNTER
Received triage call from Suzanne White ([facility]). Minesh Ferrer states that caught a cold from the facility she lives at. Reports congestion x2 days, sore throat, hardly talk. Minesh Ferrer offered and accepted appt for 11/8/19 [id number]. Minesh Ferrer verbalized understanding of information discussed w/ no further questions at this time.

## 2019-11-08 NOTE — PROGRESS NOTES
HISTORY OF PRESENT ILLNESS  Lizbet El is a 80 y.o. female. HPI   Last here 9/11/19 Pt is here for acute/ routine care. Pt presents with her daughter today who provides most of hx     Pt c/o sore throat, congestion, cough   x yesterday   States this got worse over night and having increased hoarseness   States the sore throat kept her up last night   Discussed saltwater gargles and tea with honey for sore throat   Discussed flonase for congestion   Will give tessalon perles for cough in case cough gets worse       Pt went to the ER 11/4/19 for thick fecal discharge which she thinks is coming from her vagina   This had happened before, went to the ER for this, was cleaned up   States this happened on Monday and then again yesterday and this AM   ER referred her to Dr Elise Drew (uro gyn)   Has appt pending for 11/11/19   Reviewed CT abd:  1. Sigmoid diverticulosis with no evidence of diverticulitis. 2. Small locule of gas and fluid in the vagina. No CT evidence of colovaginal  fistula. 3. Large hiatal hernia.   States in the ER she was told to f/u with PCP for low Na level   Reviewed labs- Na was 129 on ER labs   discussed dyazide could be contributing to low Na   Will change to lasix        BP is 181/79 will get repeat   SBP at the [facility] has been 131   Continues on metoprolol 25mg bid  Pt also takes dyazide 37.5-25 now daily for her leg swelling and bp and fluid in lung  Will change dyazide to lasix due to low Na   Will give order to stop taking dyazide and start taking lasix for the crossings      Wt today is 110 lbs-- stable x lov   Her wt is in the nl ranges    Will get labs today      Pt follows wth Dr Yusra Sheppard (neuro) for tremor  Last visit was 5/14/19   Had appt scheduled 9/12/19 but this was rescheduled to 11/19   Pt had been being treated for Parkinson's but the neuro decided it may not be this so they stopped this treatment  Has chronic tremors and hallucinations  Pt states her tremors have gotten worse, discussed that Dr Arnaldo Farmer will likely address this at follow up next week   She is on seroquel 25mg for sleep and hallucinations     Her daughter states she is still not sleeping well   Only sleeps throughout the night once a week, typically sleeps only 1/2 the night, wakes up around 2 am  Advised to discuss this with Dr Arnaldo Farmer at appt next week in case she wants to increase seroquel           Pt follows with Dr Page Eden (cardio) for a fib  Last visit was 7/24/19  Pt is on norpace 100mg BID         Pt sees Dr Fabián Adrian (GI) for diverticulosis  Pt also has a large hiatal hernia  Pt also has a gluten sensitivity and mostly avoids this  Not following gluten free diet   Pt takes protonix 40mg bid   Controls gerd has large HH     Pt saw Dr Nestor Morrison (neuropsych) in the past  Last visit was 2015 - no dementia     Pt saw Dr Rosetta Black (ENT) for hearing loss  Last visit was 12/18/18  Pt decided not to get hearing aids     Pt sees Dr Manav Kessler (ortho) for low back pain   Last visit was 1/9/19  Previously, provided info for Dr Awilda Wright  She had been taking tramadol but had SE so was switched to tylenol which helps somewhat  Continues on tylenol for this         Pt declined taking reclast and fosamax in the past  Pt is taking vit D 5000U daily        Pt is on ASA 81mg daily     Pt takes miralax for constipation  This works well as long as she takes it daily occasionally takes extra dose        Continues on zoloft 75 mg (increased from 50 mg lov)   Happy with this dose, states she has noticed a positive difference 11/19  Discussed this could also be contributing to her low Na level         Lov, pt Noticed some discharge from her navel x 1 week   Pt saw Dr Kirsten Lorenzana (surg) for this   Reviewed notes 10/21/19: Debris removed. Umbilicus washed until no further debris present. Placed gauze into the depth of the umbilicus. Rec remove it on Wednesday at next shower.   With each shower, shower head should be used to wash out the umbilicus. Told daughter to call with future concerns.          ACP on file.  SDMs are her daughters, Piper Hensley and Monica Donovan.     PREVENTIVE:  Colonoscopy: ~10 years ago with Dr Sergo Houston, per pt the way her colon is formed makes it difficult to get COLOs, her declines further  Pap: declines further  Mammogram: declines further  Dexa: 8/18, osteoporosis  Tdap: 10/19   Prevnar 13: 03/01/19   Pneumovax: due 3/20  Shingrix: both rounds completed   Flu shot: Fall 2019   A1C: 3/19 5.9 9/19 5.7   Micro: 9/19   Eye exam: Dr Kristen Marinelli ~summer 2018, Dr Melissa Summers 2/19 (q6 weeks),  last visit was 8/19    Lipids: 3/19        Patient Active Problem List    Diagnosis Date Noted    Type 2 diabetes with nephropathy ([facility] [address] 06/04/2019    Depression, major, recurrent, mild ([address] 06/04/2019    Pleural effusion 04/18/2019    History of stroke 06/11/2018    Epidermoid cyst of finger of left hand 02/02/2018    Osteopenia 09/25/2017    Gastroesophageal reflux disease without esophagitis 09/25/2017    Diverticulosis of large intestine without hemorrhage 09/25/2017    Benign essential tremor 09/25/2017    Irritable bowel syndrome with diarrhea 09/25/2017    Chest wall pain 09/25/2017    Constipation 09/25/2017    Hypercholesterolemia 09/25/2017    Allergic rhinitis 09/25/2017    Numbness 09/25/2017    Fuchs' corneal dystrophy 09/25/2017    Ptosis, both eyelids 09/25/2017    Idiopathic small and large fiber sensory neuropathy 04/21/2016    Diabetic peripheral neuropathy associated with type 2 diabetes mellitus ([facility] [address] 04/21/2016    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 04/21/2016    Anxiety 07/24/2015    Attention deficit disorder 07/24/2015    Cerebrovascular disease, unspecified 05/03/2015    Essential tremor 04/20/2015    B12 deficiency 04/20/2015    Osteoporosis 04/20/2015    Memory loss 04/20/2015    Celiac sprue 05/14/2013    Other and unspecified hyperlipidemia 05/14/2013    Atrial fibrillation ([address]     Hypertension      Current Outpatient Medications   Medication Sig Dispense Refill    varicella-zoster recombinant, PF, (SHINGRIX, PF,) 50 mcg/0.5 mL susr injection 0.5mL by IntraMUSCular route once now and then repeat in 2-6 months 0.5 mL 1    sertraline (ZOLOFT) 50 mg tablet Take 1.5 Tabs by mouth daily. 45 Tab 3    cyanocobalamin (VITAMIN B12) 500 mcg tablet Take 1 Tab by mouth daily. 60 Tab 5    cholecalciferol, VITAMIN D3, (VITAMIN D3) 5,000 unit tab tablet Take 1 Tab by mouth daily. 60 Tab 5    aspirin 81 mg chewable tablet Take 1 Tab by mouth daily. 120 Tab 5    sertraline (ZOLOFT) 50 mg tablet Take 1 Tab by mouth daily. 30 Tab 11    melatonin tab tablet Take 2 Tabs by mouth nightly. 60 Tab 5    acetaminophen (TYLENOL EXTRA STRENGTH) 500 mg tablet Take 1 Tab by mouth every eight (8) hours as needed for Pain. 100 Tab 2    cholecalciferol (VITAMIN D3) 1,000 unit cap Take 2,000 Units by mouth daily. Indications: taking 5,000u      cyanocobalamin (VITAMIN B-12) 100 mcg tablet Take 100 mcg by mouth daily. Indications: 500mcg      triamterene-hydroCHLOROthiazide (DYAZIDE) 37.5-25 mg per capsule Take 1 Cap by mouth daily. 90 Cap 3    Lactobacillus rhamnosus GG (CULTURELLE PO) Take  by mouth daily.  disopyramide phosphate (NORPACE) 100 mg capsule TAKE ONE CAPSULE BY MOUTH TWICE A DAY 60 Cap 0    metoprolol tartrate (LOPRESSOR) 25 mg tablet TAKE 1 TABLET TWICE A DAY 60 Tab 12    pantoprazole (PROTONIX) 40 mg tablet Take 1 Tab by mouth daily. (Patient taking differently: Take 40 mg by mouth two (2) times a day.) 30 Tab 12    ondansetron hcl (ZOFRAN) 4 mg tablet Take 1 Tab by mouth every eight (8) hours as needed for Nausea. 20 Tab 3    polyethylene glycol (MIRALAX) 17 gram/dose powder Take 17 g by mouth daily.  nitroglycerin (NITROSTAT) 0.4 mg SL tablet 1 Tab by SubLINGual route every five (5) minutes as needed for Chest Pain.  25 Tab 1     Past Surgical History: Procedure Laterality Date    HX APPENDECTOMY      HX CHOLECYSTECTOMY      HX GI      hemorrhoidectomy    HX HEENT      sinus surgery    HX HERNIA REPAIR      HX TONSILLECTOMY        Lab Results   Component Value Date/Time    WBC 5.1 11/04/2019 09:42 PM    WBC (POC) 5.6 07/23/2018 03:07 PM    HGB 12.7 11/04/2019 09:42 PM    HGB (POC) 15.0 07/23/2018 03:07 PM    HCT 38.4 11/04/2019 09:42 PM    HCT (POC) 45.9 07/23/2018 03:07 PM    PLATELET 397 45/34/7883 09:42 PM    PLATELET (POC) 138.0 07/23/2018 03:07 PM    MCV 89.7 11/04/2019 09:42 PM    MCV (POC) 90.0 07/23/2018 03:07 PM     Lab Results   Component Value Date/Time    Cholesterol, total 246 (H) 03/01/2019 10:15 AM    HDL Cholesterol 76 03/01/2019 10:15 AM    LDL, calculated 154 (H) 03/01/2019 10:15 AM    Triglyceride 82 03/01/2019 10:15 AM     Lab Results   Component Value Date/Time    GFR est non-AA 48 (L) 11/04/2019 09:42 PM    GFRNA, POC >60 10/15/2018 01:54 PM    GFR est AA 58 (L) 11/04/2019 09:42 PM    GFRAA, POC >60 10/15/2018 01:54 PM    Creatinine 1.07 (H) 11/04/2019 09:42 PM    Creatinine (POC) 0.8 10/15/2018 01:54 PM    BUN 28 (H) 11/04/2019 09:42 PM    Sodium 129 (L) 11/04/2019 09:42 PM    Potassium 4.0 11/04/2019 09:42 PM    Chloride 94 (L) 11/04/2019 09:42 PM    CO2 29 11/04/2019 09:42 PM    Magnesium 2.2 01/13/2019 11:15 PM        Review of Systems   HENT: Positive for congestion and sore throat. Respiratory: Negative for shortness of breath. Cardiovascular: Negative for chest pain. Physical Exam   Constitutional: She is oriented to person, place, and time. She appears well-developed and well-nourished. No distress. HENT:   Head: Normocephalic and atraumatic. Right Ear: External ear normal.   Left Ear: External ear normal.   Mouth/Throat: Oropharynx is clear and moist. No oropharyngeal exudate. Eyes: Conjunctivae and EOM are normal. Right eye exhibits no discharge. Left eye exhibits no discharge.    Neck: Normal range of motion. Neck supple. Cardiovascular: Normal rate, regular rhythm and normal heart sounds. Exam reveals no gallop and no friction rub. No murmur heard. Pulmonary/Chest: Effort normal and breath sounds normal. No respiratory distress. She has no wheezes. She has no rales. She exhibits no tenderness. Musculoskeletal: She exhibits no edema, tenderness or deformity. Lymphadenopathy:     She has no cervical adenopathy. Neurological: She is alert and oriented to person, place, and time. Coordination abnormal.   Skin: Skin is warm and dry. No rash noted. She is not diaphoretic. No erythema. No pallor. Psychiatric: She has a normal mood and affect. Her behavior is normal.       ASSESSMENT and PLAN    ICD-10-CM ICD-9-CM    1. Hyponatremia                  teresa related to dyazide was prev taking prn and now taking daily and na level has been slowly dropping         Will stop dyazide and change to lasix 20 mg daily instead will check bmp in 1 week and add k supplement if needed at that time  N96.9 404.4 METABOLIC PANEL, BASIC   2. Paroxysmal atrial fibrillation (HCC)              Rate Controlled on metoprolol continue  I48.0 427.31    3. Pleural effusion              Resolved with dyazide will be changing this to lasix due to hyponatremia ct scan in er did not show evidence of recurrent fluid  J90 511.9    4. Essential hypertension                  Initially elevated nl bp is quite low repeat improved continue metoprolol if bp starts to climb once she is off dyazide may need to further adjust meds continue to monitor bp at assisted living  P87 830.2 METABOLIC PANEL, BASIC   5. Colovaginal fistula            Will be seeing dr Nile petit for further eval on Monday  N82.4 619.1    6.  Depression, major, recurrent, mild ([facility] [address]            Now on zoloft 75 mg much improved of note if low na persists this may be factor will keep this in mind      F33.0 296.31    7. URI, acute              Viral will give tessalon perles flonase otc no abx for now she will contact us if sxs are progressive    J06.9 465.9           Scribed by Sarah Castellon [facility], as dictated by Dr. Jes Mercado. Current diagnosis and concerns discussed with pt at length. Pt understands risks and benefits or current treatment plan and medications, and accepts the treatment and medication with any possible risks. Pt asks appropriate questions, which were answered. Pt was instructed to call with any concerns or problems. I have reviewed the note documented by the scribe. The services provided are my own.   The documentation is accurate

## 2019-11-11 ENCOUNTER — TELEPHONE (OUTPATIENT)
Dept: INTERNAL MEDICINE CLINIC | Age: 84
End: 2019-11-11

## 2019-11-11 RX ORDER — AMOXICILLIN AND CLAVULANATE POTASSIUM 875; 125 MG/1; MG/1
1 TABLET, FILM COATED ORAL EVERY 12 HOURS
Qty: 14 TAB | Refills: 0 | Status: SHIPPED | OUTPATIENT
Start: 2019-11-11 | End: 2019-11-18

## 2019-11-11 NOTE — LETTER
11/11/2019 1:53 PM 
 
Ms. Lizbet El [address] [facility] [id number] To whomever this may concern, 
 
 
 Ms. Gordon Lau is to take amoxicillin-clavulanate (AUGMENTIN) 875-125mg:Take 1 Tab by mouth every twelve (12) hours for 7 days. If there are any questions or concerns, feel free to contact the office. Thank you.    
 
 
 
Sincerely, 
 
 
Wilfredo Adrian MD

## 2019-11-11 NOTE — TELEPHONE ENCOUNTER
Pt is not doing any better today. The cough is worse,  pt is brining up green phlegm and cold is  going down in chest more pt states to [facility]. A lot of chest congestion as well. Please call [facility] to advise as soon as possible.

## 2019-11-11 NOTE — TELEPHONE ENCOUNTER
Spoke to [employer] (HIPAA). Two pt identifiers confirmed. Felicitas informed per Dr. Josie Bermeo that augmentin ordered for pt. Mirian Jones verbalized understanding of information discussed w/ no further questions at this time.

## 2019-11-11 NOTE — TELEPHONE ENCOUNTER
Graham Vora states the script went to pharmacy, but there needs to be an order for this faxed to them as soon as possible so pt can take the mediation today.       Fax #[phone]

## 2019-11-11 NOTE — TELEPHONE ENCOUNTER
Order for antibiotic drafted, signed, and faxed to [phone] w/ confirmation received. Spoke to [facility] (HIPAA) that orders faxed to number provided w/ confirmation received. Xiomy oSn verbalized understanding of information discussed w/ no further questions at this time.

## 2019-11-16 ENCOUNTER — OFFICE VISIT (OUTPATIENT)
Dept: URGENT CARE | Age: 84
End: 2019-11-16

## 2019-11-16 VITALS
HEART RATE: 83 BPM | TEMPERATURE: 98.9 F | BODY MASS INDEX: 20.24 KG/M2 | SYSTOLIC BLOOD PRESSURE: 138 MMHG | WEIGHT: 110 LBS | OXYGEN SATURATION: 95 % | RESPIRATION RATE: 16 BRPM | HEIGHT: 62 IN | DIASTOLIC BLOOD PRESSURE: 79 MMHG

## 2019-11-16 DIAGNOSIS — J20.9 ACUTE BRONCHITIS, UNSPECIFIED ORGANISM: Primary | ICD-10-CM

## 2019-11-16 RX ORDER — ALBUTEROL SULFATE 90 UG/1
2 AEROSOL, METERED RESPIRATORY (INHALATION)
Qty: 1 INHALER | Refills: 0 | Status: SHIPPED | OUTPATIENT
Start: 2019-11-16 | End: 2020-01-30

## 2019-11-16 RX ORDER — DOXYCYCLINE 100 MG/1
100 CAPSULE ORAL 2 TIMES DAILY
Qty: 20 CAP | Refills: 0 | Status: SHIPPED | OUTPATIENT
Start: 2019-11-16 | End: 2020-01-30 | Stop reason: ALTCHOICE

## 2019-11-16 NOTE — PROGRESS NOTES
Cough   The history is provided by the patient and relative (daughter). This is a new problem. The current episode started more than 1 week ago (2 weeks). The problem occurs every few minutes. The problem has not changed since onset. The cough is productive of sputum (mild). There has been no fever. Associated symptoms include shortness of breath and wheezing (mild). She has tried antibiotics (amoxicillin) for the symptoms. The treatment provided mild (after first 3 days) relief. She is not a smoker. Her past medical history is significant for asthma.         Past Medical History:   Diagnosis Date    Acute diverticulitis 9/25/2017    Allergic rhinitis 9/25/2017    Arrhythmia     afib    Arthritis     Asthma     Atrial fibrillation ([facility] [address]     Benign essential tremor 9/25/2017    Cancer ([facility] [address]     skin    Celiac disease 9/25/2017    Constipation 9/25/2017    Diabetic peripheral neuropathy associated with type 2 diabetes mellitus ([facility] [address] 4/21/2016    Diarrhea 9/25/2017    Diverticulosis of large intestine without hemorrhage 9/25/2017    Early satiety 9/25/2017    Fuchs' corneal dystrophy 9/25/2017    Gastroesophageal reflux disease without esophagitis 9/25/2017    GERD (gastroesophageal reflux disease)     High cholesterol     Hypercholesterolemia 9/25/2017    Hypertension     Irritable bowel syndrome with diarrhea 9/25/2017    Numbness 9/25/2017    Osteopenia 9/25/2017    Other ill-defined conditions(799.89)     collapsed lung prior to hernia repair surgery    Postmenopausal 7/23/2018    Ptosis, both eyelids 9/25/2017    Statin intolerance 9/25/2017    Unspecified adverse effect of anesthesia     pt states she went into cardiac arrest prior to hernia repair after the insertion of gas in stomach        Past Surgical History:   Procedure Laterality Date    HX APPENDECTOMY      HX CHOLECYSTECTOMY      HX GI      hemorrhoidectomy    HX HEENT      sinus surgery    HX HERNIA REPAIR      HX TONSILLECTOMY           Family History   Problem Relation Age of Onset    Heart Disease Mother     Dementia Mother     Heart Disease Father     Neuropathy Child     Depression Child     Other Child         diverticulitis    Lung Cancer Sister 80        lung ca        Social History     Socioeconomic History    Marital status:      Spouse name: Not on file    Number of children: Not on file    Years of education: Not on file    Highest education level: Not on file   Occupational History    Not on file   Social Needs    Financial resource strain: Not on file    Food insecurity:     Worry: Not on file     Inability: Not on file    Transportation needs:     Medical: Not on file     Non-medical: Not on file   Tobacco Use    Smoking status: Former Smoker     Packs/day: 0.75     Years: 5.00     Pack years: 3.75     Last attempt to quit: 10/11/1967     Years since quittin.1    Smokeless tobacco: Never Used   Substance and Sexual Activity    Alcohol use: No    Drug use: No    Sexual activity: Not on file   Lifestyle    Physical activity:     Days per week: Not on file     Minutes per session: Not on file    Stress: Not on file   Relationships    Social connections:     Talks on phone: Not on file     Gets together: Not on file     Attends Hinduism service: Not on file     Active member of club or organization: Not on file     Attends meetings of clubs or organizations: Not on file     Relationship status: Not on file    Intimate partner violence:     Fear of current or ex partner: Not on file     Emotionally abused: Not on file     Physically abused: Not on file     Forced sexual activity: Not on file   Other Topics Concern    Not on file   Social History Narrative    Not on file                ALLERGIES: Bactrim [sulfamethoprim ds]; Ciprofloxacin (bulk); Prednisone;  Shellfish derived; and Statins-hmg-coa reductase inhibitors    Review of Systems   Respiratory: Positive for cough, shortness of breath and wheezing (mild). All other systems reviewed and are negative. Vitals:    11/16/19 1215   BP: 138/79   Pulse: 83   Resp: 16   Temp: 98.9 °F (37.2 °C)   SpO2: 95%   Weight: 110 lb (49.9 kg)   Height: 5' 2\" (1.575 m)       Physical Exam   Constitutional: No distress. HENT:   Right Ear: Tympanic membrane and ear canal normal.   Left Ear: Tympanic membrane and ear canal normal.   Nose: Nose normal.   Mouth/Throat: No oropharyngeal exudate, posterior oropharyngeal edema or posterior oropharyngeal erythema. Eyes: Conjunctivae are normal. Right eye exhibits no discharge. Left eye exhibits no discharge. Neck: Neck supple. Pulmonary/Chest: Effort normal and breath sounds normal. No respiratory distress. She has no wheezes. She has no rales. Lymphadenopathy:     She has no cervical adenopathy. Skin: No rash noted. Nursing note and vitals reviewed. MDM    Procedures        ICD-10-CM ICD-9-CM    1. Acute bronchitis, unspecified organism J20.9 466.0 XR CHEST PA LAT    viral/ asthmatic ?? If not better- mat need small dose of steroid  Follow with PCP  Use Robitussin DM suspension     Medications Ordered Today   Medications    albuterol (PROVENTIL HFA, VENTOLIN HFA, PROAIR HFA) 90 mcg/actuation inhaler     Sig: Take 2 Puffs by inhalation every six (6) hours as needed for Wheezing. Dispense:  1 Inhaler     Refill:  0    doxycycline (MONODOX) 100 mg capsule     Sig: Take 1 Cap by mouth two (2) times a day. Dispense:  20 Cap     Refill:  0     Results for orders placed or performed in visit on 11/16/19   XR CHEST PA LAT    Narrative    Indication:  cough, congestion     Exam: PA and lateral views of the chest.    Direct comparison is made to prior CXR dated May 2019. Findings: Cardiomediastinal silhouette is stable. Moderate-sized hiatal hernia  is noted. The lungs are hyperinflated, but grossly clear. No significant pleural  fluid is visualized.  Lower thoracic compression deformities are unchanged. Osseous structures are diffusely demineralized      Impression    IMPRESSION: Hyperinflated, grossly clear lungs. Moderate-sized hiatal hernia. The patients condition was discussed with the patient and they understand. The patient is to follow up with primary care doctor. If signs and symptoms become worse the pt is to go to the ER. The patient is to take medications as prescribed.

## 2019-11-16 NOTE — PATIENT INSTRUCTIONS
Bronchitis: Care Instructions  Your Care Instructions    Bronchitis is inflammation of the bronchial tubes, which carry air to the lungs. The tubes swell and produce mucus, or phlegm. The mucus and inflamed bronchial tubes make you cough. You may have trouble breathing. Most cases of bronchitis are caused by viruses like those that cause colds. Antibiotics usually do not help and they may be harmful. Bronchitis usually develops rapidly and lasts about 2 to 3 weeks in otherwise healthy people. Follow-up care is a key part of your treatment and safety. Be sure to make and go to all appointments, and call your doctor if you are having problems. It's also a good idea to know your test results and keep a list of the medicines you take. How can you care for yourself at home? · Take all medicines exactly as prescribed. Call your doctor if you think you are having a problem with your medicine. · Get some extra rest.  · Take an over-the-counter pain medicine, such as acetaminophen (Tylenol), ibuprofen (Advil, Motrin), or naproxen (Aleve) to reduce fever and relieve body aches. Read and follow all instructions on the label. · Do not take two or more pain medicines at the same time unless the doctor told you to. Many pain medicines have acetaminophen, which is Tylenol. Too much acetaminophen (Tylenol) can be harmful. · Take an over-the-counter cough medicine that contains dextromethorphan to help quiet a dry, hacking cough so that you can sleep. Avoid cough medicines that have more than one active ingredient. Read and follow all instructions on the label. · Breathe moist air from a humidifier, hot shower, or sink filled with hot water. The heat and moisture will thin mucus so you can cough it out. · Do not smoke. Smoking can make bronchitis worse. If you need help quitting, talk to your doctor about stop-smoking programs and medicines. These can increase your chances of quitting for good.   When should you call for help? Call 911 anytime you think you may need emergency care. For example, call if:    · You have severe trouble breathing.    Call your doctor now or seek immediate medical care if:    · You have new or worse trouble breathing.     · You cough up dark brown or bloody mucus (sputum).     · You have a new or higher fever.     · You have a new rash.    Watch closely for changes in your health, and be sure to contact your doctor if:    · You cough more deeply or more often, especially if you notice more mucus or a change in the color of your mucus.     · You are not getting better as expected. Where can you learn more? Go to http://pinky-shamar.info/. Enter H333 in the search box to learn more about \"Bronchitis: Care Instructions. \"  Current as of: June 9, 2019  Content Version: 12.2  © 4521-5465 [employer]. Care instructions adapted under license by [employer] (which disclaims liability or warranty for this information). If you have questions about a medical condition or this instruction, always ask your healthcare professional. [address] any warranty or liability for your use of this information.

## 2019-11-18 ENCOUNTER — TELEPHONE (OUTPATIENT)
Dept: INTERNAL MEDICINE CLINIC | Age: 84
End: 2019-11-18

## 2019-11-18 NOTE — TELEPHONE ENCOUNTER
Called, spoke to [employer] (HIPAA). Two pt identifiers confirmed. Felicitas informed per Dr. Dayana Mejia that the medications   Stopped aug 11/16--continue to hold. Evelin Trujillo informed that pt can go back on flonase per Dr. Raheem Roldaner verbalized understanding of information discussed w/ no further questions at this time.

## 2019-11-18 NOTE — TELEPHONE ENCOUNTER
Patient's daughter, Kale Berry, states she needs a call back to discuss patient persistent Cold Symptoms that patient was Seen at [facility] on Sat., 11/16/19 & her cold medications were changed & needs to know if Dr. Dayana Mejia is in Agreement with changes made. Please call.  Thank you

## 2019-11-18 NOTE — TELEPHONE ENCOUNTER
MD Samantha Porter LPN   Caller: Unspecified (Today,  8:21 AM)             Those changes are fine thanks

## 2019-11-25 ENCOUNTER — PATIENT OUTREACH (OUTPATIENT)
Dept: INTERNAL MEDICINE CLINIC | Age: 84
End: 2019-11-25

## 2019-11-25 ENCOUNTER — TELEPHONE (OUTPATIENT)
Dept: INTERNAL MEDICINE CLINIC | Age: 84
End: 2019-11-25

## 2019-11-25 NOTE — PROGRESS NOTES
Ambulatory Care Manager outreached to patient today to offer care management services; lvm requesting a return phone call to this NN.

## 2019-11-26 ENCOUNTER — PATIENT OUTREACH (OUTPATIENT)
Dept: INTERNAL MEDICINE CLINIC | Age: 84
End: 2019-11-26

## 2019-11-26 NOTE — TELEPHONE ENCOUNTER
NN returned phone call to Neda Warren (HIPAA Verified) today; lvm advising of returned phone call and requesting return phone call to this NN.

## 2019-11-26 NOTE — PROGRESS NOTES
Ambulatory Care Manager outreached to patient/caregiver Johnnneka Cota - HIPAA verified) today to offer care management services; lvm requesting a return phone call to this NN. Patient [employer] Account is noted to be Active; [employer] message sent to patient.

## 2019-11-26 NOTE — PROGRESS NOTES
Ambulatory Care Manager outreached to daughter, Shania Hernández (HIPAA verified) today to offer care management services. Introduction to self and role of care manager provided. Daughter accepted care management services at this time. Follow up call scheduled at this time. Daughter has Ambulatory Care Manager's contact number for for any questions or concerns. Daughter, Shania Hernández, notes that patient \"is doing better from her last visit with Dr. Asya Lee. \"  Daughter will bring patient to PCP office lab on 12/3/19 to have BMP drawn (ordered by PCP on 11/8)

## 2019-12-03 ENCOUNTER — HOSPITAL ENCOUNTER (OUTPATIENT)
Dept: LAB | Age: 84
Discharge: HOME | End: 2019-12-03
Payer: MEDICARE

## 2019-12-03 DIAGNOSIS — E87.1 HYPONATREMIA: ICD-10-CM

## 2019-12-03 DIAGNOSIS — I10 ESSENTIAL HYPERTENSION: ICD-10-CM

## 2019-12-03 PROCEDURE — 80048 BASIC METABOLIC PNL TOTAL CA: CPT

## 2019-12-03 PROCEDURE — 36415 COLL VENOUS BLD VENIPUNCTURE: CPT

## 2019-12-04 LAB
BUN SERPL-MCNC: 17 MG/DL (ref 10–36)
BUN/CREAT SERPL: 20 (ref 12–28)
CALCIUM SERPL-MCNC: 9.3 MG/DL (ref 8.7–10.3)
CHLORIDE SERPL-SCNC: 99 MMOL/L (ref 96–106)
CO2 SERPL-SCNC: 30 MMOL/L (ref 20–29)
CREAT SERPL-MCNC: 0.87 MG/DL (ref 0.57–1)
GLUCOSE SERPL-MCNC: 102 MG/DL (ref 65–99)
POTASSIUM SERPL-SCNC: 3.2 MMOL/L (ref 3.5–5.2)
SODIUM SERPL-SCNC: 140 MMOL/L (ref 134–144)

## 2019-12-09 ENCOUNTER — OFFICE VISIT (OUTPATIENT)
Dept: INTERNAL MEDICINE CLINIC | Age: 84
End: 2019-12-09

## 2019-12-09 ENCOUNTER — TELEPHONE (OUTPATIENT)
Dept: INTERNAL MEDICINE CLINIC | Age: 84
End: 2019-12-09

## 2019-12-09 VITALS
TEMPERATURE: 97.8 F | RESPIRATION RATE: 16 BRPM | DIASTOLIC BLOOD PRESSURE: 82 MMHG | HEIGHT: 62 IN | HEART RATE: 90 BPM | BODY MASS INDEX: 20.98 KG/M2 | SYSTOLIC BLOOD PRESSURE: 139 MMHG | WEIGHT: 114 LBS

## 2019-12-09 DIAGNOSIS — I48.0 PAROXYSMAL ATRIAL FIBRILLATION (HCC): ICD-10-CM

## 2019-12-09 DIAGNOSIS — E87.6 HYPOKALEMIA: ICD-10-CM

## 2019-12-09 DIAGNOSIS — R60.0 LOCALIZED EDEMA: ICD-10-CM

## 2019-12-09 DIAGNOSIS — E11.21 TYPE 2 DIABETES WITH NEPHROPATHY (HCC): Primary | ICD-10-CM

## 2019-12-09 DIAGNOSIS — G25.0 BENIGN ESSENTIAL TREMOR: ICD-10-CM

## 2019-12-09 DIAGNOSIS — J90 PLEURAL EFFUSION: ICD-10-CM

## 2019-12-09 DIAGNOSIS — N82.4 COLOVAGINAL FISTULA: ICD-10-CM

## 2019-12-09 DIAGNOSIS — I67.9 CEREBROVASCULAR DISEASE, UNSPECIFIED: ICD-10-CM

## 2019-12-09 DIAGNOSIS — F33.0 DEPRESSION, MAJOR, RECURRENT, MILD (HCC): ICD-10-CM

## 2019-12-09 DIAGNOSIS — I10 ESSENTIAL HYPERTENSION: ICD-10-CM

## 2019-12-09 DIAGNOSIS — E78.00 HYPERCHOLESTEROLEMIA: ICD-10-CM

## 2019-12-09 PROBLEM — L72.0 EPIDERMOID CYST OF FINGER OF LEFT HAND: Status: RESOLVED | Noted: 2018-02-02 | Resolved: 2019-12-09

## 2019-12-09 PROBLEM — M85.80 OSTEOPENIA: Status: RESOLVED | Noted: 2017-09-25 | Resolved: 2019-12-09

## 2019-12-09 PROBLEM — K59.00 CONSTIPATION: Status: RESOLVED | Noted: 2017-09-25 | Resolved: 2019-12-09

## 2019-12-09 RX ORDER — POTASSIUM CHLORIDE 750 MG/1
10 TABLET, EXTENDED RELEASE ORAL DAILY
Qty: 30 TAB | Refills: 3 | Status: SHIPPED | OUTPATIENT
Start: 2019-12-09 | End: 2020-03-26

## 2019-12-09 RX ORDER — QUETIAPINE FUMARATE 25 MG/1
TABLET, FILM COATED ORAL
Refills: 5 | COMMUNITY
Start: 2019-12-05 | End: 2020-01-30 | Stop reason: SDUPTHER

## 2019-12-09 NOTE — TELEPHONE ENCOUNTER
Order faxed Yeni Davalos at [facility] [phone] w/ confirmation received. Called and spoke to John dorantes at [employer] at [facility] to inform that the order for pt has been faxed. John dorantes verbalized understanding of information discussed w/ no further questions at this time.

## 2019-12-09 NOTE — LETTER
12/9/2019 3:28 PM 
 
Ms. Kosta Rowland [address] [id number] To whom this may concern, Please have Go Torres start Potassium Chloride (Klor-Con) 10mEq tablets once daily by mouth. If there are any questions or concerns, feel free to contact the office. Thank you.     
 
 
 
 
Sincerely, 
 
 
Cassidy Villalta MD

## 2019-12-09 NOTE — TELEPHONE ENCOUNTER
----- Message from Kris Junior sent at 12/9/2019  2:49 PM EST -----  Regarding: Dr. Silvano Horner: [phone]  Caller's first and last name: Bere Haynes / Gadiel Rachel at [facility]  Reason for call: Patient needs prescription order faxed to [phone] so [facility] can have order sent to Patient.   Callback required yes/no and why: Yes, to confirm prescription order is being sent   Best contact number(s): [phone]  Details to clarify the request: n/a      Copy/paste envera

## 2019-12-09 NOTE — PATIENT INSTRUCTIONS
Lab in 1-2 weeks     PLEASE TAKE LASIX (FUROSIMIDE) IN THE MORNING    KLOR CON 10MEQ DAILY FOR LOW POTASSIUM

## 2019-12-09 NOTE — PROGRESS NOTES
HISTORY OF PRESENT ILLNESS  Hugo Mcfarlane is a 80 y.o. female. HPI   Last here 11/4/19 Pt is here for routine care.    Pt presents with her daughter today who provides most of hx        BP is 139/82   SBP at the crossings has been 139, 141   Continues on metoprolol 25mg bid  lov changed  dyazide to lasix 20 mg due to low Na   Pt states this is causing urgency /freq urination   Discussed importance of taking this  Pt's daughter states her ankles have still be swollen   Discussed leg elevation and compression hose    Discussed timed bathroom breaks      Wt today is 114 lbs-- up 4 lbs x lov   Her wt is in the nl ranges      Reviewed labs 11/19      Pt follows wt Dr Maria Teresa Balbuena (neuro) for tremor  Last visit was 5/14/19   Had appt scheduled for  11/19 but this was rescheduled to 2/20   Pt had been being treated for Parkinson's but the neuro decided it may not be this so they stopped this treatment  Has chronic tremors and hallucinations  She is on seroquel 25mg for sleep and hallucinations --stable 12/19          Pt follows with Dr Antonio Parks (cardio) for a fib  Last visit was 7/24/19  Pt is on norpace 100mg BID   On asa 80        Pt sees Dr Bari Bustillos (GI) for diverticulosis  Pt also has a large hiatal hernia  Pt also has a gluten sensitivity and mostly avoids this  She tried to follow this but hard given meals provided at the [facility]   Pt takes protonix 40mg bid sx controlled  Controls gerd has large [place]     Pt saw Dr Mckenzie Durand (neuropsych) in the past  Last visit was 2015 - no dementia     Pt saw Dr Ayla Ramirez (ENT) for hearing loss  Last visit was 12/18/18  Pt decided not to get hearing aids     Pt sees Dr Rosalynn Najjar (ortho) for low back pain   Last visit was 1/9/19  Previously, provided info for Dr Alessia Cortez  She had been taking tramadol but had SE so was switched to tylenol which helps somewhat  Continues on tylenol for this         Pt declined taking reclast and fosamax in the past  Pt is taking vit D 5000U daily        Pt is on ASA 81mg daily     Pt takes miralax for constipation  This works well as long as she takes it daily occasionally takes extra dose        Continues on zoloft 75 mg (increased from 50 mg lov)   Happy with this dose, states she has noticed a positive difference 12/19       Will start klor con due to low K from lasix       Lov, pt c/o thick fecal discharge coming from her vagina   She saw Dr Walt Alfonso  (uro gyn) for this 11/19   She states this has resolved so he is planning on just observing this        Prev pt Noticed some discharge from her navel x 1 week   Pt saw Dr Verline Cockayne (surg) for this to have this removed --no recurrent issues        ACP on file.  SDMs are her daughters, [facility] and Twila Hart.     PREVENTIVE:  Colonoscopy: ~10 years ago with Dr Jovani Mueller, per pt the way her colon is formed makes it difficult to get COLOs, her declines further  Pap: 11/19 with Dr Mckayla Tomlinson further  Dexa: 8/18, osteoporosis  Tdap: 10/19   Prevnar 13: 03/01/19   Pneumovax: due 3/20  Shingrix: both rounds completed   Flu shot: Fall 2019   A1C: 3/19 5.9 9/19 5.7   Micro: 9/19   Eye exam: Dr Geovanni Ramos ~summer 2018, Dr Rosen Boards 2/19 (q6 weeks),  last visit was 8/19    Lipids: 3/19     Patient Active Problem List    Diagnosis Date Noted    Type 2 diabetes with nephropathy ([facility] [address] 06/04/2019    Depression, major, recurrent, mild ([address] 06/04/2019    Pleural effusion 04/18/2019    History of stroke 06/11/2018    Epidermoid cyst of finger of left hand 02/02/2018    Osteopenia 09/25/2017    Gastroesophageal reflux disease without esophagitis 09/25/2017    Diverticulosis of large intestine without hemorrhage 09/25/2017    Benign essential tremor 09/25/2017    Irritable bowel syndrome with diarrhea 09/25/2017    Constipation 09/25/2017    Hypercholesterolemia 09/25/2017    Allergic rhinitis 09/25/2017    Fuchs' corneal dystrophy 09/25/2017    Idiopathic small and large fiber sensory neuropathy 04/21/2016  Diabetic peripheral neuropathy associated with type 2 diabetes mellitus ([facility] [address] 04/21/2016    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 04/21/2016    Anxiety 07/24/2015    Cerebrovascular disease, unspecified 05/03/2015    B12 deficiency 04/20/2015    Osteoporosis 04/20/2015    Memory loss 04/20/2015    Celiac sprue 05/14/2013    Other and unspecified hyperlipidemia 05/14/2013    Atrial fibrillation (HCC)     Hypertension      Current Outpatient Medications   Medication Sig Dispense Refill    dextromethorphan-guaiFENesin (ROBITUSSIN COUGH-CHEST SHASHANK DM)  mg/5 mL liqd syrup Take 5 mL by mouth every six (6) hours as needed for Cough. 180 mL 0    albuterol (PROVENTIL HFA, VENTOLIN HFA, PROAIR HFA) 90 mcg/actuation inhaler Take 2 Puffs by inhalation every six (6) hours as needed for Wheezing. 1 Inhaler 0    doxycycline (MONODOX) 100 mg capsule Take 1 Cap by mouth two (2) times a day. 20 Cap 0    furosemide (LASIX) 20 mg tablet Take 1 Tab by mouth daily. 30 Tab 1    varicella-zoster recombinant, PF, (SHINGRIX, PF,) 50 mcg/0.5 mL susr injection 0.5mL by IntraMUSCular route once now and then repeat in 2-6 months 0.5 mL 1    sertraline (ZOLOFT) 50 mg tablet Take 1.5 Tabs by mouth daily. 45 Tab 3    cyanocobalamin (VITAMIN B12) 500 mcg tablet Take 1 Tab by mouth daily. 60 Tab 5    cholecalciferol, VITAMIN D3, (VITAMIN D3) 5,000 unit tab tablet Take 1 Tab by mouth daily. 60 Tab 5    aspirin 81 mg chewable tablet Take 1 Tab by mouth daily. 120 Tab 5    sertraline (ZOLOFT) 50 mg tablet Take 1 Tab by mouth daily. 30 Tab 11    melatonin tab tablet Take 2 Tabs by mouth nightly. 60 Tab 5    acetaminophen (TYLENOL EXTRA STRENGTH) 500 mg tablet Take 1 Tab by mouth every eight (8) hours as needed for Pain. 100 Tab 2    cholecalciferol (VITAMIN D3) 1,000 unit cap Take 2,000 Units by mouth daily.  Indications: taking 5,000u      cyanocobalamin (VITAMIN B-12) 100 mcg tablet Take 100 mcg by mouth daily. Indications: 500mcg      triamterene-hydroCHLOROthiazide (DYAZIDE) 37.5-25 mg per capsule Take 1 Cap by mouth daily. 90 Cap 3    Lactobacillus rhamnosus GG (CULTURELLE PO) Take  by mouth daily.  disopyramide phosphate (NORPACE) 100 mg capsule TAKE ONE CAPSULE BY MOUTH TWICE A DAY 60 Cap 0    metoprolol tartrate (LOPRESSOR) 25 mg tablet TAKE 1 TABLET TWICE A DAY 60 Tab 12    pantoprazole (PROTONIX) 40 mg tablet Take 1 Tab by mouth daily. (Patient taking differently: Take 40 mg by mouth two (2) times a day.) 30 Tab 12    ondansetron hcl (ZOFRAN) 4 mg tablet Take 1 Tab by mouth every eight (8) hours as needed for Nausea. 20 Tab 3    polyethylene glycol (MIRALAX) 17 gram/dose powder Take 17 g by mouth daily.  nitroglycerin (NITROSTAT) 0.4 mg SL tablet 1 Tab by SubLINGual route every five (5) minutes as needed for Chest Pain.  25 Tab 1     Past Surgical History:   Procedure Laterality Date    HX APPENDECTOMY      HX CHOLECYSTECTOMY      HX GI      hemorrhoidectomy    HX HEENT      sinus surgery    HX HERNIA REPAIR      HX TONSILLECTOMY        Lab Results   Component Value Date/Time    WBC 5.1 11/04/2019 09:42 PM    WBC (POC) 5.6 07/23/2018 03:07 PM    HGB 12.7 11/04/2019 09:42 PM    HGB (POC) 15.0 07/23/2018 03:07 PM    HCT 38.4 11/04/2019 09:42 PM    HCT (POC) 45.9 07/23/2018 03:07 PM    PLATELET 516 82/21/2659 09:42 PM    PLATELET (POC) 115.6 07/23/2018 03:07 PM    MCV 89.7 11/04/2019 09:42 PM    MCV (POC) 90.0 07/23/2018 03:07 PM     Lab Results   Component Value Date/Time    Cholesterol, total 246 (H) 03/01/2019 10:15 AM    HDL Cholesterol 76 03/01/2019 10:15 AM    LDL, calculated 154 (H) 03/01/2019 10:15 AM    Triglyceride 82 03/01/2019 10:15 AM     Lab Results   Component Value Date/Time    GFR est non-AA 58 (L) 12/03/2019 11:05 AM    GFRNA, POC >60 10/15/2018 01:54 PM    GFR est AA 66 12/03/2019 11:05 AM    GFRAA, POC >60 10/15/2018 01:54 PM    Creatinine 0.87 12/03/2019 11:05 AM    Creatinine (POC) 0.8 10/15/2018 01:54 PM    BUN 17 12/03/2019 11:05 AM    Sodium 140 12/03/2019 11:05 AM    Potassium 3.2 (L) 12/03/2019 11:05 AM    Chloride 99 12/03/2019 11:05 AM    CO2 30 (H) 12/03/2019 11:05 AM    Magnesium 2.2 01/13/2019 11:15 PM        Review of Systems   Respiratory: Negative for shortness of breath. Cardiovascular: Negative for chest pain. Physical Exam  Constitutional:       General: She is not in acute distress. Appearance: She is well-developed. She is not diaphoretic. HENT:      Head: Normocephalic and atraumatic. Eyes:      Conjunctiva/sclera: Conjunctivae normal.   Neck:      Musculoskeletal: Normal range of motion and neck supple. Cardiovascular:      Rate and Rhythm: Normal rate and regular rhythm. Heart sounds: Murmur (1/6 ) present. No friction rub. No gallop. Pulmonary:      Effort: Pulmonary effort is normal. No respiratory distress. Breath sounds: Normal breath sounds. No wheezing or rales. Chest:      Chest wall: No tenderness. Musculoskeletal: Normal range of motion. General: No tenderness or deformity. Right lower leg: Edema (trace) present. Left lower leg: Edema present. Lymphadenopathy:      Cervical: No cervical adenopathy. Skin:     General: Skin is warm and dry. Coloration: Skin is not pale. Findings: No erythema or rash. Neurological:      Mental Status: She is alert and oriented to person, place, and time. Coordination: Coordination abnormal.   Psychiatric:         Mood and Affect: Mood normal.         Behavior: Behavior normal.         ASSESSMENT and PLAN    ICD-10-CM ICD-9-CM    1. Type 2 diabetes with nephropathy (HCC)                  Pt is more prediabetic diet controlled last a1c at goal  E11.21 250.40      583.81    2.  Pleural effusion                  Much improved, with maxide unfortunately this also caused hyponatremia changed diruetic to lasix she is tolerating this well has not had recurrent effusion hyponatremia resolved  J90 511.9    3. Paroxysmal atrial fibrillation (HCC)                  Rate controlled on norpace on asa daily  I48.0 427.31    4. Hypokalemia                    Will start klor con 10 meqs daily repeat bmp in 2 weeks  J98.7 111.1 METABOLIC PANEL, BASIC   5. Hypercholesterolemia                  Diet controlled will repeat labs in march  E78.00 272.0    6. Cerebrovascular disease, unspecified                  Follows with neuro asa for stroke prevention  I67.9 437.9    7. Essential hypertension                  Controlled with metoprolol no longer on diazide  I10 401.9    8. Colovaginal fistula                Saw dr Melva petit for this ultimately decided on no intervention not currently having any difficulties from this  N82.4 619.1    9. Depression, major, recurrent, mild ([address]                  Controlled on zoloft continue  F33.0 296.31    10. Benign essential tremor                    Follows with neuro appt has been rescheduled to feb currently on seroquel no recent issues with hallucinations  G25.0 333.1    11. Localized edema                      Stable with lasix continue compression hose  R60.0 782.3                 Scribed by Ye Graham of [facility], as dictated by Dr. Esequiel Pradhan. Current diagnosis and concerns discussed with pt at length. Pt understands risks and benefits or current treatment plan and medications, and accepts the treatment and medication with any possible risks. Pt asks appropriate questions, which were answered. Pt was instructed to call with any concerns or problems. I have reviewed the note documented by the scribe. The services provided are my own.   The documentation is accurate

## 2019-12-19 ENCOUNTER — TELEPHONE (OUTPATIENT)
Dept: INTERNAL MEDICINE CLINIC | Age: 84
End: 2019-12-19

## 2019-12-19 ENCOUNTER — PATIENT OUTREACH (OUTPATIENT)
Dept: INTERNAL MEDICINE CLINIC | Age: 84
End: 2019-12-19

## 2019-12-19 NOTE — TELEPHONE ENCOUNTER
Patient's daughter, Beverley John she needs a call back today to discuss patient's elevated blood pressure readings over this past week. Chuck Carlos reports patient is in Assisted Living & would like to discuss readings & what to do. Please call.  Thank you

## 2019-12-19 NOTE — PROGRESS NOTES
Ambulatory Care Manager outreached to patient/caregiver Bjorn Pickett - HIPAA verified) today to complete second CCM call; lvm requesting a return phone call to this NN.

## 2019-12-19 NOTE — TELEPHONE ENCOUNTER
Fax #[phone]  ATTN: Edyta Sukhdev states there needs to be a new order [facility] to [facility] to change the time of day that pt's blood pressure is checked. It should read to take 1-2 hours after metoprolol. Please fax to [facility] as soon as possible. Thanks.

## 2019-12-19 NOTE — TELEPHONE ENCOUNTER
Called, spoke to [employer] (HIPAA). Two pt identifiers confirmed. Felicitas informed per Dr. Nata Mills to have pt's bp taken after 10 minutes of rest and that if multiple readings are taken, it could increase the BP. Ava Hanson states that they use both manual and machine BP cuffs. Ava Hanson asked if the pt receives first dose of metoprolol prior to BP readings. Ava Hanson states that the facility takes BP and then gives pt her pill around 0930. Ava Hanson advised to have facility check BP of pt 1hr-1.5hrs after giving first dose of metoprolol. Ava Hanson informed to provide update come Monday. Ava Hanson verbalized understanding of information discussed w/ no further questions at this time.

## 2019-12-19 NOTE — TELEPHONE ENCOUNTER
Cliff Archer, no change to medication, have pt rest 10 minutes prior to BP check, and report readings on 12/23/19.

## 2019-12-19 NOTE — TELEPHONE ENCOUNTER
Called, spoke to [employer] (HIPAA). Two pt identifiers confirmed. Freddy January states recent BP readings:  M-167/87, T-177/105, W-174/95, today-151/97. BP at Podiatry appt 130/76--12/18/19. Pt seemingly more confused lately--around past couple weeks. Getting mixed up more often, not sure if related to the elevated BP. Taking Metoprolol BID. Cristian Marroquin asking if an order can be sent to the pt's facility to have pt's BP checked BID. Cristian Marroquin informed Dr. January Charlton will be notified. Cristian Marroquin verbalized understanding of information discussed w/ no further questions at this time.

## 2019-12-19 NOTE — LETTER
12/20/2019 8:37 AM 
 
Ms. Sarah Roth [address] [place] [phone] To whom this may concern, Please have Ms. Angela's blood pressure taken 1hr-1.5hrs after taking her morning blood pressure medication. If there are any questions or concerns, feel free to contact the office. Thank you.    
 
 
Sincerely, 
 
 
Henrietta Castro MD

## 2019-12-30 ENCOUNTER — TELEPHONE (OUTPATIENT)
Dept: CARDIOLOGY CLINIC | Age: 84
End: 2019-12-30

## 2019-12-30 RX ORDER — FUROSEMIDE 20 MG/1
20 TABLET ORAL DAILY
Qty: 30 TAB | Refills: 1 | Status: SHIPPED | OUTPATIENT
Start: 2019-12-30 | End: 2020-02-25

## 2019-12-30 NOTE — TELEPHONE ENCOUNTER
Spoke with daughter they have reached out to Dr Opal Correa for Norpace refill will discuss with Dr Colonel Brock next visit about future refills.

## 2019-12-30 NOTE — TELEPHONE ENCOUNTER
Patient daughter Bisi House would like to speak with the nurse because her mother medication is out after today. Metoprolol 25mg and Disopyramide 100mg. Pharmacy have already sent something over to Dr. Renu Barnes today requesting refills.       [facility] [phone]    Thanks

## 2019-12-30 NOTE — TELEPHONE ENCOUNTER
PCP: Shea Santa MD    Last appt: 12/9/2019  Future Appointments   Date Time Provider Barbara Ramey   1/30/2020 11:00 AM Kel Gamboa MD [address] [facility]   2/5/2020 11:40 AM Betsey Miranda DO [address] [place] [address]   3/9/2020  1:15 PM Shea Santa MD [address]       Requested Prescriptions     Pending Prescriptions Disp Refills    furosemide (LASIX) 20 mg tablet 30 Tab 1     Sig: Take 1 Tab by mouth daily.

## 2019-12-31 ENCOUNTER — TELEPHONE (OUTPATIENT)
Dept: NEUROLOGY | Age: 84
End: 2019-12-31

## 2020-01-03 ENCOUNTER — TELEPHONE (OUTPATIENT)
Dept: NEUROLOGY | Age: 85
End: 2020-01-03

## 2020-01-03 NOTE — TELEPHONE ENCOUNTER
This med was last filled by [facility] although I see in past meds it was discontinued,    Daughter states she has been on it all along. It has not been filled lately. Will you please refill if you want her to stay on it.

## 2020-01-03 NOTE — TELEPHONE ENCOUNTER
Pt's daughter calling stating the pharmacy has sent over requests for pt's Seroquel but they haven't heard anything back. She is calling to find out what is going on.  Please call back

## 2020-01-06 ENCOUNTER — TELEPHONE (OUTPATIENT)
Dept: NEUROLOGY | Age: 85
End: 2020-01-06

## 2020-01-06 RX ORDER — QUETIAPINE FUMARATE 25 MG/1
25 TABLET, FILM COATED ORAL
Qty: 90 TAB | Refills: 1 | Status: SHIPPED | OUTPATIENT
Start: 2020-01-06 | End: 2020-05-20 | Stop reason: SDUPTHER

## 2020-01-06 NOTE — TELEPHONE ENCOUNTER
----- Message from April Vidales sent at 1/6/2020 11:54 AM EST -----  Regarding: /Refill  Contact: [phone]    Gregory Moctezuma (pt.'s daughter) called requesting a refill for the medication \"seraquil /quetitine. \" Pt uses the [facility] . Best contact number  [phone] .

## 2020-01-06 NOTE — PROGRESS NOTES
Ok to resume seroquel if absolutely needed. It does have a black box warning and I discussed this with there family last visit. They would like to resume.

## 2020-01-07 ENCOUNTER — PATIENT OUTREACH (OUTPATIENT)
Dept: INTERNAL MEDICINE CLINIC | Age: 85
End: 2020-01-07

## 2020-01-07 NOTE — PROGRESS NOTES
Ambulatory Care Manager outreached to patient/caregiver Felisa Agrawal - HIPAA verified) today to complete second CCM call; lvm requesting a return phone call to this NN.

## 2020-01-08 ENCOUNTER — TELEPHONE (OUTPATIENT)
Dept: INTERNAL MEDICINE CLINIC | Age: 85
End: 2020-01-08

## 2020-01-09 ENCOUNTER — PATIENT OUTREACH (OUTPATIENT)
Dept: INTERNAL MEDICINE CLINIC | Age: 85
End: 2020-01-09

## 2020-01-09 NOTE — PROGRESS NOTES
Ambulatory Care Manager outreached to patient/caregiver Mya Maravilla - HIPAA verified) today to perform second CCM call; lvm requesting a return phone call to this NN.

## 2020-01-15 ENCOUNTER — TELEPHONE (OUTPATIENT)
Dept: CARDIOLOGY CLINIC | Age: 85
End: 2020-01-15

## 2020-01-15 NOTE — TELEPHONE ENCOUNTER
Daughter Berdie Jobs calling her mother has been having chest discomfort pain level 8/10 for last few hours her current  /96 recommend she try nitro if doesn't relieve discomfort with 3 tablets in 15 minutes recommend they call 911 if relief may need to call back for an appt to further access any other recommendations?

## 2020-01-16 ENCOUNTER — TELEPHONE (OUTPATIENT)
Dept: INTERNAL MEDICINE CLINIC | Age: 85
End: 2020-01-16

## 2020-01-16 NOTE — TELEPHONE ENCOUNTER
Called, spoke to [employer] (HIPAA). Two pt identifiers confirmed. Rosie Ramirez states that BP has been elevated. Recent BP readings:   148/88, 152/87, 138/84,144/92, 144/91, 142/78, 123/68, 155/93, 169/99, 150/96, 161/96, 151/89. Pt has been taking Metoprolol 25mg BID. Pt has 1/30 appt w/ Dr. Mckenna Parikh pending. Rosie Ramirez asking if pt should be seen w/ Dr. Laura Gaitan sooner for BP issues. Rosie Ramirez informed Dr. Laura Gaitan will be notified. Rosie Ramirez verbalized understanding of information discussed w/ no further questions at this time.

## 2020-01-16 NOTE — TELEPHONE ENCOUNTER
----- Message from Ora Orozco sent at 1/16/2020 10:41 AM EST -----  Regarding: Dr. Garcia Ka: [phone]  Caller's first and last name and relationship (if not the patient): Diaz Edgar  Details to clarify the request: Daughter says pts. BP was 150/96  taken this morning and would like to speak w/ nurse Jas Rodriguez contact number(s): [phone]  What are the symptoms: high BP  Transfer successful - yes/no (include outcome): no  Transfer declined - yes/no (include reason): yes.  PAC advised to send a message per Danni Husain  Was caller advised to seek appropriate level of care - yes/no: yes

## 2020-01-17 NOTE — TELEPHONE ENCOUNTER
Called, spoke to [employer] (HIPAA). Two pt identifiers confirmed. Rosie Ramirez informed per Dr. Laura Gaitan pt's pressures are borderline elevated and to make sure pt rest and is calm prior to taking BP. Felicitas informed per Dr. Laura Gaitan if persistent elevation occurs, pt can be seen here in clinic. Rosie Ramirez would like to monitor pt's BP to see if it gets worse. Advised Felicitas if BP gets higher, contact clinic for appt; otherwise see Card as pended. Rosie Ramirez verbalized understanding of information discussed w/ no further questions at this time.

## 2020-01-17 NOTE — TELEPHONE ENCOUNTER
MD David Singletary, SARAHN   Caller: Unspecified (Yesterday, 10:53 AM)             Pressures are just borderline/mild elevation     Make sure she is calm and rested prior to checking bp     If persistent elevation can see her next week

## 2020-01-29 ENCOUNTER — PATIENT OUTREACH (OUTPATIENT)
Dept: INTERNAL MEDICINE CLINIC | Age: 85
End: 2020-01-29

## 2020-01-29 RX ORDER — DISOPYRAMIDE PHOSPHATE 100 MG/1
CAPSULE ORAL
Qty: 60 CAP | Refills: 0 | Status: SHIPPED | OUTPATIENT
Start: 2020-01-29 | End: 2020-02-25 | Stop reason: SDUPTHER

## 2020-01-29 NOTE — PROGRESS NOTES
Complex Case Management Episode resolved at this time due to NN inability to reach patient/daughter following initial ACM outreach encounter. No further Ambulatory Care Manager follow up scheduled.     Goals Addressed    None           Patients upcoming visits:    Future Appointments   Date Time Provider Barbara Karoline   1/30/2020 11:00 AM Michael Peterson MD [address] [facility]   2/5/2020 11:40 AM Yelena Garcia DO [facility] [facility]   3/9/2020  1:15 PM Collette Christian MD [address]

## 2020-01-30 ENCOUNTER — OFFICE VISIT (OUTPATIENT)
Dept: CARDIOLOGY CLINIC | Age: 85
End: 2020-01-30

## 2020-01-30 VITALS
WEIGHT: 112.7 LBS | HEIGHT: 62 IN | BODY MASS INDEX: 20.74 KG/M2 | SYSTOLIC BLOOD PRESSURE: 140 MMHG | HEART RATE: 90 BPM | RESPIRATION RATE: 16 BRPM | DIASTOLIC BLOOD PRESSURE: 80 MMHG

## 2020-01-30 DIAGNOSIS — I10 ESSENTIAL HYPERTENSION: ICD-10-CM

## 2020-01-30 DIAGNOSIS — I48.0 PAROXYSMAL ATRIAL FIBRILLATION (HCC): Primary | Chronic | ICD-10-CM

## 2020-01-30 DIAGNOSIS — R07.89 OTHER CHEST PAIN: ICD-10-CM

## 2020-01-30 NOTE — PROGRESS NOTES
Bradley Perez, [facility]    Subjective/HPI:     Ms. Michelle Lee is a 80 y.o. female is here for routine f/u. She has a PMHx of PAF, HTN, CVA, HLD, and hiatal hernia. She is doing well. She is here with her daughter, who provides most of the history. She notes some concerns with increased urinary frequency. This has been going on since starting Lasix around 11/2019. Her dyazide was stopped due to hyponatremia, and replaced with lasix. She was recently also treated for UTI. She complains of some low back pain as well. She notes some chest pain symptoms off and on. She had an episode of heavy, sharp chest pain that was 8/10 a few weeks ago. She called into our office and was instructed to take NTG. Her symptoms resolved completely with 1 dose of NTG.          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 I65.23    Gastroesophageal reflux disease without esophagitis K21.9    Diverticulosis of large intestine without hemorrhage K57.30    Benign essential tremor G25.0    Irritable bowel syndrome with diarrhea K58.0    Hypercholesterolemia E78.00    Allergic rhinitis J30.9    Fuchs' corneal dystrophy H18.51    History of stroke Z86.73    Pleural effusion J90    Depression, major, recurrent, 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     Past Surgical History:   Procedure Laterality Date    HX APPENDECTOMY      HX CHOLECYSTECTOMY      HX GI      hemorrhoidectomy    HX HEENT      sinus surgery    HX HERNIA REPAIR      HX TONSILLECTOMY       Family History   Problem Relation Age of Onset    Heart Disease Mother     Dementia Mother     Heart Disease Father     Neuropathy Child     Depression Child     Other Child         diverticulitis    Lung Cancer Sister 80        lung ca     Social History     Socioeconomic History    Marital status:      Spouse name: Not on file    Number of children: Not on file    Years of education: Not on file    Highest education level: Not on file   Occupational History    Not on file   Social Needs    Financial resource strain: Not on file    Food insecurity:     Worry: Not on file     Inability: Not on file    Transportation needs:     Medical: Not on file     Non-medical: Not on file   Tobacco Use    Smoking status: Former Smoker     Packs/day: 0.75     Years: 5.00     Pack years: 3.75     Last attempt to quit: 10/11/1967     Years since quittin.3    Smokeless tobacco: Never Used   Substance and Sexual Activity    Alcohol use: No    Drug use: No    Sexual activity: Not on file   Lifestyle    Physical activity:     Days per week: Not on file     Minutes per session: Not on file    Stress: Not on file   Relationships    Social connections:     Talks on phone: Not on file     Gets together: Not on file     Attends [employer] service: Not on file     Active member of club or organization: Not on file     Attends meetings of clubs or organizations: Not on file     Relationship status: Not on file    Intimate partner violence:     Fear of current or ex partner: Not on file     Emotionally abused: Not on file     Physically abused: Not on file     Forced sexual activity: Not on file   Other Topics Concern    Not on file   Social History Narrative    Not on file       Allergies   Allergen Reactions    Bactrim [Sulfamethoprim Ds] Other (comments)     consipation    Ciprofloxacin (Bulk) Other (comments)     Low wbc    Prednisone Other (comments) tachycardia    Shellfish Derived Other (comments)     Abdominal pain, Carried epi pen for this at one point.  Statins-Hmg-Coa Reductase Inhibitors Unknown (comments)     Stomach uipset and mild muscle aches        Current Outpatient Medications   Medication Sig    disopyramide phosphate (NORPACE) 100 mg capsule TAKE ONE CAPSULE BY MOUTH TWICE A DAY    QUEtiapine (SEROQUEL) 25 mg tablet Take 1 Tab by mouth nightly as needed (agitation). (Patient taking differently: Take 25 mg by mouth nightly.)    metoprolol tartrate (LOPRESSOR) 25 mg tablet TAKE ONE TABLET BY MOUTH 2 TIMES A DAY    furosemide (LASIX) 20 mg tablet Take 1 Tab by mouth daily.  potassium chloride (KLOR-CON) 10 mEq tablet Take 1 Tab by mouth daily.  dextromethorphan-guaiFENesin (ROBITUSSIN COUGH-CHEST SHASHANK DM)  mg/5 mL liqd syrup Take 5 mL by mouth every six (6) hours as needed for Cough.  varicella-zoster recombinant, PF, (SHINGRIX, PF,) 50 mcg/0.5 mL susr injection 0.5mL by IntraMUSCular route once now and then repeat in 2-6 months    sertraline (ZOLOFT) 50 mg tablet Take 1.5 Tabs by mouth daily.  cholecalciferol, VITAMIN D3, (VITAMIN D3) 5,000 unit tab tablet Take 1 Tab by mouth daily. (Patient taking differently: Take 1,000 Units by mouth daily.)    aspirin 81 mg chewable tablet Take 1 Tab by mouth daily.  melatonin tab tablet Take 2 Tabs by mouth nightly. (Patient taking differently: Take 10 mg by mouth nightly. 10 mg qhs)    acetaminophen (TYLENOL EXTRA STRENGTH) 500 mg tablet Take 1 Tab by mouth every eight (8) hours as needed for Pain.  cholecalciferol (VITAMIN D3) 1,000 unit cap Take 2,000 Units by mouth daily. Indications: taking 5,000u    cyanocobalamin (VITAMIN B-12) 100 mcg tablet Take 100 mcg by mouth daily. Indications: 500mcg    Lactobacillus rhamnosus GG (CULTURELLE PO) Take  by mouth daily.  pantoprazole (PROTONIX) 40 mg tablet Take 1 Tab by mouth daily.  (Patient taking differently: Take 40 mg by mouth two (2) times a day.)    polyethylene glycol (MIRALAX) 17 gram/dose powder Take 17 g by mouth daily.  nitroglycerin (NITROSTAT) 0.4 mg SL tablet 1 Tab by SubLINGual route every five (5) minutes as needed for Chest Pain. No current facility-administered medications for this visit. I have reviewed the problem list, allergy list, medical history, family, social history and medications. Review of Symptoms:    Review of Systems   Constitutional: Negative for chills, fever and weight loss. HENT: Negative for nosebleeds. Eyes: Negative for blurred vision and double vision. Respiratory: Negative for cough, shortness of breath and wheezing. Cardiovascular: Negative for chest pain, palpitations, orthopnea, leg swelling and PND. Gastrointestinal: Negative for abdominal pain, blood in stool, diarrhea, nausea and vomiting. Genitourinary: Positive for frequency. Musculoskeletal: Positive for back pain. Negative for joint pain. Skin: Negative for rash. Neurological: Negative for dizziness, tingling and loss of consciousness. Endo/Heme/Allergies: Does not bruise/bleed easily. Physical Exam:      General: Well developed, in no acute distress, cooperative and alert  HEENT: No carotid bruits, no JVD, trach is midline. Neck Supple, PEERL, EOM intact. Heart:  reg rate and rhythm; normal S1/S2; no murmurs, gallops or rubs. Respiratory: Clear bilaterally x 4, no wheezing or rales  Abdomen:   Soft, non-tender, no distention, no masses. + BS. Extremities:  Normal cap refill, no cyanosis, atraumatic. No edema. Neuro: A&Ox3, speech clear, gait stable. Skin: Skin color is normal. No rashes or lesions.  Non diaphoretic  Vascular: 2+ pulses symmetric in all extremities    Vitals:    01/30/20 1142 01/30/20 1143   BP: 130/80 140/80   Pulse: 90    Resp: 16    Weight: 112 lb 11.2 oz (51.1 kg)    Height: 5' 2\" (1.575 m)        Cardiographics    ECG: sinus rhythm  Results for orders placed or performed during the hospital encounter of 11/04/19   EKG, 12 LEAD, INITIAL   Result Value Ref Range    Ventricular Rate 86 BPM    Atrial Rate 86 BPM    P-R Interval 170 ms    QRS Duration 80 ms    Q-T Interval 392 ms    QTC Calculation (Bezet) 469 ms    Calculated P Axis 51 degrees    Calculated R Axis -7 degrees    Calculated T Axis 29 degrees    Diagnosis       Normal sinus rhythm  Possible Left atrial enlargement  Left ventricular hypertrophy  Confirmed by Selina Palafox ([id number]) on 11/5/2019 1:14:55 PM     Results for orders placed or performed in visit on 10/28/16   CARDIAC HOLTER MONITOR, 24 HOURS    Narrative    ECG Monitor/24 hours, Complete    Reason for Holter Monitor  PAC'S    Heartbeat    Slowest 52  Average 64  Fastest  87    Results:   Underlying Rhythm: Normal sinus rhythm      Atrial Arrhythmias: premature atrial contractions; occasional            AV Conduction: normal    Ventricular Arrhythmias: premature ventricular contractions; rare     ST Segment Analysis:non-specific changes     Symptom Correlation:  Specific symptoms not reported. Comment:   No evidence of recurrent paroxysmal atrial fibrillation.      Donna Wadsworth MD           Cardiology Labs:  Lab Results   Component Value Date/Time    Cholesterol, total 246 (H) 03/01/2019 10:15 AM    HDL Cholesterol 76 03/01/2019 10:15 AM    LDL, calculated 154 (H) 03/01/2019 10:15 AM    Triglyceride 82 03/01/2019 10:15 AM       Lab Results   Component Value Date/Time    Sodium 140 12/03/2019 11:05 AM    Potassium 3.2 (L) 12/03/2019 11:05 AM    Chloride 99 12/03/2019 11:05 AM    CO2 30 (H) 12/03/2019 11:05 AM    Anion gap 6 11/04/2019 09:42 PM    Glucose 102 (H) 12/03/2019 11:05 AM    BUN 17 12/03/2019 11:05 AM    Creatinine 0.87 12/03/2019 11:05 AM    BUN/Creatinine ratio 20 12/03/2019 11:05 AM    GFR est AA 66 12/03/2019 11:05 AM    GFR est non-AA 58 (L) 12/03/2019 11:05 AM    Calcium 9.3 12/03/2019 11:05 AM    Bilirubin, total 0.5 11/04/2019 09:42 PM    AST (SGOT) 14 (L) 11/04/2019 09:42 PM    Alk. phosphatase 54 11/04/2019 09:42 PM    Protein, total 6.4 11/04/2019 09:42 PM    Albumin 3.5 11/04/2019 09:42 PM    Globulin 2.9 11/04/2019 09:42 PM    A-G Ratio 1.2 11/04/2019 09:42 PM    ALT (SGPT) 19 11/04/2019 09:42 PM        Orders Placed This Encounter    AMB POC EKG ROUTINE W/ 12 LEADS, INTER & REP     Order Specific Question:   Reason for Exam:     Answer:   routine        Assessment:     Assessment:       ICD-10-CM ICD-9-CM    1. Paroxysmal atrial fibrillation (HCC) I48.0 427.31 AMB POC EKG ROUTINE W/ 12 LEADS, INTER & REP   2. Essential hypertension I10 401.9    3. Other chest pain R07.89 786.59         Plan:     1. Paroxysmal atrial fibrillation (HCC)  Maintaining sinus rhythm  Continue rate control therapies  Continue ASA only given no recurrence in many years    2. Essential hypertension  Off dyazide due to hyponatremia  On Lasix currently; BP doing wellI advised the patient and her daughter that I would probably not be super aggressive about her blood pressure at the age of 80 unless she is generally trending over 150/90 as she walks with a Rollator and I fear low blood pressure will make her dizzy and increased risk of falls  Would consider addition of low dose anti-hypertensive if BP trend in the 150s-160s/90s. Consider lisinopril. 3. Other chest pain  Atypical symptoms in the setting of large hiatal hernia -- prohibitive risk for surgery  Zac De La Vega in 6/2018 without evidence of ischemia  Echo done 6/2018 with preserved ejection fraction 60-65% with grade 1 DD  Discussed use of NTG, which tends to resolve pain completely. If she has no relief in symptoms despite NTGx3 in 15 minutes, then she must go to ER    Advised to discuss urinary/low back pain symptoms with Dr. Yamel Driscoll -- recently treated for UTI. F/u with Dr. Xiomy Rodríguez in 6 months, sooner as needed.     Wan Acevedo MD

## 2020-01-30 NOTE — PROGRESS NOTES
1. Have you been to the ER, urgent care clinic since your last visit? Hospitalized since your last visit? 11/2019    2. Have you seen or consulted any other health care providers outside of the [address] [place] [address] since your last visit? Include any pap smears or colon screening. No    Chief Complaint   Patient presents with    Follow-up    Irregular Heart Beat        Patient C/?  Chest discomfort at times relieved with nitro X 1.

## 2020-02-05 ENCOUNTER — OFFICE VISIT (OUTPATIENT)
Dept: NEUROLOGY | Age: 85
End: 2020-02-05

## 2020-02-05 ENCOUNTER — TELEPHONE (OUTPATIENT)
Dept: NEUROLOGY | Age: 85
End: 2020-02-05

## 2020-02-05 VITALS
BODY MASS INDEX: 20.61 KG/M2 | HEART RATE: 87 BPM | SYSTOLIC BLOOD PRESSURE: 102 MMHG | WEIGHT: 112 LBS | DIASTOLIC BLOOD PRESSURE: 70 MMHG | OXYGEN SATURATION: 97 % | RESPIRATION RATE: 16 BRPM | HEIGHT: 62 IN

## 2020-02-05 DIAGNOSIS — F02.80 LEWY BODY PARKINSON DISEASE (HCC): Primary | ICD-10-CM

## 2020-02-05 DIAGNOSIS — G31.83 LEWY BODY PARKINSON DISEASE (HCC): Primary | ICD-10-CM

## 2020-02-05 DIAGNOSIS — R44.3 HALLUCINATIONS: ICD-10-CM

## 2020-02-05 NOTE — TELEPHONE ENCOUNTER
----- Message from Natali Healy sent at 2/5/2020  3:37 PM EST -----  Regarding: Dr Moira Broussard first and last name:Julieth Ivey pt's daughter      Reason for call:to see if her mother dosage should be increased on her  Melatonin       Callback required yes/no and why:yes for reason given above      Best contact number(s):[phone]      Details to clarify the request:pt  daughter said her mother is currently taking two 5 mg tabs a day at night, which was prescribed already  , and would like to know if it should be increased to 15 mg now to help her sleep better , she is still not sleeping well under the current dosage if  so can it be called in to [facility] the one listed in her chart [phone], if it is a change they will need a letter indication the change to take to her mother Assisted Living facility     Natali Healy

## 2020-02-05 NOTE — PATIENT INSTRUCTIONS
PRESCRIPTION REFILL POLICY [facility] Statement to Patients April 1, 2014 In an effort to ensure the large volume of patient prescription refills is processed in the most efficient and expeditious manner, we are asking our patients to assist us by calling your Pharmacy for all prescription refills, this will include also your  Mail Order Pharmacy. The pharmacy will contact our office electronically to continue the refill process. Please do not wait until the last minute to call your pharmacy. We need at least 48 hours (2days) to fill prescriptions. We also encourage you to call your pharmacy before going to  your prescription to make sure it is ready. With regard to controlled substance prescription refill requests (narcotic refills) that need to be picked up at our office, we ask your cooperation by providing us with at least 72 hours (3days) notice that you will need a refill. We will not refill narcotic prescription refill requests after 4:00pm on any weekday, Monday through Thursday, or after 2:00pm on Fridays, or on the weekends. We encourage everyone to explore another way of getting your prescription refill request processed using [employer], our patient web portal through our electronic medical record system. [employer] is an efficient and effective way to communicate your medication request directly to the office and  downloadable as an lokesh on your smart phone . [employer] also features a review functionality that allows you to view your medication list as well as leave messages for your physician. Are you ready to get connected? If so please review the attatched instructions or speak to any of our staff to get you set up right away! Thank you so much for your cooperation. Should you have any questions please contact our Practice Administrator. The Physicians and Staff,  [facility]

## 2020-02-05 NOTE — PROGRESS NOTES
Chief Complaint   Patient presents with    Neurologic Problem    Parkinsons Disease       HPI    Santa Valentin is a 45-year-old woman here to follow-up. She has a likely atypical Parkinson's process suspicious for Lewy body. She has predominant auditory visual hallucinations. DaTscan was not approved to be completed. Since I saw her last she does live in assisted living with medication supervision. She stopped Seroquel at one point but the hallucinations got worse and now she takes Seroquel at bedtime 25 mg. Fortunately sleep is still quite challenging with most nights having fragmented sleep. She feels like her tremor is getting worse in the arms. Constipation persists. No falls. Review of Systems   Constitutional: Positive for malaise/fatigue. Gastrointestinal: Positive for constipation. Musculoskeletal: Negative for falls. Neurological: Positive for tremors and weakness. Negative for loss of consciousness. Psychiatric/Behavioral: Positive for hallucinations. All other systems reviewed and are negative.       Past Medical History:   Diagnosis Date    Acute diverticulitis 9/25/2017    Allergic rhinitis 9/25/2017    Arrhythmia     afib    Arthritis     Asthma     Atrial fibrillation ([address]     Benign essential tremor 9/25/2017    Cancer ([address]     skin    Celiac disease 9/25/2017    Constipation 9/25/2017    Diabetic peripheral neuropathy associated with type 2 diabetes mellitus ([address] 4/21/2016    Diarrhea 9/25/2017    Diverticulosis of large intestine without hemorrhage 9/25/2017    Early satiety 9/25/2017    Fuchs' corneal dystrophy 9/25/2017    Gastroesophageal reflux disease without esophagitis 9/25/2017    GERD (gastroesophageal reflux disease)     High cholesterol     Hypercholesterolemia 9/25/2017    Hypertension     Irritable bowel syndrome with diarrhea 9/25/2017    Numbness 9/25/2017    Osteopenia 9/25/2017    Other ill-defined conditions(799.89)     collapsed lung prior to hernia repair surgery    Postmenopausal 2018    Ptosis, both eyelids 2017    Statin intolerance 2017    Unspecified adverse effect of anesthesia     pt states she went into cardiac arrest prior to hernia repair after the insertion of gas in stomach     Family History   Problem Relation Age of Onset    Heart Disease Mother     Dementia Mother     Heart Disease Father     Neuropathy Child     Depression Child     Other Child         diverticulitis    Lung Cancer Sister 80        lung ca     Social History     Socioeconomic History    Marital status:      Spouse name: Not on file    Number of children: Not on file    Years of education: Not on file    Highest education level: Not on file   Occupational History    Not on file   Social Needs    Financial resource strain: Not on file    Food insecurity:     Worry: Not on file     Inability: Not on file    Transportation needs:     Medical: Not on file     Non-medical: Not on file   Tobacco Use    Smoking status: Former Smoker     Packs/day: 0.75     Years: 5.00     Pack years: 3.75     Last attempt to quit: 10/11/1967     Years since quittin.3    Smokeless tobacco: Never Used   Substance and Sexual Activity    Alcohol use: No    Drug use: No    Sexual activity: Not on file   Lifestyle    Physical activity:     Days per week: Not on file     Minutes per session: Not on file    Stress: Not on file   Relationships    Social connections:     Talks on phone: Not on file     Gets together: Not on file     Attends Mandae[employer] service: Not on file     Active member of club or organization: Not on file     Attends meetings of clubs or organizations: Not on file     Relationship status: Not on file    Intimate partner violence:     Fear of current or ex partner: Not on file     Emotionally abused: Not on file     Physically abused: Not on file     Forced sexual activity: Not on file   Other Topics Concern    Not on file Social History Narrative    Not on file     Allergies   Allergen Reactions    Bactrim [Sulfamethoprim Ds] Other (comments)     consipation    Ciprofloxacin (Bulk) Other (comments)     Low wbc    Prednisone Other (comments)     tachycardia    Shellfish Derived Other (comments)     Abdominal pain, Carried epi pen for this at one point.  Statins-Hmg-Coa Reductase Inhibitors Unknown (comments)     Stomach uipset and mild muscle aches         Current Outpatient Medications   Medication Sig    disopyramide phosphate (NORPACE) 100 mg capsule TAKE ONE CAPSULE BY MOUTH TWICE A DAY    QUEtiapine (SEROQUEL) 25 mg tablet Take 1 Tab by mouth nightly as needed (agitation). (Patient taking differently: Take 25 mg by mouth nightly.)    metoprolol tartrate (LOPRESSOR) 25 mg tablet TAKE ONE TABLET BY MOUTH 2 TIMES A DAY    furosemide (LASIX) 20 mg tablet Take 1 Tab by mouth daily.  potassium chloride (KLOR-CON) 10 mEq tablet Take 1 Tab by mouth daily.  sertraline (ZOLOFT) 50 mg tablet Take 1.5 Tabs by mouth daily.  cholecalciferol, VITAMIN D3, (VITAMIN D3) 5,000 unit tab tablet Take 1 Tab by mouth daily. (Patient taking differently: Take 1,000 Units by mouth daily.)    aspirin 81 mg chewable tablet Take 1 Tab by mouth daily.  melatonin tab tablet Take 2 Tabs by mouth nightly. (Patient taking differently: Take 10 mg by mouth nightly. 10 mg qhs)    acetaminophen (TYLENOL EXTRA STRENGTH) 500 mg tablet Take 1 Tab by mouth every eight (8) hours as needed for Pain.  cholecalciferol (VITAMIN D3) 1,000 unit cap Take 2,000 Units by mouth daily. Indications: taking 5,000u    cyanocobalamin (VITAMIN B-12) 100 mcg tablet Take 100 mcg by mouth daily. Indications: 500mcg    Lactobacillus rhamnosus GG (CULTURELLE PO) Take  by mouth daily.  pantoprazole (PROTONIX) 40 mg tablet Take 1 Tab by mouth daily.  (Patient taking differently: Take 40 mg by mouth two (2) times a day.)    polyethylene glycol (MIRALAX) 17 gram/dose powder Take 17 g by mouth daily.  nitroglycerin (NITROSTAT) 0.4 mg SL tablet 1 Tab by SubLINGual route every five (5) minutes as needed for Chest Pain.  dextromethorphan-guaiFENesin (ROBITUSSIN COUGH-CHEST SHASHANK DM)  mg/5 mL liqd syrup Take 5 mL by mouth every six (6) hours as needed for Cough.  varicella-zoster recombinant, PF, (SHINGRIX, PF,) 50 mcg/0.5 mL susr injection 0.5mL by IntraMUSCular route once now and then repeat in 2-6 months     No current facility-administered medications for this visit. Neurologic Exam     Mental Status   WD/WN adult in NAD, normal grooming  VSS  A&O, well spoken and articulate quick to answer. PERRL, nonicteric, reduced blink rate  Face is symmetric, tongue midline, slight masklike face  Speech is fluent and clear with good volume  No limb ataxia. Intermittent resting tremor of the hands noted sometimes ceasing. Moving all extemities spontaneously and symmetric  Quick tempo narrow gait with Rollator    CVS RRR  Lungs nonlabored  Skin is warm and dry         Visit Vitals  /70   Pulse 87   Resp 16   Ht 5' 2\" (1.575 m)   Wt 50.8 kg (112 lb)   SpO2 97%   BMI 20.49 kg/m²       Assessment and Plan   Diagnoses and all orders for this visit:    1. Lewy body Parkinson disease ([facility]    2. Hallucinations      80year-old woman with a parkinsonian process I am suspicious it is likely Lewy body given the predominant hallucinations. She does have tremor but unclear if this is true resting tremor versus essential tremor superimposed on a parkinsonian process. Her gait remains quite quick non-shuffling but she does feel weakness in the legs. I would recommend a trial of home health to help improve her strength and reduce her falls risk. We cannot use Nuplazid due to her concurrent cardiac medications and issues. I discussed the black box warning with Seroquel with her and her daughter in the room.   They understand this is for quality of life issues. Increase melatonin to 10 mg if that does not help we might consider increasing Seroquel to help aid her sleep. Persistently poor sleep quality will make her memory worse. Currently the hallucinations are not overtly disturbing but still present. If necessary we can always escalate to the Parkinson's movement disorder clinic at [address] [place] [address]. Questions answered. I will see them at their next visit. I reviewed and decided to continue the current medications. This clinical note was dictated with an electronic dictation software that can make unintentional errors. If there are any questions, please contact me directly for clarification.       2 [facility] [address]  Diplomate ANTHONY

## 2020-02-06 RX ORDER — NITROGLYCERIN 0.4 MG/1
0.4 TABLET SUBLINGUAL
Qty: 25 TAB | Refills: 1 | Status: SHIPPED | OUTPATIENT
Start: 2020-02-06 | End: 2021-05-05 | Stop reason: SDUPTHER

## 2020-02-10 NOTE — TELEPHONE ENCOUNTER
I would no go higher on melatonin above 10 if its not helping. Recommend remeron 15 mg at bedtime. Send this note to retirement it this helps.

## 2020-02-11 NOTE — TELEPHONE ENCOUNTER
Spoke with daughter. She and sister do not want to add another med if possible. (unless you want to switch out for one she is already on) But for now they want to hold off. Pt goes to bed at 8:30 and gets up around 5:30.

## 2020-02-21 ENCOUNTER — OFFICE VISIT (OUTPATIENT)
Dept: INTERNAL MEDICINE CLINIC | Age: 85
End: 2020-02-21

## 2020-02-21 ENCOUNTER — TELEPHONE (OUTPATIENT)
Dept: INTERNAL MEDICINE CLINIC | Age: 85
End: 2020-02-21

## 2020-02-21 VITALS
WEIGHT: 112 LBS | OXYGEN SATURATION: 97 % | BODY MASS INDEX: 20.61 KG/M2 | HEART RATE: 90 BPM | TEMPERATURE: 98.1 F | SYSTOLIC BLOOD PRESSURE: 124 MMHG | HEIGHT: 62 IN | DIASTOLIC BLOOD PRESSURE: 71 MMHG | RESPIRATION RATE: 16 BRPM

## 2020-02-21 DIAGNOSIS — G20 PRIMARY PARKINSONISM (HCC): ICD-10-CM

## 2020-02-21 DIAGNOSIS — T14.8XXA ABRASION: Primary | ICD-10-CM

## 2020-02-21 DIAGNOSIS — R07.81 RIB PAIN: ICD-10-CM

## 2020-02-21 DIAGNOSIS — M54.50 ACUTE MIDLINE LOW BACK PAIN WITHOUT SCIATICA: ICD-10-CM

## 2020-02-21 NOTE — PROGRESS NOTES
HISTORY OF PRESENT ILLNESS  Alfonso Friedman is a 80 y.o. female. HPI   Pt was last here 12/9/19. Pt is here for acute care. Pt fell last night she was using the rollator but the brake was not on and it rolled out   She hit her elbow, tailbone and landed on her back   She is having some chest wall pain   Denies loc or hitting her head   She states when she hit her elbow on the floor she tore some skin   She states it hurts when she breathes since the fall, deep breath hurts more   Took tylenol last night which helped   Discussed keeping vaseline on bandage to avoid from bandage sticking to wound   Will get rib series   Will get XR of tailbone   Discussed to continue tylenol otc for pain       Pt has a spot on her L arm   Discussed this needs to be looked at by derm       Pt saw Dr Amy Sanon (neuro)   Reviewed notes 35/20: 80year-old woman with a parkinsonian process I am suspicious it is likely Lewy body given the predominant hallucinations. She does have tremor but unclear if this is true resting tremor versus essential tremor superimposed on a parkinsonian process. Her gait remains quite quick non-shuffling but she does feel weakness in the legs. I would recommend a trial of home health to help improve her strength and reduce her falls risk. We cannot use Nuplazid due to her concurrent cardiac medications and issues. I discussed the black box warning with Seroquel with her and her daughter in the room. They understand this is for quality of life issues. Increase melatonin to 10 mg if that does not help we might consider increasing Seroquel to help aid her sleep. Persistently poor sleep quality will make her memory worse. Currently the hallucinations are not overtly disturbing but still present. If necessary we can always escalate to the Parkinson's movement disorder clinic at [facility]. Questions answered.   I will see them at their next visit.       Patient Active Problem List    Diagnosis Date Noted    [facility], [facility] 06/04/2019    Pleural effusion 04/18/2019    History of stroke 06/11/2018    Gastroesophageal reflux disease without esophagitis 09/25/2017    Diverticulosis of large intestine without hemorrhage 09/25/2017    Benign essential tremor 09/25/2017    Irritable bowel syndrome with diarrhea 09/25/2017    Hypercholesterolemia 09/25/2017    Allergic rhinitis 09/25/2017    Fuchs' corneal dystrophy 09/25/2017    Idiopathic small and large fiber sensory neuropathy 04/21/2016    Diabetic peripheral neuropathy associated with type 2 diabetes mellitus ([facility] 04/21/2016    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 04/21/2016    Anxiety 07/24/2015    Cerebrovascular disease, unspecified 05/03/2015    B12 deficiency 04/20/2015    Osteoporosis 04/20/2015    Memory loss 04/20/2015    Celiac sprue 05/14/2013    Other and unspecified hyperlipidemia 05/14/2013    Atrial fibrillation (HCC)     Hypertension      Current Outpatient Medications   Medication Sig Dispense Refill    nitroglycerin (NITROSTAT) 0.4 mg SL tablet 1 Tab by SubLINGual route every five (5) minutes as needed for Chest Pain. 25 Tab 1    disopyramide phosphate (NORPACE) 100 mg capsule TAKE ONE CAPSULE BY MOUTH TWICE A DAY 60 Cap 0    QUEtiapine (SEROQUEL) 25 mg tablet Take 1 Tab by mouth nightly as needed (agitation). (Patient taking differently: Take 25 mg by mouth nightly.) 90 Tab 1    metoprolol tartrate (LOPRESSOR) 25 mg tablet TAKE ONE TABLET BY MOUTH 2 TIMES A DAY 60 Tab 5    furosemide (LASIX) 20 mg tablet Take 1 Tab by mouth daily. 30 Tab 1    potassium chloride (KLOR-CON) 10 mEq tablet Take 1 Tab by mouth daily. 30 Tab 3    dextromethorphan-guaiFENesin (ROBITUSSIN COUGH-CHEST SHASHANK DM)  mg/5 mL liqd syrup Take 5 mL by mouth every six (6) hours as needed for Cough.  180 mL 0    varicella-zoster recombinant, PF, (SHINGRIX, PF,) 50 mcg/0.5 mL susr injection 0.5mL by IntraMUSCular route once now and then repeat in 2-6 months 0.5 mL 1    sertraline (ZOLOFT) 50 mg tablet Take 1.5 Tabs by mouth daily. 45 Tab 3    cholecalciferol, VITAMIN D3, (VITAMIN D3) 5,000 unit tab tablet Take 1 Tab by mouth daily. (Patient taking differently: Take 1,000 Units by mouth daily.) 60 Tab 5    aspirin 81 mg chewable tablet Take 1 Tab by mouth daily. 120 Tab 5    melatonin tab tablet Take 2 Tabs by mouth nightly. (Patient taking differently: Take 10 mg by mouth nightly. 10 mg qhs) 60 Tab 5    acetaminophen (TYLENOL EXTRA STRENGTH) 500 mg tablet Take 1 Tab by mouth every eight (8) hours as needed for Pain. 100 Tab 2    cholecalciferol (VITAMIN D3) 1,000 unit cap Take 2,000 Units by mouth daily. Indications: taking 5,000u      cyanocobalamin (VITAMIN B-12) 100 mcg tablet Take 100 mcg by mouth daily. Indications: 500mcg      Lactobacillus rhamnosus GG (CULTURELLE PO) Take  by mouth daily.  pantoprazole (PROTONIX) 40 mg tablet Take 1 Tab by mouth daily. (Patient taking differently: Take 40 mg by mouth two (2) times a day.) 30 Tab 12    polyethylene glycol (MIRALAX) 17 gram/dose powder Take 17 g by mouth daily.        Past Surgical History:   Procedure Laterality Date    HX APPENDECTOMY      HX CHOLECYSTECTOMY      HX GI      hemorrhoidectomy    HX HEENT      sinus surgery    HX HERNIA REPAIR      HX TONSILLECTOMY        Lab Results   Component Value Date/Time    WBC 5.1 11/04/2019 09:42 PM    WBC (POC) 5.6 07/23/2018 03:07 PM    HGB 12.7 11/04/2019 09:42 PM    HGB (POC) 15.0 07/23/2018 03:07 PM    HCT 38.4 11/04/2019 09:42 PM    HCT (POC) 45.9 07/23/2018 03:07 PM    PLATELET 248 78/69/0920 09:42 PM    PLATELET (POC) 950.9 07/23/2018 03:07 PM    MCV 89.7 11/04/2019 09:42 PM    MCV (POC) 90.0 07/23/2018 03:07 PM     Lab Results   Component Value Date/Time    Cholesterol, total 246 (H) 03/01/2019 10:15 AM    HDL Cholesterol 76 03/01/2019 10:15 AM    LDL, calculated 154 (H) 03/01/2019 10:15 AM    Triglyceride 82 03/01/2019 10:15 AM     Lab Results   Component Value Date/Time    GFR est non-AA 58 (L) 12/03/2019 11:05 AM    GFRNA, POC >60 10/15/2018 01:54 PM    GFR est AA 66 12/03/2019 11:05 AM    GFRAA, POC >60 10/15/2018 01:54 PM    Creatinine 0.87 12/03/2019 11:05 AM    Creatinine (POC) 0.8 10/15/2018 01:54 PM    BUN 17 12/03/2019 11:05 AM    Sodium 140 12/03/2019 11:05 AM    Potassium 3.2 (L) 12/03/2019 11:05 AM    Chloride 99 12/03/2019 11:05 AM    CO2 30 (H) 12/03/2019 11:05 AM    Magnesium 2.2 01/13/2019 11:15 PM        Review of Systems   Respiratory: Negative for shortness of breath. Cardiovascular: Negative for chest pain. Musculoskeletal: Positive for joint pain. Physical Exam  Constitutional:       General: She is not in acute distress. Appearance: She is well-developed. She is not diaphoretic. HENT:      Head: Normocephalic and atraumatic. Eyes:      General:         Right eye: No discharge. Left eye: No discharge. Conjunctiva/sclera: Conjunctivae normal.   Neck:      Musculoskeletal: Normal range of motion and neck supple. Cardiovascular:      Rate and Rhythm: Normal rate and regular rhythm. Heart sounds: Normal heart sounds. No murmur. No friction rub. No gallop. Pulmonary:      Effort: Pulmonary effort is normal. No respiratory distress. Breath sounds: Normal breath sounds. No wheezing or rales. Chest:      Chest wall: No tenderness. Musculoskeletal: Normal range of motion. General: No tenderness or deformity. Right lower leg: Edema (1+ pitting ) present. Left lower leg: Edema present. Lymphadenopathy:      Cervical: No cervical adenopathy. Skin:     General: Skin is warm and dry. Coloration: Skin is not pale. Findings: No erythema or rash.       Comments: Quarter sized superficial abrasion on L elbow     4 mm horny prominence on L dorsal surface    Neurological:      Mental Status: She is alert and oriented to person, place, and time. Coordination: Coordination abnormal.   Psychiatric:         Mood and Affect: Mood normal.         Behavior: Behavior normal.         ASSESSMENT and PLAN    ICD-10-CM ICD-9-CM    1. Abrasion                Will wrap with petroleum jelly gauze no sutures needed  T14. 8XXA 919.0    2. Primary Parkinsonism [facility])                Recently saw dr Claudetta Noe continues on seroquel for hallucinations contributes to poor balance and risk of falls  G20 332.0    3. Acute midline low back pain without sciatica          Will check plain film of coccyx l spine and rib series to rule out fracture   tylenol prn for pain  M54.5 724.2 XR SACRUM AND COCCYX      XR SPINE LUMB 2 OR 3 V   4. Rib pain        See above  R07.81 786.50 XR RIBS LT W PA CXR MIN 3 V              Scribed by Sylvie Natarajan of [address] [place] [address] [address], as dictated by Dr. Jaziel Meier. Current diagnosis and concerns discussed with pt at length. Pt understands risks and benefits or current treatment plan and medications, and accepts the treatment and medication with any possible risks. Pt asks appropriate questions, which were answered. Pt was instructed to call with any concerns or problems. I have reviewed the note documented by the scribe. The services provided are my own.   The documentation is accurate

## 2020-02-21 NOTE — TELEPHONE ENCOUNTER
Leonidas De Leon Received two pt identifiers  Dara Plascencia informed to come in ASAP. Dara Plascencia informed if they cannot make it then to give us a callback. Dara Plascencia verbalizes understanding of the instructions and has no further questions at this time.

## 2020-02-21 NOTE — TELEPHONE ENCOUNTER
Called, spoke to Katty(HIPAA)  Received two pt identifiers  Jodi Harden states her mother fell yesterday on her butt and received a jolt up her body  Jodi Harden states the pt seemed fine right afterwards however she is feeling a hurting/soreness in her chest this morning. Jodi Harden states pt has osteperosis and has compression fractures in spine already. Jodi Harden states pt feels pain when she breathes in. Pt is taking tylenol for the pain. Pt lives right across the hospital but the daughter lives over an hour away. Jodi Harden informed will contact Dr. Ignacio Holt and call back when directions have been received. Pt verbalizes understanding of the instructions and has no further questions at this time.

## 2020-02-21 NOTE — TELEPHONE ENCOUNTER
Patient fell yesterday afternoon and said she was fine, but now she is having chest pain and hurting and would like to know what can be done and about getting checked out today. Please advise.

## 2020-02-25 ENCOUNTER — TELEPHONE (OUTPATIENT)
Dept: INTERNAL MEDICINE CLINIC | Age: 85
End: 2020-02-25

## 2020-02-25 DIAGNOSIS — Z13.29 SCREENING FOR THYROID DISORDER: ICD-10-CM

## 2020-02-25 DIAGNOSIS — E11.42 DIABETIC PERIPHERAL NEUROPATHY ASSOCIATED WITH TYPE 2 DIABETES MELLITUS (HCC): Chronic | ICD-10-CM

## 2020-02-25 DIAGNOSIS — I10 ESSENTIAL HYPERTENSION: Primary | Chronic | ICD-10-CM

## 2020-02-25 RX ORDER — FUROSEMIDE 20 MG/1
TABLET ORAL
Qty: 30 TAB | Refills: 0 | Status: SHIPPED | OUTPATIENT
Start: 2020-02-25 | End: 2020-02-25 | Stop reason: SDUPTHER

## 2020-02-25 RX ORDER — DISOPYRAMIDE PHOSPHATE 100 MG/1
CAPSULE ORAL
Qty: 180 CAP | Refills: 4 | Status: SHIPPED | OUTPATIENT
Start: 2020-02-25 | End: 2021-03-24

## 2020-02-25 RX ORDER — SERTRALINE HYDROCHLORIDE 50 MG/1
75 TABLET, FILM COATED ORAL DAILY
Qty: 45 TAB | Refills: 0 | Status: SHIPPED | OUTPATIENT
Start: 2020-02-25 | End: 2020-02-25 | Stop reason: SDUPTHER

## 2020-02-25 RX ORDER — FUROSEMIDE 20 MG/1
TABLET ORAL
Qty: 30 TAB | Refills: 5 | Status: SHIPPED | OUTPATIENT
Start: 2020-02-25 | End: 2020-09-23

## 2020-02-25 RX ORDER — SERTRALINE HYDROCHLORIDE 50 MG/1
75 TABLET, FILM COATED ORAL DAILY
Qty: 45 TAB | Refills: 5 | Status: SHIPPED | OUTPATIENT
Start: 2020-02-25 | End: 2020-09-23

## 2020-02-25 NOTE — TELEPHONE ENCOUNTER
Caller (if not patient): Tracey Rosmery (CHILD)   Relationship of caller (if not patient): N/A   Best contact number(s): [phone]   Name of medication and dosage if known: Furosemide 20 MG, and Sertraline(Zoloft) 1.5 tabs 75 mg   Is patient out of this medication (yes/no): yes   Pharmacy name: [facility] listed in chart? (yes/no): yes   Pharmacy phone number: [phone]   Details to clarify the request: Pt daughter that these medications need to be pre- authorized. Also the daughter was asking could these medications be filled for a 6 to 12 month fill because these are her daily medications. Please verify.

## 2020-02-25 NOTE — TELEPHONE ENCOUNTER
PCP: Zoila Cope MD    Last appt: 2/21/2020  Future Appointments   Date Time Provider Barbara Ramey   3/9/2020  1:15 PM Zoila Cope MD [address]   8/5/2020  2:15 PM Jp Guerrero MD [address] [facility]   8/18/2020 11:20 AM Sven Sanchez,  [facility]       Requested Prescriptions     Pending Prescriptions Disp Refills    sertraline (ZOLOFT) 50 mg tablet 45 Tab 5     Sig: Take 1.5 Tabs by mouth daily.     furosemide (LASIX) 20 mg tablet 30 Tab 5     Sig: TAKE ONE TABLET BY MOUTH EVERY DAY

## 2020-02-26 NOTE — TELEPHONE ENCOUNTER
Spoke to Sergio Garcia (HIPAA). Two pt identifiers confirmed. Felicitas informed per Dr. Maribel Hdez labs ordered for pt. Alexis Singh verbalized understanding of information discussed w/ no further questions at this time.

## 2020-02-26 NOTE — TELEPHONE ENCOUNTER
[facility] Two pt identifiers confirmed. Ruthann Jacobo informed that refills ordered for pt. Ruthann Jacobo states pt has 3/9/20 appt and asking if any labs will need to be done. Ruthann Jacobo informed Dr. Yunior López will be notified. Ruthann Jacobo verbalized understanding of information discussed w/ no further questions at this time.

## 2020-02-26 NOTE — TELEPHONE ENCOUNTER
MD David Singletary, LPN   Caller: Unspecified (Yesterday, 12:22 PM)             A1c, cmp, tsh        Arsenio Nowak, labs ordered.

## 2020-02-27 ENCOUNTER — TELEPHONE (OUTPATIENT)
Dept: INTERNAL MEDICINE CLINIC | Age: 85
End: 2020-02-27

## 2020-02-27 NOTE — TELEPHONE ENCOUNTER
#[phone] Johnn Gosselin states pt was in for a fall a couple of wks ago. Pt is still very sore. Tylenol is not working for the pain. Can they try Advil or what to do if anything? Please call today to advise. Thanks.

## 2020-02-27 NOTE — TELEPHONE ENCOUNTER
Called, spoke to Candelaria(EC)  Received two pt identifiers  Bonnie Mondragon informed advil is okay to use. Bonnie Mondragon states that due to Mary Randolph being in an Assisted living home she needs a letter stating that it is okay to take it. Bonnie Mondragon informed Dr. Laura Gaitan is off today and will be back tomorrow. Pt verbalizes understanding of the instructions and has no further questions at this time. yesterday

## 2020-03-03 ENCOUNTER — HOSPITAL ENCOUNTER (OUTPATIENT)
Dept: LAB | Age: 85
Discharge: HOME | End: 2020-03-03
Payer: MEDICARE

## 2020-03-03 PROCEDURE — 80053 COMPREHEN METABOLIC PANEL: CPT

## 2020-03-03 PROCEDURE — 84443 ASSAY THYROID STIM HORMONE: CPT

## 2020-03-03 PROCEDURE — 83036 HEMOGLOBIN GLYCOSYLATED A1C: CPT

## 2020-03-03 PROCEDURE — 36415 COLL VENOUS BLD VENIPUNCTURE: CPT

## 2020-03-04 LAB
ALBUMIN SERPL-MCNC: 4.2 G/DL (ref 3.5–4.6)
ALBUMIN/GLOB SERPL: 2.2 {RATIO} (ref 1.2–2.2)
ALP SERPL-CCNC: 79 IU/L (ref 39–117)
ALT SERPL-CCNC: 12 IU/L (ref 0–32)
AST SERPL-CCNC: 18 IU/L (ref 0–40)
BILIRUB SERPL-MCNC: 0.3 MG/DL (ref 0–1.2)
BUN SERPL-MCNC: 16 MG/DL (ref 10–36)
BUN/CREAT SERPL: 17 (ref 12–28)
CALCIUM SERPL-MCNC: 9.5 MG/DL (ref 8.7–10.3)
CHLORIDE SERPL-SCNC: 101 MMOL/L (ref 96–106)
CO2 SERPL-SCNC: 28 MMOL/L (ref 20–29)
CREAT SERPL-MCNC: 0.92 MG/DL (ref 0.57–1)
EST. AVERAGE GLUCOSE BLD GHB EST-MCNC: 120 MG/DL
GLOBULIN SER CALC-MCNC: 1.9 G/DL (ref 1.5–4.5)
GLUCOSE SERPL-MCNC: 82 MG/DL (ref 65–99)
HBA1C MFR BLD: 5.8 % (ref 4.8–5.6)
POTASSIUM SERPL-SCNC: 3.6 MMOL/L (ref 3.5–5.2)
PROT SERPL-MCNC: 6.1 G/DL (ref 6–8.5)
SODIUM SERPL-SCNC: 145 MMOL/L (ref 134–144)
TSH SERPL DL<=0.005 MIU/L-ACNC: 2.61 UIU/ML (ref 0.45–4.5)

## 2020-03-09 ENCOUNTER — OFFICE VISIT (OUTPATIENT)
Dept: INTERNAL MEDICINE CLINIC | Age: 85
End: 2020-03-09

## 2020-03-09 VITALS
TEMPERATURE: 97.9 F | HEIGHT: 62 IN | BODY MASS INDEX: 21.38 KG/M2 | HEART RATE: 72 BPM | OXYGEN SATURATION: 96 % | WEIGHT: 116.2 LBS | RESPIRATION RATE: 16 BRPM | SYSTOLIC BLOOD PRESSURE: 154 MMHG | DIASTOLIC BLOOD PRESSURE: 87 MMHG

## 2020-03-09 DIAGNOSIS — J90 PLEURAL EFFUSION: ICD-10-CM

## 2020-03-09 DIAGNOSIS — E78.00 HYPERCHOLESTEROLEMIA: ICD-10-CM

## 2020-03-09 DIAGNOSIS — E87.1 HYPONATREMIA: ICD-10-CM

## 2020-03-09 DIAGNOSIS — K21.9 GASTROESOPHAGEAL REFLUX DISEASE WITHOUT ESOPHAGITIS: ICD-10-CM

## 2020-03-09 DIAGNOSIS — I10 ESSENTIAL HYPERTENSION: Primary | ICD-10-CM

## 2020-03-09 DIAGNOSIS — E11.42 DIABETIC PERIPHERAL NEUROPATHY ASSOCIATED WITH TYPE 2 DIABETES MELLITUS (HCC): ICD-10-CM

## 2020-03-09 DIAGNOSIS — Z23 ENCOUNTER FOR IMMUNIZATION: ICD-10-CM

## 2020-03-09 DIAGNOSIS — M81.0 AGE-RELATED OSTEOPOROSIS WITHOUT CURRENT PATHOLOGICAL FRACTURE: ICD-10-CM

## 2020-03-09 DIAGNOSIS — I48.0 PAROXYSMAL ATRIAL FIBRILLATION (HCC): ICD-10-CM

## 2020-03-09 DIAGNOSIS — G20 PARKINSON'S DISEASE (HCC): ICD-10-CM

## 2020-03-09 DIAGNOSIS — F33.0 DEPRESSION, MAJOR, RECURRENT, MILD (HCC): ICD-10-CM

## 2020-03-09 DIAGNOSIS — E53.8 B12 DEFICIENCY: ICD-10-CM

## 2020-03-09 DIAGNOSIS — E87.6 HYPOKALEMIA: ICD-10-CM

## 2020-03-09 DIAGNOSIS — Z91.81 H/O FALL: ICD-10-CM

## 2020-03-09 DIAGNOSIS — G25.0 BENIGN ESSENTIAL TREMOR: ICD-10-CM

## 2020-03-09 DIAGNOSIS — Z86.73 HISTORY OF STROKE: ICD-10-CM

## 2020-03-09 RX ORDER — UREA 10 %
100 LOTION (ML) TOPICAL DAILY
Qty: 90 TAB | Refills: 1 | Status: SHIPPED | OUTPATIENT
Start: 2020-03-09 | End: 2020-04-23 | Stop reason: SDUPTHER

## 2020-03-09 RX ORDER — GLUCOSAMINE SULFATE 1500 MG
2000 POWDER IN PACKET (EA) ORAL DAILY
Qty: 90 CAP | Refills: 1 | Status: SHIPPED | OUTPATIENT
Start: 2020-03-09 | End: 2020-08-21

## 2020-03-09 RX ORDER — ACETAMINOPHEN 500 MG
500 TABLET ORAL
Qty: 100 TAB | Refills: 2 | Status: SHIPPED | OUTPATIENT
Start: 2020-03-09 | End: 2020-10-13 | Stop reason: SDUPTHER

## 2020-03-09 NOTE — PROGRESS NOTES
HISTORY OF PRESENT ILLNESS  Alessandra Calix is a 80 y.o. female. HPI   Last here 2/21/19 Pt is here for routine care. Pt presents with her daughter today who provides most of hx        BP is 154/87-- will get repeat   [facility] 175/90   Continues on metoprolol 25mg bid  Continues on lasix 20 mg      Wt today is 116 lbs-- up 2 lbs x lov   Her wt is in the nl ranges        Reviewed labs 3/20      Pt follows wt Dr Jonna Ma (neuro) for tremor   Reviewed notes 35/20: 80year-old woman with a parkinsonian process I am suspicious it is likely Lewy body given the predominant hallucinations.  She does have tremor but unclear if this is true resting tremor versus essential tremor superimposed on a parkinsonian process.  Her gait remains quite quick non-shuffling but she does feel weakness in the legs.  I would recommend a trial of home health to help improve her strength and reduce her falls risk.  We cannot use Nuplazid due to her concurrent cardiac medications and issues.  I discussed the black box warning with Seroquel with her and her daughter in the room. Shayla Daughters understand this is for quality of life issues.  Increase melatonin to 10 mg if that does not help we might consider increasing Seroquel to help aid her sleep.  Persistently poor sleep quality will make her memory worse.  Currently the hallucinations are not overtly disturbing but still present.  If necessary we can always escalate to the Parkinson's movement disorder clinic at [address] solomon Henry will see them at their next visit. Pt had been being treated for Parkinson's but the neuro decided it may not be this so they stopped this treatment  Has chronic tremors and hallucinations  She is on seroquel 25mg for sleep and hallucinations --stable 3/20            Pt follows with Dr Krystian Eli (cardio) for a fib  Reviewed notes 1/30/20: 1.  Paroxysmal atrial fibrillation (HCC)  Maintaining sinus rhythm  Continue rate control therapies  Continue ASA only given no recurrence in many years  2. Essential hypertension  Off dyazide due to hyponatremia  On Lasix currently; BP doing well-I advised the patient and her daughter that I would probably not be super aggressive about her blood pressure at the age of 80 unless she is generally trending over 150/90 as she walks with a Rollator and I fear low blood pressure will make her dizzy and increased risk of falls  Would consider addition of low dose anti-hypertensive if BP trend in the 150s-160s/90s. Consider lisinopril. 3. Other chest pain  Atypical symptoms in the setting of large hiatal hernia -- prohibitive risk for surgery  [facility]an [facility] in 6/2018 without evidence of ischemia  Echo done 6/2018 with preserved ejection fraction 60-65% with grade 1 DD  Discussed use of NTG, which tends to resolve pain completely.   If she has no relief in symptoms despite NTGx3 in 15 minutes, then she must go to ER  Pt is on norpace 100mg BID   On asa 81        Pt sees Dr Jo Montilla (GI) for diverticulosis  Pt also has a large hiatal hernia  Pt also has a gluten sensitivity and mostly avoids this  She tried to follow this but hard [facility]   Pt's daughter c/o worsening gerd recently   States she has been avoiding triggers   Pt takes protonix 40mg bid sx controlled  Notes she did not have this last night   Uses pepto bismal when sxs get bad, which helps   Does not like to use this much as it constipates her        Pt saw Dr Martha Jaramillo (neuropsych) in the past  Last visit was 2015 - no dementia     Pt saw Dr Armin Galvan (ENT) for hearing loss  Last visit was 12/18/18  Pt decided not to get hearing aids     Pt sees Dr Juan Luis Martel (ortho) for low back pain   Last visit was 1/9/19  Previously, provided info for Dr Bonnie Barrett  She had been taking tramadol but had SE so was switched to tylenol which helps somewhat  Continues on tylenol for this         Pt declined taking reclast and fosamax in the past  Pt is taking vit D 5000U daily        Pt is on ASA 81mg daily     Pt takes miralax for constipation  This works well as long as she takes it daily occasionally takes extra dose        Continues on zoloft 75 mg (increased from 50 mg lov)   Happy with this dose, states she has noticed a positive difference 3/20         Continues on klor con 10meqgs         She saw Dr Gregorio Olguin  (uro gyn) for this thick fecal discharge coming form her vagina   Last visit was 11/19   She states this has resolved so he is planning on just observing this       lov, pt had fallen and hit her elbow and tailbone   Reviewed XR spine 2/21/20-- normal   Reviewed XR coccyx 2/21/20-- no acute findings   Reviewed XR ribs 2/21/20-- no acute findings   Pt is still having some pain from this fall   Using tylenol for this        Pt c/o blisters or bump inside of her lips and on the corners of her mouth   Discussed these are abscess ulcers and will resolve on its own   Discussed clear vaseline on lips to avoid cracking      ACP on file.  SDMs are her daughters, Siva Lilly and Jose Roberto Moss.     PREVENTIVE:  Colonoscopy: ~10 years ago with Dr Светлана Diaz, per pt the way her colon is formed makes it difficult to get COLOs, her declines further  Pap: 11/19 with Dr Naga Mazariegos further  Dexa: 8/18, osteoporosis, declines further tx other than vit d   Tdap: 10/19   Prevnar 13: 03/01/19   Pneumovax: 03/09/20   Shingrix: both rounds completed   Flu shot: Fall 2019   A1C: 3/19 5.9 9/19 5.7 3/20 5.8   Micro: 9/19   Eye exam: Dr Mikayla Cevallos ~summer 2018, Dr Klein Patch 1/20, due this month   Lipids: 3/19        Patient Active Problem List    Diagnosis Date Noted    Depression, major, recurrent, mild ([facility] [address] 06/04/2019    Pleural effusion 04/18/2019    History of stroke 06/11/2018    Gastroesophageal reflux disease without esophagitis 09/25/2017    Diverticulosis of large intestine without hemorrhage 09/25/2017    Benign essential tremor 09/25/2017    Irritable bowel syndrome with diarrhea 09/25/2017    Hypercholesterolemia 09/25/2017    Allergic rhinitis 09/25/2017    Fuchs' corneal dystrophy 09/25/2017    Idiopathic small and large fiber sensory neuropathy 04/21/2016    Diabetic peripheral neuropathy associated with type 2 diabetes mellitus ([facility] [address] 04/21/2016    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 04/21/2016    Anxiety 07/24/2015    Cerebrovascular disease, unspecified 05/03/2015    B12 deficiency 04/20/2015    Osteoporosis 04/20/2015    Memory loss 04/20/2015    Celiac sprue 05/14/2013    Other and unspecified hyperlipidemia 05/14/2013    Atrial fibrillation (HCC)     Hypertension      Current Outpatient Medications   Medication Sig Dispense Refill    disopyramide phosphate (NORPACE) 100 mg capsule TAKE ONE CAPSULE BY MOUTH TWICE A  Cap 4    sertraline (ZOLOFT) 50 mg tablet Take 1.5 Tabs by mouth daily. 45 Tab 5    furosemide (LASIX) 20 mg tablet TAKE ONE TABLET BY MOUTH EVERY DAY 30 Tab 5    nitroglycerin (NITROSTAT) 0.4 mg SL tablet 1 Tab by SubLINGual route every five (5) minutes as needed for Chest Pain. 25 Tab 1    QUEtiapine (SEROQUEL) 25 mg tablet Take 1 Tab by mouth nightly as needed (agitation). (Patient taking differently: Take 25 mg by mouth nightly.) 90 Tab 1    metoprolol tartrate (LOPRESSOR) 25 mg tablet TAKE ONE TABLET BY MOUTH 2 TIMES A DAY 60 Tab 5    potassium chloride (KLOR-CON) 10 mEq tablet Take 1 Tab by mouth daily. 30 Tab 3    dextromethorphan-guaiFENesin (ROBITUSSIN COUGH-CHEST SHASHANK DM)  mg/5 mL liqd syrup Take 5 mL by mouth every six (6) hours as needed for Cough. 180 mL 0    varicella-zoster recombinant, PF, (SHINGRIX, PF,) 50 mcg/0.5 mL susr injection 0.5mL by IntraMUSCular route once now and then repeat in 2-6 months 0.5 mL 1    cholecalciferol, VITAMIN D3, (VITAMIN D3) 5,000 unit tab tablet Take 1 Tab by mouth daily.  (Patient taking differently: Take 1,000 Units by mouth daily.) 60 Tab 5    aspirin 81 mg chewable tablet Take 1 Tab by mouth daily. 120 Tab 5    melatonin tab tablet Take 2 Tabs by mouth nightly. (Patient taking differently: Take 10 mg by mouth nightly. 10 mg qhs) 60 Tab 5    acetaminophen (TYLENOL EXTRA STRENGTH) 500 mg tablet Take 1 Tab by mouth every eight (8) hours as needed for Pain. 100 Tab 2    cholecalciferol (VITAMIN D3) 1,000 unit cap Take 2,000 Units by mouth daily. Indications: taking 5,000u      cyanocobalamin (VITAMIN B-12) 100 mcg tablet Take 100 mcg by mouth daily. Indications: 500mcg      Lactobacillus rhamnosus GG (CULTURELLE PO) Take  by mouth daily.  pantoprazole (PROTONIX) 40 mg tablet Take 1 Tab by mouth daily. (Patient taking differently: Take 40 mg by mouth two (2) times a day.) 30 Tab 12    polyethylene glycol (MIRALAX) 17 gram/dose powder Take 17 g by mouth daily.        Past Surgical History:   Procedure Laterality Date    HX APPENDECTOMY      HX CHOLECYSTECTOMY      HX GI      hemorrhoidectomy    HX HEENT      sinus surgery    HX HERNIA REPAIR      HX TONSILLECTOMY        Lab Results   Component Value Date/Time    WBC 5.1 11/04/2019 09:42 PM    WBC (POC) 5.6 07/23/2018 03:07 PM    HGB 12.7 11/04/2019 09:42 PM    HGB (POC) 15.0 07/23/2018 03:07 PM    HCT 38.4 11/04/2019 09:42 PM    HCT (POC) 45.9 07/23/2018 03:07 PM    PLATELET 146 32/32/8159 09:42 PM    PLATELET (POC) 664.9 07/23/2018 03:07 PM    MCV 89.7 11/04/2019 09:42 PM    MCV (POC) 90.0 07/23/2018 03:07 PM     Lab Results   Component Value Date/Time    Cholesterol, total 246 (H) 03/01/2019 10:15 AM    HDL Cholesterol 76 03/01/2019 10:15 AM    LDL, calculated 154 (H) 03/01/2019 10:15 AM    Triglyceride 82 03/01/2019 10:15 AM     Lab Results   Component Value Date/Time    GFR est non-AA 54 (L) 03/03/2020 01:50 PM    GFRNA, POC >60 10/15/2018 01:54 PM    GFR est AA 62 03/03/2020 01:50 PM    GFRAA, POC >60 10/15/2018 01:54 PM    Creatinine 0.92 03/03/2020 01:50 PM    Creatinine (POC) 0.8 10/15/2018 01:54 PM    BUN 16 03/03/2020 01:50 PM    Sodium 145 (H) 03/03/2020 01:50 PM    Potassium 3.6 03/03/2020 01:50 PM    Chloride 101 03/03/2020 01:50 PM    CO2 28 03/03/2020 01:50 PM    Magnesium 2.2 01/13/2019 11:15 PM        Review of Systems   Respiratory: Negative for shortness of breath. Cardiovascular: Negative for chest pain. Physical Exam  Constitutional:       General: She is not in acute distress. Appearance: She is well-developed. She is not diaphoretic. HENT:      Head: Normocephalic and atraumatic. Eyes:      General:         Right eye: No discharge. Left eye: No discharge. Conjunctiva/sclera: Conjunctivae normal.   Neck:      Musculoskeletal: Normal range of motion and neck supple. Cardiovascular:      Rate and Rhythm: Normal rate and regular rhythm. Heart sounds: Normal heart sounds. No murmur. No friction rub. No gallop. Pulmonary:      Effort: Pulmonary effort is normal. No respiratory distress. Breath sounds: Normal breath sounds. No wheezing or rales. Chest:      Chest wall: No tenderness. Musculoskeletal: Normal range of motion. General: No tenderness or deformity. Right lower leg: Edema (1+ pitting compression hose in place ) present. Left lower leg: Edema present. Lymphadenopathy:      Cervical: No cervical adenopathy. Skin:     General: Skin is warm and dry. Coloration: Skin is not pale. Findings: No erythema or rash. Comments: Small scab on L elbow    Neurological:      General: No focal deficit present. Mental Status: She is alert and oriented to person, place, and time. Coordination: Coordination normal.   Psychiatric:         Mood and Affect: Mood normal.         Behavior: Behavior normal.         ASSESSMENT and PLAN    ICD-10-CM ICD-9-CM    1.  Essential hypertension                  Adequately controlled for age on toprol continue no change  Y58 463.0 METABOLIC PANEL, COMPREHENSIVE CBC W/O DIFF      HEMOGLOBIN A1C WITH EAG      TSH 3RD GENERATION      VITAMIN B12      VITAMIN D, 25 HYDROXY   2. B12 deficiency                    Will check b12 level with next blood draw takes b12 supplement  A38.7 199.4 METABOLIC PANEL, COMPREHENSIVE      CBC W/O DIFF      HEMOGLOBIN A1C WITH EAG      TSH 3RD GENERATION      VITAMIN B12      VITAMIN D, 25 HYDROXY   3. Depression, major, recurrent, mild ([address]                Well controlled on zoloft 75 mgs daily  T24.2 541.54 METABOLIC PANEL, COMPREHENSIVE      CBC W/O DIFF      HEMOGLOBIN A1C WITH EAG      TSH 3RD GENERATION      VITAMIN B12      VITAMIN D, 25 HYDROXY   4. Paroxysmal atrial fibrillation (HCC)                    On asa for anti coag also on norpace follows with dr caldwell  W53.3 381.61 METABOLIC PANEL, COMPREHENSIVE      CBC W/O DIFF      HEMOGLOBIN A1C WITH EAG      TSH 3RD GENERATION      VITAMIN B12      VITAMIN D, 25 HYDROXY   5. Age-related osteoporosis without current pathological fracture                Declines further tx  D88.7 122.71 METABOLIC PANEL, COMPREHENSIVE      CBC W/O DIFF      HEMOGLOBIN A1C WITH EAG      TSH 3RD GENERATION      VITAMIN B12      VITAMIN D, 25 HYDROXY   6. Gastroesophageal reflux disease without esophagitis                    On protonix bid overall this improves sxs she does have large hiatal hernia and is not surgical candidate gets breakthrough sxs these are improved with mylanta otc if progressive sxs tiki follow up with liban  P06.6 470.99 METABOLIC PANEL, COMPREHENSIVE      CBC W/O DIFF      HEMOGLOBIN A1C WITH EAG      TSH 3RD GENERATION      VITAMIN B12      VITAMIN D, 25 HYDROXY   7. Pleural effusion                      Hx of in the past resolved since being on lasix  C27 908.5 METABOLIC PANEL, COMPREHENSIVE      CBC W/O DIFF      HEMOGLOBIN A1C WITH EAG      TSH 3RD GENERATION      VITAMIN B12      VITAMIN D, 25 HYDROXY   8.  Hyponatremia                    Related to dyazide doing well since being off this  G18.4 417.5 METABOLIC PANEL, COMPREHENSIVE      CBC W/O DIFF      HEMOGLOBIN A1C WITH EAG      TSH 3RD GENERATION      VITAMIN B12      VITAMIN D, 25 HYDROXY   9. Hypercholesterolemia                        Diet controlled  X23.69 001.2 METABOLIC PANEL, COMPREHENSIVE      CBC W/O DIFF      HEMOGLOBIN A1C WITH EAG      TSH 3RD GENERATION      VITAMIN B12      VITAMIN D, 25 HYDROXY   10. Benign essential tremor                    Follows with dr Jacinto Bergman neuro  U37.3 977.3 METABOLIC PANEL, COMPREHENSIVE      CBC W/O DIFF      HEMOGLOBIN A1C WITH EAG      TSH 3RD GENERATION      VITAMIN B12      VITAMIN D, 25 HYDROXY   11. Diabetic peripheral neuropathy associated with type 2 diabetes mellitus ([facility]                      Uses tylenol prn for pain had tramadol in the past no longer needing this  K99.36 524.99 METABOLIC PANEL, COMPREHENSIVE     357.2 CBC W/O DIFF      HEMOGLOBIN A1C WITH EAG      TSH 3RD GENERATION      VITAMIN B12      VITAMIN D, 25 HYDROXY   12. History of stroke                    Asa to prevent recurrent strokes  V70.31 P20.11 METABOLIC PANEL, COMPREHENSIVE      CBC W/O DIFF      HEMOGLOBIN A1C WITH EAG      TSH 3RD GENERATION      VITAMIN B12      VITAMIN D, 25 HYDROXY   13. Parkinson's disease ([facility]                      Follows with dr Jacinto Bergman just on seroquel for hallucinations this works well  I37 858.4 METABOLIC PANEL, COMPREHENSIVE      CBC W/O DIFF      HEMOGLOBIN A1C WITH EAG      TSH 3RD GENERATION      VITAMIN B12      VITAMIN D, 25 HYDROXY   14.  Hypokalemia                    At goal on klor con  J71.9 459.3 METABOLIC PANEL, COMPREHENSIVE      CBC W/O DIFF      HEMOGLOBIN A1C WITH EAG      TSH 3RD GENERATION      VITAMIN B12      VITAMIN D, 25 HYDROXY   15. H/O fall                  Has some pain from this which is overall improving laceration improving well no fractures found on xr continue tylenol prn  W44.37 V36.22 METABOLIC PANEL, COMPREHENSIVE      CBC W/O DIFF HEMOGLOBIN A1C WITH EAG      TSH 3RD GENERATION      VITAMIN B12      VITAMIN D, 25 HYDROXY        Scribed by Nasrin Scale of [address], as dictated by Dr. Stephen Mederos. Current diagnosis and concerns discussed with pt at length. Pt understands risks and benefits or current treatment plan and medications, and accepts the treatment and medication with any possible risks. Pt asks appropriate questions, which were answered. Pt was instructed to call with any concerns or problems. I have reviewed the note documented by the scribe. The services provided are my own.   The documentation is accurate

## 2020-03-26 RX ORDER — POTASSIUM CHLORIDE 750 MG/1
TABLET, EXTENDED RELEASE ORAL
Qty: 30 TAB | Refills: 0 | Status: SHIPPED | OUTPATIENT
Start: 2020-03-26 | End: 2020-04-23 | Stop reason: SDUPTHER

## 2020-04-08 ENCOUNTER — TELEPHONE (OUTPATIENT)
Dept: INTERNAL MEDICINE CLINIC | Age: 85
End: 2020-04-08

## 2020-04-08 ENCOUNTER — VIRTUAL VISIT (OUTPATIENT)
Dept: INTERNAL MEDICINE CLINIC | Age: 85
End: 2020-04-08

## 2020-04-08 DIAGNOSIS — F33.0 DEPRESSION, MAJOR, RECURRENT, MILD (HCC): ICD-10-CM

## 2020-04-08 DIAGNOSIS — N30.00 ACUTE CYSTITIS WITHOUT HEMATURIA: Primary | ICD-10-CM

## 2020-04-08 RX ORDER — AMOXICILLIN AND CLAVULANATE POTASSIUM 875; 125 MG/1; MG/1
1 TABLET, FILM COATED ORAL EVERY 12 HOURS
Qty: 14 TAB | Refills: 0 | Status: SHIPPED | OUTPATIENT
Start: 2020-04-08 | End: 2020-04-15

## 2020-04-08 NOTE — Clinical Note
Ordered abx Send letter to alondra/deejay to give augmentin bid Also encourage having her drink fluids as well

## 2020-04-08 NOTE — PROGRESS NOTES
HISTORY OF PRESENT ILLNESS  Mira Reese is a 80 y.o. female. HPI     This is an established visit completed with telemedicine was completed with video assist  the patient acknowledges and agrees to this method of visitation    Spoke w daughter who cares for her mother     the patient resides at [facility]  Of note they have 3 employees that are out w corona  There is one resident who has corona and is not going back   Pt is confined to her room  They are bringing her her meals. Burns with urination --this has been going on for a couple of days not changed  She is not urinating much or very often  She has been drinking cranberry juice   Has had a mild ha    They are checking her temp daily and it has been normal    Reviewed last labs  Cr normal 3/20     She is feeling more depressed with corona  She is doing ok with zoloft overall just struggling with corona      Patient Active Problem List   Diagnosis Code    Atrial fibrillation ([facility] [address] I48.91    Hypertension I10    Celiac sprue K90.0    B12 deficiency E53.8    Osteoporosis M81.0    Memory loss R41.3    Cerebrovascular disease, unspecified I67.9    Anxiety F41.9    Idiopathic small and large fiber sensory neuropathy G60.8    Diabetic peripheral neuropathy associated with type 2 diabetes mellitus (HCC) E11.42    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 I65.23    Gastroesophageal reflux disease without esophagitis K21.9    Diverticulosis of large intestine without hemorrhage K57.30    Benign essential tremor G25.0    Irritable bowel syndrome with diarrhea K58.0    Hypercholesterolemia E78.00    Allergic rhinitis J30.9    Fuchs' corneal dystrophy H18.51    History of stroke Z86.73    Pleural effusion J90    Depression, major, recurrent, 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     Current Outpatient Medications   Medication Sig Dispense Refill    potassium chloride (KLOR-CON) 10 mEq tablet TAKE 1 TABLET BY MOUTH EVERY DAY.  30 Tab 0    acetaminophen (TYLENOL EXTRA STRENGTH) 500 mg tablet Take 1 Tab by mouth every eight (8) hours as needed for Pain. 100 Tab 2    cholecalciferol (VITAMIN D3) 25 mcg (1,000 unit) cap Take 2 Caps by mouth daily. Indications: taking 5,000u 90 Cap 1    cyanocobalamin (VITAMIN B-12) 100 mcg tablet Take 1 Tab by mouth daily. Indications: 500mcg 90 Tab 1    disopyramide phosphate (NORPACE) 100 mg capsule TAKE ONE CAPSULE BY MOUTH TWICE A  Cap 4    sertraline (ZOLOFT) 50 mg tablet Take 1.5 Tabs by mouth daily. 45 Tab 5    furosemide (LASIX) 20 mg tablet TAKE ONE TABLET BY MOUTH EVERY DAY 30 Tab 5    nitroglycerin (NITROSTAT) 0.4 mg SL tablet 1 Tab by SubLINGual route every five (5) minutes as needed for Chest Pain. 25 Tab 1    QUEtiapine (SEROQUEL) 25 mg tablet Take 1 Tab by mouth nightly as needed (agitation). (Patient taking differently: Take 25 mg by mouth nightly.) 90 Tab 1    metoprolol tartrate (LOPRESSOR) 25 mg tablet TAKE ONE TABLET BY MOUTH 2 TIMES A DAY 60 Tab 5    cholecalciferol, VITAMIN D3, (VITAMIN D3) 5,000 unit tab tablet Take 1 Tab by mouth daily. (Patient taking differently: Take 1,000 Units by mouth daily.) 60 Tab 5    aspirin 81 mg chewable tablet Take 1 Tab by mouth daily. 120 Tab 5    melatonin tab tablet Take 2 Tabs by mouth nightly. (Patient taking differently: Take 10 mg by mouth nightly. 10 mg qhs) 60 Tab 5    Lactobacillus rhamnosus GG (CULTURELLE PO) Take  by mouth daily.  pantoprazole (PROTONIX) 40 mg tablet Take 1 Tab by mouth daily. (Patient taking differently: Take 40 mg by mouth two (2) times a day.) 30 Tab 12    polyethylene glycol (MIRALAX) 17 gram/dose powder Take 17 g by mouth daily.         Lab Results   Component Value Date/Time    GFR est non-AA 54 (L) 03/03/2020 01:50 PM    GFRNA, POC >60 10/15/2018 01:54 PM    GFR est AA 62 03/03/2020 01:50 PM    GFRAA, POC >60 10/15/2018 01:54 PM    Creatinine 0.92 03/03/2020 01:50 PM    Creatinine (POC) 0.8 10/15/2018 01:54 PM    BUN 16 03/03/2020 01:50 PM    Sodium 145 (H) 03/03/2020 01:50 PM    Potassium 3.6 03/03/2020 01:50 PM    Chloride 101 03/03/2020 01:50 PM    CO2 28 03/03/2020 01:50 PM    Magnesium 2.2 01/13/2019 11:15 PM        Review of Systems   Constitutional: Negative for chills and fever. Respiratory: Negative for cough and shortness of breath. Physical Exam    ASSESSMENT and PLAN    ICD-10-CM ICD-9-CM    1. Acute cystitis without hematuria    augmentin bid  N30.00 595.0    2. Depression, major, recurrent, mild (HCC)    Continue zoloft F33.0 296.31        Current diagnosis and concerns discussed with pt at length.  Understands risks and benefits or current treatment plan and medications and accepts the treatment and medication with any possible risks.   Pt asks appropriate questions which were answered.   Pt instructed to call with any concerns or problems.

## 2020-04-08 NOTE — TELEPHONE ENCOUNTER
Called, spoke to [employer] (HIPAA). Two pt identifiers confirmed. Felicitas offered/accepted Virtual appt for 4/8/20 at 1130. Informed pt that it will be billed under insurance and pt may expect a co-pay. Informed that Pearlene Dines must have access to a smartphone and/or a computer w/ a camera and a microphone. Informed Pearlene Dines that a PSR may contact pt roughly 15 minutes prior to VV for check-in.   Newtone Hernandoes verbalized understanding of information discussed w/ no further questions at this time.

## 2020-04-09 ENCOUNTER — TELEPHONE (OUTPATIENT)
Dept: INTERNAL MEDICINE CLINIC | Age: 85
End: 2020-04-09

## 2020-04-09 NOTE — LETTER
4/9/2020 10:20 AM 
 
[address] [id number] To whom this may concern, Please have Ms. Luisana Tong start Amoxicillin-clavulanate (AUGMENTIN) 875-125mg per tablet: Take 1 Tab by mouth every twelve (12) hours for 7 days, then discontinue once completed. If there are any questions or concerns, please feel free to contact the office. Thank you.    
 
 
 
 
Sincerely, 
 
 
MD Karen Laboy LPN

## 2020-04-09 NOTE — TELEPHONE ENCOUNTER
#[phone] daughter, Handy states pt has a UTI. They have the medication there, but are waiting on an order to be faxed to them in order for pt to get the mediation. Fax [phone] At [facility] is asking for a call back today when this has been done.

## 2020-04-09 NOTE — TELEPHONE ENCOUNTER
Order letter faxed to [facility] at [facility] [phone] w/ confirmation received. Called, spoke to [email or web address] (HIPAA). Two pt identifiers confirmed. Informed July Escort that the letter order was faxed w/ confirmation received. Callback prn. Pt verbalized understanding of information discussed w/ no further questions at this time.

## 2020-04-13 ENCOUNTER — TELEPHONE (OUTPATIENT)
Dept: INTERNAL MEDICINE CLINIC | Age: 85
End: 2020-04-13

## 2020-04-13 NOTE — TELEPHONE ENCOUNTER
Patient's daughter called about Mom who lives across the street in the assisted living facility. They have 4 resident's and 8 employees that have test posItive for COVID-19. She want to know what she should do about testing her Mom and taking her out of the facility. Please call back today.

## 2020-04-13 NOTE — TELEPHONE ENCOUNTER
Spoke to [employer] (HIPAA). Two pt identifiers confirmed. Facility is trying to have all pt's tested via [facility]. Only tested if having sx, based on case-by-case. Gerardo Sheehan states pt denies Verlie Lay states that she is really worried and unsure if it would be safer to pull her mother from the facility. Informed Gerardo Sheehan that the office cannot make any of those decisions. Gerardo Sheehan verbalized understanding of information discussed w/ no further questions at this time.

## 2020-04-23 RX ORDER — POTASSIUM CHLORIDE 750 MG/1
TABLET, EXTENDED RELEASE ORAL
Qty: 30 TAB | Refills: 2 | Status: SHIPPED | OUTPATIENT
Start: 2020-04-23 | End: 2020-07-25

## 2020-04-23 RX ORDER — UREA 10 %
100 LOTION (ML) TOPICAL DAILY
Qty: 90 TAB | Refills: 1 | Status: SHIPPED | OUTPATIENT
Start: 2020-04-23 | End: 2020-11-19

## 2020-04-23 NOTE — TELEPHONE ENCOUNTER
PCP: Fernando Nyhan, MD    Last appt: 4/8/2020  Future Appointments   Date Time Provider Barbara Ramey   8/5/2020  2:15 PM Deya Mckeon MD [address] [facility]   8/18/2020 11:20 AM DO CORBY Madsen/ [facility]   9/8/2020  2:45 PM Fernando Nyhan, MD [address]       Requested Prescriptions     Pending Prescriptions Disp Refills    cyanocobalamin (Vitamin B-12) 100 mcg tablet 90 Tab 1     Sig: Take 1 Tab by mouth daily. Indications: 500mcg    potassium chloride (KLOR-CON) 10 mEq tablet 30 Tab 2     Sig: TAKE 1 TABLET BY MOUTH EVERY DAY.

## 2020-05-05 ENCOUNTER — TELEPHONE (OUTPATIENT)
Dept: INTERNAL MEDICINE CLINIC | Age: 85
End: 2020-05-05

## 2020-05-05 ENCOUNTER — VIRTUAL VISIT (OUTPATIENT)
Dept: INTERNAL MEDICINE CLINIC | Age: 85
End: 2020-05-05

## 2020-05-05 DIAGNOSIS — U07.1 COVID-19: ICD-10-CM

## 2020-05-05 DIAGNOSIS — N30.00 ACUTE CYSTITIS WITHOUT HEMATURIA: Primary | ICD-10-CM

## 2020-05-05 DIAGNOSIS — I10 ESSENTIAL HYPERTENSION: ICD-10-CM

## 2020-05-05 DIAGNOSIS — R44.3 HALLUCINATION: ICD-10-CM

## 2020-05-05 DIAGNOSIS — K21.9 GASTROESOPHAGEAL REFLUX DISEASE WITHOUT ESOPHAGITIS: ICD-10-CM

## 2020-05-05 DIAGNOSIS — R41.3 MEMORY LOSS: ICD-10-CM

## 2020-05-05 RX ORDER — AMOXICILLIN AND CLAVULANATE POTASSIUM 875; 125 MG/1; MG/1
1 TABLET, FILM COATED ORAL EVERY 12 HOURS
Qty: 10 TAB | Refills: 0 | Status: SHIPPED | OUTPATIENT
Start: 2020-05-05 | End: 2020-05-10

## 2020-05-05 NOTE — TELEPHONE ENCOUNTER
Spoke with Yasemin Gibbons at [place]. Yasemin Gibbons states that per Dr. Oneil Flynn, if pt's BP is over 150/90, to take an extra 12.5mg (PRN) BID on top of pt's normal 25mg BID. Franklin Cox verbal to call quantity of 30 additional tabs.

## 2020-05-05 NOTE — TELEPHONE ENCOUNTER
Spoke with Carolee mejias at [facility]. [place] offered and accepted appt for 5/11/20 at 1430. Faxing order for Augmentin (give with food) to fax: [phone] per Jael--faxed w/ confirmation received. [place] verbalized understanding of information discussed w/ no further questions at this time.

## 2020-05-05 NOTE — TELEPHONE ENCOUNTER
Richard Hartley [address] [place] [address]   Phone Number:  [phone] (Call me)             Lo Sosa, is needing the quantity for patient's medication Metoprolol.      Copy/paste  ENVERA

## 2020-05-05 NOTE — PROGRESS NOTES
HISTORY OF PRESENT ILLNESS  Marcelina Jacob is a 80 y.o. female. HPI     This is an established visit completed with telemedicine was completed with video assist  the patient acknowledges and agrees to this method of visitation  dxoyme      the patient was dx with corona virus earlier in April   She was briefly home with her d[place]her [place]  Was tested positve 15th of April and was positive for corona but never had sx  No cough or fever or other sx.   However it does hurt to breath at times  Lungs overall clear per nurse who examined her w me,     Pulse ox 96%, 93 hr today   bp elevated now at 180/107     However previous readings have been 120/70, 113/74, 144/90      She recently had uti 4/8 and was tx w abx        the patient is concerned that urine is darker  Starting several days ago, bright yellow color, was given extra water to drinks  Pt is not confused  Otherwise doing well   However she is having some mild hallucinations--she sees pictures and knows they are not real        Last here 3/20 Pt is here for routine care.   Pt presents with her daughter today who provides most of hx          Continues on metoprolol 25mg bid  Continues on lasix 20 mg   Not having issues with swelling in her ankles     Wt has been stable  Her wt is in the nl ranges        Reviewed labs      Pt follows [facility] Dr Марина Santana (neuro) for tremor  and hallucinations  Last  notes 35/20: 89-year-old woman with a parkinsonian process I am suspicious it is likely Lewy body given the predominant hallucinations.  She does have tremor but unclear if this is true resting tremor versus essential tremor superimposed on a parkinsonian process.  Her gait remains quite quick non-shuffling but she does feel weakness in the legs.  I would recommend a trial of home health to help improve her strength and reduce her falls risk.  We cannot use Nuplazid due to her concurrent cardiac medications and issues.  I discussed the black box warning with Seroquel with her and her daughter in the room. Emma Cook understand this is for quality of life issues.  Increase melatonin to 10 mg if that does not help we might consider increasing Seroquel to help aid her sleep.  Persistently poor sleep quality will make her memory worse.  Currently the hallucinations are not overtly disturbing but still present.  If necessary we can always escalate to the Parkinson's movement disorder clinic at [address] solomon Tolentino will see them at their next visit. Pt had been being treated for Parkinson's but the neuro decided it may not be this so they stopped this treatment  Has chronic tremors and hallucinations  She is on seroquel 25mg for sleep and hallucinations --    She still gets the hallucinations on and off see above she knows they are not real but they are disturbing to have           Pt follows with Dr Zo Belcher (cardio) for a fib  Last notes 1/30/20: 1.  Paroxysmal atrial fibrillation (HCC)    Pt is on norpace 100mg BID   On asa 81        Pt sees Dr Charity Lopez (GI) for diverticulosis  Pt also has a large hiatal hernia  Pt also has a gluten sensitivity and mostly avoids this    Pt takes protonix 40mg bid sx controlled          Pt saw Dr Carey Thompson (neuropsych) in the past  Last visit was 2015 - no dementia     Pt saw Dr Clarissa Yung (ENT) for hearing loss  Last visit was 12/18/18  Pt decided not to get hearing aids     Pt sees Dr Lauri Castellanos (ortho) for low back pain   Last visit was 1/9/19  Previously, provided info for Dr Henok Jessica  She had been taking tramadol but had SE so was switched to tylenol which helps somewhat  Continues on tylenol for this         Pt declined taking reclast and fosamax in the past  Pt is taking vit D 5000U daily        Pt is on ASA 81mg daily     Pt takes miralax for constipation  This works well as long as she takes it daily occasionally takes extra dose        Continues on zoloft 75 mg            Continues on klor con 10meqs         She saw Dr Cecil Schreiber gyn) for this thick fecal discharge coming form her vagina   Last visit was 11/19   She states this has resolved so he is planning on just observing this                    ACP on file. SDMs are her daughters, Khoi Nevarez and Dayton Yao.     PREVENTIVE:  Colonoscopy: ~10 years ago with Dr Lynn Briseno, per pt the way her colon is formed makes it difficult to get COLOs, her declines further  Pap: 11/19 with Dr Yusra Clifton  Mammogram: declines further  Dexa: 8/18, osteoporosis, declines further tx other than vit d   Tdap: 10/19   Prevnar 13: 03/01/19   Pneumovax: 03/09/20   Shingrix: both rounds completed   Flu shot: Fall 2019   A1C: 3/19 5.9 9/19 5.7 3/20 5.8   Micro: 9/19   Eye exam: Dr Shauna Chris ~summer 2018, Dr Eric High 1/20, due this month   Lipids: 3/19                   Patient Active Problem List   Diagnosis Code    Atrial fibrillation ([facility] [address] I48.91    Hypertension I10    Celiac sprue K90.0    B12 deficiency E53.8    Osteoporosis M81.0    Memory loss R41.3    Cerebrovascular disease, unspecified I67.9    Anxiety F41.9    Idiopathic small and large fiber sensory neuropathy G60.8    Diabetic peripheral neuropathy associated with type 2 diabetes mellitus (HCC) E11.42    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 I65.23    Gastroesophageal reflux disease without esophagitis K21.9    Diverticulosis of large intestine without hemorrhage K57.30    Benign essential tremor G25.0    Irritable bowel syndrome with diarrhea K58.0    Hypercholesterolemia E78.00    Allergic rhinitis J30.9    Fuchs' corneal dystrophy H18.51    History of stroke Z86.73    Pleural effusion J90    Depression, major, recurrent, 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  Indications: 500mcg 90 Tab 1    potassium chloride (KLOR-CON) 10 mEq tablet TAKE 1 TABLET BY MOUTH EVERY DAY. 30 Tab 2    acetaminophen (TYLENOL EXTRA STRENGTH) 500 mg tablet Take 1 Tab by mouth every eight (8) hours as needed for Pain. 100 Tab 2    cholecalciferol (VITAMIN D3) 25 mcg (1,000 unit) cap Take 2 Caps by mouth daily. Indications: taking 5,000u 90 Cap 1    disopyramide phosphate (NORPACE) 100 mg capsule TAKE ONE CAPSULE BY MOUTH TWICE A  Cap 4    sertraline (ZOLOFT) 50 mg tablet Take 1.5 Tabs by mouth daily. 45 Tab 5    furosemide (LASIX) 20 mg tablet TAKE ONE TABLET BY MOUTH EVERY DAY 30 Tab 5    nitroglycerin (NITROSTAT) 0.4 mg SL tablet 1 Tab by SubLINGual route every five (5) minutes as needed for Chest Pain. 25 Tab 1    QUEtiapine (SEROQUEL) 25 mg tablet Take 1 Tab by mouth nightly as needed (agitation). (Patient taking differently: Take 25 mg by mouth nightly.) 90 Tab 1    metoprolol tartrate (LOPRESSOR) 25 mg tablet TAKE ONE TABLET BY MOUTH 2 TIMES A DAY 60 Tab 5    cholecalciferol, VITAMIN D3, (VITAMIN D3) 5,000 unit tab tablet Take 1 Tab by mouth daily. (Patient taking differently: Take 1,000 Units by mouth daily.) 60 Tab 5    aspirin 81 mg chewable tablet Take 1 Tab by mouth daily. 120 Tab 5    melatonin tab tablet Take 2 Tabs by mouth nightly. (Patient taking differently: Take 10 mg by mouth nightly. 10 mg qhs) 60 Tab 5    Lactobacillus rhamnosus GG (CULTURELLE PO) Take  by mouth daily.  pantoprazole (PROTONIX) 40 mg tablet Take 1 Tab by mouth daily. (Patient taking differently: Take 40 mg by mouth two (2) times a day.) 30 Tab 12    polyethylene glycol (MIRALAX) 17 gram/dose powder Take 17 g by mouth daily.         Lab Results   Component Value Date/Time    GFR est non-AA 54 (L) 03/03/2020 01:50 PM    GFRNA, POC >60 10/15/2018 01:54 PM    GFR est AA 62 03/03/2020 01:50 PM    GFRAA, POC >60 10/15/2018 01:54 PM    Creatinine 0.92 03/03/2020 01:50 PM    Creatinine (POC) 0.8 10/15/2018 01:54 PM    BUN 16 03/03/2020 01:50 PM    Sodium 145 (H) 03/03/2020 01:50 PM    Potassium 3.6 03/03/2020 01:50 PM    Chloride 101 03/03/2020 01:50 PM    CO2 28 03/03/2020 01:50 PM    Magnesium 2.2 01/13/2019 11:15 PM     Lab Results   Component Value Date/Time    TSH 2.610 03/03/2020 01:50 PM    T4, Free 0.9 02/11/2009 01:04 PM         Review of Systems   Constitutional: Negative for chills and fever. Respiratory: Negative for shortness of breath and wheezing. Genitourinary: Positive for frequency. Negative for dysuria. Physical Exam  Constitutional:       General: She is not in acute distress. Appearance: Normal appearance. She is not ill-appearing, toxic-appearing or diaphoretic. HENT:      Head: Normocephalic and atraumatic. Eyes:      General:         Right eye: No discharge. Left eye: No discharge. Conjunctiva/sclera: Conjunctivae normal.   Neurological:      General: No focal deficit present. Mental Status: She is alert and oriented to person, place, and time. Psychiatric:         Mood and Affect: Mood normal.         Behavior: Behavior normal.         ASSESSMENT and PLAN    ICD-10-CM ICD-9-CM    1. Acute cystitis without hematuria    Augmentin  bid for 5 days    Sending urine cx N30.00 595.0    2. Essential hypertension    the patient is on toprol 25mg bid  She is also on Lasix but this is primarily to help with a recurrent pleural effusion swelling in her legs is stable    Blood pressure is elevated today but overall her readings have been okay    Discussed if bp is 150/90 or greater can take extra half tab of metoprolol    Overall be generally well controlled    Her bp is checked each day prior to getting her bp meds     I10 401.9    3. Memory loss R41.3 780.93    4.  Hallucination    Continue Seroquel per neurology    There is concerned that she may have an underlying urinary tract infection which could be making these worse currently we will treat this and continue to monitor    Of note she was recently diagnosed as positive for COVID and this may be playing a role as well R44.3 780.1    5. COVID-19    Had a positive test about 2 weeks ago [place]    However patient is doing quite well she really has minimal if any symptoms she reports some mild pleuritic symptoms but her oxygen levels are good she is breathing easily her lungs are clear U07.1      J98.8     6. Gastroesophageal reflux disease without esophagitis    Controlled on Protonix K21.9 530.81        Paddy Strong is a 80 y.o. female being evaluated by a Virtual Visit (video visit) encounter to address concerns as mentioned above. A caregiver was present when appropriate. Due to this being a TeleHealth encounter ([facility] public health emergency), evaluation of the following organ systems was limited: Vitals/Constitutional/EENT/Resp/CV/GI//MS/Neuro/Skin/Heme-Lymph-Imm. Pursuant to the emergency declaration under the [address] [place] [address], [address] [place] [address] authority and the [employer]oe Majeste and [employer] Act, this Virtual Visit was conducted with patient's (and/or legal guardian's) consent, to reduce the risk of exposure to COVID-19 and provide necessary medical care. Services were provided through a video synchronous discussion virtually to substitute for in-person encounter. --Karen Shell MD on 5/5/2020 at 3:25 PM    An electronic signature was used to authenticate this note.

## 2020-05-05 NOTE — TELEPHONE ENCOUNTER
Keyon Ordaz (from home) states that Dr. Danny Royal would like to virtually see pt in one week. There are no appts available. Please call [place] to schedule. Thanks.

## 2020-05-05 NOTE — TELEPHONE ENCOUNTER
Agnieszka Miranda [facility]   Phone Number: [phone]             Caller's first and last name: Casey Hameed/Director of Nursing [place] at [facility]   Reason for call: Would like an order for a UA w/reflex culture.  Fax order to: [phone] required yes/no and why: Yes, to inform   Best contact number(s): [phone]   Details to clarify the request: N/A     Olive Hagen

## 2020-05-05 NOTE — TELEPHONE ENCOUNTER
Spoke to Beauchamp American at [facility]. Informed Dr. David Dixon is requesting to have a VV with the pt. Derick Abel offered and accepted VV appt for 5/5/20 at 1315. Derick Abel verbalized understanding of information discussed w/ no further questions at this time.

## 2020-05-05 NOTE — LETTER
5/5/2020 2:35 PM 
 
Ms. Adela Conklin [address] [id number] To whom this may concern, Please have Ms. Angela take Amoxicillin-Clavulanate (AUGMENTIN) 875-125 mg per tablet: Sig: Take 1 Tab by mouth every twelve (12) hours for 5 days; take with food. If there are any questions or concerns, please feel free to contact the office. Thank you.    
 
 
Sincerely, 
 
 
Daiana Miranda MD

## 2020-05-10 NOTE — PROGRESS NOTES
HISTORY OF PRESENT ILLNESS  Marcelina Jacob is a 80 y.o. female. HPI   Pt last seen vv 4/5/20. This is an established visit completed with telemedicine was completed with video assist  the patient acknowledges and agrees to this method of visitation  dxoyme        the patient was dx with corona virus earlier in April   She was briefly home with her daugher [place]  Was tested positve 15th of April and was positive for corona but never had sx  No cough or fever or other sx.   However it does hurt to breath at times  Lungs overall clear per nurse who examined her w me,      Pulse ox 96%, 93 hr today   bp elevated now at 180/107      However previous readings have been 120/70, 113/74, 144/90        She recently had uti 4/8 and was tx w abx           the patient is concerned that urine is darker  Starting several days ago, bright yellow color, was given extra water to drinks  Pt is not confused  Otherwise doing well   However she is having some mild hallucinations--she sees pictures and knows they are not real           Last here 3/20 Pt is here for routine care.   Pt presents with her daughter today who provides most of hx           Continues on metoprolol 25mg bid  Continues on lasix 20 mg   Not having issues with swelling in her ankles     Wt has been stable  Her wt is in the nl ranges        Reviewed labs      Pt follows [facility] Dr Марина Santana (neuro) for tremor  and hallucinations  Last  notes 35/20: 89-year-old woman with a parkinsonian process I am suspicious it is likely Lewy body given the predominant hallucinations.  She does have tremor but unclear if this is true resting tremor versus essential tremor superimposed on a parkinsonian process.  Her gait remains quite quick non-shuffling but she does feel weakness in the legs.  I would recommend a trial of home health to help improve her strength and reduce her falls risk.  We cannot use Nuplazid due to her concurrent cardiac medications and issues.  I discussed the black box warning with Seroquel with her and her daughter in the room. Eric Davey understand this is for quality of life issues.  Increase melatonin to 10 mg if that does not help we might consider increasing Seroquel to help aid her sleep.  Persistently poor sleep quality will make her memory worse.  Currently the hallucinations are not overtly disturbing but still present.  If necessary we can always escalate to the Parkinson's movement disorder clinic at [address] solomon Samaniego will see them at their next visit.   Pt had been being treated for Parkinson's but the neuro decided it may not be this so they stopped this treatment  Has chronic tremors and hallucinations  She is on seroquel 25mg for sleep and hallucinations --     She still gets the hallucinations on and off see above she knows they are not real but they are disturbing to have           Pt follows with Dr Liam Handley (cardio) for a fib  Last notes 1/30/20: 1. Paroxysmal atrial fibrillation ([address]     Pt is on norpace 100mg BID   On asa 81        Pt sees Dr Faith Juan (GI) for diverticulosis  Pt also has a large hiatal hernia  Pt also has a gluten sensitivity and mostly avoids this     Pt takes protonix 40mg bid sx controlled           Pt saw Dr Bibiana Esteves (neuropsych) in the past  Last visit was 2015 - no dementia     Pt saw Dr Colton Goldmann (ENT) for hearing loss  Last visit was 12/18/18  Pt decided not to get hearing aids     Pt sees Dr Caitlin Fraser (ortho) for low back pain   Last visit was 1/9/19  Previously, provided info for Dr Mandie Romero  She had been taking tramadol but had SE so was switched to tylenol which helps somewhat  Continues on tylenol for this         Pt declined taking reclast and fosamax in the past  Pt is taking vit D 5000U daily        Pt is on ASA 81mg daily     Pt takes miralax for constipation  This works well as long as she takes it daily occasionally takes extra dose        Continues on zoloft 75 mg          Continues on klor con 10meqs         She saw Dr Kaylyn Blackwell gyn) for this thick fecal discharge coming form her vagina   Last visit was 11/19   She states this has resolved so he is planning on just observing this                     ACP on file. SDMs are her daughters, Joyce Bolton and Andrew Encarnacion.     PREVENTIVE:  Colonoscopy: ~10 years ago with Dr Chelsea Rothman, per pt the way her colon is formed makes it difficult to get COLOs, her declines further  Pap: 11/19 with Dr Alex Corley  Mammogram: declines further  Dexa: 8/18, osteoporosis, declines further tx other than vit d   Tdap: 10/19   Prevnar 13: 03/01/19   Pneumovax: 03/09/20   Shingrix: both rounds completed   Flu shot: Fall 2019   A1C: 3/19 5.9 9/19 5.7 3/20 5.8   Micro: 9/19   Eye exam: Dr Demetrice Langley ~summer 2018, Dr Benson Trent, due this month   Lipids: 3/19     Patient Active Problem List   Diagnosis Code    Atrial fibrillation ([facility] [address] I48.91    Hypertension I10    Celiac sprue K90.0    B12 deficiency E53.8    Osteoporosis M81.0    Memory loss R41.3    Cerebrovascular disease, unspecified I67.9    Anxiety F41.9    Idiopathic small and large fiber sensory neuropathy G60.8    Diabetic peripheral neuropathy associated with type 2 diabetes mellitus (HCC) E11.42    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 I65.23    Gastroesophageal reflux disease without esophagitis K21.9    Diverticulosis of large intestine without hemorrhage K57.30    Benign essential tremor G25.0    Irritable bowel syndrome with diarrhea K58.0    Hypercholesterolemia E78.00    Allergic rhinitis J30.9    Fuchs' corneal dystrophy H18.51    History of stroke Z86.73    Pleural effusion J90    Depression, major, recurrent, 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  10 Tab 0    cyanocobalamin (Vitamin B-12) 100 mcg tablet Take 1 Tab by mouth daily. Indications: 500mcg 90 Tab 1    potassium chloride (KLOR-CON) 10 mEq tablet TAKE 1 TABLET BY MOUTH EVERY DAY. 30 Tab 2    acetaminophen (TYLENOL EXTRA STRENGTH) 500 mg tablet Take 1 Tab by mouth every eight (8) hours as needed for Pain. 100 Tab 2    cholecalciferol (VITAMIN D3) 25 mcg (1,000 unit) cap Take 2 Caps by mouth daily. Indications: taking 5,000u 90 Cap 1    disopyramide phosphate (NORPACE) 100 mg capsule TAKE ONE CAPSULE BY MOUTH TWICE A  Cap 4    sertraline (ZOLOFT) 50 mg tablet Take 1.5 Tabs by mouth daily. 45 Tab 5    furosemide (LASIX) 20 mg tablet TAKE ONE TABLET BY MOUTH EVERY DAY 30 Tab 5    nitroglycerin (NITROSTAT) 0.4 mg SL tablet 1 Tab by SubLINGual route every five (5) minutes as needed for Chest Pain. 25 Tab 1    QUEtiapine (SEROQUEL) 25 mg tablet Take 1 Tab by mouth nightly as needed (agitation). (Patient taking differently: Take 25 mg by mouth nightly.) 90 Tab 1    metoprolol tartrate (LOPRESSOR) 25 mg tablet TAKE ONE TABLET BY MOUTH 2 TIMES A DAY 60 Tab 5    cholecalciferol, VITAMIN D3, (VITAMIN D3) 5,000 unit tab tablet Take 1 Tab by mouth daily. (Patient taking differently: Take 1,000 Units by mouth daily.) 60 Tab 5    aspirin 81 mg chewable tablet Take 1 Tab by mouth daily. 120 Tab 5    melatonin tab tablet Take 2 Tabs by mouth nightly. (Patient taking differently: Take 10 mg by mouth nightly. 10 mg qhs) 60 Tab 5    Lactobacillus rhamnosus GG (CULTURELLE PO) Take  by mouth daily.  pantoprazole (PROTONIX) 40 mg tablet Take 1 Tab by mouth daily. (Patient taking differently: Take 40 mg by mouth two (2) times a day.) 30 Tab 12    polyethylene glycol (MIRALAX) 17 gram/dose powder Take 17 g by mouth daily.         Lab Results   Component Value Date/Time    Cholesterol, total 246 (H) 03/01/2019 10:15 AM    HDL Cholesterol 76 03/01/2019 10:15 AM    LDL, calculated 154 (H) 03/01/2019 10:15 AM    Triglyceride 82 03/01/2019 10:15 AM     Lab Results   Component Value Date/Time    GFR est non-AA 54 (L) 03/03/2020 01:50 PM    GFRNA, POC >60 10/15/2018 01:54 PM    GFR est AA 62 03/03/2020 01:50 PM    GFRAA, POC >60 10/15/2018 01:54 PM    Creatinine 0.92 03/03/2020 01:50 PM    Creatinine (POC) 0.8 10/15/2018 01:54 PM    BUN 16 03/03/2020 01:50 PM    Sodium 145 (H) 03/03/2020 01:50 PM    Potassium 3.6 03/03/2020 01:50 PM    Chloride 101 03/03/2020 01:50 PM    CO2 28 03/03/2020 01:50 PM    Magnesium 2.2 01/13/2019 11:15 PM        Review of Systems   Respiratory: Negative for shortness of breath. Cardiovascular: Negative for chest pain. Physical Exam    ASSESSMENT and PLAN    ICD-10-CM ICD-9-CM    1.  ERRONEOUS ENCOUNTER--DISREGARD  03056

## 2020-05-11 ENCOUNTER — VIRTUAL VISIT (OUTPATIENT)
Dept: INTERNAL MEDICINE CLINIC | Age: 85
End: 2020-05-11

## 2020-05-12 ENCOUNTER — TELEPHONE (OUTPATIENT)
Dept: INTERNAL MEDICINE CLINIC | Age: 85
End: 2020-05-12

## 2020-05-12 NOTE — TELEPHONE ENCOUNTER
Called, spoke to [employer] (HIPAA). Two pt identifiers confirmed. Cody Gomez states that the nurse who was supposed to help for the VV was not at work and wants to apologize. Cody Gomez states after talking to pt and another nurse, she reports pt is feeling better after finishing abx. Reports BP is doing better. Cody Gomez states pt is having some delusions--will f/u w/ Neuro. Cody Gomez thinks pt may not need to do the 1wk f/u, but if Dr. Tomasz Rothman recommends it, she will have pt f/u. Cody Gomez informed Dr. Tomasz Rothman will be notified. Cody Gomez verbalized understanding of information discussed w/ no further questions at this time.

## 2020-05-12 NOTE — TELEPHONE ENCOUNTER
MD Sylvia Paniagua LPN   Caller: Unspecified (Today,  8:49 AM)             Vv only if problem      Noted. Closing.

## 2020-05-12 NOTE — TELEPHONE ENCOUNTER
#[phone] Bony Henderson states pt missed her appt yesterday due to the nurse not showing to do the VV. Bony Henderson is asking for a call back from the nurse to relay what is happening with pt.       Please call

## 2020-05-13 ENCOUNTER — TELEPHONE (OUTPATIENT)
Dept: NEUROLOGY | Age: 85
End: 2020-05-13

## 2020-05-13 NOTE — TELEPHONE ENCOUNTER
I have spoken to patient's daughter and advised that patient have a virtual visit. She agreed to reach out to [address] and have them schedule a virtual visit for this patient.

## 2020-05-20 ENCOUNTER — OFFICE VISIT (OUTPATIENT)
Dept: NEUROLOGY | Age: 85
End: 2020-05-20

## 2020-05-20 VITALS
SYSTOLIC BLOOD PRESSURE: 145 MMHG | HEIGHT: 62 IN | DIASTOLIC BLOOD PRESSURE: 69 MMHG | OXYGEN SATURATION: 95 % | WEIGHT: 115 LBS | BODY MASS INDEX: 21.16 KG/M2 | RESPIRATION RATE: 20 BRPM | HEART RATE: 73 BPM

## 2020-05-20 DIAGNOSIS — F02.80 LEWY BODY PARKINSON DISEASE (HCC): Primary | ICD-10-CM

## 2020-05-20 DIAGNOSIS — G31.83 LEWY BODY PARKINSON DISEASE (HCC): Primary | ICD-10-CM

## 2020-05-20 DIAGNOSIS — R44.3 HALLUCINATIONS: ICD-10-CM

## 2020-05-20 RX ORDER — QUETIAPINE FUMARATE 25 MG/1
25 TABLET, FILM COATED ORAL
Qty: 90 TAB | Refills: 3 | Status: SHIPPED | OUTPATIENT
Start: 2020-05-20 | End: 2020-06-11

## 2020-05-20 RX ORDER — CHOLECALCIFEROL (VITAMIN D3) 125 MCG
CAPSULE ORAL
Qty: 60 TAB | Refills: 5 | Status: SHIPPED | OUTPATIENT
Start: 2020-05-20 | End: 2020-08-17 | Stop reason: SDUPTHER

## 2020-05-20 NOTE — PROGRESS NOTES
Pt's daughter Anita Alcantara states that her mother's hallucination has gotten worse and also stated her mom has gotten more anxiety trying to explain the things she is seeing.

## 2020-05-20 NOTE — PROGRESS NOTES
Chief Complaint   Patient presents with    Parkinsons Disease     lewy body? 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 She has predominant auditory visual hallucinations several days a week. I spoke with her and her family member, Thi Bales, by phone. We also did the visit by video. It sounds like in recent months she has had more nightmares and bad dreams. She is on melatonin 10 mg nightly. Seroquel has been helpful with the severity. She understands the hallucinations are not real and usually not frightening but sometimes she does get scared. No falls or drooling. She lives in assisted living.    Delroy Gay is a 80year-old woman here to follow-up. She has a likely atypical Parkinson's process suspicious for Lewy body. She has predominant auditory visual hallucinations. DaTscan was not approved to be completed. Since I saw her last she does live in assisted living with medication supervision. She stopped Seroquel at one point but the hallucinations got worse and now she takes Seroquel at bedtime 25 mg. Fortunately sleep is still quite challenging with most nights having fragmented sleep. She feels like her tremor is getting worse in the arms. Constipation persists. No falls. -------------------------------------------------------------------------------------------------------------------------------------------------------------------------------------------------------------------------------------------------  This is a telemedicine visit that was performed with the originating site at the patients's HOME and the distant site at [facility] outpatient Neurology clinic at [place], [place]. Verbal consent to participate in the video visit was obtained and 2 factor identification was completed (name and  verified).     This visit occurred during the [address] emergency and a telemedicine visit was done in lieu of an office visit. I discussed with the patient the nature of our telemedicine visit is that:  - I would evaluate the patient and recommend diagnostic and treatment based on my assessment  - Our sessions are not being recorded and that personal health information is protected  - Our team will provide follow-up care in person if when the patient needs it. Review of Systems   Musculoskeletal: Negative for falls. Psychiatric/Behavioral: Positive for hallucinations. The patient has insomnia. All other systems reviewed and are negative.       Past Medical History:   Diagnosis Date    Acute diverticulitis 9/25/2017    Allergic rhinitis 9/25/2017    Arrhythmia     afib    Arthritis     Asthma     [facility]rial fibrillation ([facility]     Benign essential tremor 9/25/2017    Cancer ([facility] [address] [facility]     skin    Celiac disease 9/25/2017    Constipation 9/25/2017    Diabetic peripheral neuropathy associated with type 2 diabetes mellitus ([facility] [address] [facility] 4/21/2016    Diarrhea 9/25/2017    Diverticulosis of large intestine without hemorrhage 9/25/2017    Early satiety 9/25/2017    Fuchs' corneal dystrophy 9/25/2017    Gastroesophageal reflux disease without esophagitis 9/25/2017    GERD (gastroesophageal reflux disease)     High cholesterol     Hypercholesterolemia 9/25/2017    Hypertension     Irritable bowel syndrome with diarrhea 9/25/2017    Numbness 9/25/2017    Osteopenia 9/25/2017    Other ill-defined conditions(799.89)     collapsed lung prior to hernia repair surgery    Postmenopausal 7/23/2018    Ptosis, both eyelids 9/25/2017    Statin intolerance 9/25/2017    Unspecified adverse effect of anesthesia     pt states she went into cardiac arrest prior to hernia repair after the insertion of gas in stomach     Family History   Problem Relation Age of Onset    Heart Disease Mother     Dementia Mother     Heart Disease Father     Neuropathy Child     Depression Child     Other Child diverticulitis    Lung Cancer Sister 80        lung ca     Social History     Socioeconomic History    Marital status:      Spouse name: Not on file    Number of children: Not on file    Years of education: Not on file    Highest education level: Not on file   Occupational History    Not on file   Social Needs    Financial resource strain: Not on file    Food insecurity     Worry: Not on file     Inability: Not on file    Transportation needs     Medical: Not on file     Non-medical: Not on file   Tobacco Use    Smoking status: Former Smoker     Packs/day: 0.75     Years: 5.00     Pack years: 3.75     Last attempt to quit: 10/11/1967     Years since quittin.6    Smokeless tobacco: Never Used   Substance and Sexual Activity    Alcohol use: No    Drug use: No    Sexual activity: Not on file   Lifestyle    Physical activity     Days per week: Not on file     Minutes per session: Not on file    Stress: Not on file   Relationships    Social connections     Talks on phone: Not on file     Gets together: Not on file     Attends Nondenominational service: Not on file     Active member of club or organization: Not on file     Attends meetings of clubs or organizations: Not on file     Relationship status: Not on file    Intimate partner violence     Fear of current or ex partner: Not on file     Emotionally abused: Not on file     Physically abused: Not on file     Forced sexual activity: Not on file   Other Topics Concern    Not on file   Social History Narrative    Not on file     Allergies   Allergen Reactions    Bactrim [Sulfamethoprim Ds] Other (comments)     consipation    Ciprofloxacin (Bulk) Other (comments)     Low wbc    Prednisone Other (comments)     tachycardia    Shellfish Derived Other (comments)     Abdominal pain, Carried epi pen for this at one point.     Statins-Hmg-Coa Reductase Inhibitors Unknown (comments)     Stomach uipset and mild muscle aches         Current Outpatient Medications   Medication Sig    melatonin 5 mg tablet Take 1 or 2 tabs at bedtime for sleep    QUEtiapine (SEROquel) 25 mg tablet Take 1 Tab by mouth nightly.  cyanocobalamin (Vitamin B-12) 100 mcg tablet Take 1 Tab by mouth daily. Indications: 500mcg    potassium chloride (KLOR-CON) 10 mEq tablet TAKE 1 TABLET BY MOUTH EVERY DAY.  acetaminophen (TYLENOL EXTRA STRENGTH) 500 mg tablet Take 1 Tab by mouth every eight (8) hours as needed for Pain.  disopyramide phosphate (NORPACE) 100 mg capsule TAKE ONE CAPSULE BY MOUTH TWICE A DAY    sertraline (ZOLOFT) 50 mg tablet Take 1.5 Tabs by mouth daily.  furosemide (LASIX) 20 mg tablet TAKE ONE TABLET BY MOUTH EVERY DAY    nitroglycerin (NITROSTAT) 0.4 mg SL tablet 1 Tab by SubLINGual route every five (5) minutes as needed for Chest Pain.  metoprolol tartrate (LOPRESSOR) 25 mg tablet TAKE ONE TABLET BY MOUTH 2 TIMES A DAY    cholecalciferol, VITAMIN D3, (VITAMIN D3) 5,000 unit tab tablet Take 1 Tab by mouth daily. (Patient taking differently: Take 1,000 Units by mouth daily.)    aspirin 81 mg chewable tablet Take 1 Tab by mouth daily.  Lactobacillus rhamnosus GG (CULTURELLE PO) Take  by mouth daily.  pantoprazole (PROTONIX) 40 mg tablet Take 1 Tab by mouth daily. (Patient taking differently: Take 40 mg by mouth two (2) times a day.)    polyethylene glycol (MIRALAX) 17 gram/dose powder Take 17 g by mouth daily.  cholecalciferol (VITAMIN D3) 25 mcg (1,000 unit) cap Take 2 Caps by mouth daily. Indications: taking 5,000u     No current facility-administered medications for this visit. Neurologic Exam     Mental Status   WD/WN adult in NAD, normal grooming  VSS  A&O, pleasant talkative and follows commands    PERRL, nonicteric  Face is symmetric, tongue midline  Speech is fluent and clear  No limb ataxia.   Appears tremulous  Moving all extemities spontaneously and symmetric but with bradykinesia  Slow gait with Rollator, she can sit down independently and ambulate independently             Visit Vitals  /69   Pulse 73   Resp 20   Ht 5' 2\" (1.575 m)   Wt 52.2 kg (115 lb)   SpO2 95%   BMI 21.03 kg/m²       Assessment and Plan   Diagnoses and all orders for this visit:    1. Lewy body Parkinson disease ([facility]  -     melatonin 5 mg tablet; Take 1 or 2 tabs at bedtime for sleep  -     QUEtiapine (SEROquel) 25 mg tablet; Take 1 Tab by mouth nightly. 2. Hallucinations  -     melatonin 5 mg tablet; Take 1 or 2 tabs at bedtime for sleep  -     QUEtiapine (SEROquel) 25 mg tablet; Take 1 Tab by mouth nightly. 51-year-old woman with a parkinsonian process, consideration for Lewy body disease, having increasing nightmares and dreams and hallucinations. It is possible that \"natural\" melatonin could be aggravating the underlying conditions I recommend we try  vegan or vegetarian synthetic melatonin. 1 or 2 tablets at bedtime as needed. The order is written above in the assessment/diagnostic section portion above. Continue Seroquel 25 mg every bedtime. I spoke with her family member again that Seroquel has a black box warning for risk of stroke. We are using this medication for quality of life and we will continue and she agrees. We would like to maintain as low dose as possible to avoid overmedication and sedation. Both Ms. Álvaro Mercado and her family member agree. I would like to see her towards the end of the year. Questions answered in detail. I reviewed and decided to continue the current medications. This clinical note was dictated with an electronic dictation software that can make unintentional errors. If there are any questions, please contact me directly for clarification.       2 [facility], [facility] [address]  Diplomate ANTHONY

## 2020-06-02 ENCOUNTER — TELEPHONE (OUTPATIENT)
Dept: NEUROLOGY | Age: 85
End: 2020-06-02

## 2020-06-02 NOTE — TELEPHONE ENCOUNTER
Tiera Ramirez, pt's daughter, said over the past couple of weeks pt has declined. She is hoping Dr. Lillian Abel will call her so she can talk to her because she is concerned.      Please call back after 1:30pm

## 2020-06-03 ENCOUNTER — TELEPHONE (OUTPATIENT)
Dept: INTERNAL MEDICINE CLINIC | Age: 85
End: 2020-06-03

## 2020-06-03 NOTE — TELEPHONE ENCOUNTER
Daughter, Yayo Hadley states that [facility] where pt lives needs a new order to clarify when and how much of the vitamins, B12 and D3 are to be taken.     Fax #[phone]  Attn: Rgeino Pollard

## 2020-06-03 NOTE — TELEPHONE ENCOUNTER
Dr. Segundo Donnelly, patient just had a virtual visit end of May. Please call at your convenience.  Thanks

## 2020-06-04 ENCOUNTER — TELEPHONE (OUTPATIENT)
Dept: NEUROLOGY | Age: 85
End: 2020-06-04

## 2020-06-04 NOTE — TELEPHONE ENCOUNTER
Pt daughter calling stating she was told told to specifically ask for Brooke Wolf.  She is requesting a call back

## 2020-06-05 ENCOUNTER — TELEPHONE (OUTPATIENT)
Dept: INTERNAL MEDICINE CLINIC | Age: 85
End: 2020-06-05

## 2020-06-05 NOTE — TELEPHONE ENCOUNTER
Quang Saldivar states that [facility] never received the letter for the vitamins that sent to them. Quang Saldivar is asking that you just mail this to her home. Kody FRANCIS. [address] [place] [address], [address] [place].

## 2020-06-05 NOTE — TELEPHONE ENCOUNTER
Letter mailed to [facility] as requested to address. Letter also re-faxed to [facility] at  w/ confirmation received.

## 2020-06-11 ENCOUNTER — VIRTUAL VISIT (OUTPATIENT)
Dept: NEUROLOGY | Age: 85
End: 2020-06-11

## 2020-06-11 ENCOUNTER — TELEPHONE (OUTPATIENT)
Dept: INTERNAL MEDICINE CLINIC | Age: 85
End: 2020-06-11

## 2020-06-11 ENCOUNTER — TELEPHONE (OUTPATIENT)
Dept: NEUROLOGY | Age: 85
End: 2020-06-11

## 2020-06-11 DIAGNOSIS — F02.80 LEWY BODY PARKINSON DISEASE (HCC): ICD-10-CM

## 2020-06-11 DIAGNOSIS — G31.83 LEWY BODY PARKINSON DISEASE (HCC): ICD-10-CM

## 2020-06-11 DIAGNOSIS — R44.3 HALLUCINATIONS: ICD-10-CM

## 2020-06-11 RX ORDER — QUETIAPINE FUMARATE 25 MG/1
TABLET, FILM COATED ORAL
Qty: 90 TAB | Refills: 1 | Status: SHIPPED | OUTPATIENT
Start: 2020-06-11 | End: 2020-08-12 | Stop reason: SDUPTHER

## 2020-06-11 RX ORDER — ALBUTEROL SULFATE 90 UG/1
AEROSOL, METERED RESPIRATORY (INHALATION)
COMMUNITY
End: 2020-08-21

## 2020-06-11 RX ORDER — LACTOBACILLUS RHAMNOSUS GG 10B CELL
CAPSULE ORAL
COMMUNITY
End: 2020-12-28 | Stop reason: SDUPTHER

## 2020-06-11 NOTE — TELEPHONE ENCOUNTER
Lisa Poster- Daughter     Reason for call:   Needed an order for the correct dosage for vitamins D3 and B12 to be sent to the Pt's assisted living facility, but the orders were not sent so the daughter is now requesting to pick them up. Callback required yes/no and why:   Yes, to let her know if the order can be printed and if she can pick it up to take to the facility.      Best contact number(s):   [phone]     Details to clarify the request:       Sanju Page

## 2020-06-11 NOTE — TELEPHONE ENCOUNTER
Spoke with the patient's daughter, Wendy, she states the patient's episodes are getting worse \"She got in her head ;ast night that she nor anyone on her floor at her nursing home were fed dinner and so she pulled out her peanut butter and bread with the intention of feeding everyone that wasn't fed. When I spoke with her last night she was adamant that it was real and she was not making it up. The longer this goes on the less my sister and I know what to do about it. However, when I talked to her this morning she was able to state the realized it may have been a dream.\"  Patient's daughter, Wendy is requesting to talk to you prior to the virtual visit as she does not want to upset her mom by discussing this in front of her. \"

## 2020-06-11 NOTE — PROGRESS NOTES
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 She has predominant auditory visual hallucinations several days a week. I spoke with her and her  Mansi Cordova, who was present during the visit where she lives at an [facility]. We also did the visit by video. Since I saw her recently she is having more daytime hallucinations that are disturbing initially. Some paranoia is developing. Melatonin was changed to synthetic but there has not been much improvement. Seroquel does help with nighttime sleep. She understands the hallucinations are not real and usually not frightening but sometimes she does get scared. No falls or drooling. She lives in assisted living.    Alana Dominguez is a 80year-old woman here to follow-up. She has a likely atypical Parkinson's process suspicious for Lewy body. She has predominant auditory visual hallucinations. DaTscan was not approved to be completed. Since I saw her last she does live in assisted living with medication supervision. She stopped Seroquel at one point but the hallucinations got worse and now she takes Seroquel at bedtime 25 mg. Fortunately sleep is still quite challenging with most nights having fragmented sleep. She feels like her tremor is getting worse in the arms. Constipation persists. No falls.     -------------------------------------------------------------------------------------------------------------------------------------------------------------------------------------------------------------------------------------------------  This is a telemedicine visit that was performed with the originating site at the patients's HOME and the distant site at [facility] outpatient Neurology clinic at [facility] [place], [place]. Verbal consent to participate in the video visit was obtained and 2 factor identification was completed (name and  verified). This visit occurred during the [place] [address] emergency and a telemedicine visit was done in lieu of an office visit. I discussed with the patient the nature of our telemedicine visit is that:  - I would evaluate the patient and recommend diagnostic and treatment based on my assessment  - Our sessions are not being recorded and that personal health information is protected  - Our team will provide follow-up care in person if when the patient needs it. Review of Systems   Unable to perform ROS: Dementia   Neurological: Positive for tremors. Psychiatric/Behavioral: Positive for hallucinations.        Past Medical History:   Diagnosis Date    Acute diverticulitis 2017    Allergic rhinitis 2017    Arrhythmia     afib    Arthritis     Asthma     Atrial fibrillation ([facility] [address] [facility]     Benign essential tremor 2017    Cancer ([facility] [address] [facility]     skin    Celiac disease 2017    Constipation 2017    Diabetic peripheral neuropathy associated with type 2 diabetes mellitus ([facility] [address] [facility] 2016    Diarrhea 2017    Diverticulosis of large intestine without hemorrhage 2017    Early satiety 2017    Fuchs' corneal dystrophy 2017    Gastroesophageal reflux disease without esophagitis 2017    GERD (gastroesophageal reflux disease)     High cholesterol     Hypercholesterolemia 2017    Hypertension     Irritable bowel syndrome with diarrhea 2017    Numbness 2017    Osteopenia 2017    Other ill-defined conditions(799.89)     collapsed lung prior to hernia repair surgery    Postmenopausal 2018    Ptosis, both eyelids 2017    Statin intolerance 2017    Unspecified adverse effect of anesthesia     pt states she went into cardiac arrest prior to hernia repair after the insertion of gas in stomach     Family History   Problem Relation Age of Onset    Heart Disease Mother     Dementia Mother     Heart Disease Father     Neuropathy Child     Depression Child     Other Child         diverticulitis    Lung Cancer Sister 80        lung ca     Social History     Socioeconomic History    Marital status:      Spouse name: Not on file    Number of children: Not on file    Years of education: Not on file    Highest education level: Not on file   Occupational History    Not on file   Social Needs    Financial resource strain: Not on file    Food insecurity     Worry: Not on file     Inability: Not on file    Transportation needs     Medical: Not on file     Non-medical: Not on file   Tobacco Use    Smoking status: Former Smoker     Packs/day: 0.75     Years: 5.00     Pack years: 3.75     Last attempt to quit: 10/11/1967     Years since quittin.7    Smokeless tobacco: Never Used   Substance and Sexual Activity    Alcohol use: No    Drug use: No    Sexual activity: Not on file   Lifestyle    Physical activity     Days per week: Not on file     Minutes per session: Not on file    Stress: Not on file   Relationships    Social connections     Talks on phone: Not on file     Gets together: Not on file     Attends Tenriism service: Not on file     Active member of club or organization: Not on file     Attends meetings of clubs or organizations: Not on file     Relationship status: Not on file    Intimate partner violence     Fear of current or ex partner: Not on file     Emotionally abused: Not on file     Physically abused: Not on file     Forced sexual activity: Not on file   Other Topics Concern    Not on file   Social History Narrative    Not on file     Allergies   Allergen Reactions    Bactrim [Sulfamethoprim Ds] Other (comments)     consipation    Ciprofloxacin (Bulk) Other (comments)     Low wbc    Prednisone Other (comments)     tachycardia    Shellfish Derived Other (comments)     Abdominal pain, Carried epi pen for this at one point.  Statins-Hmg-Coa Reductase Inhibitors Unknown (comments)     Stomach uipset and mild muscle aches         Current Outpatient Medications   Medication Sig    melatonin 5 mg tablet Take 1 or 2 tabs at bedtime for sleep    QUEtiapine (SEROquel) 25 mg tablet Take 1 Tab by mouth nightly.  cyanocobalamin (Vitamin B-12) 100 mcg tablet Take 1 Tab by mouth daily. Indications: 500mcg    potassium chloride (KLOR-CON) 10 mEq tablet TAKE 1 TABLET BY MOUTH EVERY DAY.  acetaminophen (TYLENOL EXTRA STRENGTH) 500 mg tablet Take 1 Tab by mouth every eight (8) hours as needed for Pain.  cholecalciferol (VITAMIN D3) 25 mcg (1,000 unit) cap Take 2 Caps by mouth daily. Indications: taking 5,000u    disopyramide phosphate (NORPACE) 100 mg capsule TAKE ONE CAPSULE BY MOUTH TWICE A DAY    sertraline (ZOLOFT) 50 mg tablet Take 1.5 Tabs by mouth daily.  furosemide (LASIX) 20 mg tablet TAKE ONE TABLET BY MOUTH EVERY DAY    nitroglycerin (NITROSTAT) 0.4 mg SL tablet 1 Tab by SubLINGual route every five (5) minutes as needed for Chest Pain.  metoprolol tartrate (LOPRESSOR) 25 mg tablet TAKE ONE TABLET BY MOUTH 2 TIMES A DAY    cholecalciferol, VITAMIN D3, (VITAMIN D3) 5,000 unit tab tablet Take 1 Tab by mouth daily. (Patient taking differently: Take 1,000 Units by mouth daily.)    aspirin 81 mg chewable tablet Take 1 Tab by mouth daily.  pantoprazole (PROTONIX) 40 mg tablet Take 1 Tab by mouth daily. (Patient taking differently: Take 40 mg by mouth two (2) times a day.)    polyethylene glycol (MIRALAX) 17 gram/dose powder Take 17 g by mouth daily.  Lactobac.  rhamnosus GG-inulin (Trista 13) 10 billion cell -200 mg cpSP [facility] 10 billion cell-200 mg sprinkle capsule    albuterol (ProAir HFA) 90 mcg/actuation inhaler ProAir HFA 90 mcg/actuation aerosol inhaler    Lactobacillus rhamnosus GG (CULTURELLE PO) Take  by mouth daily. No current facility-administered medications for this visit. Neurologic Exam     Mental Status   WD/WN adult in NAD, normal grooming  VSS  A&O, pleasant talkative and follows commands    PERRL, nonicteric, reduced blink rate  Face is symmetric, tongue midline, masklike  Speech is slow and clear  No limb ataxia. Appears tremulous  Moving all extemities spontaneously and symmetric but with bradykinesia  Slow gait with Rollator, she can sit down independently and ambulate independently       There were no vitals taken for this visit. Assessment and Plan   Diagnoses and all orders for this visit:    1. Lewy body Parkinson disease ([facility] [address]    2. Hallucinations      80year-old woman with Lewy body disease. We cannot use Nuplazid due to the concurrent Norpace for cardiac purposes. We are going to do increase Seroquel by adding a half a tablet midmorning for the daytime hallucinations. Continue nighttime dosing 25 mg. Frequent redirection. Stop melatonin since it does not seem to be helping. We will be able to reassess if there was any benefit by briefly discontinuing it. Family understands that Seroquel does carry a black box warning however this is for quality of life issues. I reviewed and decided to continue the current medications. This clinical note was dictated with an electronic dictation software that can make unintentional errors. If there are any questions, please contact me directly for clarification.       [address] [facility], [address]  Diplomate WILIN

## 2020-06-11 NOTE — TELEPHONE ENCOUNTER
Called, spoke to [place] (HIPAA). Two pt identifiers confirmed. Informed Dara Pang that the letter has been printed for pickup. Dara Pang verbalized understanding of information discussed w/ no further questions at this time.

## 2020-06-11 NOTE — PROGRESS NOTES
Chief Complaint   Patient presents with    Follow-up     Parkinsons, patient's daughter states Joey Wilkinson is hallucinating more and it is becoming more complex and harder to deal with\"

## 2020-06-11 NOTE — Clinical Note
Please send my note to [place] at [address] PT to [address]

## 2020-06-11 NOTE — TELEPHONE ENCOUNTER
Daughter calling with some follow up questions for Dr Steve Porras. How can herself and her  sister help pt when she is having these dream, delusions. How are they suppose to handle it? What are they suppose to say, how can they keep her calm? What is the next step if the medication doesn't help? Is there other medication or is this it, or will it continue to be this way? Please call.

## 2020-06-15 NOTE — TELEPHONE ENCOUNTER
Unfortunately this is a very difficult situation. I do not think the dreams and delusions are going to go away. I think the best course is that when she is having an event to make sure she feels safe and calm is much as possible. Try to redirect her attention as safely as can be done. We have to find that delicate balance of treating the symptoms but not to overmedicate her and cause other issues. 24-hour care and supervision is likely if not already in place. If her cardiac medicine, Norpace, is ever discontinued by cardiology let me know and we can always try the other medication called Nuplazid.

## 2020-06-15 NOTE — PROGRESS NOTES
Notes sent and form sent to  [facility]. OV notes faxed to Priscilla Matthew as requested.

## 2020-06-16 NOTE — TELEPHONE ENCOUNTER
Spoke with patient's daughter, relayed Dr. Valentín Lo recommendation. Patient's daughter did want you to know that \"just the little bump up of the Seroquel has improved her mood and demeanor so much and I justwanted to share that with Dr. Zeeshan Wynne. \"

## 2020-06-25 ENCOUNTER — TELEPHONE (OUTPATIENT)
Dept: INTERNAL MEDICINE CLINIC | Age: 85
End: 2020-06-25

## 2020-06-25 RX ORDER — METOPROLOL TARTRATE 25 MG/1
TABLET, FILM COATED ORAL
Qty: 30 TAB | Refills: 0 | OUTPATIENT
Start: 2020-06-25

## 2020-06-25 NOTE — TELEPHONE ENCOUNTER
Patient's daughter, Eliseo Hernandez, states she needs a call back in reference to prescription for patient's, Metoprolol tartrate (LOPRESSOR)  1/2 tablet/pill to be taken as needed that was prescribed by Dr. Emily Taylor Being denied on refill request. Please call to discuss. See instructions on prescription denied.  Thank you

## 2020-06-26 NOTE — TELEPHONE ENCOUNTER
Name and  confirmed. Spoke with pt's daughter Princess Abebe about pt's medication informed pt that another provider has sent in the medication to the pharmacy, pt verbally understood.

## 2020-07-04 ENCOUNTER — APPOINTMENT (OUTPATIENT)
Dept: CT IMAGING | Age: 85
End: 2020-07-04
Attending: EMERGENCY MEDICINE
Payer: MEDICARE

## 2020-07-04 ENCOUNTER — HOSPITAL ENCOUNTER (EMERGENCY)
Age: 85
Discharge: HOME | End: 2020-07-04
Attending: EMERGENCY MEDICINE
Payer: MEDICARE

## 2020-07-04 VITALS
DIASTOLIC BLOOD PRESSURE: 76 MMHG | OXYGEN SATURATION: 93 % | RESPIRATION RATE: 14 BRPM | TEMPERATURE: 98.9 F | SYSTOLIC BLOOD PRESSURE: 165 MMHG | HEART RATE: 91 BPM

## 2020-07-04 DIAGNOSIS — M47.812 CERVICAL ARTHRITIS: ICD-10-CM

## 2020-07-04 DIAGNOSIS — R55 SYNCOPE AND COLLAPSE: Primary | ICD-10-CM

## 2020-07-04 DIAGNOSIS — S16.1XXA STRAIN OF NECK MUSCLE, INITIAL ENCOUNTER: ICD-10-CM

## 2020-07-04 DIAGNOSIS — S09.90XA CLOSED HEAD INJURY, INITIAL ENCOUNTER: ICD-10-CM

## 2020-07-04 DIAGNOSIS — N30.01 ACUTE CYSTITIS WITH HEMATURIA: ICD-10-CM

## 2020-07-04 LAB
ALBUMIN SERPL-MCNC: 3.5 G/DL (ref 3.5–5)
ALBUMIN/GLOB SERPL: 1.1 {RATIO} (ref 1.1–2.2)
ALP SERPL-CCNC: 73 U/L (ref 45–117)
ALT SERPL-CCNC: 16 U/L (ref 12–78)
ANION GAP SERPL CALC-SCNC: 3 MMOL/L (ref 5–15)
APPEARANCE UR: CLEAR
AST SERPL-CCNC: 17 U/L (ref 15–37)
ATRIAL RATE: 83 BPM
BACTERIA URNS QL MICRO: ABNORMAL /HPF
BASOPHILS # BLD: 0 K/UL (ref 0–0.1)
BASOPHILS NFR BLD: 1 % (ref 0–1)
BILIRUB SERPL-MCNC: 0.5 MG/DL (ref 0.2–1)
BILIRUB UR QL: NEGATIVE
BUN SERPL-MCNC: 28 MG/DL (ref 6–20)
BUN/CREAT SERPL: 31 (ref 12–20)
CALCIUM SERPL-MCNC: 9.2 MG/DL (ref 8.5–10.1)
CALCULATED P AXIS, ECG09: 59 DEGREES
CALCULATED R AXIS, ECG10: -18 DEGREES
CALCULATED T AXIS, ECG11: 47 DEGREES
CHLORIDE SERPL-SCNC: 105 MMOL/L (ref 97–108)
CO2 SERPL-SCNC: 32 MMOL/L (ref 21–32)
COLOR UR: ABNORMAL
CREAT SERPL-MCNC: 0.9 MG/DL (ref 0.55–1.02)
DIAGNOSIS, 93000: NORMAL
DIFFERENTIAL METHOD BLD: ABNORMAL
EOSINOPHIL # BLD: 0 K/UL (ref 0–0.4)
EOSINOPHIL NFR BLD: 1 % (ref 0–7)
EPITH CASTS URNS QL MICRO: ABNORMAL /LPF
ERYTHROCYTE [DISTWIDTH] IN BLOOD BY AUTOMATED COUNT: 13.5 % (ref 11.5–14.5)
GLOBULIN SER CALC-MCNC: 3.2 G/DL (ref 2–4)
GLUCOSE SERPL-MCNC: 103 MG/DL (ref 65–100)
GLUCOSE UR STRIP.AUTO-MCNC: NEGATIVE MG/DL
HCT VFR BLD AUTO: 38.6 % (ref 35–47)
HGB BLD-MCNC: 12.8 G/DL (ref 11.5–16)
HGB UR QL STRIP: NEGATIVE
IMM GRANULOCYTES # BLD AUTO: 0 K/UL (ref 0–0.04)
IMM GRANULOCYTES NFR BLD AUTO: 0 % (ref 0–0.5)
KETONES UR QL STRIP.AUTO: NEGATIVE MG/DL
LEUKOCYTE ESTERASE UR QL STRIP.AUTO: ABNORMAL
LYMPHOCYTES # BLD: 0.7 K/UL (ref 0.8–3.5)
LYMPHOCYTES NFR BLD: 16 % (ref 12–49)
MAGNESIUM SERPL-MCNC: 2.1 MG/DL (ref 1.6–2.4)
MCH RBC QN AUTO: 29.8 PG (ref 26–34)
MCHC RBC AUTO-ENTMCNC: 33.2 G/DL (ref 30–36.5)
MCV RBC AUTO: 89.8 FL (ref 80–99)
MONOCYTES # BLD: 0.6 K/UL (ref 0–1)
MONOCYTES NFR BLD: 13 % (ref 5–13)
NEUTS SEG # BLD: 3.2 K/UL (ref 1.8–8)
NEUTS SEG NFR BLD: 69 % (ref 32–75)
NITRITE UR QL STRIP.AUTO: NEGATIVE
NRBC # BLD: 0 K/UL (ref 0–0.01)
NRBC BLD-RTO: 0 PER 100 WBC
P-R INTERVAL, ECG05: 162 MS
PH UR STRIP: 6 [PH] (ref 5–8)
PLATELET # BLD AUTO: 169 K/UL (ref 150–400)
PMV BLD AUTO: 10.8 FL (ref 8.9–12.9)
POTASSIUM SERPL-SCNC: 3.7 MMOL/L (ref 3.5–5.1)
PROT SERPL-MCNC: 6.7 G/DL (ref 6.4–8.2)
PROT UR STRIP-MCNC: NEGATIVE MG/DL
Q-T INTERVAL, ECG07: 376 MS
QRS DURATION, ECG06: 78 MS
QTC CALCULATION (BEZET), ECG08: 441 MS
RBC # BLD AUTO: 4.3 M/UL (ref 3.8–5.2)
RBC #/AREA URNS HPF: ABNORMAL /HPF (ref 0–5)
RBC MORPH BLD: ABNORMAL
SODIUM SERPL-SCNC: 140 MMOL/L (ref 136–145)
SP GR UR REFRACTOMETRY: 1.02 (ref 1–1.03)
TROPONIN I SERPL-MCNC: <0.05 NG/ML
UROBILINOGEN UR QL STRIP.AUTO: 1 EU/DL (ref 0.2–1)
VENTRICULAR RATE, ECG03: 83 BPM
WBC # BLD AUTO: 4.5 K/UL (ref 3.6–11)
WBC URNS QL MICRO: ABNORMAL /HPF (ref 0–4)

## 2020-07-04 PROCEDURE — 72125 CT NECK SPINE W/O DYE: CPT

## 2020-07-04 PROCEDURE — 85025 COMPLETE CBC W/AUTO DIFF WBC: CPT

## 2020-07-04 PROCEDURE — 36415 COLL VENOUS BLD VENIPUNCTURE: CPT

## 2020-07-04 PROCEDURE — 80053 COMPREHEN METABOLIC PANEL: CPT

## 2020-07-04 PROCEDURE — 70450 CT HEAD/BRAIN W/O DYE: CPT

## 2020-07-04 PROCEDURE — 93005 ELECTROCARDIOGRAM TRACING: CPT

## 2020-07-04 PROCEDURE — 84484 ASSAY OF TROPONIN QUANT: CPT

## 2020-07-04 PROCEDURE — 83735 ASSAY OF MAGNESIUM: CPT

## 2020-07-04 PROCEDURE — 99285 EMERGENCY DEPT VISIT HI MDM: CPT

## 2020-07-04 PROCEDURE — 81001 URINALYSIS AUTO W/SCOPE: CPT

## 2020-07-04 RX ORDER — CEPHALEXIN 500 MG/1
500 CAPSULE ORAL 3 TIMES DAILY
Qty: 15 CAP | Refills: 0 | Status: SHIPPED | OUTPATIENT
Start: 2020-07-04 | End: 2020-08-21

## 2020-07-04 RX ORDER — CEPHALEXIN 500 MG/1
500 CAPSULE ORAL 3 TIMES DAILY
Qty: 15 CAP | Refills: 0 | Status: SHIPPED | OUTPATIENT
Start: 2020-07-04 | End: 2020-07-04

## 2020-07-04 NOTE — ED NOTES
Pt assisted to bedside commode to provide urine sample. Pt placed back in bed and covered with warm blankets.

## 2020-07-04 NOTE — ED NOTES
MILLY Euceda reviewed discharge instructions with the patient and Pt's POA. The patient and POA verbalized understanding. All questions and concerns were addressed. The patient accepted a wheelchair and is discharged in the care of family members with instructions and prescriptions in hand. Pt is alert and oriented x 4. Respirations are clear and unlabored.

## 2020-07-04 NOTE — ED NOTES
Pt's daughters,  Luis F Tan and Hugo Clau, are updated on Pt status and results. Luis F Tan states she will drive to  Pt at discharge with Hugo Olguin confirming this plan.

## 2020-07-04 NOTE — DISCHARGE INSTRUCTIONS
Patient Education        Neck Strain: Care Instructions  Your Care Instructions     You have strained the muscles and ligaments in your neck. A sudden, awkward movement can strain the neck. This often occurs with falls or car accidents or during certain sports. Everyday activities like working on a computer or sleeping can also cause neck strain if they force you to hold your neck in an awkward position for a long time. It is common for neck pain to get worse for a day or two after an injury, but it should start to feel better after that. You may have more pain and stiffness for several days before it gets better. This is expected. It may take a few weeks or longer for it to heal completely. Good home treatment can help you get better faster and avoid future neck problems. Follow-up care is a key part of your treatment and safety. Be sure to make and go to all appointments, and call your doctor if you are having problems. It's also a good idea to know your test results and keep a list of the medicines you take. How can you care for yourself at home? · If you were given a neck brace (cervical collar) to limit neck motion, wear it as instructed for as many days as your doctor tells you to. Do not wear it longer than you were told to. Wearing a brace for too long can make neck stiffness worse and weaken the neck muscles. · You can try using heat or ice to see if it helps. ? Try using a heating pad on a low or medium setting for 15 to 20 minutes every 2 to 3 hours. Try a warm shower in place of one session with the heating pad. You can also buy single-use heat wraps that last up to 8 hours. ? You can also try an ice pack for 10 to 15 minutes every 2 to 3 hours. · Take pain medicines exactly as directed. ? If the doctor gave you a prescription medicine for pain, take it as prescribed. ? If you are not taking a prescription pain medicine, ask your doctor if you can take an over-the-counter medicine.   · Gently rub the area to relieve pain and help with blood flow. Do not massage the area if it hurts to do so. · Do not do anything that makes the pain worse. Take it easy for a couple of days. You can do your usual activities if they do not hurt your neck or put it at risk for more stress or injury. · Try sleeping on a special neck pillow. Place it under your neck, not under your head. Placing a tightly rolled-up towel under your neck while you sleep will also work. If you use a neck pillow or rolled towel, do not use your regular pillow at the same time. · To prevent future neck pain, do exercises to stretch and strengthen your neck and back. Learn how to use good posture, safe lifting techniques, and proper body mechanics. When should you call for help? [id number] anytime you think you may need emergency care. For example, call if:  · You are unable to move an arm or a leg at all. Call your doctor now or seek immediate medical care if:  · You have new or worse symptoms in your arms, legs, chest, belly, or buttocks. Symptoms may include:  ? Numbness or tingling. ? Weakness. ? Pain. · You lose bladder or bowel control. Watch closely for changes in your health, and be sure to contact your doctor if:  · You are not getting better as expected. Where can you learn more? Go to http://www.gray.com/  Enter M253 in the search box to learn more about \"Neck Strain: Care Instructions. \"  Current as of: March 2, 2020               Content Version: 12.5  © 2006-2020 Healthwise, [employer]. Care instructions adapted under license by [employer] (which disclaims liability or warranty for this information). If you have questions about a medical condition or this instruction, always ask your healthcare professional. [place] [address] any warranty or liability for your use of this information.          Patient Education        Cervical Spondylosis: Care Instructions  Your Care Instructions     Cervical spondylosis is a type of arthritis of the neck. It can happen as people get older. It may be caused by bone spurs or other problems. You may have neck pain and stiffness. Sometimes the space around the spinal cord narrows. When this happens, it is called spinal stenosis. Spinal stenosis can cause pain, numbness, or weakness in the arms, legs, feet, and rear end (buttocks). It can also cause loss of bowel and bladder control. You can treat some of your symptoms with over-the-counter pain medicine. But if you have spinal stenosis with severe symptoms, you may need surgery. Follow-up care is a key part of your treatment and safety. Be sure to make and go to all appointments, and call your doctor if you are having problems. It's also a good idea to know your test results and keep a list of the medicines you take. How can you care for yourself at home? · Take anti-inflammatory medicines to reduce neck pain. These include ibuprofen (Advil, Motrin) and naproxen (Aleve). Be safe with medicines. Read and follow all instructions on the label. · Follow your doctor's recommendation about activity. He or she may tell you not to do sports or activities that could injure your neck. When should you call for help? [id number] anytime you think you may need emergency care. For example, call if:  · You are unable to move an arm or a leg at all. Call your doctor now or seek immediate medical care if:  · You have new or worse symptoms in your arms, legs, belly, or buttocks. Symptoms may include:  ? Numbness or tingling. ? Weakness. ? Pain. · You lose bladder or bowel control. Watch closely for changes in your health, and be sure to contact your doctor if:  · You do not get better as expected. Where can you learn more? Go to http://pinky-shamar.info/  Enter G381 in the search box to learn more about \"Cervical Spondylosis: Care Instructions. \"  Current as of: March 2, 2020               Content Version: 12.5  © 6141-2704 [employer]. Care instructions adapted under license by [employer] (which disclaims liability or warranty for this information). If you have questions about a medical condition or this instruction, always ask your healthcare professional. [address] any warranty or liability for your use of this information. Patient Education        Fainting: Care Instructions  Your Care Instructions     When you faint, or pass out, you lose consciousness for a short time. A brief drop in blood flow to the brain often causes it. When you fall or lie down, more blood flows to your brain and you regain consciousness. Emotional stress, pain, or overheating--especially if you have been standing--can make you faint. In these cases, fainting is usually not serious. But fainting can be a sign of a more serious problem. Your doctor may want you to have more tests to rule out other causes. The treatment you need depends on the reason why you fainted. The doctor has checked you carefully, but problems can develop later. If you notice any problems or new symptoms, get medical treatment right away. Follow-up care is a key part of your treatment and safety. Be sure to make and go to all appointments, and call your doctor if you are having problems. It's also a good idea to know your test results and keep a list of the medicines you take. How can you care for yourself at home? · Drink plenty of fluids to prevent dehydration. If you have kidney, heart, or liver disease and have to limit fluids, talk with your doctor before you increase your fluid intake. When should you call for help? [id number] anytime you think you may need emergency care. For example, call if:  · You have symptoms of a heart problem. These may include:  ? Chest pain or pressure. ? Severe trouble breathing. ? A fast or irregular heartbeat.   ? Lightheadedness or sudden weakness. ? Coughing up pink, foamy mucus. ? Passing out. After you call 911, the  may tell you to chew 1 adult-strength or 2 to 4 low-dose aspirin. Wait for an ambulance. Do not try to drive yourself. · You have symptoms of a stroke. These may include:  ? Sudden numbness, tingling, weakness, or loss of movement in your face, arm, or leg, especially on only one side of your body. ? Sudden vision changes. ? Sudden trouble speaking. ? Sudden confusion or trouble understanding simple statements. ? Sudden problems with walking or balance. ? A sudden, severe headache that is different from past headaches. · You passed out (lost consciousness) again. Watch closely for changes in your health, and be sure to contact your doctor if:  · You do not get better as expected. Where can you learn more? Go to http://pinky-shamar.info/  Enter A848 in the search box to learn more about \"Fainting: Care Instructions. \"  Current as of: June 26, 2019               Content Version: 12.5  © 0147-3858 [employer]. Care instructions adapted under license by [employer] (which disclaims liability or warranty for this information). If you have questions about a medical condition or this instruction, always ask your healthcare professional. [place] [address] any warranty or liability for your use of this information. Patient Education        Urinary Tract Infection in Women: Care Instructions  Your Care Instructions     A urinary tract infection, or UTI, is a general term for an infection anywhere between the kidneys and the urethra (where urine comes out). Most UTIs are bladder infections. They often cause pain or burning when you urinate. UTIs are caused by bacteria and can be cured with antibiotics. Be sure to complete your treatment so that the infection goes away. Follow-up care is a key part of your treatment and safety.  Be sure to make and go to all appointments, and call your doctor if you are having problems. It's also a good idea to know your test results and keep a list of the medicines you take. How can you care for yourself at home? · Take your antibiotics as directed. Do not stop taking them just because you feel better. You need to take the full course of antibiotics. · Drink extra water and other fluids for the next day or two. This may help wash out the bacteria that are causing the infection. (If you have kidney, heart, or liver disease and have to limit fluids, talk with your doctor before you increase your fluid intake.)  · Avoid drinks that are carbonated or have caffeine. They can irritate the bladder. · Urinate often. Try to empty your bladder each time. · To relieve pain, take a hot bath or lay a heating pad set on low over your lower belly or genital area. Never go to sleep with a heating pad in place. To prevent UTIs  · Drink plenty of water each day. This helps you urinate often, which clears bacteria from your system. (If you have kidney, heart, or liver disease and have to limit fluids, talk with your doctor before you increase your fluid intake.)  · Urinate when you need to. · Urinate right after you have sex. · Change sanitary pads often. · Avoid douches, bubble baths, feminine hygiene sprays, and other feminine hygiene products that have deodorants. · After going to the bathroom, wipe from front to back. When should you call for help? Call your doctor now or seek immediate medical care if:  · Symptoms such as fever, chills, nausea, or vomiting get worse or appear for the first time. · You have new pain in your back just below your rib cage. This is called flank pain. · There is new blood or pus in your urine. · You have any problems with your antibiotic medicine.   Watch closely for changes in your health, and be sure to contact your doctor if:  · You are not getting better after taking an antibiotic for 2 days.  · Your symptoms go away but then come back. Where can you learn more? Go to http://www.gray.com/  Enter X114 in the search box to learn more about \"Urinary Tract Infection in Women: Care Instructions. \"  Current as of: August 22, 2019               Content Version: 12.5  © 0357-9153 Healthwise[employer]. Care instructions adapted under license by [employer] (which disclaims liability or warranty for this information). If you have questions about a medical condition or this instruction, always ask your healthcare professional. [address] any warranty or liability for your use of this information.

## 2020-07-04 NOTE — ED PROVIDER NOTES
EMERGENCY DEPARTMENT HISTORY AND PHYSICAL EXAM      Date: 7/4/2020  Patient Name: Matt Thomas    History of Presenting Illness     Chief Complaint   Patient presents with    Fall       History Provided By: Patient and EMS    HPI: Matt Thomas, 80 y.o. female presents to the ED with cc of ground-level fall. Patient is a resident of the [facility]. According to EMS, the patient was reaching for an item off of the floor and fell out of her chair. The patient is not sure what happened after she reached for the item on the floor. So apparently, she did n lose consciousness. She denied that she was dizzy prior to the fall. She complains of neck pain with movement. She states that if she is at rest, she has no neck pain. With movement, the pain is a 3 out of 10 in severity. She arrives to the ER in c-collar. She also states that she has a mild headache. She denies any new pain elsewhere. She states that intermittently she has lower back pain that preceded her fall today. She specifically denies any extremity pain, chest pain or shortness of breath. She denies cough, fever or chills. She also denies numbness or tingling. There are no other complaints, changes, or physical findings at this time. PCP: Kristan Gitelman, MD    No current facility-administered medications on file prior to encounter. Current Outpatient Medications on File Prior to Encounter   Medication Sig Dispense Refill    metoprolol tartrate (LOPRESSOR) 25 mg tablet TAKE ONE TABLET BY MOUTH 2 TIMES A  Tab 1    Lactobac.  rhamnosus GG-inulin ([address]) 10 billion cell -200 mg cpSP [facility] 10 billion cell-200 mg sprinkle capsule      albuterol (ProAir HFA) 90 mcg/actuation inhaler ProAir HFA 90 mcg/actuation aerosol inhaler      QUEtiapine (SEROquel) 25 mg tablet 1/2 tab in the mid-morning (10am) and 1 tab at bedtime 90 Tab 1    melatonin 5 mg tablet Take 1 or 2 tabs at bedtime for sleep 60 Tab 5    cyanocobalamin (Vitamin B-12) 100 mcg tablet Take 1 Tab by mouth daily. Indications: 500mcg 90 Tab 1    potassium chloride (KLOR-CON) 10 mEq tablet TAKE 1 TABLET BY MOUTH EVERY DAY. 30 Tab 2    acetaminophen (TYLENOL EXTRA STRENGTH) 500 mg tablet Take 1 Tab by mouth every eight (8) hours as needed for Pain. 100 Tab 2    cholecalciferol (VITAMIN D3) 25 mcg (1,000 unit) cap Take 2 Caps by mouth daily. Indications: taking 5,000u 90 Cap 1    disopyramide phosphate (NORPACE) 100 mg capsule TAKE ONE CAPSULE BY MOUTH TWICE A  Cap 4    sertraline (ZOLOFT) 50 mg tablet Take 1.5 Tabs by mouth daily. 45 Tab 5    furosemide (LASIX) 20 mg tablet TAKE ONE TABLET BY MOUTH EVERY DAY 30 Tab 5    nitroglycerin (NITROSTAT) 0.4 mg SL tablet 1 Tab by SubLINGual route every five (5) minutes as needed for Chest Pain. 25 Tab 1    cholecalciferol, VITAMIN D3, (VITAMIN D3) 5,000 unit tab tablet Take 1 Tab by mouth daily. (Patient taking differently: Take 1,000 Units by mouth daily.) 60 Tab 5    aspirin 81 mg chewable tablet Take 1 Tab by mouth daily. 120 Tab 5    Lactobacillus rhamnosus GG (CULTURELLE PO) Take  by mouth daily.  pantoprazole (PROTONIX) 40 mg tablet Take 1 Tab by mouth daily. (Patient taking differently: Take 40 mg by mouth two (2) times a day.) 30 Tab 12    polyethylene glycol (MIRALAX) 17 gram/dose powder Take 17 g by mouth daily.          Past History     Past Medical History:  Past Medical History:   Diagnosis Date    Acute diverticulitis 9/25/2017    Allergic rhinitis 9/25/2017    Arrhythmia     afib    Arthritis     Asthma     Atrial fibrillation ([address]     Benign essential tremor 9/25/2017    Cancer ([facility]     skin    Celiac disease 9/25/2017    Constipation 9/25/2017    Diabetic peripheral neuropathy associated with type 2 diabetes mellitus ([address] 4/21/2016    Diarrhea 9/25/2017    Diverticulosis of large intestine without hemorrhage 9/25/2017    Early satiety 2017    Fuchs' corneal dystrophy 2017    Gastroesophageal reflux disease without esophagitis 2017    GERD (gastroesophageal reflux disease)     High cholesterol     Hypercholesterolemia 2017    Hypertension     Irritable bowel syndrome with diarrhea 2017    Numbness 2017    Osteopenia 2017    Other ill-defined conditions(799.89)     collapsed lung prior to hernia repair surgery    Postmenopausal 2018    Ptosis, both eyelids 2017    Statin intolerance 2017    Unspecified adverse effect of anesthesia     pt states she went into cardiac arrest prior to hernia repair after the insertion of gas in stomach       Past Surgical History:  Past Surgical History:   Procedure Laterality Date    HX APPENDECTOMY      HX CHOLECYSTECTOMY      HX GI      hemorrhoidectomy    HX HEENT      sinus surgery    HX HERNIA REPAIR      HX TONSILLECTOMY         Family History:  Family History   Problem Relation Age of Onset    Heart Disease Mother     Dementia Mother     Heart Disease Father     Neuropathy Child     Depression Child     Other Child         diverticulitis    Lung Cancer Sister 80        lung ca       Social History:  Social History     Tobacco Use    Smoking status: Former Smoker     Packs/day: 0.75     Years: 5.00     Pack years: 3.75     Last attempt to quit: 10/11/1967     Years since quittin.7    Smokeless tobacco: Never Used   Substance Use Topics    Alcohol use: No    Drug use: No       Allergies: Allergies   Allergen Reactions    Bactrim [Sulfamethoprim Ds] Other (comments)     consipation    Ciprofloxacin (Bulk) Other (comments)     Low wbc    Prednisone Other (comments)     tachycardia    Shellfish Derived Other (comments)     Abdominal pain, Carried epi pen for this at one point.     Statins-Hmg-Coa Reductase Inhibitors Unknown (comments)     Stomach uipset and mild muscle aches         Review of Systems   Review of Systems Constitutional: Negative for fever. HENT: Negative for congestion. Eyes: Negative. Respiratory: Negative for shortness of breath. Cardiovascular: Negative for chest pain. Gastrointestinal: Negative for abdominal pain. Endocrine: Negative for heat intolerance. Genitourinary: Negative for dysuria. Musculoskeletal: Positive for neck pain. Negative for back pain. Skin: Negative for rash. Allergic/Immunologic: Negative for immunocompromised state. Neurological: Positive for headaches. Negative for dizziness and numbness. Hematological: Does not bruise/bleed easily. Psychiatric/Behavioral: Negative. All other systems reviewed and are negative. Physical Exam   Physical Exam  Vitals signs and nursing note reviewed. Constitutional:       General: She is not in acute distress. Appearance: She is well-developed. HENT:      Head: Normocephalic. Eyes:      Extraocular Movements: Extraocular movements intact. Pupils: Pupils are equal, round, and reactive to light. Neck:      Comments: In cervical collar  Cardiovascular:      Rate and Rhythm: Normal rate and regular rhythm. Heart sounds: Normal heart sounds. Pulmonary:      Effort: Pulmonary effort is normal.      Breath sounds: Normal breath sounds. Abdominal:      General: Bowel sounds are normal.      Palpations: Abdomen is soft. Tenderness: There is no abdominal tenderness. Musculoskeletal: Normal range of motion. Skin:     General: Skin is warm and dry. Neurological:      General: No focal deficit present. Mental Status: She is alert and oriented to person, place, and time.    Psychiatric:         Mood and Affect: Mood normal.         Behavior: Behavior normal.         Diagnostic Study Results     Labs -     Recent Results (from the past 12 hour(s))   CBC WITH AUTOMATED DIFF    Collection Time: 07/04/20  8:48 AM   Result Value Ref Range    WBC 4.5 3.6 - 11.0 K/uL    RBC 4.30 3.80 - 5.20 M/uL HGB 12.8 11.5 - 16.0 g/dL    HCT 38.6 35.0 - 47.0 %    MCV 89.8 80.0 - 99.0 FL    MCH 29.8 26.0 - 34.0 PG    MCHC 33.2 30.0 - 36.5 g/dL    RDW 13.5 11.5 - 14.5 %    PLATELET 437 867 - 762 K/uL    MPV 10.8 8.9 - 12.9 FL    NRBC 0.0 0  WBC    ABSOLUTE NRBC 0.00 0.00 - 0.01 K/uL    NEUTROPHILS 69 32 - 75 %    LYMPHOCYTES 16 12 - 49 %    MONOCYTES 13 5 - 13 %    EOSINOPHILS 1 0 - 7 %    BASOPHILS 1 0 - 1 %    IMMATURE GRANULOCYTES 0 0.0 - 0.5 %    ABS. NEUTROPHILS 3.2 1.8 - 8.0 K/UL    ABS. LYMPHOCYTES 0.7 (L) 0.8 - 3.5 K/UL    ABS. MONOCYTES 0.6 0.0 - 1.0 K/UL    ABS. EOSINOPHILS 0.0 0.0 - 0.4 K/UL    ABS. BASOPHILS 0.0 0.0 - 0.1 K/UL    ABS. IMM. GRANS. 0.0 0.00 - 0.04 K/UL    DF SMEAR SCANNED      RBC COMMENTS NORMOCYTIC, NORMOCHROMIC     METABOLIC PANEL, COMPREHENSIVE    Collection Time: 07/04/20  8:48 AM   Result Value Ref Range    Sodium 140 136 - 145 mmol/L    Potassium 3.7 3.5 - 5.1 mmol/L    Chloride 105 97 - 108 mmol/L    CO2 32 21 - 32 mmol/L    Anion gap 3 (L) 5 - 15 mmol/L    Glucose 103 (H) 65 - 100 mg/dL    BUN 28 (H) 6 - 20 MG/DL    Creatinine 0.90 0.55 - 1.02 MG/DL    BUN/Creatinine ratio 31 (H) 12 - 20      GFR est AA >60 >60 ml/min/1.73m2    GFR est non-AA 58 (L) >60 ml/min/1.73m2    Calcium 9.2 8.5 - 10.1 MG/DL    Bilirubin, total 0.5 0.2 - 1.0 MG/DL    ALT (SGPT) 16 12 - 78 U/L    AST (SGOT) 17 15 - 37 U/L    Alk.  phosphatase 73 45 - 117 U/L    Protein, total 6.7 6.4 - 8.2 g/dL    Albumin 3.5 3.5 - 5.0 g/dL    Globulin 3.2 2.0 - 4.0 g/dL    A-G Ratio 1.1 1.1 - 2.2     MAGNESIUM    Collection Time: 07/04/20  8:48 AM   Result Value Ref Range    Magnesium 2.1 1.6 - 2.4 mg/dL   TROPONIN I    Collection Time: 07/04/20  8:48 AM   Result Value Ref Range    Troponin-I, Qt. <0.05 <0.05 ng/mL   EKG, 12 LEAD, INITIAL    Collection Time: 07/04/20  8:53 AM   Result Value Ref Range    Ventricular Rate 83 BPM    Atrial Rate 83 BPM    P-R Interval 162 ms    QRS Duration 78 ms    Q-T Interval 376 ms    QTC Calculation (Bezet) 441 ms    Calculated P Axis 59 degrees    Calculated R Axis -18 degrees    Calculated T Axis 47 degrees    Diagnosis       Sinus rhythm with marked sinus arrhythmia  Moderate voltage criteria for LVH, may be normal variant  Inferior infarct (cited on or before 04-JUL-2020)  When compared with ECG of 04-NOV-2019 21:50,  Questionable change in initial forces of Inferior leads     URINALYSIS W/ RFLX MICROSCOPIC    Collection Time: 07/04/20  9:44 AM   Result Value Ref Range    Color YELLOW/STRAW      Appearance CLEAR CLEAR      Specific gravity 1.024 1.003 - 1.030      pH (UA) 6.0 5.0 - 8.0      Protein Negative NEG mg/dL    Glucose Negative NEG mg/dL    Ketone Negative NEG mg/dL    Bilirubin Negative NEG      Blood Negative NEG      Urobilinogen 1.0 0.2 - 1.0 EU/dL    Nitrites Negative NEG      Leukocyte Esterase SMALL (A) NEG         Radiologic Studies -   CT HEAD WO CONT   Final Result   IMPRESSION:   1. No evidence of acute intracranial abnormality. 2. 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 CT SPINE CERV WO CONT   Final Result   IMPRESSION:      1. No evidence of acute fracture. Degenerative changes as above. CT Results  (Last 48 hours)    None        CXR Results  (Last 48 hours)    None          Medical Decision Making   I am the first provider for this patient. I reviewed the vital signs, available nursing notes, past medical history, past surgical history, family history and social history. Vital Signs-Reviewed the patient's vital signs. Patient Vitals for the past 12 hrs:   Temp Pulse Resp BP SpO2   07/04/20 0828 98.9 °F (37.2 °C) 91 14 (!) 165/93 94 %       EKG interpretation: (Preliminary)  Rhythm: normal sinus rhythm; and regular . Rate (approx.): 83; Axis: normal; KY interval: normal; QRS interval: normal ; ST/T wave: non-specific changes;  Other findings: Improved. Records Reviewed: Nursing Notes, Old Medical Records, Previous electrocardiograms, Ambulance Run Sheet, Previous Radiology Studies and Previous Laboratory Studies    Provider Notes (Medical Decision Making):   Fracture, sprain, contusion, intracranial hemorrhage, dehydration, electrolyte abnormality, arrhythmia, UTI, anemia    ED Course:   Initial assessment performed. The patients presenting problems have been discussed, and they are in agreement with the care plan formulated and outlined with them. I have encouraged them to ask questions as they arise throughout their visit. Procedure note:    Cervical collar was removed by myself, Dr. Payan Meals note:    Patient is feeling better. Her results were reviewed. She is advised to follow-up and return to ER for       Critical Care Time:   \0    Disposition:  home      DISCHARGE PLAN:  1. Discharge Medication List as of 7/4/2020 11:32 AM        2. Follow-up Information     Follow up With Specialties Details Why Contact Info    Anmol Campbell MD Internal Medicine In 2 days As needed [address] [facility] [address].  [phone]      [facility] EMERGENCY DEPT Emergency Medicine  If symptoms worsen [address] [place] [address] Drive  [address] [facility]  [phone]        3. Return to ED if worse     Diagnosis     Clinical Impression:   1. Syncope and collapse    2. Closed head injury, initial encounter    3. Strain of neck muscle, initial encounter    4. Cervical arthritis    5. Acute cystitis with hematuria        Attestations:    Enrique Abreu MD    Please note that this dictation was completed with Smappo, the computer voice recognition software. Quite often unanticipated grammatical, syntax, homophones, and other interpretive errors are inadvertently transcribed by the computer software. Please disregard these errors. Please excuse any errors that have escaped final proofreading. Thank you.

## 2020-07-04 NOTE — ED NOTES
Pt arrives from [facility] via EMS reporting a GLF from her chair this am. EMS reports Pt was reaching to pick an item off the floor and she fell out of the chair. Pt states she does not know why she fell and denies dizziness at time of fall. Pt is complaining of neck pain with movement:  Pt arrives to ED wearing a C-collar. Pt is afebrile at this time.

## 2020-07-13 ENCOUNTER — TELEPHONE (OUTPATIENT)
Dept: INTERNAL MEDICINE CLINIC | Age: 85
End: 2020-07-13

## 2020-07-13 NOTE — LETTER
7/14/2020 12:44 PM 
 
Ms. John [address]va [address] [id number] To whom this may concern, Please have a urinalysis collected on Ms. Jadyn Ko. If there are any questions or concerns, please feel free to contact the office. Thank you.    
 
 
 
 
Sincerely, 
 
 
Page Cota MD

## 2020-07-13 NOTE — TELEPHONE ENCOUNTER
Caller's first and last name: Марина Parker       Reason for call: rx consultation       Callback required yes/no and why: N/A       Best contact number(s): [phone]       Details to clarify the request: caller requesting urinalysis order to be submitted to [facility] #[phone] Attn: Perri Alvarado

## 2020-07-14 ENCOUNTER — TELEPHONE (OUTPATIENT)
Dept: INTERNAL MEDICINE CLINIC | Age: 85
End: 2020-07-14

## 2020-07-14 NOTE — TELEPHONE ENCOUNTER
----- Message from Sergo Ed sent at 2020  1:14 PM EDT -----  Regarding: Dr Micah Penny / Return office call  To:  [facility]  Subject: Dr. Angelica Huang  Patient's first and last name: Joycie Aschoff  : 1926  ID numbers:  #[id number] [id number]    Caller's first and last name and relationship (if not the patient): Dagoberto Chao, daughter  Best contact number(s): [phone]  Whose call is being returned: Dr. Micah Penny  Details to clarify the request: n/a    Jackie Read

## 2020-07-14 NOTE — TELEPHONE ENCOUNTER
Vm left for pts daughter Sergey Handley that [address]smany [address] Dr. [facility] [place]; UA ordered and faxed to Betito Allan at [phone] w/ confirmation received. Left VM for Rachna Hamm (CDRLV) . Order faxed with confirmation received and to call back prn.

## 2020-07-15 NOTE — TELEPHONE ENCOUNTER
Pt Daughter called and VM left asking them to give us a call back at their earliest convenience. And notified that a UA was ordered and it was faxed to [facility]. But to call us back if she has any questions.

## 2020-07-21 ENCOUNTER — TELEPHONE (OUTPATIENT)
Dept: INTERNAL MEDICINE CLINIC | Age: 85
End: 2020-07-21

## 2020-07-21 NOTE — TELEPHONE ENCOUNTER
----- Message from Jeff East sent at 7/21/2020  3:59 PM EDT -----  Regarding: Tad Query MD  General Message/Vendor Calls    Caller's first and last name: Afsaneh at [facility]       Reason for call: Lab orders request       Callback required yes/no and why: Yes       Best contact number(s): [phone]      Details to clarify the request: Caller requesting lab orders-advise if she wants to treat pt's UTI, signed and sent back       Jeff East

## 2020-07-23 RX ORDER — AMOXICILLIN AND CLAVULANATE POTASSIUM 875; 125 MG/1; MG/1
1 TABLET, FILM COATED ORAL EVERY 12 HOURS
Qty: 14 TAB | Refills: 0 | Status: SHIPPED | OUTPATIENT
Start: 2020-07-23 | End: 2020-07-30

## 2020-07-25 RX ORDER — POTASSIUM CHLORIDE 750 MG/1
TABLET, FILM COATED, EXTENDED RELEASE ORAL
Qty: 30 TAB | Refills: 0 | Status: SHIPPED | OUTPATIENT
Start: 2020-07-25 | End: 2020-08-27

## 2020-08-12 DIAGNOSIS — F02.80 LEWY BODY PARKINSON DISEASE (HCC): ICD-10-CM

## 2020-08-12 DIAGNOSIS — G31.83 LEWY BODY PARKINSON DISEASE (HCC): ICD-10-CM

## 2020-08-12 DIAGNOSIS — R44.3 HALLUCINATIONS: ICD-10-CM

## 2020-08-12 RX ORDER — QUETIAPINE FUMARATE 25 MG/1
TABLET, FILM COATED ORAL
Qty: 90 TAB | Refills: 1 | Status: SHIPPED | OUTPATIENT
Start: 2020-08-12 | End: 2020-09-15

## 2020-08-12 NOTE — TELEPHONE ENCOUNTER
Requested Prescriptions     Pending Prescriptions Disp Refills    QUEtiapine (SEROquel) 25 mg tablet 90 Tab 1     Si/2 tab in the mid-morning (10am) and 1 tab at bedtime     She just needs the prescription for the 1/2 tab. She has 11 refills for the whole tab. She only has enough for today and tomorrow.

## 2020-08-13 ENCOUNTER — PATIENT MESSAGE (OUTPATIENT)
Dept: NEUROLOGY | Facility: CLINIC | Age: 85
End: 2020-08-13

## 2020-08-13 NOTE — TELEPHONE ENCOUNTER
From: Emelia Liao  To: Dasia Edouard DO  Sent: 8/13/2020 12:59 PM EDT  Subject: Prescription Question    The most recent refill we requested for Quetiapine was sent to [facility] in [place]. This is incorrect. Her normal pharmacy is [facility] in [place]. We only use [facility] if it is weekend/after hours and urgent. I have contacted [facility] and they will work with [facility] to get it transferred but going forward, please send all refills to [facility] unless otherwise requested.      Thanks,  Ruth Wolf (daughter of Uriah Murphylibertad)

## 2020-08-17 DIAGNOSIS — G31.83 LEWY BODY PARKINSON DISEASE (HCC): ICD-10-CM

## 2020-08-17 DIAGNOSIS — R44.3 HALLUCINATIONS: ICD-10-CM

## 2020-08-17 DIAGNOSIS — F02.80 LEWY BODY PARKINSON DISEASE (HCC): ICD-10-CM

## 2020-08-19 RX ORDER — CHOLECALCIFEROL (VITAMIN D3) 125 MCG
CAPSULE ORAL
Qty: 60 TAB | Refills: 5 | Status: SHIPPED | OUTPATIENT
Start: 2020-08-19 | End: 2020-11-18 | Stop reason: DRUGHIGH

## 2020-08-21 ENCOUNTER — OFFICE VISIT (OUTPATIENT)
Dept: CARDIOLOGY CLINIC | Age: 85
End: 2020-08-21
Payer: MEDICARE

## 2020-08-21 VITALS
BODY MASS INDEX: 21.07 KG/M2 | RESPIRATION RATE: 18 BRPM | HEIGHT: 62 IN | WEIGHT: 114.5 LBS | HEART RATE: 87 BPM | SYSTOLIC BLOOD PRESSURE: 120 MMHG | DIASTOLIC BLOOD PRESSURE: 86 MMHG

## 2020-08-21 DIAGNOSIS — I48.0 PAROXYSMAL ATRIAL FIBRILLATION (HCC): Primary | ICD-10-CM

## 2020-08-21 DIAGNOSIS — R07.89 OTHER CHEST PAIN: ICD-10-CM

## 2020-08-21 DIAGNOSIS — I10 ESSENTIAL HYPERTENSION: Chronic | ICD-10-CM

## 2020-08-21 PROCEDURE — 1100F PTFALLS ASSESS-DOCD GE2>/YR: CPT | Performed by: INTERNAL MEDICINE

## 2020-08-21 PROCEDURE — 99214 OFFICE O/P EST MOD 30 MIN: CPT | Performed by: INTERNAL MEDICINE

## 2020-08-21 PROCEDURE — G8420 CALC BMI NORM PARAMETERS: HCPCS | Performed by: INTERNAL MEDICINE

## 2020-08-21 PROCEDURE — G8427 DOCREV CUR MEDS BY ELIG CLIN: HCPCS | Performed by: INTERNAL MEDICINE

## 2020-08-21 PROCEDURE — G9717 DOC PT DX DEP/BP F/U NT REQ: HCPCS | Performed by: INTERNAL MEDICINE

## 2020-08-21 PROCEDURE — 93010 ELECTROCARDIOGRAM REPORT: CPT | Performed by: INTERNAL MEDICINE

## 2020-08-21 PROCEDURE — 3288F FALL RISK ASSESSMENT DOCD: CPT | Performed by: INTERNAL MEDICINE

## 2020-08-21 PROCEDURE — G8536 NO DOC ELDER MAL SCRN: HCPCS | Performed by: INTERNAL MEDICINE

## 2020-08-21 PROCEDURE — 1090F PRES/ABSN URINE INCON ASSESS: CPT | Performed by: INTERNAL MEDICINE

## 2020-08-21 RX ORDER — ADHESIVE BANDAGE
30 BANDAGE TOPICAL DAILY PRN
COMMUNITY

## 2020-08-21 NOTE — PROGRESS NOTES
1. Have you been to the ER, urgent care clinic since your last visit? [id number]. 2. Have you seen or consulted any other health care providers outside of the [address] [place] [address] since your last visit? Include any pap smears or colon screening.  No

## 2020-08-24 ENCOUNTER — TELEPHONE (OUTPATIENT)
Dept: CARDIOLOGY CLINIC | Age: 85
End: 2020-08-24

## 2020-08-24 NOTE — TELEPHONE ENCOUNTER
----- [facility] sent at 8/24/2020 10:52 AM EDT -----  Patient seen last week---Metoprolol Tartrate 25 mg BID.   So pls send this order to facility

## 2020-08-27 RX ORDER — POTASSIUM CHLORIDE 750 MG/1
TABLET, FILM COATED, EXTENDED RELEASE ORAL
Qty: 30 TAB | Refills: 0 | Status: SHIPPED | OUTPATIENT
Start: 2020-08-27 | End: 2020-09-23

## 2020-08-31 NOTE — PROGRESS NOTES
Pt last seen vv 5/11/20. Here for routine care  
  
  
Recall: the patient was dx with corona virus earlier in April She was briefly home with her daugher for 2 weeks and [place] Was tested positve 15th of April and was positive for corona but never had sx No cough or fever or other sx. However it does hurt to breath at times Lungs overall clear per nurse who examined her w me, She went to ED 7/4/20 for a fall Reviewed ct head 7/4/20: 1. No evidence of acute intracranial abnormality. 2. Unchanged moderate chronic microvascular ischemic disease and chronic 
infarcts in the right basal ganglia and bilateral cerebellum. Reviewed ct c-spine 7/4/20: 1. No evidence of acute fracture. Degenerative changes as above. Bp is controlled  
Continues on metoprolol 25mg bid Continues on lasix 20 mg  
Not having issues with swelling in her ankles 
  
Wt was 114 lb lov Her wt is in the nl ranges 
  
  
Reviewed labs 3/20 
  
Pt follows wt Dr Mary Lucio (neuro) for tremor  and hallucinations Reviewed note 1011/20:89-year-old woman with Lewy body disease. We cannot use Nuplazid due to the concurrent Norpace for cardiac purposes. We are going to do increase Seroquel by adding a half a tablet midmorning for the daytime hallucinations. Continue nighttime dosing 25 mg. Frequent redirection. Stop melatonin since it does not seem to be helping. We will be able to reassess if there was any benefit by briefly discontinuing it. Family understands that Seroquel does carry a black box warning however this is for quality of life issues. I reviewed and decided to continue the current medications. Pt had been being treated for Parkinson's but the neuro decided it may not be this so they stopped this treatment Has chronic tremors and hallucinations She is on seroquel 25mg for sleep and hallucinations -- She still gets the hallucinations on and off see above she knows they are not real but they are disturbing to have 
  
  
  
Pt follows with Dr Luis Reilly (cardio) for a fib Reviewed note 8/21/20: Paroxysmal atrial fibrillation ([address] Maintaining sinus rhythm Continue rate control therapies Continue ASA only given no recurrence in many years Essential hypertension BP controlled. Continue anti-hypertensive therapy and low sodium diet Other chest pain Atypical symptoms in the setting of large hiatal hernia -- prohibitive risk for surgery Kitty Essex in 6/2018 normal 
Echo done 6/2018 with preserved ejection fraction 60-65% with grade 1 DD Continue NTG as prescribed -- taking 1 dose which resolves pain If she has no relief in symptoms despite NTGx3 in 15 minutes, then she must go to ER 
  
  
Pt is on norpace 100mg BID  
On asa 81 
  
  
Pt sees Dr Tena Ospina (GI) for diverticulosis Pt also has a large hiatal hernia Pt also has a gluten sensitivity and mostly avoids this 
  
Pt takes protonix 40mg bid sx controlled 
  
  
  
Pt saw Dr Echo Mejía (neuropsych) in the past 
Last visit was 2015 - no dementia 
  
Pt saw Dr Russ Gilliland (ENT) for hearing loss Last visit was 12/18/18 Pt decided not to get hearing aids 
  
Pt sees Dr Garry Casas (ortho) for low back pain  
Last visit was 1/9/19 Previously, provided info for Dr Jef Montemayor She had been taking tramadol but had SE so was switched to tylenol which helps somewhat Continues on tylenol for this  
  
  
Pt declined taking reclast and fosamax in the past 
Pt is taking vit D 5000U daily 
  
  
Pt is on ASA 81mg daily 
  
Pt takes miralax for constipation This works well as long as she takes it daily occasionally takes extra dose 
  
  
Continues on zoloft 75 mg  
  
  
  
Continues on klor con 10meqs  
  
  
She saw Dr Garibay Boron gyn) for this thick fecal discharge coming form her vagina Last visit was 11/19  
She states this has resolved so he is planning on just observing this  
  
  
  
  
  
  
 ACP on file. SDMs are her daughters, Purnima Norwood and Luis F Tan. 
  
PREVENTIVE: 
Colonoscopy: ~10 years ago with Dr Speedy Fan, per pt the way her colon is formed makes it difficult to get COLOs, her declines further Pap: 11/19 with Dr Lenin Sheehan further Dexa: 8/18, osteoporosis, declines further tx other than vit d  
Tdap: 10/19  
Prevnar 13: 03/01/19  
Pneumovax: 03/09/20  
Shingrix: both rounds completed  
Flu shot: Fall 2019  
A1C: 3/19 5.9 9/19 5.7 3/20 5.8  
Micro: 9/19  
Eye exam: Dr Rajan Pastrana ~summer 2018, Dr Farzad Carlos, due this month  
Lipids: 3/19

## 2020-09-02 RX ORDER — METOPROLOL TARTRATE 25 MG/1
TABLET, FILM COATED ORAL
Qty: 270 TAB | Refills: 1 | Status: SHIPPED | OUTPATIENT
Start: 2020-09-02 | End: 2020-11-18 | Stop reason: DRUGHIGH

## 2020-09-02 NOTE — TELEPHONE ENCOUNTER
replied back to the patient as I had seen her that day and we did discuss dosing the metoprolol as she stated. Please write a rx for metoprolol to state the following: Take 25 mg tablet twice a day, 1 at 9AM and 1 at [facility].   May take additional 12.5 mg metoprolol as needed ONLY IF BP remains > 150/90 ONE HOUR after taking regular metoprolol dose.      Thanks,

## 2020-09-02 NOTE — PROGRESS NOTES
HISTORY OF PRESENT ILLNESS  Hugo Mcfarlane is a 80 y.o. female. HPI  Pt last seen vv 5/11/20. Here for routine care. This is an established visit completed with telemedicine was completed with video assist  the patient acknowledges and agrees to this method of visitation santiago    Recall: the patient was dx with corona virus earlier in April   She was briefly home with her daugher for 2 weeks and [place]   Was tested positve 15th of April and was positive for corona but never had sx   No cough or fever or other sx. However it does hurt to breath at times   Lungs overall clear per nurse who examined her w me      She went to ED 7/4/20 for a fall  Reviewed ct head 7/4/20: 1. No evidence of acute intracranial abnormality. 2. 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 Reviewed ct c-spine 7/4/20: 1. No evidence of acute fracture. Degenerative changes as above. Lives at the [facility]      BP is controlled 160/80  Was 120 /70 merary at heart   Has labile bp   Continues on metoprolol 25mg bid   Continues on lasix 20 mg   Not having issues with swelling in her ankles --      Wt was 114 lb lov --116 at hojme today   Her wt is in the nl ranges       Reviewed labs 3/20   Pt follows wt Dr Maria Teresa Balbuena (neuro) for tremor and hallucinations   Reviewed note 1011/20:89-year-old woman with Lewy body disease. We cannot use Nuplazid due to the concurrent Norpace for cardiac purposes. We are going to do increase Seroquel by adding a half a tablet midmorning for the daytime hallucinations. Continue nighttime dosing 25 mg. Frequent redirection. Stop melatonin since it does not seem to be helping. We will be able to reassess if there was any benefit by briefly discontinuing it. Family understands that Seroquel does carry a black box warning however this is for quality of life issues.    I reviewed and decided to continue the current medications. Pt had been being treated for Parkinson's but the neuro decided it may not be this so they stopped this treatment   Has chronic tremors and hallucinations   She is on seroquel 25mg for sleep and hallucinations --   Also gets another half in the morning. She still gets the hallucinations on and off see above she knows they are not real but they are disturbing to have         Talking melatonin 5-10mg at bedtime       Pt follows with Dr Natanael Senior (cardio) for a fib   Reviewed note 8/21/20: Paroxysmal atrial fibrillation (HCC)   Maintaining sinus rhythm   Continue rate control therapies     bp was well controlled there  Continue ASA only given no recurrence in many years       Essential hypertension   BP controlled. in general .   Continue anti-hypertensive therapy and low sodium diet   Other chest pain   Atypical symptoms in the setting of large hiatal hernia -- prohibitive risk for surgery   Lexiscan in 6/2018 normal   Echo done 6/2018 with preserved ejection fraction 60-65% with grade 1 DD   Continue NTG as prescribed -- taking 1 dose which resolves pain   If she has no relief in symptoms despite NTGx3 in 15 minutes, then she must go to ER   Pt is on norpace 100mg BID       Pt sees Dr Dale Tinoco (GI) for diverticulosis   Pt also has a large hiatal hernia   Pt also has a gluten sensitivity and mostly avoids this   Pt takes protonix 40mg bid sx controlled works well      klor con 10meq daily       Pt saw Dr Mauro Theodore (neuropsych) in the past   Last visit was 2015 - no dementia     Pt saw Dr Lauren Alcantara (ENT) for hearing loss   Last visit was 12/18/18  Pt decided not to get hearing aids     Pt sees Dr James Castro (ortho) for low back pain   Last visit was 1/9/19   Previously, provided info for Dr Nicho Clifton  She had been taking tramadol but had SE so was switched to tylenol which helps somewhat   Continues on tylenol for this       Pt declined taking reclast and fosamax in the past     Pt is taking vit D 5000U daily       Pt takes miralax for constipation   This works well as long as she takes it daily occasionally takes extra dose     Continues on zoloft 75 mg helps w mood/depression , happy w dose     Continues on klor con 10meqs       She saw Dr Vicky Stokes (uro gyn) for this thick fecal discharge coming form her vagina   Last visit was 11/19   She states this has resolved so he is planning on just observing this       ACP on file. SDMs are her daughters, Bryanna Lebron and Tyler Guerrier.                PREVENTIVE:   Colonoscopy: ~10 years ago with Dr Bari Bustillos, per pt the way her colon is formed makes it difficult to get COLOs, her declines further   Pap: 11/19 with Dr Taylor Coad: declines further  Dexa: 8/18, osteoporosis, declines further tx other than vit d   Tdap: 10/19   Prevnar 13: 03/01/19   Pneumovax: 03/09/20   Shingrix: both rounds completed   Flu shot: Fall 2019   A1C: 3/19 5.9 9/19 5.7 3/20 5.8   Micro: 9/19   Eye exam: Dr Eric Agrawal ~summer 2018, Dr Laura Brady 7/20  Lipids: 3/19     Patient Active Problem List    Diagnosis Date Noted    Depression, major, recurrent, mild ([facility] 06/04/2019    Pleural effusion 04/18/2019    History of stroke 06/11/2018    Gastroesophageal reflux disease without esophagitis 09/25/2017    Diverticulosis of large intestine without hemorrhage 09/25/2017    Benign essential tremor 09/25/2017    Irritable bowel syndrome with diarrhea 09/25/2017    Hypercholesterolemia 09/25/2017    Allergic rhinitis 09/25/2017    Fuchs' corneal dystrophy 09/25/2017    Idiopathic small and large fiber sensory neuropathy 04/21/2016    Diabetic peripheral neuropathy associated with type 2 diabetes mellitus ([facility] [address] 04/21/2016    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 04/21/2016    Anxiety 07/24/2015    Cerebrovascular disease, unspecified 05/03/2015    B12 deficiency 04/20/2015    Osteoporosis 04/20/2015    Memory loss 04/20/2015    Celiac sprue 05/14/2013    Atrial fibrillation (HCC)     Hypertension      Current Outpatient Medications   Medication Sig Dispense Refill    metoprolol tartrate (LOPRESSOR) 25 mg tablet Metoprolol 25 mg 1 @ 900 AM and 1 @ 700 PM an additional 12.5 mg if  BP > 150/90  one hour after taking regular dose 270 Tab 1    potassium chloride SR (KLOR-CON 10) 10 mEq tablet TAKE 1 TABLET BY MOUTH EVERY DAY. 30 Tab 0    magnesium hydroxide (Baez Milk of Magnesia) 400 mg/5 mL suspension Take 30 mL by mouth daily as needed for Constipation.  melatonin 5 mg tablet Take 1 or 2 tabs at bedtime for sleep 60 Tab 5    QUEtiapine (SEROquel) 25 mg tablet 1/2 tab in the mid-morning (10am) and 1 tab at bedtime 90 Tab 1    Lactobac. rhamnosus GG-inulin ([address] 13) 10 billion cell -200 mg cpSP Culturelle Digestive Health 10 billion cell-200 mg sprinkle capsule      cyanocobalamin (Vitamin B-12) 100 mcg tablet Take 1 Tab by mouth daily. Indications: 500mcg 90 Tab 1    acetaminophen (TYLENOL EXTRA STRENGTH) 500 mg tablet Take 1 Tab by mouth every eight (8) hours as needed for Pain. 100 Tab 2    disopyramide phosphate (NORPACE) 100 mg capsule TAKE ONE CAPSULE BY MOUTH TWICE A  Cap 4    sertraline (ZOLOFT) 50 mg tablet Take 1.5 Tabs by mouth daily. 45 Tab 5    furosemide (LASIX) 20 mg tablet TAKE ONE TABLET BY MOUTH EVERY DAY 30 Tab 5    nitroglycerin (NITROSTAT) 0.4 mg SL tablet 1 Tab by SubLINGual route every five (5) minutes as needed for Chest Pain. 25 Tab 1    cholecalciferol, VITAMIN D3, (VITAMIN D3) 5,000 unit tab tablet Take 1 Tab by mouth daily. 60 Tab 5    aspirin 81 mg chewable tablet Take 1 Tab by mouth daily. 120 Tab 5    Lactobacillus rhamnosus GG (CULTURELLE PO) Take  by mouth daily.  pantoprazole (PROTONIX) 40 mg tablet Take 1 Tab by mouth daily.  (Patient taking differently: Take 40 mg by mouth two (2) times a day.) 30 Tab 12    polyethylene glycol (MIRALAX) 17 gram/dose powder Take 17 g by mouth daily.        Past Medical History:   Diagnosis Date    Acute diverticulitis 2017    Allergic rhinitis 2017    Arrhythmia     afib    Arthritis     Asthma     Atrial fibrillation ([facility] [address]     Benign essential tremor 2017    Cancer ([facility] [address]     skin    Celiac disease 2017    Constipation 2017    Diabetic peripheral neuropathy associated with type 2 diabetes mellitus ([facility] [address] 2016    Diarrhea 2017    Diverticulosis of large intestine without hemorrhage 2017    Early satiety 2017    Fuchs' corneal dystrophy 2017    Gastroesophageal reflux disease without esophagitis 2017    GERD (gastroesophageal reflux disease)     High cholesterol     Hypercholesterolemia 2017    Hypertension     Irritable bowel syndrome with diarrhea 2017    Numbness 2017    Osteopenia 2017    Other ill-defined conditions(799.89)     collapsed lung prior to hernia repair surgery    Postmenopausal 2018    Ptosis, both eyelids 2017    Statin intolerance 2017    Unspecified adverse effect of anesthesia     pt states she went into cardiac arrest prior to hernia repair after the insertion of gas in stomach     Past Surgical History:   Procedure Laterality Date    HX APPENDECTOMY      HX CHOLECYSTECTOMY      HX GI      hemorrhoidectomy    HX HEENT      sinus surgery    HX HERNIA REPAIR      HX TONSILLECTOMY       Family History   Problem Relation Age of Onset    Heart Disease Mother     Dementia Mother     Heart Disease Father     Neuropathy Child     Depression Child     Other Child         diverticulitis    Lung Cancer Sister 80        lung ca     Social History     Tobacco Use    Smoking status: Former Smoker     Packs/day: 0.75     Years: 5.00     Pack years: 3.75     Last attempt to quit: 10/11/1967     Years since quittin.9    Smokeless tobacco: Never Used   Substance Use Topics    Alcohol use: No      Lab Results Component Value Date/Time    WBC 4.5 07/04/2020 08:48 AM    WBC (POC) 5.6 07/23/2018 03:07 PM    HGB 12.8 07/04/2020 08:48 AM    HGB (POC) 15.0 07/23/2018 03:07 PM    HCT 38.6 07/04/2020 08:48 AM    HCT (POC) 45.9 07/23/2018 03:07 PM    PLATELET 240 64/25/0685 08:48 AM    PLATELET (POC) 309.5 07/23/2018 03:07 PM    MCV 89.8 07/04/2020 08:48 AM    MCV (POC) 90.0 07/23/2018 03:07 PM     Lab Results   Component Value Date/Time    Cholesterol, total 246 (H) 03/01/2019 10:15 AM    HDL Cholesterol 76 03/01/2019 10:15 AM    LDL, calculated 154 (H) 03/01/2019 10:15 AM    Triglyceride 82 03/01/2019 10:15 AM     Lab Results   Component Value Date/Time    GFR est non-AA 58 (L) 07/04/2020 08:48 AM    GFRNA, POC >60 10/15/2018 01:54 PM    GFR est AA >60 07/04/2020 08:48 AM    GFRAA, POC >60 10/15/2018 01:54 PM    Creatinine 0.90 07/04/2020 08:48 AM    Creatinine (POC) 0.8 10/15/2018 01:54 PM    BUN 28 (H) 07/04/2020 08:48 AM    Sodium 140 07/04/2020 08:48 AM    Potassium 3.7 07/04/2020 08:48 AM    Chloride 105 07/04/2020 08:48 AM    CO2 32 07/04/2020 08:48 AM    Magnesium 2.1 07/04/2020 08:48 AM        Review of Systems   Respiratory: Negative for shortness of breath. Cardiovascular: Negative for chest pain. Physical Exam  Constitutional:       General: She is not in acute distress. Appearance: Normal appearance. She is not ill-appearing, toxic-appearing or diaphoretic. HENT:      Head: Normocephalic and atraumatic. Eyes:      General:         Right eye: No discharge. Left eye: No discharge. Conjunctiva/sclera: Conjunctivae normal.   Neurological:      General: No focal deficit present. Mental Status: She is alert and oriented to person, place, and time. Psychiatric:         Mood and Affect: Mood normal.         Behavior: Behavior normal.         ASSESSMENT and PLAN    ICD-10-CM ICD-9-CM    1.  Medicare annual wellness visit, subsequent  Z00.00 V70.0 LIPID PANEL      METABOLIC PANEL, COMPREHENSIVE      CBC W/O DIFF      HEMOGLOBIN A1C WITH EAG      TSH 3RD GENERATION   2. Paroxysmal atrial fibrillation (HCC)  I48.0 427.31 LIPID PANEL   Up-to-date with cardiology on aspirin rate controlled on metoprolol   METABOLIC PANEL, COMPREHENSIVE      CBC W/O DIFF      HEMOGLOBIN A1C WITH EAG      TSH 3RD GENERATION   3. Depression, major, recurrent, mild (HCC)  F33.0 296.31 LIPID PANEL   Stable on Zoloft doing well    Has a history of hallucinations follows with neurology for this has mild cognitive impairment as well    On Seroquel currently taking half dose in the morning and a full tablet in the evening   METABOLIC PANEL, COMPREHENSIVE      CBC W/O DIFF      HEMOGLOBIN A1C WITH EAG      TSH 3RD GENERATION   4. Essential hypertension  I10 401.9 LIPID PANEL   Controlled metoprolol twice daily    She has labile blood pressure it was great at cardiologist office discussed monitoring blood pressure when calm and at rest no change to dose   METABOLIC PANEL, COMPREHENSIVE      CBC W/O DIFF      HEMOGLOBIN A1C WITH EAG      TSH 3RD GENERATION   5. Gastroesophageal reflux disease without esophagitis  K21.9 530.81 LIPID PANEL      METABOLIC PANEL, COMPREHENSIVE      CBC W/O DIFF      HEMOGLOBIN A1C WITH EAG      TSH 3RD GENERATION   6. Anxiety doing well on Zoloft continue F41.9 300.00 LIPID PANEL      METABOLIC PANEL, COMPREHENSIVE      CBC W/O DIFF      HEMOGLOBIN A1C WITH EAG      TSH 3RD GENERATION   7. Hypercholesterolemia  E78.00 272.0 LIPID PANEL      METABOLIC PANEL, COMPREHENSIVE   Diet controlled   CBC W/O DIFF      HEMOGLOBIN A1C WITH EAG      TSH 3RD GENERATION   8. Fuchs' corneal dystrophy  H18.51 371.57 LIPID PANEL      METABOLIC PANEL, COMPREHENSIVE   Follows with ophthalmology   CBC W/O DIFF      HEMOGLOBIN A1C WITH EAG      TSH 3RD GENERATION   9.  Age-related osteoporosis without current pathological fracture  M81.0 733.01 LIPID PANEL   On but on vitamin D not interested in further bone densities METABOLIC PANEL, COMPREHENSIVE      CBC W/O DIFF      HEMOGLOBIN A1C WITH EAG      TSH 3RD GENERATION   10. Pleural effusion         continue on Lasix to prevent recurrence J90 511.9 LIPID PANEL      METABOLIC PANEL, COMPREHENSIVE      CBC W/O DIFF      HEMOGLOBIN A1C WITH EAG      TSH 3RD GENERATION   11. Irritable bowel syndrome with diarrhea  K58.0 564.1 LIPID PANEL   Stable on a probiotic   METABOLIC PANEL, COMPREHENSIVE      CBC W/O DIFF      HEMOGLOBIN A1C WITH EAG      TSH 3RD GENERATION   12. IFG (impaired fasting glucose)  R73.01 790.21 LIPID PANEL   Monitor A1c diet controlled   METABOLIC PANEL, COMPREHENSIVE      CBC W/O DIFF      HEMOGLOBIN A1C WITH EAG      TSH 3RD GENERATION        Discussed aphthous ulcers--idiopathic can try biotin supplement       Current diagnosis and concerns discussed with pt at length. Pt understands risks and benefits or current treatment plan and medications, and accepts the treatment and medication with any possible risks. Pt asks appropriate questions, which were answered. Pt was instructed to call with any concerns or problems. This is the Subsequent Medicare Annual Wellness Exam, performed 12 months or more after the Initial AWV or the last Subsequent AWV    I have reviewed the patient's medical history in detail and updated the computerized patient record.      History     Patient Active Problem List   Diagnosis Code    Atrial fibrillation (HCC) I48.91    Hypertension I10    Celiac sprue K90.0    B12 deficiency E53.8    Osteoporosis M81.0    Memory loss R41.3    Cerebrovascular disease, unspecified I67.9    Anxiety F41.9    Idiopathic small and large fiber sensory neuropathy G60.8    Diabetic peripheral neuropathy associated with type 2 diabetes mellitus (HCC) E11.42    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 I65.23    Gastroesophageal reflux disease without esophagitis K21.9    Diverticulosis of large intestine without hemorrhage K57.30    Benign essential tremor G25.0    Irritable bowel syndrome with diarrhea K58.0    Hypercholesterolemia E78.00    Allergic rhinitis J30.9    Fuchs' corneal dystrophy H18.51    History of stroke Z86.73    Pleural effusion J90    Depression, major, recurrent, 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     Past Medical History:   Diagnosis Date    Acute diverticulitis 9/25/2017    Allergic rhinitis 9/25/2017    Arrhythmia     afib    Arthritis     Asthma     Atrial fibrillation ([facility] [address]     Benign essential tremor 9/25/2017    Cancer ([facility]     skin    Celiac disease 9/25/2017    Constipation 9/25/2017    Diabetic peripheral neuropathy associated with type 2 diabetes mellitus ([facility] [address] 4/21/2016    Diarrhea 9/25/2017    Diverticulosis of large intestine without hemorrhage 9/25/2017    Early satiety 9/25/2017    Fuchs' corneal dystrophy 9/25/2017    Gastroesophageal reflux disease without esophagitis 9/25/2017    GERD (gastroesophageal reflux disease)     High cholesterol     Hypercholesterolemia 9/25/2017    Hypertension     Irritable bowel syndrome with diarrhea 9/25/2017    Numbness 9/25/2017    Osteopenia 9/25/2017    Other ill-defined conditions(799.89)     collapsed lung prior to hernia repair surgery    Postmenopausal 7/23/2018    Ptosis, both eyelids 9/25/2017    Statin intolerance 9/25/2017    Unspecified adverse effect of anesthesia     pt states she went into cardiac arrest prior to hernia repair after the insertion of gas in stomach      Past Surgical History:   Procedure Laterality Date    HX APPENDECTOMY      HX CHOLECYSTECTOMY      HX GI      hemorrhoidectomy    HX HEENT      sinus surgery    HX HERNIA REPAIR      HX TONSILLECTOMY       Current Outpatient Medications   Medication Sig Dispense Refill    metoprolol tartrate (LOPRESSOR) 25 mg tablet Take 0.5 Tabs by mouth two (2) times daily as needed (if BP > 150/90 1 hour after taking regular 25 mg dose).  90 Tab 0    metoprolol tartrate (LOPRESSOR) 25 mg tablet Metoprolol 25 mg 1 @ 900 AM and 1 @ 700 PM an additional 12.5 mg if  BP > 150/90  one hour after taking regular dose 270 Tab 1    potassium chloride SR (KLOR-CON 10) 10 mEq tablet TAKE 1 TABLET BY MOUTH EVERY DAY. 30 Tab 0    magnesium hydroxide (Baez Milk of Magnesia) 400 mg/5 mL suspension Take 30 mL by mouth daily as needed for Constipation.  melatonin 5 mg tablet Take 1 or 2 tabs at bedtime for sleep 60 Tab 5    QUEtiapine (SEROquel) 25 mg tablet 1/2 tab in the mid-morning (10am) and 1 tab at bedtime 90 Tab 1    Lactobac. rhamnosus GG-inulin ([address] 13) 10 billion cell -200 mg cpSP Culturelle Digestive Health 10 billion cell-200 mg sprinkle capsule      cyanocobalamin (Vitamin B-12) 100 mcg tablet Take 1 Tab by mouth daily. Indications: 500mcg 90 Tab 1    acetaminophen (TYLENOL EXTRA STRENGTH) 500 mg tablet Take 1 Tab by mouth every eight (8) hours as needed for Pain. 100 Tab 2    disopyramide phosphate (NORPACE) 100 mg capsule TAKE ONE CAPSULE BY MOUTH TWICE A  Cap 4    sertraline (ZOLOFT) 50 mg tablet Take 1.5 Tabs by mouth daily. 45 Tab 5    furosemide (LASIX) 20 mg tablet TAKE ONE TABLET BY MOUTH EVERY DAY 30 Tab 5    nitroglycerin (NITROSTAT) 0.4 mg SL tablet 1 Tab by SubLINGual route every five (5) minutes as needed for Chest Pain. 25 Tab 1    cholecalciferol, VITAMIN D3, (VITAMIN D3) 5,000 unit tab tablet Take 1 Tab by mouth daily. 60 Tab 5    aspirin 81 mg chewable tablet Take 1 Tab by mouth daily. 120 Tab 5    Lactobacillus rhamnosus GG (CULTURELLE PO) Take  by mouth daily.  pantoprazole (PROTONIX) 40 mg tablet Take 1 Tab by mouth daily. (Patient taking differently: Take 40 mg by mouth two (2) times a day.) 30 Tab 12    polyethylene glycol (MIRALAX) 17 gram/dose powder Take 17 g by mouth daily.        Allergies   Allergen Reactions    Bactrim [Sulfamethoprim Ds] Other (comments)     consipation    Ciprofloxacin (Bulk) Other (comments)     Low wbc    Prednisone Other (comments)     tachycardia    Shellfish Derived Other (comments)     Abdominal pain, Carried epi pen for this at one point.  Statins-Hmg-Coa Reductase Inhibitors Unknown (comments)     Stomach uipset and mild muscle aches       Family History   Problem Relation Age of Onset    Heart Disease Mother     Dementia Mother     Heart Disease Father     Neuropathy Child     Depression Child     Other Child         diverticulitis    Lung Cancer Sister 80        lung ca     Social History     Tobacco Use    Smoking status: Former Smoker     Packs/day: 0.75     Years: 5.00     Pack years: 3.75     Last attempt to quit: 10/11/1967     Years since quittin.9    Smokeless tobacco: Never Used   Substance Use Topics    Alcohol use: No       Depression Risk Factor Screening:     3 most recent PHQ Screens 2020   Little interest or pleasure in doing things Not at all   Feeling down, depressed, irritable, or hopeless Not at all   Total Score PHQ 2 0   Trouble falling or staying asleep, or sleeping too much -   Feeling tired or having little energy -   Poor appetite, weight loss, or overeating -   Feeling bad about yourself - or that you are a failure or have let yourself or your family down -   Trouble concentrating on things such as school, work, reading, or watching TV -   Moving or speaking so slowly that other people could have noticed; or the opposite being so fidgety that others notice -   Thoughts of being better off dead, or hurting yourself in some way -   PHQ 9 Score -       Alcohol Risk Factor Screening:   Do you average 1 drink per night or more than 7 drinks a week:  No    On any one occasion in the past three months have you have had more than 3 drinks containing alcohol:  No    No alcohol  Functional Ability and Level of Safety:   Hearing: The patient needs further evaluation. --had this checked out, hearing aides too expensive, could not handle them      Activities of Daily Living: The home contains: handrails and grab bars  Patient needs help with:  phone, transportation, shopping, preparing meals, laundry, housework, managing medications, managing money, eating, dressing, bathing, hygiene, bathroom needs and walking     Ambulation: with no difficulty and rollator     Fall Risk:  Fall Risk Assessment, last 12 mths 9/8/2020   Able to walk? Yes   Fall in past 12 months? Yes   Fall with injury? Yes   Number of falls in past 12 months 1   Fall Risk Score 2   had fall , lives at [place], has appropriate safety equipment  Discussed PT not interested for now  Abuse Screen:  Patient is not abused   lives at the [place]    Cognitive Screening   Has your family/caregiver stated any concerns about your memory: yes - mild, stable not interested in medication    Cognitive Screening: mild dementia   Follows neurology  Patient Care Team   Patient Care Team:  Josse Rosenberg MD as PCP - General (Internal Medicine)  Josse Rosenberg MD as PCP - [facility] Empaneled Provider  Sadiq Chang PsyD (Psychology)  Beatrice Castelan MD as Physician (Cardiology)  Jana Reyna MD (Ophthalmology)  Seth Lockwood MD (Ophthalmology)  Yamel Gaxiola MD (Gastroenterology)  Ashtyn Lopez DO (Neurology)  Jocelin Dubois MD (Physical Medicine and Rehabilitation)  Jane Walton MD (Otolaryngology)  Jonathon Oh NP (Nurse Practitioner)  Kolton Martinez MD as Surgeon (General Surgery)   updated    Assessment/Plan   Education and counseling provided:  Are appropriate based on today's review and evaluation  Influenza Vaccine  Screening for glaucoma  Diabetes screening test    Diagnoses and all orders for this visit:    1. Medicare annual wellness visit, subsequent        Health Maintenance Due   Topic Date Due    Flu Vaccine (1) 09/01/2020    Medicare Yearly Exam  09/11/2020    MICROALBUMIN Q1  09/11/2020     ACP on file.  SDMs are her daughters, Bryanna Lebron and Tyler Guerrier. Colonoscopy: ~10 years ago with Dr Bari Bustillos, per pt the way her colon is formed makes it difficult to get COLOs, her declines further   Pap: 11/19 with Dr Taylor Coad: declines further  Dexa: 8/18, osteoporosis, declines further tx other than vit d       Tdap: 10/19   Prevnar 13: 03/01/19   Pneumovax: 03/09/20   Shingrix: both rounds completed   Flu shot: Fall 2019       A1C: 3/20 5.8 due with next labs    Eye exam:  Dr Laura Brady 7/20    Lipids: 3/19  due with next labs            Hugo Kaeanna marie, who was evaluated through a synchronous (real-time) audio-video encounter, and/or her healthcare decision maker, is aware that it is a billable service, with coverage as determined by her insurance carrier. She provided verbal consent to proceed: Yes, and patient identification was verified. It was conducted pursuant to the emergency declaration under the [address] [place] [address], [address] [place] [address] authority and the [employer] Act. A caregiver was present when appropriate. Ability to conduct physical exam was limited. I was at home. The patient was at home.     Willam Gayle MD

## 2020-09-03 RX ORDER — METOPROLOL TARTRATE 25 MG/1
12.5 TABLET, FILM COATED ORAL
Qty: 90 TAB | Refills: 0 | Status: SHIPPED | OUTPATIENT
Start: 2020-09-03 | End: 2020-09-08

## 2020-09-08 ENCOUNTER — VIRTUAL VISIT (OUTPATIENT)
Dept: INTERNAL MEDICINE CLINIC | Age: 85
End: 2020-09-08
Payer: MEDICARE

## 2020-09-08 DIAGNOSIS — K21.9 GASTROESOPHAGEAL REFLUX DISEASE WITHOUT ESOPHAGITIS: ICD-10-CM

## 2020-09-08 DIAGNOSIS — E78.00 HYPERCHOLESTEROLEMIA: ICD-10-CM

## 2020-09-08 DIAGNOSIS — K58.0 IRRITABLE BOWEL SYNDROME WITH DIARRHEA: ICD-10-CM

## 2020-09-08 DIAGNOSIS — J90 PLEURAL EFFUSION: ICD-10-CM

## 2020-09-08 DIAGNOSIS — H18.519 FUCHS' CORNEAL DYSTROPHY: ICD-10-CM

## 2020-09-08 DIAGNOSIS — R73.01 IFG (IMPAIRED FASTING GLUCOSE): ICD-10-CM

## 2020-09-08 DIAGNOSIS — M81.0 AGE-RELATED OSTEOPOROSIS WITHOUT CURRENT PATHOLOGICAL FRACTURE: ICD-10-CM

## 2020-09-08 DIAGNOSIS — I48.0 PAROXYSMAL ATRIAL FIBRILLATION (HCC): ICD-10-CM

## 2020-09-08 DIAGNOSIS — F33.0 DEPRESSION, MAJOR, RECURRENT, MILD (HCC): ICD-10-CM

## 2020-09-08 DIAGNOSIS — Z00.00 MEDICARE ANNUAL WELLNESS VISIT, SUBSEQUENT: Primary | ICD-10-CM

## 2020-09-08 DIAGNOSIS — F41.9 ANXIETY: ICD-10-CM

## 2020-09-08 DIAGNOSIS — I10 ESSENTIAL HYPERTENSION: ICD-10-CM

## 2020-09-08 PROCEDURE — G9717 DOC PT DX DEP/BP F/U NT REQ: HCPCS | Performed by: INTERNAL MEDICINE

## 2020-09-08 PROCEDURE — G8420 CALC BMI NORM PARAMETERS: HCPCS | Performed by: INTERNAL MEDICINE

## 2020-09-08 PROCEDURE — G8536 NO DOC ELDER MAL SCRN: HCPCS | Performed by: INTERNAL MEDICINE

## 2020-09-08 PROCEDURE — 1100F PTFALLS ASSESS-DOCD GE2>/YR: CPT | Performed by: INTERNAL MEDICINE

## 2020-09-08 PROCEDURE — 1090F PRES/ABSN URINE INCON ASSESS: CPT | Performed by: INTERNAL MEDICINE

## 2020-09-08 PROCEDURE — 99214 OFFICE O/P EST MOD 30 MIN: CPT | Performed by: INTERNAL MEDICINE

## 2020-09-08 PROCEDURE — 3288F FALL RISK ASSESSMENT DOCD: CPT | Performed by: INTERNAL MEDICINE

## 2020-09-08 PROCEDURE — G0439 PPPS, SUBSEQ VISIT: HCPCS | Performed by: INTERNAL MEDICINE

## 2020-09-08 PROCEDURE — G8427 DOCREV CUR MEDS BY ELIG CLIN: HCPCS | Performed by: INTERNAL MEDICINE

## 2020-09-08 NOTE — PATIENT INSTRUCTIONS
Medicare Wellness Visit, Female The best way to live healthy is to have a lifestyle where you eat a well-balanced diet, exercise regularly, limit alcohol use, and quit all forms of tobacco/nicotine, if applicable. Regular preventive services are another way to keep healthy. Preventive services (vaccines, screening tests, monitoring & exams) can help personalize your care plan, which helps you manage your own care. Screening tests can find health problems at the earliest stages, when they are easiest to treat. [facility] follows the current, evidence-based guidelines published by the [facility] Wright ([facility]) when recommending preventive services for our patients. Because we follow these guidelines, sometimes recommendations change over time as research supports it. (For example, mammograms used to be recommended annually. Even though Medicare will still pay for an annual mammogram, the newer guidelines recommend a mammogram every two years for women of average risk). Of course, you and your doctor may decide to screen more often for some diseases, based on your risk and your co-morbidities (chronic disease you are already diagnosed with). Preventive services for you include: - Medicare offers their members a free annual wellness visit, which is time for you and your primary care provider to discuss and plan for your preventive service needs. Take advantage of this benefit every year! 
-All adults over the age of 72 should receive the recommended pneumonia vaccines. Current USPSTF guidelines recommend a series of two vaccines for the best pneumonia protection.  
-All adults should have a flu vaccine yearly and a tetanus vaccine every 10 years.  
-All adults age 48 and older should receive the shingles vaccines (series of two vaccines). -All adults age 38-68 who are overweight should have a diabetes screening test once every three years. -All adults born between [birth year] and 1965 should be screened once for Hepatitis C. 
-Other screening tests and preventive services for persons with diabetes include: an eye exam to screen for diabetic retinopathy, a kidney function test, a foot exam, and stricter control over your cholesterol.  
-Cardiovascular screening for adults with routine risk involves an electrocardiogram (ECG) at intervals determined by your doctor.  
-Colorectal cancer screenings should be done for adults age 54-65 with no increased risk factors for colorectal cancer. There are a number of acceptable methods of screening for this type of cancer. Each test has its own benefits and drawbacks. Discuss with your doctor what is most appropriate for you during your annual wellness visit. The different tests include: colonoscopy (considered the best screening method), a fecal occult blood test, a fecal DNA test, and sigmoidoscopy. 
 
-A bone mass density test is recommended when a woman turns 65 to screen for osteoporosis. This test is only recommended one time, as a screening. Some providers will use this same test as a disease monitoring tool if you already have osteoporosis. -Breast cancer screenings are recommended every other year for women of normal risk, age 54-69. 
-Cervical cancer screenings for women over age 72 are only recommended with certain risk factors. Here is a list of your current Health Maintenance items (your personalized list of preventive services) with a due date: 
Health Maintenance Due Topic Date Due  Yearly Flu Vaccine (1) 09/01/2020 [facility] Annual Well Visit  09/11/2020  Albumin Urine Test  09/11/2020

## 2020-09-09 DIAGNOSIS — H18.519 FUCHS' CORNEAL DYSTROPHY: ICD-10-CM

## 2020-09-09 DIAGNOSIS — M81.0 AGE-RELATED OSTEOPOROSIS WITHOUT CURRENT PATHOLOGICAL FRACTURE: ICD-10-CM

## 2020-09-09 DIAGNOSIS — I10 ESSENTIAL HYPERTENSION: ICD-10-CM

## 2020-09-09 DIAGNOSIS — J90 PLEURAL EFFUSION: ICD-10-CM

## 2020-09-09 DIAGNOSIS — R73.01 IFG (IMPAIRED FASTING GLUCOSE): ICD-10-CM

## 2020-09-09 DIAGNOSIS — K58.0 IRRITABLE BOWEL SYNDROME WITH DIARRHEA: ICD-10-CM

## 2020-09-09 DIAGNOSIS — F41.9 ANXIETY: ICD-10-CM

## 2020-09-09 DIAGNOSIS — I48.0 PAROXYSMAL ATRIAL FIBRILLATION (HCC): ICD-10-CM

## 2020-09-09 DIAGNOSIS — K21.9 GASTROESOPHAGEAL REFLUX DISEASE WITHOUT ESOPHAGITIS: ICD-10-CM

## 2020-09-09 DIAGNOSIS — Z00.00 MEDICARE ANNUAL WELLNESS VISIT, SUBSEQUENT: ICD-10-CM

## 2020-09-09 DIAGNOSIS — F33.0 DEPRESSION, MAJOR, RECURRENT, MILD (HCC): ICD-10-CM

## 2020-09-09 DIAGNOSIS — E78.00 HYPERCHOLESTEROLEMIA: ICD-10-CM

## 2020-09-09 RX ORDER — GLUCOSAMINE/CHONDR SU A SOD 750-600 MG
1 TABLET ORAL DAILY
Qty: 30 CAP | Refills: 3 | Status: SHIPPED | OUTPATIENT
Start: 2020-09-09 | End: 2020-10-23 | Stop reason: SDUPTHER

## 2020-09-09 NOTE — TELEPHONE ENCOUNTER
Daughter, Isa Moe states that Dr. Josie Bermeo was to call in a new medication after pt's visit yesterday, 9-8-20. This has not gone to [facility] yet. She states this was Viatin (did she mean vitamin)    Isa Moe states this will also need an order sent to [facility] in order for her to take this. She did not have their fax, but phone #[phone]    The order needs to be to the ATTN: [facility].

## 2020-09-09 NOTE — LETTER
9/9/2020 1:35 PM 
 
Ms. Williams [address] [id number] To whom this may concern, Please have Ms. Julianna Tran take Biotin 2,500mcg once daily by mouth. If there are any questions or concerns, please feel free to contact the office. Thank you.      
 
Sincerely, 
 
 
Sammy Peterson MD

## 2020-09-09 NOTE — TELEPHONE ENCOUNTER
Called out to [employer] (HIPAA). Two pt identifiers confirmed. Informed [facility] that Dr. Vicente Gu will be sending in biotin and LPN RR will be faxing the order over to [address] [place] [address]. [facility] verbalized understanding of information discussed w/ no further questions at this time. Med pended; letter faxed w/ confirmation received.

## 2020-09-14 ENCOUNTER — OFFICE VISIT (OUTPATIENT)
Dept: NEUROLOGY | Facility: CLINIC | Age: 85
End: 2020-09-14
Payer: MEDICARE

## 2020-09-14 VITALS
SYSTOLIC BLOOD PRESSURE: 128 MMHG | BODY MASS INDEX: 20.98 KG/M2 | HEIGHT: 62 IN | DIASTOLIC BLOOD PRESSURE: 82 MMHG | OXYGEN SATURATION: 97 % | WEIGHT: 114 LBS | HEART RATE: 88 BPM

## 2020-09-14 DIAGNOSIS — G31.83 LEWY BODY PARKINSON DISEASE (HCC): Primary | ICD-10-CM

## 2020-09-14 DIAGNOSIS — R44.3 HALLUCINATIONS: ICD-10-CM

## 2020-09-14 DIAGNOSIS — F02.80 LEWY BODY PARKINSON DISEASE (HCC): Primary | ICD-10-CM

## 2020-09-14 DIAGNOSIS — G25.0 BENIGN ESSENTIAL TREMOR: ICD-10-CM

## 2020-09-14 PROCEDURE — G8420 CALC BMI NORM PARAMETERS: HCPCS | Performed by: PSYCHIATRY & NEUROLOGY

## 2020-09-14 PROCEDURE — 3288F FALL RISK ASSESSMENT DOCD: CPT | Performed by: PSYCHIATRY & NEUROLOGY

## 2020-09-14 PROCEDURE — G9717 DOC PT DX DEP/BP F/U NT REQ: HCPCS | Performed by: PSYCHIATRY & NEUROLOGY

## 2020-09-14 PROCEDURE — 1090F PRES/ABSN URINE INCON ASSESS: CPT | Performed by: PSYCHIATRY & NEUROLOGY

## 2020-09-14 PROCEDURE — G8536 NO DOC ELDER MAL SCRN: HCPCS | Performed by: PSYCHIATRY & NEUROLOGY

## 2020-09-14 PROCEDURE — 99214 OFFICE O/P EST MOD 30 MIN: CPT | Performed by: PSYCHIATRY & NEUROLOGY

## 2020-09-14 PROCEDURE — G8427 DOCREV CUR MEDS BY ELIG CLIN: HCPCS | Performed by: PSYCHIATRY & NEUROLOGY

## 2020-09-14 PROCEDURE — 1100F PTFALLS ASSESS-DOCD GE2>/YR: CPT | Performed by: PSYCHIATRY & NEUROLOGY

## 2020-09-14 RX ORDER — PRIMIDONE 50 MG/1
TABLET ORAL
Qty: 90 TAB | Refills: 1 | Status: SHIPPED | OUTPATIENT
Start: 2020-09-14 | End: 2020-09-14

## 2020-09-14 RX ORDER — PRIMIDONE 50 MG/1
TABLET ORAL
Qty: 90 TAB | Refills: 1 | Status: SHIPPED | OUTPATIENT
Start: 2020-09-14 | End: 2020-09-15

## 2020-09-14 NOTE — PROGRESS NOTES
Chief Complaint   Patient presents with    Tremors     Follow up, \"the tremors have gotten worse, they are bothering me now in my arms\"     Visit Vitals  /82 (BP 1 Location: Left arm, BP Patient Position: Sitting)   Pulse 88   Ht 5' 2\" (1.575 m)   Wt 51.7 kg (114 lb)   SpO2 97%   BMI 20.85 kg/m²

## 2020-09-14 NOTE — PROGRESS NOTES
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 Since I saw her last some of the restrictions have been liberalized such that she is able to leave her living location. She is on Seroquel 25 mg nightly and the hallucinations are less intense and less frequent. Unclear if she is taking 12.5 mg in the morning after looking at the medication list from her facility. She did have a fall in July of this year falling backwards hitting the back of her head but no loss of consciousness. She gets occasional mild right-sided headaches that are described as dull and annoying and not daily. No difficulty walking. Tremor however is getting worse in both upper extremities making it difficult to eat and using utensils. Isaiah Murillo is a 80year-old woman here to follow-up. She has a likely atypical Parkinson's process suspicious for Lewy body. She has predominant auditory visual hallucinations. DaTscan was not approved to be completed. Since I saw her last she does live in assisted living with medication supervision. She stopped Seroquel at one point but the hallucinations got worse and now she takes Seroquel at bedtime 25 mg. Fortunately sleep is still quite challenging with most nights having fragmented sleep. She feels like her tremor is getting worse in the arms. Constipation persists. No falls. Review of Systems   Eyes: Negative for blurred vision and double vision. Neurological: Positive for tremors and headaches. All other systems reviewed and are negative.       Past Medical History:   Diagnosis Date    Acute diverticulitis 9/25/2017    Allergic rhinitis 9/25/2017    Arrhythmia     afib    Arthritis     Asthma     Atrial fibrillation (HCC)     Benign essential tremor 2017    Cancer ([facility]     skin    Celiac disease 2017    Constipation 2017    Diabetic peripheral neuropathy associated with type 2 diabetes mellitus ([facility] 2016    Diarrhea 2017    Diverticulosis of large intestine without hemorrhage 2017    Early satiety 2017    Fuchs' corneal dystrophy 2017    Gastroesophageal reflux disease without esophagitis 2017    GERD (gastroesophageal reflux disease)     High cholesterol     Hypercholesterolemia 2017    Hypertension     Irritable bowel syndrome with diarrhea 2017    Numbness 2017    Osteopenia 2017    Other ill-defined conditions(799.89)     collapsed lung prior to hernia repair surgery    Postmenopausal 2018    Ptosis, both eyelids 2017    Statin intolerance 2017    Unspecified adverse effect of anesthesia     pt states she went into cardiac arrest prior to hernia repair after the insertion of gas in stomach     Family History   Problem Relation Age of Onset    Heart Disease Mother     Dementia Mother     Heart Disease Father     Neuropathy Child     Depression Child     Other Child         diverticulitis    Lung Cancer Sister 80        lung ca     Social History     Socioeconomic History    Marital status:      Spouse name: Not on file    Number of children: Not on file    Years of education: Not on file    Highest education level: Not on file   Occupational History    Not on file   Social Needs    Financial resource strain: Not on file    Food insecurity     Worry: Not on file     Inability: Not on file    Transportation needs     Medical: Not on file     Non-medical: Not on file   Tobacco Use    Smoking status: Former Smoker     Packs/day: 0.75     Years: 5.00     Pack years: 3.75     Last attempt to quit: 10/11/1967     Years since quittin.9    Smokeless tobacco: Never Used   Substance and Sexual Activity    Alcohol use: No    Drug use:  No  Sexual activity: Not on file   Lifestyle    Physical activity     Days per week: Not on file     Minutes per session: Not on file    Stress: Not on file   Relationships    Social connections     Talks on phone: Not on file     Gets together: Not on file     Attends Gnosticism service: Not on file     Active member of club or organization: Not on file     Attends meetings of clubs or organizations: Not on file     Relationship status: Not on file    Intimate partner violence     Fear of current or ex partner: Not on file     Emotionally abused: Not on file     Physically abused: Not on file     Forced sexual activity: Not on file   Other Topics Concern    Not on file   Social History Narrative    Not on file     Allergies   Allergen Reactions    Bactrim [Sulfamethoprim Ds] Other (comments)     consipation    Ciprofloxacin (Bulk) Other (comments)     Low wbc    Prednisone Other (comments)     tachycardia    Shellfish Derived Other (comments)     Abdominal pain, Carried epi pen for this at one point.  Statins-Hmg-Coa Reductase Inhibitors Unknown (comments)     Stomach uipset and mild muscle aches         Current Outpatient Medications   Medication Sig    primidone (MYSOLINE) 50 mg tablet One half tab twice daily for tremor at approx 0900 and approx 2000    metoprolol tartrate (LOPRESSOR) 25 mg tablet Metoprolol 25 mg 1 @ 900 AM and 1 @ 700 PM an additional 12.5 mg if  BP > 150/90  one hour after taking regular dose    potassium chloride SR (KLOR-CON 10) 10 mEq tablet TAKE 1 TABLET BY MOUTH EVERY DAY.  magnesium hydroxide (Baez Milk of Magnesia) 400 mg/5 mL suspension Take 30 mL by mouth daily as needed for Constipation.  melatonin 5 mg tablet Take 1 or 2 tabs at bedtime for sleep    QUEtiapine (SEROquel) 25 mg tablet 1/2 tab in the mid-morning (10am) and 1 tab at bedtime    Lactobac.  rhamnosus GG-inulin (Archien 13) 10 billion cell -200 mg cpSP Trista 13 10 billion cell-200 mg sprinkle capsule    cyanocobalamin (Vitamin B-12) 100 mcg tablet Take 1 Tab by mouth daily. Indications: 500mcg    acetaminophen (TYLENOL EXTRA STRENGTH) 500 mg tablet Take 1 Tab by mouth every eight (8) hours as needed for Pain.  disopyramide phosphate (NORPACE) 100 mg capsule TAKE ONE CAPSULE BY MOUTH TWICE A DAY    sertraline (ZOLOFT) 50 mg tablet Take 1.5 Tabs by mouth daily.  furosemide (LASIX) 20 mg tablet TAKE ONE TABLET BY MOUTH EVERY DAY    nitroglycerin (NITROSTAT) 0.4 mg SL tablet 1 Tab by SubLINGual route every five (5) minutes as needed for Chest Pain.  cholecalciferol, VITAMIN D3, (VITAMIN D3) 5,000 unit tab tablet Take 1 Tab by mouth daily.  aspirin 81 mg chewable tablet Take 1 Tab by mouth daily.  pantoprazole (PROTONIX) 40 mg tablet Take 1 Tab by mouth daily. (Patient taking differently: Take 40 mg by mouth two (2) times a day.)    polyethylene glycol (MIRALAX) 17 gram/dose powder Take 17 g by mouth daily.  Biotin 2,500 mcg cap Take 1 Cap by mouth daily.  Lactobacillus rhamnosus GG (CULTURELLE PO) Take  by mouth daily. No current facility-administered medications for this visit. Neurologic Exam     Mental Status   WD/WN adult in NAD, normal grooming  VSS  A&O pleasant, smiling, follows commands well but she is hearing impaired. PERRL, nonicteric, reduced blink rate  Face is symmetric, tongue midline  Speech is fluent and clear with good volume  No limb ataxia.   Intermittent resting tremor of both upper extremities a little bit worse on the right also affecting the head and voice  Moving all extemities spontaneously and symmetric  Good tempo non-shuffling gait with Rollator    CVS RRR  Lungs nonlabored  Skin is warm and dry         Visit Vitals  /82 (BP 1 Location: Left arm, BP Patient Position: Sitting)   Pulse 88   Ht 5' 2\" (1.575 m)   Wt 51.7 kg (114 lb)   SpO2 97%   BMI 20.85 kg/m²       Assessment and Plan   Diagnoses and all orders for this visit:    1. Lewy body Parkinson disease ([facility]    2. Hallucinations    3. Benign essential tremor    Other orders  -     primidone (MYSOLINE) 50 mg tablet; One half tab twice daily for tremor at approx 0900 and approx 210      80year-old woman who I suspect has a atypical Parkinson syndrome possibly Lewy body manifesting as hallucinations and some bradykinesia. Today it does appear that her tremor is getting worse but it looks more essential affecting both upper extremities and her head and voice. I am going to have her try low-dose primidone to start and we might titrate depending on tolerability. Her walking is quite adequate with good tempo non-shuffling. Hallucinations are controlled better I think in part because of less isolation. Continue 25 mg Seroquel at bedtime. If she is still taking 12 point 5 in the morning okay to continue otherwise I would not resume it if she is not taking it. After couple weeks okay to call and leave a message if the primidone is having any benefit or not. I would like to see her in 6 months or sooner if something changes. I reviewed and decided to continue the current medications. This clinical note was dictated with an electronic dictation software that can make unintentional errors. If there are any questions, please contact me directly for clarification.       [address] [facility], [address]  Diplomate ANTOHNY

## 2020-09-15 ENCOUNTER — TELEPHONE (OUTPATIENT)
Dept: NEUROLOGY | Facility: CLINIC | Age: 85
End: 2020-09-15

## 2020-09-15 DIAGNOSIS — G31.83 LEWY BODY PARKINSON DISEASE (HCC): ICD-10-CM

## 2020-09-15 DIAGNOSIS — F02.80 LEWY BODY PARKINSON DISEASE (HCC): ICD-10-CM

## 2020-09-15 DIAGNOSIS — R44.3 HALLUCINATIONS: ICD-10-CM

## 2020-09-15 RX ORDER — PRIMIDONE 50 MG/1
TABLET ORAL
Qty: 90 TAB | Refills: 1 | Status: SHIPPED | OUTPATIENT
Start: 2020-09-15 | End: 2020-11-06

## 2020-09-15 RX ORDER — QUETIAPINE FUMARATE 25 MG/1
TABLET, FILM COATED ORAL
Qty: 90 TAB | Refills: 1 | Status: SHIPPED | OUTPATIENT
Start: 2020-09-15 | End: 2020-11-06

## 2020-09-15 NOTE — TELEPHONE ENCOUNTER
Pt's daughter calling to give fax number to [facility] at [facility] for medication order Attention Christine Selby [phone]

## 2020-09-23 RX ORDER — POTASSIUM CHLORIDE 750 MG/1
TABLET, FILM COATED, EXTENDED RELEASE ORAL
Qty: 30 TAB | Refills: 0 | Status: SHIPPED | OUTPATIENT
Start: 2020-09-23 | End: 2020-10-23

## 2020-09-23 RX ORDER — SERTRALINE HYDROCHLORIDE 50 MG/1
TABLET, FILM COATED ORAL
Qty: 45 TAB | Refills: 0 | Status: SHIPPED | OUTPATIENT
Start: 2020-09-23 | End: 2020-10-23

## 2020-09-23 RX ORDER — FUROSEMIDE 20 MG/1
TABLET ORAL
Qty: 30 TAB | Refills: 0 | Status: SHIPPED | OUTPATIENT
Start: 2020-09-23 | End: 2020-10-23

## 2020-10-02 ENCOUNTER — HOSPITAL ENCOUNTER (EMERGENCY)
Age: 85
Discharge: HOME | End: 2020-10-02
Attending: EMERGENCY MEDICINE | Admitting: EMERGENCY MEDICINE
Payer: MEDICARE

## 2020-10-02 ENCOUNTER — APPOINTMENT (OUTPATIENT)
Dept: GENERAL RADIOLOGY | Age: 85
End: 2020-10-02
Attending: EMERGENCY MEDICINE
Payer: MEDICARE

## 2020-10-02 ENCOUNTER — APPOINTMENT (OUTPATIENT)
Dept: CT IMAGING | Age: 85
End: 2020-10-02
Attending: EMERGENCY MEDICINE
Payer: MEDICARE

## 2020-10-02 VITALS
OXYGEN SATURATION: 94 % | WEIGHT: 117 LBS | BODY MASS INDEX: 21.53 KG/M2 | HEIGHT: 62 IN | HEART RATE: 85 BPM | TEMPERATURE: 98.4 F | RESPIRATION RATE: 20 BRPM | SYSTOLIC BLOOD PRESSURE: 126 MMHG | DIASTOLIC BLOOD PRESSURE: 72 MMHG
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
## 2020-10-02 DIAGNOSIS — S60.212A CONTUSION OF LEFT WRIST, INITIAL ENCOUNTER: ICD-10-CM

## 2020-10-02 DIAGNOSIS — S00.03XA HEMATOMA OF SCALP, INITIAL ENCOUNTER: ICD-10-CM

## 2020-10-02 DIAGNOSIS — S09.90XA CLOSED HEAD INJURY, INITIAL ENCOUNTER: ICD-10-CM

## 2020-10-02 LAB
APPEARANCE UR: CLEAR
BACTERIA URNS QL MICRO: NEGATIVE /HPF
BILIRUB UR QL: NEGATIVE
COLOR UR: ABNORMAL
EPITH CASTS URNS QL MICRO: ABNORMAL /LPF
GLUCOSE UR STRIP.AUTO-MCNC: NEGATIVE MG/DL
HGB UR QL STRIP: NEGATIVE
HYALINE CASTS URNS QL MICRO: ABNORMAL /LPF (ref 0–5)
KETONES UR QL STRIP.AUTO: NEGATIVE MG/DL
LEUKOCYTE ESTERASE UR QL STRIP.AUTO: ABNORMAL
NITRITE UR QL STRIP.AUTO: NEGATIVE
PH UR STRIP: 5.5 [PH] (ref 5–8)
PROT UR STRIP-MCNC: NEGATIVE MG/DL
RBC #/AREA URNS HPF: ABNORMAL /HPF (ref 0–5)
SP GR UR REFRACTOMETRY: 1.02 (ref 1–1.03)
UA: UC IF INDICATED,UAUC: ABNORMAL
UROBILINOGEN UR QL STRIP.AUTO: 0.2 EU/DL (ref 0.2–1)
WBC URNS QL MICRO: ABNORMAL /HPF (ref 0–4)

## 2020-10-02 PROCEDURE — 87086 URINE CULTURE/COLONY COUNT: CPT

## 2020-10-02 PROCEDURE — 81001 URINALYSIS AUTO W/SCOPE: CPT

## 2020-10-02 PROCEDURE — 73110 X-RAY EXAM OF WRIST: CPT

## 2020-10-02 PROCEDURE — 99284 EMERGENCY DEPT VISIT MOD MDM: CPT

## 2020-10-02 PROCEDURE — 70450 CT HEAD/BRAIN W/O DYE: CPT

## 2020-10-02 PROCEDURE — 72125 CT NECK SPINE W/O DYE: CPT

## 2020-10-02 NOTE — ED PROVIDER NOTES
EMERGENCY DEPARTMENT HISTORY AND PHYSICAL EXAM     ------------------------------------------------------------------------------------------------------  Please note that this dictation was completed with [employer] the [employer] voice recognition software. Quite often unanticipated grammatical, syntax, homophones, and other interpretive errors are inadvertently transcribed by the computer software. Please disregard these errors. Please excuse any errors that have escaped final proofreading.  -----------------------------------------------------------------------------------------------------------------    Date: 10/2/2020  Patient Name: Malcolm Dasilva    History of Presenting Illness     Chief Complaint   Patient presents with   [facility]     Patient rolled out of bed sleeping and hit head. Patient complains of posterior head pain 3/10 on pain scale, denies visual changes or nausea. Denies recent falls. History Provided By: Patient    HPI: Malcolm Dasilva is a 80 y.o. female, with significant pmhx of A. fib, Parkinson's with tremor, neuropathy, hypertension, who presents via private EMS from [place] to the ED with report of having fallen out of bed earlier this evening and struck the back of her head. Patient does not particularly remember the incident as she notes she was asleep at the time. Notes having a small bump to the back of her head and some associated neck pain. Noted hematoma to her left wrist which she notes is not painful. Patient takes aspirin daily. Pt also specifically denies any recent fevers, chills, CP, SOB, nausea, vomiting, diarrhea, abd pain, changes in BM, urinary sxs. PCP: Chela Gusman MD    Social Hx: denies tobacco, denies EtOH, denies Illicit Drugs     There are no other complaints, changes, or physical findings at this time.      Allergies   Allergen Reactions    Bactrim [Sulfamethoprim Ds] Other (comments)     consipation    Ciprofloxacin (Bulk) Other (comments) Low wbc    Prednisone Other (comments)     tachycardia    Shellfish Derived Other (comments)     Abdominal pain, Carried epi pen for this at one point.  Statins-Hmg-Coa Reductase Inhibitors Unknown (comments)     Stomach uipset and mild muscle aches         Current Outpatient Medications   Medication Sig Dispense Refill    furosemide (LASIX) 20 mg tablet TAKE ONE TABLET BY MOUTH EVERY DAY 30 Tab 0    potassium chloride SR (KLOR-CON 10) 10 mEq tablet TAKE 1 TABLET BY MOUTH EVERY DAY. 30 Tab 0    sertraline (ZOLOFT) 50 mg tablet TAKE 1 & 1/2 TABLETS BY MOUTH EVERY DAY 45 Tab 0    QUEtiapine (SEROquel) 25 mg tablet 1/2 tab in the mid-morning (10am) and 1 tab at bedtime 90 Tab 1    primidone (MYSOLINE) 50 mg tablet One half tab twice daily for tremor at approx 0900 and approx 2000 90 Tab 1    Biotin 2,500 mcg cap Take 1 Cap by mouth daily. 30 Cap 3    metoprolol tartrate (LOPRESSOR) 25 mg tablet Metoprolol 25 mg 1 @ 900 AM and 1 @ 700 PM an additional 12.5 mg if  BP > 150/90  one hour after taking regular dose 270 Tab 1    magnesium hydroxide (Baez Milk of Magnesia) 400 mg/5 mL suspension Take 30 mL by mouth daily as needed for Constipation.  melatonin 5 mg tablet Take 1 or 2 tabs at bedtime for sleep 60 Tab 5    Lactobac. rhamnosus GG-inulin (Trista 13) 10 billion cell -200 mg cpSP [facility] 10 billion cell-200 mg sprinkle capsule      cyanocobalamin (Vitamin B-12) 100 mcg tablet Take 1 Tab by mouth daily. Indications: 500mcg 90 Tab 1    acetaminophen (TYLENOL EXTRA STRENGTH) 500 mg tablet Take 1 Tab by mouth every eight (8) hours as needed for Pain. 100 Tab 2    disopyramide phosphate (NORPACE) 100 mg capsule TAKE ONE CAPSULE BY MOUTH TWICE A  Cap 4    nitroglycerin (NITROSTAT) 0.4 mg SL tablet 1 Tab by SubLINGual route every five (5) minutes as needed for Chest Pain.  25 Tab 1    cholecalciferol, VITAMIN D3, (VITAMIN D3) 5,000 unit tab tablet Take 1 Tab by mouth daily. 60 Tab 5    aspirin 81 mg chewable tablet Take 1 Tab by mouth daily. 120 Tab 5    Lactobacillus rhamnosus GG (CULTURELLE PO) Take  by mouth daily.  pantoprazole (PROTONIX) 40 mg tablet Take 1 Tab by mouth daily. (Patient taking differently: Take 40 mg by mouth two (2) times a day.) 30 Tab 12    polyethylene glycol (MIRALAX) 17 gram/dose powder Take 17 g by mouth daily.          Past History     Past Medical History:  Past Medical History:   Diagnosis Date    Acute diverticulitis 9/25/2017    Allergic rhinitis 9/25/2017    Arrhythmia     afib    Arthritis     Asthma     Atrial fibrillation [facility] [address]     Benign essential tremor 9/25/2017    Cancer ([facility] [address]     skin    Celiac disease 9/25/2017    Constipation 9/25/2017    Diabetic peripheral neuropathy associated with type 2 diabetes mellitus ([facility] [address] 4/21/2016    Diarrhea 9/25/2017    Diverticulosis of large intestine without hemorrhage 9/25/2017    Early satiety 9/25/2017    Fuchs' corneal dystrophy 9/25/2017    Gastroesophageal reflux disease without esophagitis 9/25/2017    GERD (gastroesophageal reflux disease)     High cholesterol     Hypercholesterolemia 9/25/2017    Hypertension     Irritable bowel syndrome with diarrhea 9/25/2017    Numbness 9/25/2017    Osteopenia 9/25/2017    Other ill-defined conditions(799.89)     collapsed lung prior to hernia repair surgery    Postmenopausal 7/23/2018    Ptosis, both eyelids 9/25/2017    Statin intolerance 9/25/2017    Unspecified adverse effect of anesthesia     pt states she went into cardiac arrest prior to hernia repair after the insertion of gas in stomach       Past Surgical History:  Past Surgical History:   Procedure Laterality Date    HX APPENDECTOMY      HX CHOLECYSTECTOMY      HX GI      hemorrhoidectomy    HX HEENT      sinus surgery    HX HERNIA REPAIR      HX TONSILLECTOMY         Family History:  Family History   Problem Relation Age of Onset    Heart Disease Mother     Dementia Mother     Heart Disease Father     Neuropathy Child     Depression Child     Other Child         diverticulitis    Lung Cancer Sister 80        lung ca       Social History:  Social History     Tobacco Use    Smoking status: Former Smoker     Packs/day: 0.75     Years: 5.00     Pack years: 3.75     Last attempt to quit: 10/11/1967     Years since quittin.0    Smokeless tobacco: Never Used   Substance Use Topics    Alcohol use: No    Drug use: No       Allergies: Allergies   Allergen Reactions    Bactrim [Sulfamethoprim Ds] Other (comments)     consipation    Ciprofloxacin (Bulk) Other (comments)     Low wbc    Prednisone Other (comments)     tachycardia    Shellfish Derived Other (comments)     Abdominal pain, Carried epi pen for this at one point.  Statins-Hmg-Coa Reductase Inhibitors Unknown (comments)     Stomach uipset and mild muscle aches         Review of Systems   Review of Systems   Constitutional: Negative. Negative for fever. Eyes: Negative. Respiratory: Negative. Negative for shortness of breath. Cardiovascular: Negative for chest pain. Gastrointestinal: Negative for abdominal pain, nausea and vomiting. Endocrine: Negative. Genitourinary: Negative. Negative for difficulty urinating, dysuria and hematuria. Musculoskeletal: Negative. Skin: Negative. Neurological: Negative. Negative for weakness. Psychiatric/Behavioral: Negative for suicidal ideas. All other systems reviewed and are negative. Physical Exam   Physical Exam  Vitals signs and nursing note reviewed. Constitutional:       General: She is not in acute distress. Appearance: She is well-developed. She is not diaphoretic. HENT:      Head: Normocephalic. Contusion present. Nose: Nose normal.   Eyes:      General: No scleral icterus. Conjunctiva/sclera: Conjunctivae normal.   Neck:      Musculoskeletal: Normal range of motion.       Trachea: No tracheal deviation. Cardiovascular:      Rate and Rhythm: Normal rate and regular rhythm. Heart sounds: Normal heart sounds. No murmur. No friction rub. Pulmonary:      Effort: Pulmonary effort is normal. No respiratory distress. Breath sounds: Normal breath sounds. No stridor. No wheezing or rales. Abdominal:      General: Bowel sounds are normal. There is no distension. Palpations: Abdomen is soft. Tenderness: There is no abdominal tenderness. There is no rebound. Musculoskeletal: Normal range of motion. General: No tenderness. Left wrist: She exhibits swelling. She exhibits normal range of motion, no tenderness and no bony tenderness. Arms:    Skin:     General: Skin is warm and dry. Findings: No rash. Neurological:      Mental Status: She is alert and oriented to person, place, and time. Cranial Nerves: No cranial nerve deficit. Psychiatric:         Speech: Speech normal.         Behavior: Behavior normal.         Thought Content:  Thought content normal.         Judgment: Judgment normal.           Diagnostic Study Results     Labs -     Recent Results (from the past 12 hour(s))   URINALYSIS W/ REFLEX CULTURE    Collection Time: 10/02/20  3:40 AM    Specimen: Urine   Result Value Ref Range    Color YELLOW/STRAW      Appearance CLEAR CLEAR      Specific gravity 1.019 1.003 - 1.030      pH (UA) 5.5 5.0 - 8.0      Protein Negative NEG mg/dL    Glucose Negative NEG mg/dL    Ketone Negative NEG mg/dL    Bilirubin Negative NEG      Blood Negative NEG      Urobilinogen 0.2 0.2 - 1.0 EU/dL    Nitrites Negative NEG      Leukocyte Esterase SMALL (A) NEG      WBC 10-20 0 - 4 /hpf    RBC 0-5 0 - 5 /hpf    Epithelial cells FEW FEW /lpf    Bacteria Negative NEG /hpf    UA:UC IF INDICATED URINE CULTURE ORDERED (A) CNI      Hyaline cast 2-5 0 - 5 /lpf       Radiologic Studies -   XR WRIST LT AP/LAT/OBL MIN 3V   Final Result   IMPRESSION:   Osteopenia with no acute fracture. CT HEAD WO CONT   Final Result   IMPRESSION:      No acute traumatic injury. CT SPINE CERV WO CONT   Final Result   IMPRESSION:      1. No acute cervical spine fracture or subluxation. Mild degenerative changes. 2. Age indeterminate mild T4 compression fracture. CT Results  (Last 48 hours)               10/02/20 0230  CT HEAD WO CONT Final result    Impression:  IMPRESSION:       No acute traumatic injury. Narrative:  INDICATION:   fall       EXAMINATION:  CT HEAD WO CONTRAST       COMPARISON:  July 4, 2020       TECHNIQUE:  Routine noncontrast axial head CT was performed. Sagittal and   coronal reconstructions were generated. CT dose reduction was achieved through   use of a standardized protocol tailored for this examination and automatic   exposure control for dose modulation. FINDINGS:       Ventricles:  Midline, no hydrocephalus. Intracranial Hemorrhage:  None. 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 Basal Cisterns:  Normal.    Paranasal Sinuses:  Visualized sinuses are clear. Soft Tissues:  No significant soft tissue swelling. Osseous Structures:  No acute fracture. Additional Comments:  N/A.            10/02/20 0230  CT SPINE CERV WO CONT Final result    Impression:  IMPRESSION:       1. No acute cervical spine fracture or subluxation. Mild degenerative changes. 2. Age indeterminate mild T4 compression fracture. Narrative:  INDICATION: fall       COMPARISON: July 4, 2020. TECHNIQUE:   Noncontrast axial CT imaging of the cervical spine was performed. Coronal and sagittal reconstructions were obtained. CT dose reduction was achieved through use of a standardized protocol tailored   for this examination and automatic exposure control for dose modulation.        FINDINGS:       There is normal alignment of the cervical spine. No acute cervical spine   fracture. Mild compression deformity T4, age indeterminate. The craniocervical   junction is intact. Mild multilevel degenerative changes with mild spinal canal   stenosis C5-6 and C6-7. Small calcified granulomas right lung apex. CXR Results  (Last 48 hours)    None            Medical Decision Making   I am the first provider for this patient. I reviewed the vital signs, available nursing notes, past medical history, past surgical history, family history and social history. Vital Signs-Reviewed the patient's vital signs. Patient Vitals for the past 12 hrs:   Temp Pulse Resp BP SpO2   10/02/20 0430    126/72 94 %   10/02/20 0415    (!) 140/75 95 %   10/02/20 0215    128/79 94 %   10/02/20 0154 98.4 °F (36.9 °C) 85 20 (!) 151/91 95 %       Pulse Oximetry Analysis - 95% on RA    Records Reviewed/Interpretted: Nursing Notes from triage and Old Medical Records noting previous neurology visits with Dr. Hilda Dunlap for Lewy body Parkinson's disease with hallucinations    Provider Notes (Medical Decision Making):     DDX:  Closed head injury, hematoma, wrist injury/fracture    Plan:  Ct head and neck, wrist xray     Impression:  Ground-level fall, scalp contusion, closed head injury, wrist contusion    ED Course:   Initial assessment performed. The patients presenting problems have been discussed, and they are in agreement with the care plan formulated and outlined with them. I have encouraged them to ask questions as they arise throughout their visit. I reviewed our electronic medical record system for any past medical records that were available that may contribute to the patients current condition, the nursing notes and and vital signs from today's visit  Nursing notes will be reviewed as they become available in realtime while the pt has been in the ED. Nick Hua MD      I personally reviewed/interpreted pt's imaging.   Agree with official read by radiology as noted above. Fay Mejia MD        Progress note:  Pt noted to be feeling better, ready for discharge. Discussed lab and imagingfindings with pt, specifically noting negative intracranial bleed or C-spine injury, noted indeterminate age of T spine compression fracture. Pt will follow up with primary care as instructed. All questions have been answered, pt voiced understanding and agreement with plan. Specific return precautions provided in addition to instructions for pt to return to the ED immediately should sx worsen at any time. Fay Mejia MD             Critical Care Time:     none      Diagnosis     Clinical Impression:   1. Fall, initial encounter    2. Hematoma of scalp, initial encounter    3. Contusion of left wrist, initial encounter    4. Closed head injury, initial encounter        PLAN:  1. Discharge Medication List as of 10/2/2020  4:24 AM        2. Follow-up Information     Follow up With Specialties Details Why Contact Info    Dorota Bernstein MD Internal Medicine Schedule an appointment as soon as possible for a visit in 2 days  [address] [place] [address] 83.  [phone]          Return to ED if worse     Disposition:      The patient's results have been reviewed with family and/or caregiver. They verbally convey their understanding and agreement of the patient's signs, symptoms, diagnosis, treatment and prognosis and additionally agree to follow up as recommended in the discharge instructions or to return to the Emergency Room should the patient's condition change prior to their follow-up appointment. The family and/or caregiver verbally agrees with the care-plan and all of their questions have been answered.  The discharge instructions have also been provided to the them with educational information regarding the patient's diagnosis as well a list of reasons why the patient would want to return to the ER prior to their follow-up appointment should their condition change. Luan Oconnor MD          This note will not be viewable in [address]

## 2020-10-02 NOTE — ED NOTES
Patient discharged by Yusra Bernard MD. Patient provided with discharge instructions Rx and instructions on follow up care. Patient out of ED via wheelchair accompanied by family.

## 2020-10-02 NOTE — DISCHARGE INSTRUCTIONS
Patient Education        Bruises: Care Instructions  Your Care Instructions     Bruises occur when small blood vessels under the skin tear or rupture, most often from a twist, bump, or fall. Blood leaks into tissues under the skin and causes a black-and-blue spot that often turns colors, including purplish black, reddish blue, or yellowish green, as the bruise heals. Bruises hurt, but most are not serious and will go away on their own within 2 to 4 weeks. Sometimes, gravity causes them to spread down the body. A leg bruise usually will take longer to heal than a bruise on the face or arms. Follow-up care is a key part of your treatment and safety. Be sure to make and go to all appointments, and call your doctor if you are having problems. It's also a good idea to know your test results and keep a list of the medicines you take. How can you care for yourself at home? · Take pain medicines exactly as directed. ? If the doctor gave you a prescription medicine for pain, take it as prescribed. ? If you are not taking a prescription pain medicine, ask your doctor if you can take an over-the-counter medicine. · Put ice or a cold pack on the area for 10 to 20 minutes at a time. Put a thin cloth between the ice and your skin. · If you can, prop up the bruised area on pillows as much as possible for the next few days. Try to keep the bruise above the level of your heart. When should you call for help? Call your doctor now or seek immediate medical care if:    · You have signs of infection, such as:  ? Increased pain, swelling, warmth, or redness. ? Red streaks leading from the bruise. ? Pus draining from the bruise. ? A fever.     · You have a bruise on your leg and signs of a blood clot, such as:  ? Increasing redness and swelling along with warmth, tenderness, and pain in the bruised area. ? Pain in your calf, back of the knee, thigh, or groin. ?  Redness and swelling in your leg or groin.     · Your pain gets worse. Watch closely for changes in your health, and be sure to contact your doctor if:    · You do not get better as expected. Where can you learn more? Go to http://www.gray.com/  Enter T177 in the search box to learn more about \"Bruises: Care Instructions. \"  Current as of: June 26, 2019               Content Version: 12.6  © 2996-1996 [employer]. Care instructions adapted under license by [employer] (which disclaims liability or warranty for this information). If you have questions about a medical condition or this instruction, always ask your healthcare professional. [address] any warranty or liability for your use of this information. Patient Education     Contusion: Care Instructions  Your Care Instructions  Contusion is the medical term for a bruise. It is the result of a direct blow or an impact, such as a fall. Contusions are common sports injuries. Most people think of a bruise as a black-and-blue spot. This happens when small blood vessels get torn and leak blood under the skin. But bones, muscles, and organs can also get bruised. This may damage deep tissues but not cause a bruise you can see. The doctor will do a physical exam to find the location of your contusion. You may also have tests to make sure you do not have a more serious injury, such as a broken bone or nerve damage. These may include X-rays or other imaging tests like a CT scan or MRI. Deep-tissue contusions may cause pain and swelling. But if there is no serious damage, they will often get better in a few weeks with home treatment. The doctor has checked you carefully, but problems can develop later. If you notice any problems or new symptoms, get medical treatment right away. Follow-up care is a key part of your treatment and safety. Be sure to make and go to all appointments, and call your doctor if you are having problems.  It's also a good idea to know your test results and keep a list of the medicines you take. How can you care for yourself at home? · Put ice or a cold pack on the sore area for 10 to 20 minutes at a time to stop swelling. Put a thin cloth between the ice pack and your skin. · Be safe with medicines. Read and follow all instructions on the label. ¨ If the doctor gave you a prescription medicine for pain, take it as prescribed. ¨ If you are not taking a prescription pain medicine, ask your doctor if you can take an over-the-counter medicine. · If you can, prop up the sore area on pillows as much as possible for the next few days. Try to keep the sore area above the level of your heart. When should you call for help? Call your doctor now or seek immediate medical care if:  · Your pain gets worse. · You have new or worse swelling. · You have tingling, weakness, or numbness in the area near the contusion. · The area near the contusion is cold or pale. Watch closely for changes in your health, and be sure to contact your doctor if:  · You do not get better as expected. Where can you learn more? Go to [employer]  Enter [id number] in the search box to learn more about \"Contusion: Care Instructions. \"   © 7186-9474 [employer]. Care instructions adapted under license by [facility] (which disclaims liability or warranty for this information). This care instruction is for use with your licensed healthcare professional. If you have questions about a medical condition or this instruction, always ask your healthcare professional. [place] [address] any warranty or liability for your use of this information.   Content Version: 47.4.738386; Current as of: May 22, 2015           Patient Education        Preventing Falls: Care Instructions  Your Care Instructions     Getting around your home safely can be a challenge if you have injuries or health problems that make it easy for you to fall. Loose rugs and furniture in walkways are among the dangers for many older people who have problems walking or who have poor eyesight. People who have conditions such as arthritis, osteoporosis, or dementia also have to be careful not to fall. You can make your home safer with a few simple measures. Follow-up care is a key part of your treatment and safety. Be sure to make and go to all appointments, and call your doctor if you are having problems. It's also a good idea to know your test results and keep a list of the medicines you take. How can you care for yourself at home? Taking care of yourself  · You may get dizzy if you do not drink enough water. To prevent dehydration, drink plenty of fluids, enough so that your urine is light yellow or clear like water. Choose water and other caffeine-free clear liquids. If you have kidney, heart, or liver disease and have to limit fluids, talk with your doctor before you increase the amount of fluids you drink. · Exercise regularly to improve your strength, muscle tone, and balance. Walk if you can. Swimming may be a good choice if you cannot walk easily. · Have your vision and hearing checked each year or any time you notice a change. If you have trouble seeing and hearing, you might not be able to avoid objects and could lose your balance. · Know the side effects of the medicines you take. Ask your doctor or pharmacist whether the medicines you take can affect your balance. Sleeping pills or sedatives can affect your balance. · Limit the amount of alcohol you drink. Alcohol can impair your balance and other senses. · Ask your doctor whether calluses or corns on your feet need to be removed. If you wear loose-fitting shoes because of calluses or corns, you can lose your balance and fall. · Talk to your doctor if you have numbness in your feet. Preventing falls at home  · Remove raised doorway thresholds, throw rugs, and clutter.  Repair loose carpet or raised areas in the floor. · Move furniture and electrical cords to keep them out of walking paths. · Use nonskid floor wax, and wipe up spills right away, especially on ceramic tile floors. · If you use a walker or cane, put rubber tips on it. If you use crutches, clean the bottoms of them regularly with an abrasive pad, such as steel wool. · Keep your house well lit, especially Melonie Maxin, and outside walkways. Use night-lights in areas such as hallways and bathrooms. Add extra light switches or use remote switches (such as switches that go on or off when you clap your hands) to make it easier to turn lights on if you have to get up during the night. · Install sturdy handrails on stairways. · Move items in your cabinets so that the things you use a lot are on the lower shelves (about waist level). · Keep a cordless phone and a flashlight with new batteries by your bed. If possible, put a phone in each of the main rooms of your house, or carry a cell phone in case you fall and cannot reach a phone. Or, you can wear a device around your neck or wrist. You push a button that sends a signal for help. · Wear low-heeled shoes that fit well and give your feet good support. Use footwear with nonskid soles. Check the heels and soles of your shoes for wear. Repair or replace worn heels or soles. · Do not wear socks without shoes on wood floors. · Walk on the grass when the sidewalks are slippery. If you live in an area that gets snow and ice in the winter, sprinkle salt on slippery steps and sidewalks. Preventing falls in the bath  · Install grab bars and nonskid mats inside and outside your shower or tub and near the toilet and sinks. · Use shower chairs and bath benches. · Use a hand-held shower head that will allow you to sit while showering.   · Get into a tub or shower by putting the weaker leg in first. Get out of a tub or shower with your strong side first.  · Repair loose toilet seats and consider installing a raised toilet seat to make getting on and off the toilet easier. · Keep your bathroom door unlocked while you are in the shower. Where can you learn more? Go to http://www.pineda.com/  Enter G117 in the search box to learn more about \"Preventing Falls: Care Instructions. \"  Current as of: April 15, 2020               Content Version: 12.6  © 2006-2020 [employer]. Care instructions adapted under license by [employer] (which disclaims liability or warranty for this information). If you have questions about a medical condition or this instruction, always ask your healthcare professional. [address] any warranty or liability for your use of this information. Patient Education        Head Injury: Care Instructions  Your Care Instructions     Most injuries to the head are minor. Bumps, cuts, and scrapes on the head and face usually heal well and can be treated the same as injuries to other parts of the body. Although it's rare, once in a while a more serious problem shows up after you are home. So it's good to be on the lookout for symptoms for a day or two. Follow-up care is a key part of your treatment and safety. Be sure to make and go to all appointments, and call your doctor if you are having problems. It's also a good idea to know your test results and keep a list of the medicines you take. How can you care for yourself at home? · Follow your doctor's instructions. He or she will tell you if you need someone to watch you closely for the next 24 hours or longer. · Take it easy for the next few days or more if you are not feeling well. · Ask your doctor when it's okay for you to go back to activities like driving a car, riding a bike, or operating machinery. When should you call for help? Call 911 anytime you think you may need emergency care.  For example, call if:    · You have a seizure.     · You passed out (lost consciousness).     · You are confused or can't stay awake. Call your doctor now or seek immediate medical care if:    · You have new or worse vomiting.     · You feel less alert.     · You have new weakness or numbness in any part of your body. Watch closely for changes in your health, and be sure to contact your doctor if:    · You do not get better as expected.     · You have new symptoms, such as headaches, trouble concentrating, or changes in mood. Where can you learn more? Go to http://www.gray.com/  Enter M264 in the search box to learn more about \"Head Injury: Care Instructions. \"  Current as of: November 20, 2019               Content Version: 12.6  © 8878-1580 [employer]. Care instructions adapted under license by [employer] (which disclaims liability or warranty for this information). If you have questions about a medical condition or this instruction, always ask your healthcare professional. [place] [address] any warranty or liability for your use of this information.

## 2020-10-03 LAB
BACTERIA SPEC CULT: NORMAL
SERVICE CMNT-IMP: NORMAL

## 2020-10-05 ENCOUNTER — TELEPHONE (OUTPATIENT)
Dept: INTERNAL MEDICINE CLINIC | Age: 85
End: 2020-10-05

## 2020-10-05 ENCOUNTER — PATIENT OUTREACH (OUTPATIENT)
Dept: CASE MANAGEMENT | Age: 85
End: 2020-10-05

## 2020-10-05 NOTE — TELEPHONE ENCOUNTER
Patient's daughter, Vi Gee states she needs for Dr. Yamila Fitzpatrick to see ED [facility] ER 10/2/20 notes & also Labs for patient seen Friday night & fall with injuries Prior to patient's Virtual Visit appt tomorrow, 10/6/20. Garrard reports patient is still not doing well & is concerned with Labs that patient may have UTI. Please call if any questions.  Thank you

## 2020-10-05 NOTE — TELEPHONE ENCOUNTER
Spoke to [employer] (HIPAA). Two pt identifiers confirmed. Tomasz Porter informed that there are some lab orders for pt pending--likely for NOV f/u.  Tomasz Porter states that pt is too [place] to go to the lab and she was inquiring if the ED took labs for she was not there when pt initially got there. Tomasz Porter informed that UA/UC negative. Mike Cornea to just to the [facility] and see what Dr. Jose Luis Ha recommends if pt needs any labs. Tomasz Porter verbalized understanding of information discussed w/ no further questions at this time.

## 2020-10-05 NOTE — PROGRESS NOTES
Patient contacted regarding recent discharge and COVID-19 risk. Discussed COVID-19 related testing which was not done at this time. Test results were not done. Patient informed of results, if available? N/A      Care Transition Nurse/ Ambulatory Care Manager/ LPN Care Coordinator contacted the daughter, Alvino Angulo, by telephone to perform post discharge assessment. Verified name and  with daughter as identifiers. Per daughter, Alvino Angulo, patient resides at [facility]. Patient has following risk factors of: asthma and diabetes. Rx - none. Pt was advised to f/u with PCP (Dr. Darshana Milan Provider) post-discharge. ACM encouraged daughter to schedule patient for a follow-up visit with PCP. No further outreach/follow-up is scheduled by this ACM at this time; pt is a resident of [facility].

## 2020-10-06 ENCOUNTER — VIRTUAL VISIT (OUTPATIENT)
Dept: INTERNAL MEDICINE CLINIC | Age: 85
End: 2020-10-06
Payer: MEDICARE

## 2020-10-06 DIAGNOSIS — T14.8XXA ABRASION: Primary | ICD-10-CM

## 2020-10-06 DIAGNOSIS — I48.0 PAROXYSMAL ATRIAL FIBRILLATION (HCC): ICD-10-CM

## 2020-10-06 DIAGNOSIS — R07.89 CHEST WALL PAIN: ICD-10-CM

## 2020-10-06 DIAGNOSIS — M81.0 AGE-RELATED OSTEOPOROSIS WITHOUT CURRENT PATHOLOGICAL FRACTURE: ICD-10-CM

## 2020-10-06 DIAGNOSIS — R25.1 TREMOR: ICD-10-CM

## 2020-10-06 DIAGNOSIS — F02.80 LEWY BODY PARKINSON DISEASE (HCC): ICD-10-CM

## 2020-10-06 DIAGNOSIS — R06.09 DOE (DYSPNEA ON EXERTION): ICD-10-CM

## 2020-10-06 DIAGNOSIS — I10 ESSENTIAL HYPERTENSION: ICD-10-CM

## 2020-10-06 DIAGNOSIS — R26.89 POOR BALANCE: ICD-10-CM

## 2020-10-06 DIAGNOSIS — E78.00 HYPERCHOLESTEROLEMIA: ICD-10-CM

## 2020-10-06 DIAGNOSIS — G31.83 LEWY BODY PARKINSON DISEASE (HCC): ICD-10-CM

## 2020-10-06 PROCEDURE — 1090F PRES/ABSN URINE INCON ASSESS: CPT | Performed by: INTERNAL MEDICINE

## 2020-10-06 PROCEDURE — 1100F PTFALLS ASSESS-DOCD GE2>/YR: CPT | Performed by: INTERNAL MEDICINE

## 2020-10-06 PROCEDURE — 3288F FALL RISK ASSESSMENT DOCD: CPT | Performed by: INTERNAL MEDICINE

## 2020-10-06 PROCEDURE — 99214 OFFICE O/P EST MOD 30 MIN: CPT | Performed by: INTERNAL MEDICINE

## 2020-10-06 PROCEDURE — G8427 DOCREV CUR MEDS BY ELIG CLIN: HCPCS | Performed by: INTERNAL MEDICINE

## 2020-10-06 PROCEDURE — G9717 DOC PT DX DEP/BP F/U NT REQ: HCPCS | Performed by: INTERNAL MEDICINE

## 2020-10-06 NOTE — PROGRESS NOTES
HISTORY OF PRESENT ILLNESS  Dann Lema is a 80 y.o. female. HPI     Pt last seen vv 9/8/20. Here for routine care. This is an established visit completed with telemedicine was completed with video assist  the patient acknowledges and agrees to this method of visitation santiago  Presents with daughter who provides hx  Lives at the [facility]     Recall: the patient was dx with corona virus earlier in April   She was briefly home with her daugher for 2 weeks and [place]   Was tested positve 15th of April and was positive for corona but never had sx   No cough or fever or other sx. However it does hurt to breath at times   Lungs overall clear per nurse who examined her w me     Recall she was in ed 7/4/20 for a fall   She was in ed 10/2/20 for a fall  She was asleep and fell out of her bed, she hit the back of her head and skinned the back of her calf   Reviewed ct head 10/2/20: No acute traumatic injury. Reviewed ct c spine 10/2/20: . No acute cervical spine fracture or subluxation. Mild degenerative changes. 2. Age indeterminate mild T4 compression fracture. Reviewed xr wrist 10/2/20: Osteopenia with no acute fracture.   She believes her legs are weak due to blood loss from wounds  Discussed her legs probably wouldn't be weak due to this   Discussed she wasn't anemic on last labs  Recall she was in ed 7/4/20 for a fall   She has a bump on the back of her head  She is having her leg bandaged every other day at [facility]  Discussed she does not have uti  Discussed PT for leg weakness   Daughter states she got a fracture in her back during PT  Discussed PT would just be for strength and balance and not for back pain  Daughter will discuss this and let us know how they want to proceed    She c/o intermittent cp   The pain is in the center of her chest, she also endorses sorenes  Denies pain on palpation   She does not have cp right now   She thinks this pain started after her fall   She states it's not the pain she had before when she had heart pain   She has been taking tylenol for bump on head but not for cp   It has not gotten better over the past several days  She has sob on exertion - this is the same as before, not progressive  Will get cxr   Discussed this is prob due to fall and won't proceed with cardiac workup for now     BP is controlled   Continues on metoprolol 25mg bid   Continues on lasix 20 mg   Not having issues with swelling in her ankles --        Wt was 114 lb lov    Her wt is in the nl ranges         Reviewed labs     Pt follows wt Dr Tanner Sheth (neuro) for tremor and hallucinations   Reviewed note 9/14/20:1. Lewy body Parkinson disease ([facility] [address]  2. Hallucinations  3. Benign essential tremor  Other orders  -     primidone (MYSOLINE) 50 mg tablet; One half tab twice daily for tremor at approx 0900 and approx 210  80year-old woman who I suspect has a atypical Parkinson syndrome possibly Lewy body manifesting as hallucinations and some bradykinesia. Today it does appear that her tremor is getting worse but it looks more essential affecting both upper extremities and her head and voice. I am going to have her try low-dose primidone to start and we might titrate depending on tolerability. Her walking is quite adequate with good tempo non-shuffling. Hallucinations are controlled better I think in part because of less isolation. Continue 25 mg Seroquel at bedtime. If she is still taking 12 point 5 in the morning okay to continue otherwise I would not resume it if she is not taking it. After couple weeks okay to call and leave a message if the primidone is having any benefit or not. I would like to see her in 6 months or sooner if something changes. I reviewed and decided to continue the current medications. This clinical note was dictated with an electronic dictation s  oftware that can make unintentional errors.   If there are any questions, please contact me directly for clarification. Pt had been being treated for Parkinson's but the neuro decided it may not be this so they stopped this treatment   Has chronic tremors and hallucinations   She is on seroquel 25mg for sleep and hallucinations --   Also gets another half in the morning.   She still gets the hallucinations on and off see above she knows they are not real but they are disturbing to have   She started primidone 50 mg for tremor         Talking melatonin 5-10mg at bedtime         Pt follows with Dr Lissette Salmon (cardio) for a fib   Last visit 8/21/20   Atypical symptoms in the setting of large hiatal hernia -- prohibitive risk for surgery   Almbeau Srivastava in 6/2018 normal   Echo done 6/2018 with preserved ejection fraction 60-65% with grade 1 DD   Continue NTG as prescribed -- taking 1 dose which resolves pain   If she has no relief in symptoms despite NTGx3 in 15 minutes, then she must go to ER   Pt is on norpace 100mg BID         Pt sees Dr John Wayne (GI) for diverticulosis   Pt also has a large hiatal hernia   Pt also has a gluten sensitivity and mostly avoids this   Pt takes protonix 40mg bid sx controlled works well        klor con 10meq daily         Pt saw Dr Susan Haque (neuropsych) in the past   Last visit was 2015 - no dementia      Pt saw Dr Keshav Polanco (ENT) for hearing loss   Last visit was 12/18/18  Pt decided not to get hearing aids      Pt sees Dr Zakia Soler (ortho) for low back pain   Last visit was 1/9/19   Previously, provided info for Dr Pierce Simms  She had been taking tramadol but had SE so was switched to tylenol which helps somewhat   Continues on tylenol for this         Pt declined taking reclast and fosamax in the past      Pt is taking vit D 5000U daily         Pt takes miralax for constipation   This works well as long as she takes it daily occasionally takes extra dose      Continues on zoloft 75 mg helps w mood/depression , happy w dose      Continues on klor con 10meqs         She saw Dr Enrique Banuelos (uro gyn) for this thick fecal discharge coming form her vagina   Last visit was 11/19   She states this has resolved so he is planning on just observing this         ACP on file.  SDMs are her daughters, Little Lanes and Sweta Gentleman.                     PREVENTIVE:   Colonoscopy: ~10 years ago with Dr Marj Reddy, per pt the way her colon is formed makes it difficult to get COLOs, her declines further   Pap: 11/19 with Dr Justina Antunez: declines further  Dexa: 8/18, osteoporosis, declines further tx other than vit d   Tdap: 10/19   Prevnar 13: 03/01/19   Pneumovax: 03/09/20   Shingrix: both rounds completed   Flu shot: Fall 2019, due now   A1C: 3/19 5.9 9/19 5.7 3/20 5.8   Micro: 9/19   Eye exam: Dr Luis Alberto Jay ~summer 2018, Dr Childs 7/20  Lipids: 3/19     Patient Active Problem List    Diagnosis Date Noted    Depression, major, recurrent, mild ([facility] [address] 06/04/2019    Pleural effusion 04/18/2019    History of stroke 06/11/2018    Gastroesophageal reflux disease without esophagitis 09/25/2017    Diverticulosis of large intestine without hemorrhage 09/25/2017    Benign essential tremor 09/25/2017    Irritable bowel syndrome with diarrhea 09/25/2017    Hypercholesterolemia 09/25/2017    Allergic rhinitis 09/25/2017    Fuchs' corneal dystrophy 09/25/2017    Idiopathic small and large fiber sensory neuropathy 04/21/2016    Diabetic peripheral neuropathy associated with type 2 diabetes mellitus ([facility] [address] 04/21/2016    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 04/21/2016    Anxiety 07/24/2015    Cerebrovascular disease, unspecified 05/03/2015    B12 deficiency 04/20/2015    Osteoporosis 04/20/2015    Memory loss 04/20/2015    Celiac sprue 05/14/2013    Atrial fibrillation (HCC)     Hypertension      Current Outpatient Medications   Medication Sig Dispense Refill    furosemide (LASIX) 20 mg tablet TAKE ONE TABLET BY MOUTH EVERY DAY 30 Tab 0    potassium chloride SR (KLOR-CON 10) 10 mEq tablet TAKE 1 TABLET BY MOUTH EVERY DAY. 30 Tab 0    sertraline (ZOLOFT) 50 mg tablet TAKE 1 & 1/2 TABLETS BY MOUTH EVERY DAY 45 Tab 0    QUEtiapine (SEROquel) 25 mg tablet 1/2 tab in the mid-morning (10am) and 1 tab at bedtime 90 Tab 1    primidone (MYSOLINE) 50 mg tablet One half tab twice daily for tremor at approx 0900 and approx 2000 90 Tab 1    Biotin 2,500 mcg cap Take 1 Cap by mouth daily. 30 Cap 3    metoprolol tartrate (LOPRESSOR) 25 mg tablet Metoprolol 25 mg 1 @ 900 AM and 1 @ 700 PM an additional 12.5 mg if  BP > 150/90  one hour after taking regular dose 270 Tab 1    magnesium hydroxide (Baez Milk of Magnesia) 400 mg/5 mL suspension Take 30 mL by mouth daily as needed for Constipation.  melatonin 5 mg tablet Take 1 or 2 tabs at bedtime for sleep 60 Tab 5    cyanocobalamin (Vitamin B-12) 100 mcg tablet Take 1 Tab by mouth daily. Indications: 500mcg 90 Tab 1    acetaminophen (TYLENOL EXTRA STRENGTH) 500 mg tablet Take 1 Tab by mouth every eight (8) hours as needed for Pain. 100 Tab 2    disopyramide phosphate (NORPACE) 100 mg capsule TAKE ONE CAPSULE BY MOUTH TWICE A  Cap 4    cholecalciferol, VITAMIN D3, (VITAMIN D3) 5,000 unit tab tablet Take 1 Tab by mouth daily. 60 Tab 5    aspirin 81 mg chewable tablet Take 1 Tab by mouth daily. 120 Tab 5    pantoprazole (PROTONIX) 40 mg tablet Take 1 Tab by mouth daily. (Patient taking differently: Take 40 mg by mouth two (2) times a day.) 30 Tab 12    Lactobac. rhamnosus GG-inulin (Trista 13) 10 billion cell -200 mg cpSP Culturee Digestive [employer] 10 billion cell-200 mg sprinkle capsule      nitroglycerin (NITROSTAT) 0.4 mg SL tablet 1 Tab by SubLINGual route every five (5) minutes as needed for Chest Pain. 25 Tab 1    Lactobacillus rhamnosus GG (CULTURELLE PO) Take  by mouth daily.  polyethylene glycol (MIRALAX) 17 gram/dose powder Take 17 g by mouth daily.        Past Surgical History:   Procedure Laterality Date    HX APPENDECTOMY  HX CHOLECYSTECTOMY      HX GI      hemorrhoidectomy    HX HEENT      sinus surgery    HX HERNIA REPAIR      HX TONSILLECTOMY        Lab Results   Component Value Date/Time    WBC 4.5 07/04/2020 08:48 AM    WBC (POC) 5.6 07/23/2018 03:07 PM    HGB 12.8 07/04/2020 08:48 AM    HGB (POC) 15.0 07/23/2018 03:07 PM    HCT 38.6 07/04/2020 08:48 AM    HCT (POC) 45.9 07/23/2018 03:07 PM    PLATELET 786 34/51/8620 08:48 AM    PLATELET (POC) 144.2 07/23/2018 03:07 PM    MCV 89.8 07/04/2020 08:48 AM    MCV (POC) 90.0 07/23/2018 03:07 PM     Lab Results   Component Value Date/Time    Cholesterol, total 246 (H) 03/01/2019 10:15 AM    HDL Cholesterol 76 03/01/2019 10:15 AM    LDL, calculated 154 (H) 03/01/2019 10:15 AM    Triglyceride 82 03/01/2019 10:15 AM     Lab Results   Component Value Date/Time    GFR est non-AA 58 (L) 07/04/2020 08:48 AM    GFRNA, POC >60 10/15/2018 01:54 PM    GFR est AA >60 07/04/2020 08:48 AM    GFRAA, POC >60 10/15/2018 01:54 PM    Creatinine 0.90 07/04/2020 08:48 AM    Creatinine (POC) 0.8 10/15/2018 01:54 PM    BUN 28 (H) 07/04/2020 08:48 AM    Sodium 140 07/04/2020 08:48 AM    Potassium 3.7 07/04/2020 08:48 AM    Chloride 105 07/04/2020 08:48 AM    CO2 32 07/04/2020 08:48 AM    Magnesium 2.1 07/04/2020 08:48 AM        Review of Systems   Respiratory: Positive for shortness of breath. Cardiovascular: Positive for chest pain. Physical Exam  Constitutional:       General: She is not in acute distress. Appearance: Normal appearance. She is not ill-appearing, toxic-appearing or diaphoretic. HENT:      Head: Normocephalic and atraumatic. Eyes:      General:         Right eye: No discharge. Left eye: No discharge. Conjunctiva/sclera: Conjunctivae normal.   Pulmonary:      Effort: No respiratory distress. Neurological:      Mental Status: She is alert and oriented to person, place, and time. Mental status is at baseline.       Gait: Gait abnormal.   Psychiatric: Mood and Affect: Mood normal.         Behavior: Behavior normal.         ASSESSMENT and PLAN    ICD-10-CM ICD-9-CM    1. Abrasion     Discussed Vaseline bandage she lives at the [place] [facility] and they are changing dressings every other day     T14. 8XXA 919.0    2. Essential hypertension     Has been adequately controlled on metoprolol at recent office visits I10 401.9    3. Paroxysmal atrial fibrillation (HCC)       Up-to-date with cardiology continues on Norpace 100 mg twice daily I48.0 427.31    4. Age-related osteoporosis without current pathological fracture       Declines further treatment or evaluation M81.0 733.01    5. Hypercholesterolemia     Diet controlled E78.00 272.0    6. Tremor       Now on primidone which is helping R25.1 781.0    7. Lewy body Parkinson disease ([facility] [address]  G31.83 332.0 REFERRAL TO PHYSICAL THERAPY   Patient with likely Lewy body Parkinson's disease had recent evaluation with neurology tremor is being treated with primidone hallucinations with Seroquel    She does have a shuffling gait but thought adequate that no additional medication was started    Discussed with patient that her balance difficulties are at least in part related to this in addition to her age    Discussed physical therapy they will consider this so not sure if it like to pursue physical therapy at this time they will get back to me referral placed in case had like to do physical therapy  331.82    8.  Poor balance       see above discussed physical therapy R26.89 781.99 REFERRAL TO PHYSICAL THERAPY   9. LATHAM (dyspnea on exertion)     Patient is a chronic history of some mild dyspnea on exertion she feels that it may be a bit progressive recently really quite unclear    We will repeat chest x-ray she does have a history of a pleural effusion about a year ago which improved with Lasix she continues on Lasix    I suspect her deconditioning and frailty are contributing to the symptom of shortness of breath await x-ray results check basic labs as well R06.00 786.09 XR CHEST PA LAT   10. Chest wall pain       Patient reports some chest wall pain after her fall I suspect this is musculoskeletal and not cardiac in etiology her symptoms happen with movement primarily but do not seem exertional more movement related chest x-ray will help evaluate ribs discussed Tylenol as needed if symptoms progress or do not resolve will evaluate further R07.89 786.52 XR CHEST PA LAT           Scribed by [facility] [address], as dictated by Dr. Sylvester Young. Current diagnosis and concerns discussed with pt at length. Pt understands risks and benefits or current treatment plan and medications, and accepts the treatment and medication with any possible risks. Pt asks appropriate questions, which were answered. Pt was instructed to call with any concerns or problems. I have reviewed the note documented by the scribe. The services provided are my own.   The documentation is accurate

## 2020-10-07 ENCOUNTER — TELEPHONE (OUTPATIENT)
Dept: INTERNAL MEDICINE CLINIC | Age: 85
End: 2020-10-07

## 2020-10-07 NOTE — TELEPHONE ENCOUNTER
[facility]  [facility]               Caller's first and last name: Tomasz Porter   Reason for call: Requesting to speak with Ambrosio regarding PT for the patient.    Callback required yes/no and why: yes   Best contact number(s): [phone]   Details to clarify the request: n/a      Copy/paste Melina dai

## 2020-10-08 NOTE — TELEPHONE ENCOUNTER
Patient's daughter, Katlyn Domingo, states she needs a call back today please on getting orders for patient to receive Physical Therapy. Please see previous encounter. Please call.  Thank you

## 2020-10-08 NOTE — TELEPHONE ENCOUNTER
Received call from Maykel Blackman (HIPAA). Two pt identifiers confirmed. Maykel Blackman asking for PT order to go to [facility] w/ [employer]. Maykel Blackman verbalized understanding of information discussed w/ no further questions at this time. PT order faxed to [facility] at [phone] w/ confirmation received.

## 2020-10-12 ENCOUNTER — TELEPHONE (OUTPATIENT)
Dept: INTERNAL MEDICINE CLINIC | Age: 85
End: 2020-10-12

## 2020-10-12 NOTE — TELEPHONE ENCOUNTER
#[phone] daughter, Cara Junior states pt had a fall last week and has a gash/wound on her leg that isn't healing as she would like. Pt is also having quite a bit of ankle swelling. Cara Junior would like pt to be seen in person by Dr. Nadine Martines as soon as she can. Pt will be going for chest x ray and labs this week.

## 2020-10-13 ENCOUNTER — HOSPITAL ENCOUNTER (OUTPATIENT)
Dept: GENERAL RADIOLOGY | Age: 85
Discharge: HOME | End: 2020-10-13
Payer: MEDICARE

## 2020-10-13 ENCOUNTER — HOSPITAL ENCOUNTER (OUTPATIENT)
Dept: LAB | Age: 85
Discharge: HOME | End: 2020-10-13
Payer: MEDICARE

## 2020-10-13 PROCEDURE — 84443 ASSAY THYROID STIM HORMONE: CPT

## 2020-10-13 PROCEDURE — 80061 LIPID PANEL: CPT

## 2020-10-13 PROCEDURE — 83036 HEMOGLOBIN GLYCOSYLATED A1C: CPT

## 2020-10-13 PROCEDURE — 36415 COLL VENOUS BLD VENIPUNCTURE: CPT

## 2020-10-13 PROCEDURE — 80053 COMPREHEN METABOLIC PANEL: CPT

## 2020-10-13 PROCEDURE — 71046 X-RAY EXAM CHEST 2 VIEWS: CPT

## 2020-10-13 PROCEDURE — 85027 COMPLETE CBC AUTOMATED: CPT

## 2020-10-13 RX ORDER — ACETAMINOPHEN 500 MG
500 TABLET ORAL
Qty: 100 TAB | Refills: 2 | Status: SHIPPED | OUTPATIENT
Start: 2020-10-13 | End: 2020-11-18 | Stop reason: DRUGHIGH

## 2020-10-13 NOTE — PROGRESS NOTES
HISTORY OF PRESENT ILLNESS  Ashwini Arias is a 80 y.o. female. HPI     Pt last seen vv 10/6/20 Here for routine care. This is an established visit completed with telemedicine was completed with video assist  the patient acknowledges and agrees to this method of visitation santiago  Presents with daughter who provides hx  Lives at the [place]     Recall: the patient was dx with corona virus earlier in April, she has recovered and is doing well     Lov, She was in ed 10/2/20 for a fall  She was asleep and fell out of her bed, she hit the back of her head and skinned the back of her calf   Now her R ankle is swollen   They have been bandaging leg at [place]  On exam: Mild erythema on back R leg, 2 healing abrasions, 1/4 inch diameter, other is smaller, No purulent drainage,1-2+ pitting edema , No swelling in L leg   Discussed elevation   Discussed taking off bandage before shower and putting new bandage on after shower   Daughter endorses drainage from abrasion    Discussed this will take a long time to heal   Will give keflex   She will be starting PT next week 10/20 to help prevent further falls      Lov, c/o cp   Reviewed cxr 10/13/20: 1. Persistent cardiomegaly with small pleural effusions and/or pleural  thickening unchanged. No further issues with this     BP is controlled, doesn't remember readings   Continues on metoprolol 25mg bid   Continues on lasix 20 mg daily - only swelling in R leg due to injury   Discussed if swelling gets worse, can double up on lasix for a few days     Wt was 114 lb lov    Her wt is in the nl ranges      Reviewed labs      Pt follows wt Dr Valentino Colindres (neuro) for tremor and hallucinations   Last visit 9/14/20  . Pt had been being treated for Parkinson's but the neuro decided it may not be this so they stopped this treatment   Has chronic tremors and hallucinations   She is on seroquel 25mg for sleep and hallucinations --   Also gets another half in the morning.   She started primidone 50 mg for tremor - has had improvement thinks this is working well    Talking melatonin 5-10mg at bedtime      Pt follows with Dr Sona Baez (cardio) for a fib   Last visit 8/21/20   Atypical symptoms in the setting of large hiatal hernia -- prohibitive risk for surgery   Bren Messer in 6/2018 normal   Echo done 6/2018 with preserved ejection fraction 60-65% with grade 1 DD   Continue NTG as prescribed -- taking 1 dose which resolves pain   If she has no relief in symptoms despite NTGx3 in 15 minutes, then she must go to ER   Pt is on norpace 100mg BID      Pt sees Dr Jagdeep Medrano (GI) for diverticulosis   Pt also has a large hiatal hernia   Pt also has a gluten sensitivity and mostly avoids this   Pt takes protonix 40mg bid sx controlled works well     klor con 10meq daily      Pt saw Dr Heriberto Andersen (neuropsych) in the past   Last visit was 2015 - no dementia      Pt saw Dr Charlee Haddad (ENT) for hearing loss   Last visit was 12/18/18  Pt decided not to get hearing aids      Pt sees Dr Obed Mahan (ortho) for low back pain   Last visit was 1/9/19   Previously, provided info for Dr Debbie Sainz  She had been taking tramadol but had SE so was switched to tylenol which helps somewhat   Continues on tylenol for this         Pt declined taking reclast and fosamax in the past      Pt is taking vit D 5000U daily         Pt takes miralax for constipation   This works well as long as she takes it daily occasionally takes extra dose      Continues on zoloft 75 mg helps w mood/depression , happy w dose      Continues on klor con 10meqs         She saw Dr Juan Warren (uro gyn) for this thick fecal discharge coming form her vagina   Last visit was 11/19   She states this has resolved so he is planning on just observing this         ACP on file.  SDMs are her daughters, Senait Marks and Santhosh Dial.      PREVENTIVE:   Colonoscopy: ~10 years ago with Dr Jagdeep Medrano, per pt the way her colon is formed makes it difficult to get COLOs, her declines further   Pap: 11/19 with  Gregorio Olguin   Mammogram: declines further  Dexa: 8/18, osteoporosis, declines further tx other than vit d   Tdap: 10/19   Prevnar 13: 03/01/19   Pneumovax: 03/09/20   Shingrix: both rounds completed   Flu shot: 10/20   A1C: 3/19 5.9 9/19 5.7 3/20 5.8 10/20 5.5  Micro: 9/19   Eye exam: Dr Mikayla Cevallos ~summer 2018, Dr Marita Campawith: 10/20     Patient Active Problem List    Diagnosis Date Noted    Depression, major, recurrent, mild ([facility] 06/04/2019    Pleural effusion 04/18/2019    History of stroke 06/11/2018    Gastroesophageal reflux disease without esophagitis 09/25/2017    Diverticulosis of large intestine without hemorrhage 09/25/2017    Benign essential tremor 09/25/2017    Irritable bowel syndrome with diarrhea 09/25/2017    Hypercholesterolemia 09/25/2017    Allergic rhinitis 09/25/2017    Fuchs' corneal dystrophy 09/25/2017    Idiopathic small and large fiber sensory neuropathy 04/21/2016    Diabetic peripheral neuropathy associated with type 2 diabetes mellitus ([facility] 04/21/2016    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 04/21/2016    Anxiety 07/24/2015    Cerebrovascular disease, unspecified 05/03/2015    B12 deficiency 04/20/2015    Osteoporosis 04/20/2015    Memory loss 04/20/2015    Celiac sprue 05/14/2013    Atrial fibrillation (HCC)     Hypertension      Current Outpatient Medications   Medication Sig Dispense Refill    cephALEXin (KEFLEX) 500 mg capsule Take 1 Cap by mouth three (3) times daily. 7 Cap 0    acetaminophen (Tylenol Extra Strength) 500 mg tablet Take 1 Tab by mouth every eight (8) hours as needed for Pain. 100 Tab 2    furosemide (LASIX) 20 mg tablet TAKE ONE TABLET BY MOUTH EVERY DAY 30 Tab 0    potassium chloride SR (KLOR-CON 10) 10 mEq tablet TAKE 1 TABLET BY MOUTH EVERY DAY.  30 Tab 0    sertraline (ZOLOFT) 50 mg tablet TAKE 1 & 1/2 TABLETS BY MOUTH EVERY DAY 45 Tab 0    QUEtiapine (SEROquel) 25 mg tablet 1/2 tab in the mid-morning (10am) and 1 tab at bedtime 90 Tab 1    primidone (MYSOLINE) 50 mg tablet One half tab twice daily for tremor at approx 0900 and approx 2000 90 Tab 1    Biotin 2,500 mcg cap Take 1 Cap by mouth daily. 30 Cap 3    metoprolol tartrate (LOPRESSOR) 25 mg tablet Metoprolol 25 mg 1 @ 900 AM and 1 @ 700 PM an additional 12.5 mg if  BP > 150/90  one hour after taking regular dose 270 Tab 1    magnesium hydroxide (Baez Milk of Magnesia) 400 mg/5 mL suspension Take 30 mL by mouth daily as needed for Constipation.  melatonin 5 mg tablet Take 1 or 2 tabs at bedtime for sleep 60 Tab 5    Lactobac. rhamnosus GG-inulin (Trista 13) 10 billion cell -200 mg cpSP Culturee Digestive Health 10 billion cell-200 mg sprinkle capsule      cyanocobalamin (Vitamin B-12) 100 mcg tablet Take 1 Tab by mouth daily. Indications: 500mcg 90 Tab 1    disopyramide phosphate (NORPACE) 100 mg capsule TAKE ONE CAPSULE BY MOUTH TWICE A  Cap 4    nitroglycerin (NITROSTAT) 0.4 mg SL tablet 1 Tab by SubLINGual route every five (5) minutes as needed for Chest Pain. 25 Tab 1    cholecalciferol, VITAMIN D3, (VITAMIN D3) 5,000 unit tab tablet Take 1 Tab by mouth daily. 60 Tab 5    aspirin 81 mg chewable tablet Take 1 Tab by mouth daily. 120 Tab 5    Lactobacillus rhamnosus GG (CULTURELLE PO) Take  by mouth daily.  pantoprazole (PROTONIX) 40 mg tablet Take 1 Tab by mouth daily. (Patient taking differently: Take 40 mg by mouth two (2) times a day.) 30 Tab 12    polyethylene glycol (MIRALAX) 17 gram/dose powder Take 17 g by mouth daily.        Past Surgical History:   Procedure Laterality Date    HX APPENDECTOMY      HX CHOLECYSTECTOMY      HX GI      hemorrhoidectomy    HX HEENT      sinus surgery    HX HERNIA REPAIR      HX TONSILLECTOMY        Lab Results   Component Value Date/Time    WBC 3.1 (L) 10/13/2020 08:20 AM    WBC (POC) 5.6 07/23/2018 03:07 PM    HGB 12.8 10/13/2020 08:20 AM    HGB (POC) 15.0 07/23/2018 03:07 PM    HCT 36.5 10/13/2020 08:20 AM    HCT (POC) 45.9 07/23/2018 03:07 PM    PLATELET 309 68/82/0137 08:20 AM    PLATELET (POC) 285.4 07/23/2018 03:07 PM    MCV 90 10/13/2020 08:20 AM    MCV (POC) 90.0 07/23/2018 03:07 PM     Lab Results   Component Value Date/Time    Cholesterol, total 227 (H) 10/13/2020 08:20 AM    HDL Cholesterol 74 10/13/2020 08:20 AM    LDL, calculated 154 (H) 03/01/2019 10:15 AM    LDL Chol Calc (NIH) 143 (H) 10/13/2020 08:20 AM    Triglyceride 58 10/13/2020 08:20 AM     Lab Results   Component Value Date/Time    GFR est non-AA 52 (L) 10/13/2020 08:20 AM    GFRNA, POC >60 10/15/2018 01:54 PM    GFR est AA 60 10/13/2020 08:20 AM    GFRAA, POC >60 10/15/2018 01:54 PM    Creatinine 0.94 10/13/2020 08:20 AM    Creatinine (POC) 0.8 10/15/2018 01:54 PM    BUN 27 10/13/2020 08:20 AM    Sodium 144 10/13/2020 08:20 AM    Potassium 3.7 10/13/2020 08:20 AM    Chloride 103 10/13/2020 08:20 AM    CO2 29 10/13/2020 08:20 AM    Magnesium 2.1 07/04/2020 08:48 AM        Review of Systems   Respiratory: Negative for shortness of breath. Cardiovascular: Negative for chest pain. Physical Exam  Constitutional:       General: She is not in acute distress. Appearance: Normal appearance. She is not ill-appearing, toxic-appearing or diaphoretic. HENT:      Head: Normocephalic and atraumatic. Eyes:      General:         Right eye: No discharge. Left eye: No discharge. Conjunctiva/sclera: Conjunctivae normal.   Pulmonary:      Effort: No respiratory distress. Musculoskeletal:      Right lower leg: Edema present. Left lower leg: No edema. Comments:  Mild erythema on back R leg, 2 healing abrasions, 1/4 inch diameter, other is smaller, No purulent drainage,1-2+ pitting edema , No swelling in L leg    Skin:     Findings: Erythema present. Neurological:      Mental Status: She is alert and oriented to person, place, and time. Mental status is at baseline. Gait: Gait normal.   Psychiatric:         Mood and Affect: Mood normal.         Behavior: Behavior normal.         ASSESSMENT and PLAN    ICD-10-CM ICD-9-CM    1. Essential hypertension       Adequately controlled metoprolol takes extra half dose if systolic blood pressure climbs over 150     I10 401.9    2. Paroxysmal atrial fibrillation (HCC)       Rate control metoprolol I48.0 427.31    3. Benign essential tremor         Improved with primidone G25.0 333.1    4. Laceration of right lower extremity, initial encounter         Improving encouraged her injury about 2 weeks ago    Has very thin skin    She is getting bandages with [place] where she lives    We will focus on not having the bandages be wet after her shower    We will give a short course of Keflex due to some erythema    Overall she is improving S81.811A 894.0    5. Localized edema       Lasix stable and her left leg on Lasix 20 mg daily    Has more swelling in the right leg due to an injury    Not currently concerned about DVT    Discussed continue leg elevation continue current dose of Lasix    Discussed if progressive swelling we could double the dose for a few days     R60.0 782.3    6. Pleural effusion         Stable on recent chest x-ray J90 511.9    7. Depression, major, recurrent, mild (HCC)     Controlled on Zoloft   F33.0 296.31    8. Hallucination       Stable Seroquel R44.3 780.1    9. Poor balance       We will set up with physical therapy she has the order already and is working on this R26.89 781.99    10. Hypokalemia         On Klor-Con monitor levels E87.6 276.8    11. Hypercholesterolemia       Diet control E78.00 272.0    12. Cellulitis of right lower extremity       See above given Keflex L03.115 682.6            Scribed by Yohana Ortiz [facility], as dictated by Dr. Joslyn Gandhi. Current diagnosis and concerns discussed with pt at length.  Pt understands risks and benefits or current treatment plan and medications, and accepts the treatment and medication with any possible risks. Pt asks appropriate questions, which were answered. Pt was instructed to call with any concerns or problems. I have reviewed the note documented by the scribe. The services provided are my own. The documentation is accurate     Lunneka Darlene, who was evaluated through a synchronous (real-time) audio-video encounter, and/or her healthcare decision maker, is aware that it is a billable service, with coverage as determined by her insurance carrier. She provided verbal consent to proceed: Yes, and patient identification was verified. It was conducted pursuant to the emergency declaration under the [address] [place] [address], [address] [place] [address] authority and the [employer] General Act. A caregiver was present when appropriate. Ability to conduct physical exam was limited. I was at home. The patient was at home.

## 2020-10-13 NOTE — TELEPHONE ENCOUNTER
Spoke to [employer] ([facility]). Two pt identifiers confirmed. Offered 1300 appt for today--unable to accept. Anna Barron states a VV would be good for 10/16/20. Scheduled for 1115 on 10/16/20. Anna Barron verbalized understanding of information discussed w/ no further questions at this time. Anna Barron asking for a refill of tylenol for pt--pended.

## 2020-10-14 LAB
ALBUMIN SERPL-MCNC: 4.1 G/DL (ref 3.5–4.6)
ALBUMIN/GLOB SERPL: 2.2 {RATIO} (ref 1.2–2.2)
ALP SERPL-CCNC: 73 IU/L (ref 39–117)
ALT SERPL-CCNC: 11 IU/L (ref 0–32)
AST SERPL-CCNC: 16 IU/L (ref 0–40)
BILIRUB SERPL-MCNC: 0.4 MG/DL (ref 0–1.2)
BUN SERPL-MCNC: 27 MG/DL (ref 10–36)
BUN/CREAT SERPL: 29 (ref 12–28)
CALCIUM SERPL-MCNC: 9.8 MG/DL (ref 8.7–10.3)
CHLORIDE SERPL-SCNC: 103 MMOL/L (ref 96–106)
CHOLEST SERPL-MCNC: 227 MG/DL (ref 100–199)
CO2 SERPL-SCNC: 29 MMOL/L (ref 20–29)
CREAT SERPL-MCNC: 0.94 MG/DL (ref 0.57–1)
ERYTHROCYTE [DISTWIDTH] IN BLOOD BY AUTOMATED COUNT: 13.1 % (ref 11.7–15.4)
EST. AVERAGE GLUCOSE BLD GHB EST-MCNC: 111 MG/DL
GLOBULIN SER CALC-MCNC: 1.9 G/DL (ref 1.5–4.5)
GLUCOSE SERPL-MCNC: 101 MG/DL (ref 65–99)
HBA1C MFR BLD: 5.5 % (ref 4.8–5.6)
HCT VFR BLD AUTO: 36.5 % (ref 34–46.6)
HDLC SERPL-MCNC: 74 MG/DL
HGB BLD-MCNC: 12.8 G/DL (ref 11.1–15.9)
LDLC SERPL CALC-MCNC: 143 MG/DL (ref 0–99)
MCH RBC QN AUTO: 31.6 PG (ref 26.6–33)
MCHC RBC AUTO-ENTMCNC: 35.1 G/DL (ref 31.5–35.7)
MCV RBC AUTO: 90 FL (ref 79–97)
PLATELET # BLD AUTO: 217 X10E3/UL (ref 150–450)
POTASSIUM SERPL-SCNC: 3.7 MMOL/L (ref 3.5–5.2)
PROT SERPL-MCNC: 6 G/DL (ref 6–8.5)
RBC # BLD AUTO: 4.05 X10E6/UL (ref 3.77–5.28)
SODIUM SERPL-SCNC: 144 MMOL/L (ref 134–144)
TRIGL SERPL-MCNC: 58 MG/DL (ref 0–149)
TSH SERPL DL<=0.005 MIU/L-ACNC: 3.38 UIU/ML (ref 0.45–4.5)
VLDLC SERPL CALC-MCNC: 10 MG/DL (ref 5–40)
WBC # BLD AUTO: 3.1 X10E3/UL (ref 3.4–10.8)

## 2020-10-16 ENCOUNTER — VIRTUAL VISIT (OUTPATIENT)
Dept: INTERNAL MEDICINE CLINIC | Age: 85
End: 2020-10-16
Payer: MEDICARE

## 2020-10-16 DIAGNOSIS — G25.0 BENIGN ESSENTIAL TREMOR: ICD-10-CM

## 2020-10-16 DIAGNOSIS — L03.115 CELLULITIS OF RIGHT LOWER EXTREMITY: ICD-10-CM

## 2020-10-16 DIAGNOSIS — E78.00 HYPERCHOLESTEROLEMIA: ICD-10-CM

## 2020-10-16 DIAGNOSIS — E87.6 HYPOKALEMIA: ICD-10-CM

## 2020-10-16 DIAGNOSIS — R26.89 POOR BALANCE: ICD-10-CM

## 2020-10-16 DIAGNOSIS — F33.0 DEPRESSION, MAJOR, RECURRENT, MILD (HCC): ICD-10-CM

## 2020-10-16 DIAGNOSIS — I48.0 PAROXYSMAL ATRIAL FIBRILLATION (HCC): ICD-10-CM

## 2020-10-16 DIAGNOSIS — S81.811A LACERATION OF RIGHT LOWER EXTREMITY, INITIAL ENCOUNTER: ICD-10-CM

## 2020-10-16 DIAGNOSIS — R60.0 LOCALIZED EDEMA: ICD-10-CM

## 2020-10-16 DIAGNOSIS — R44.3 HALLUCINATION: ICD-10-CM

## 2020-10-16 DIAGNOSIS — J90 PLEURAL EFFUSION: ICD-10-CM

## 2020-10-16 DIAGNOSIS — I10 ESSENTIAL HYPERTENSION: Primary | ICD-10-CM

## 2020-10-16 PROCEDURE — G8427 DOCREV CUR MEDS BY ELIG CLIN: HCPCS | Performed by: INTERNAL MEDICINE

## 2020-10-16 PROCEDURE — 1090F PRES/ABSN URINE INCON ASSESS: CPT | Performed by: INTERNAL MEDICINE

## 2020-10-16 PROCEDURE — G0463 HOSPITAL OUTPT CLINIC VISIT: HCPCS | Performed by: INTERNAL MEDICINE

## 2020-10-16 PROCEDURE — 99214 OFFICE O/P EST MOD 30 MIN: CPT | Performed by: INTERNAL MEDICINE

## 2020-10-16 PROCEDURE — G9717 DOC PT DX DEP/BP F/U NT REQ: HCPCS | Performed by: INTERNAL MEDICINE

## 2020-10-16 PROCEDURE — 3288F FALL RISK ASSESSMENT DOCD: CPT | Performed by: INTERNAL MEDICINE

## 2020-10-16 PROCEDURE — 1100F PTFALLS ASSESS-DOCD GE2>/YR: CPT | Performed by: INTERNAL MEDICINE

## 2020-10-16 RX ORDER — CEPHALEXIN 500 MG/1
500 CAPSULE ORAL 3 TIMES DAILY
Qty: 7 CAP | Refills: 0 | Status: SHIPPED | OUTPATIENT
Start: 2020-10-16 | End: 2020-10-19 | Stop reason: SDUPTHER

## 2020-10-19 ENCOUNTER — TELEPHONE (OUTPATIENT)
Dept: INTERNAL MEDICINE CLINIC | Age: 85
End: 2020-10-19

## 2020-10-19 DIAGNOSIS — L03.115 CELLULITIS OF RIGHT LOWER EXTREMITY: Primary | ICD-10-CM

## 2020-10-19 RX ORDER — CEPHALEXIN 500 MG/1
500 CAPSULE ORAL 3 TIMES DAILY
Qty: 14 CAP | Refills: 0 | Status: SHIPPED | OUTPATIENT
Start: 2020-10-19 | End: 2020-11-18

## 2020-10-19 NOTE — LETTER
10/19/2020 4:32 PM 
 
Ms. Yary Joy [address] To whom this may concern, Please have Ms. Amando Kerr start Cephalexin (Keflex) on 10/16/20 and take 1 capsule PO TID for 7 days. If there are any questions or concerns, please feel free to contact the office. Thank you.    
 
 
 
Sincerely, 
 
 
Shankar Mendez MD

## 2020-10-19 NOTE — TELEPHONE ENCOUNTER
Called out and spoke to [employer] (HIPAA). Two pt identifiers confirmed. Alvino Angulo states that Wilber York needs a written order for pt to take the abx--faxed to [phone]. Informed Alvino Angulo that there was a typo for pt's keflex and an additional 14 pills will be called in--called into [employer]. Alvino Angulo asking for an Aid and Attendance Form (through [employer] help w/ payment for aid for ADL's. Form placed in Dr. Dorothy De La O office. Alvino Angulo verbalized understanding of information discussed w/ no further questions at this time.

## 2020-10-19 NOTE — TELEPHONE ENCOUNTER
Maylin Cruz, [address] [address] Office Pool               General Message/Vendor Calls     Caller's first and last name: Araceli Berry       Reason for call:Pt's rx only gave her 7 cap.  Should rx have been written for 7 days worth? [address] [place] [address] to see if this was an error and if so, can remaining rx be called back into pharmacy.        Callback required yes/no and why:  yes     Best contact number(s): [phone]         Details to clarify the request: [address] [facility]

## 2020-10-20 ENCOUNTER — TELEPHONE (OUTPATIENT)
Dept: INTERNAL MEDICINE CLINIC | Age: 85
End: 2020-10-20

## 2020-10-20 NOTE — TELEPHONE ENCOUNTER
[place], [address] [facility]               General Message/Vendor Calls     Caller's first and last name:[facility]       Reason for call: The Cephalexin Medication the pharmacy need a Strength for it.        Callback required yes/no and why: yes       Best contact number(s): [phone] ext [phone]       Details to clarify the request:       Deanne Avalos

## 2020-10-23 ENCOUNTER — TELEPHONE (OUTPATIENT)
Dept: INTERNAL MEDICINE CLINIC | Age: 85
End: 2020-10-23

## 2020-10-23 DIAGNOSIS — G25.0 BENIGN ESSENTIAL TREMOR: ICD-10-CM

## 2020-10-23 DIAGNOSIS — I65.23 STENOSIS OF BOTH CAROTID ARTERIES WITHOUT CEREBRAL INFARCTION: ICD-10-CM

## 2020-10-23 DIAGNOSIS — Z86.73 HISTORY OF STROKE: ICD-10-CM

## 2020-10-23 DIAGNOSIS — R41.3 MEMORY LOSS: ICD-10-CM

## 2020-10-23 DIAGNOSIS — G60.8 IDIOPATHIC SMALL AND LARGE FIBER SENSORY NEUROPATHY: ICD-10-CM

## 2020-10-23 DIAGNOSIS — R93.0 ABNORMAL MRI OF HEAD: ICD-10-CM

## 2020-10-23 DIAGNOSIS — E11.42 DIABETIC PERIPHERAL NEUROPATHY ASSOCIATED WITH TYPE 2 DIABETES MELLITUS (HCC): ICD-10-CM

## 2020-10-23 DIAGNOSIS — I67.9 CEREBROVASCULAR DISEASE, UNSPECIFIED: ICD-10-CM

## 2020-10-23 RX ORDER — SERTRALINE HYDROCHLORIDE 50 MG/1
TABLET, FILM COATED ORAL
Qty: 45 TAB | Refills: 0 | Status: SHIPPED | OUTPATIENT
Start: 2020-10-23 | End: 2020-11-19

## 2020-10-23 RX ORDER — FUROSEMIDE 20 MG/1
TABLET ORAL
Qty: 30 TAB | Refills: 0 | Status: SHIPPED | OUTPATIENT
Start: 2020-10-23 | End: 2020-11-18

## 2020-10-23 RX ORDER — POTASSIUM CHLORIDE 750 MG/1
TABLET, FILM COATED, EXTENDED RELEASE ORAL
Qty: 30 TAB | Refills: 0 | Status: SHIPPED | OUTPATIENT
Start: 2020-10-23 | End: 2020-12-28 | Stop reason: SDUPTHER

## 2020-10-23 RX ORDER — GLUCOSAMINE/CHONDR SU A SOD 750-600 MG
1 TABLET ORAL DAILY
Qty: 30 CAP | Refills: 3 | Status: SHIPPED | OUTPATIENT
Start: 2020-10-23 | End: 2021-03-31

## 2020-10-23 NOTE — TELEPHONE ENCOUNTER
Called out and spoke to [employer] (HIPAA). Two pt identifiers confirmed. Informed Felicitas that Dr. Myrna Cruz approved the biotin as well as lasix, potassium, and zoloft. Michael Cross verbalized understanding of information discussed w/ no further questions at this time.

## 2020-10-23 NOTE — TELEPHONE ENCOUNTER
----- Message from Eitan Irizarry sent at 10/23/2020 12:54 PM EDT -----  Regarding: Dr. Khoi Robins  General Message/Vendor Calls    Caller's first and last name: Vicente Reeves (pts daughter)      Reason for call: Ha Robin is requesting PCP send a prescription for Biotin 2500mg to [facility], [phone].       Callback required yes/no and why: yes      Best contact number(s): [phone]      Message from Natalie John

## 2020-10-23 NOTE — TELEPHONE ENCOUNTER
PCP: Hernan Schofield MD    Last appt: 10/16/2020  Future Appointments   Date Time Provider aBrbara Ramey   3/16/2021  1:40 PM Yelena Boss, DO [facility]       Requested Prescriptions     Pending Prescriptions Disp Refills    Biotin 2,500 mcg cap 30 Cap 3     Sig: Take 1 Cap by mouth daily.

## 2020-10-28 ENCOUNTER — TELEPHONE (OUTPATIENT)
Dept: INTERNAL MEDICINE CLINIC | Age: 85
End: 2020-10-28

## 2020-10-28 RX ORDER — CHOLECALCIFEROL TAB 125 MCG (5000 UNIT) 125 MCG
5000 TAB ORAL DAILY
Qty: 60 TAB | Refills: 5 | Status: SHIPPED | OUTPATIENT
Start: 2020-10-28 | End: 2021-01-14 | Stop reason: SDUPTHER

## 2020-10-28 NOTE — TELEPHONE ENCOUNTER
Called out and spoke to [employer] (HIPAA). Two pt identifiers confirmed. Christina Vera informed Dept. [facility] Forms completed. Christina Jobshahram states that she has a note from pt's eye doctor stating that she is legally blind. Advised to bring it in or attach via [address] Christina Jobshahram states that she would like to get the forms this Friday.

## 2020-10-30 ENCOUNTER — TELEPHONE (OUTPATIENT)
Dept: INTERNAL MEDICINE CLINIC | Age: 85
End: 2020-10-30

## 2020-10-30 NOTE — TELEPHONE ENCOUNTER
Rocio Andersen 7 minutes ago (10:03 AM)          #[phone] Domingo Irby is requesting a call pertaining to the form you have to be filled out. Do you have all you need? Do you need a copy of anything?    Has doctor signed and done what she needs to do?     Please call Melida Louder to let her know if she can  the form(s).       If you call after 10:30 please text her the answer she ask as she will be in an appt.                Documentation

## 2020-10-30 NOTE — TELEPHONE ENCOUNTER
Called out and spoke to pt. Two pt identifiers confirmed. Informed Karen Ivey that forms are ready for pickup. Karen Ivey will come up to office this morning and get it. Informed Alek Boogie to ask for LPN RR. Alek Boogie verbalized understanding of information discussed w/ no further questions at this time.

## 2020-10-30 NOTE — TELEPHONE ENCOUNTER
#[phone] John Kruse is requesting a call pertaining to the form you have to be filled out. Do you have all you need? Do you need a copy of anything? Has doctor signed and done what she needs to do? Please call Mike Scott to let her know if she can  the form(s). If you call after 10:30 please text her the answer she ask as she will be in an appt.

## 2020-11-06 DIAGNOSIS — G31.83 LEWY BODY PARKINSON DISEASE (HCC): ICD-10-CM

## 2020-11-06 DIAGNOSIS — F02.80 LEWY BODY PARKINSON DISEASE (HCC): ICD-10-CM

## 2020-11-06 DIAGNOSIS — R44.3 HALLUCINATIONS: ICD-10-CM

## 2020-11-06 RX ORDER — QUETIAPINE FUMARATE 25 MG/1
TABLET, FILM COATED ORAL
Qty: 90 TAB | Refills: 1 | Status: CANCELLED | OUTPATIENT
Start: 2020-11-06

## 2020-11-06 RX ORDER — PRIMIDONE 50 MG/1
TABLET ORAL
Qty: 90 TAB | Refills: 1 | Status: CANCELLED | OUTPATIENT
Start: 2020-11-06

## 2020-11-09 NOTE — TELEPHONE ENCOUNTER
Spoke with the patient's daughter, on HIPPA, ordered in error both medications were refilled 11/6/2020 for 6 months

## 2020-11-18 ENCOUNTER — APPOINTMENT (OUTPATIENT)
Dept: GENERAL RADIOLOGY | Age: 85
End: 2020-11-18
Attending: STUDENT IN AN ORGANIZED HEALTH CARE EDUCATION/TRAINING PROGRAM
Payer: MEDICARE

## 2020-11-18 ENCOUNTER — HOSPITAL ENCOUNTER (EMERGENCY)
Age: 85
Discharge: HOME | End: 2020-11-18
Attending: STUDENT IN AN ORGANIZED HEALTH CARE EDUCATION/TRAINING PROGRAM
Payer: MEDICARE

## 2020-11-18 VITALS
HEIGHT: 61 IN | WEIGHT: 117 LBS | BODY MASS INDEX: 22.09 KG/M2 | TEMPERATURE: 98.8 F | RESPIRATION RATE: 13 BRPM | HEART RATE: 69 BPM | OXYGEN SATURATION: 94 % | SYSTOLIC BLOOD PRESSURE: 132 MMHG | DIASTOLIC BLOOD PRESSURE: 67 MMHG

## 2020-11-18 DIAGNOSIS — R53.1 GENERALIZED WEAKNESS: Primary | ICD-10-CM

## 2020-11-18 LAB
ALBUMIN SERPL-MCNC: 3.1 G/DL (ref 3.5–5)
ALBUMIN/GLOB SERPL: 1.1 {RATIO} (ref 1.1–2.2)
ALP SERPL-CCNC: 69 U/L (ref 45–117)
ALT SERPL-CCNC: 17 U/L (ref 12–78)
ANION GAP SERPL CALC-SCNC: 5 MMOL/L (ref 5–15)
APPEARANCE UR: CLEAR
AST SERPL-CCNC: 14 U/L (ref 15–37)
ATRIAL RATE: 82 BPM
BACTERIA URNS QL MICRO: ABNORMAL /HPF
BASOPHILS # BLD: 0 K/UL (ref 0–0.1)
BASOPHILS NFR BLD: 1 % (ref 0–1)
BILIRUB SERPL-MCNC: 0.5 MG/DL (ref 0.2–1)
BILIRUB UR QL: NEGATIVE
BUN SERPL-MCNC: 22 MG/DL (ref 6–20)
BUN/CREAT SERPL: 25 (ref 12–20)
CALCIUM SERPL-MCNC: 9 MG/DL (ref 8.5–10.1)
CALCULATED P AXIS, ECG09: 66 DEGREES
CALCULATED R AXIS, ECG10: -3 DEGREES
CALCULATED T AXIS, ECG11: 38 DEGREES
CHLORIDE SERPL-SCNC: 107 MMOL/L (ref 97–108)
CO2 SERPL-SCNC: 28 MMOL/L (ref 21–32)
COLOR UR: ABNORMAL
COMMENT, HOLDF: NORMAL
CREAT SERPL-MCNC: 0.88 MG/DL (ref 0.55–1.02)
DIAGNOSIS, 93000: NORMAL
DIFFERENTIAL METHOD BLD: ABNORMAL
EOSINOPHIL # BLD: 0 K/UL (ref 0–0.4)
EOSINOPHIL NFR BLD: 1 % (ref 0–7)
EPITH CASTS URNS QL MICRO: ABNORMAL /LPF
ERYTHROCYTE [DISTWIDTH] IN BLOOD BY AUTOMATED COUNT: 13.4 % (ref 11.5–14.5)
GLOBULIN SER CALC-MCNC: 2.9 G/DL (ref 2–4)
GLUCOSE SERPL-MCNC: 124 MG/DL (ref 65–100)
GLUCOSE UR STRIP.AUTO-MCNC: NEGATIVE MG/DL
HCT VFR BLD AUTO: 35.4 % (ref 35–47)
HGB BLD-MCNC: 11.6 G/DL (ref 11.5–16)
HGB UR QL STRIP: NEGATIVE
IMM GRANULOCYTES # BLD AUTO: 0 K/UL (ref 0–0.04)
IMM GRANULOCYTES NFR BLD AUTO: 0 % (ref 0–0.5)
KETONES UR QL STRIP.AUTO: NEGATIVE MG/DL
LEUKOCYTE ESTERASE UR QL STRIP.AUTO: ABNORMAL
LYMPHOCYTES # BLD: 0.4 K/UL (ref 0.8–3.5)
LYMPHOCYTES NFR BLD: 10 % (ref 12–49)
MAGNESIUM SERPL-MCNC: 2.1 MG/DL (ref 1.6–2.4)
MCH RBC QN AUTO: 29.7 PG (ref 26–34)
MCHC RBC AUTO-ENTMCNC: 32.8 G/DL (ref 30–36.5)
MCV RBC AUTO: 90.8 FL (ref 80–99)
MONOCYTES # BLD: 0.7 K/UL (ref 0–1)
MONOCYTES NFR BLD: 19 % (ref 5–13)
NEUTS SEG # BLD: 2.4 K/UL (ref 1.8–8)
NEUTS SEG NFR BLD: 69 % (ref 32–75)
NITRITE UR QL STRIP.AUTO: NEGATIVE
NRBC # BLD: 0 K/UL (ref 0–0.01)
NRBC BLD-RTO: 0 PER 100 WBC
P-R INTERVAL, ECG05: 162 MS
PH UR STRIP: 6 [PH] (ref 5–8)
PLATELET # BLD AUTO: 142 K/UL (ref 150–400)
PMV BLD AUTO: 10.7 FL (ref 8.9–12.9)
POTASSIUM SERPL-SCNC: 3.4 MMOL/L (ref 3.5–5.1)
PROT SERPL-MCNC: 6 G/DL (ref 6.4–8.2)
PROT UR STRIP-MCNC: NEGATIVE MG/DL
Q-T INTERVAL, ECG07: 374 MS
QRS DURATION, ECG06: 76 MS
QTC CALCULATION (BEZET), ECG08: 436 MS
RBC # BLD AUTO: 3.9 M/UL (ref 3.8–5.2)
RBC #/AREA URNS HPF: ABNORMAL /HPF (ref 0–5)
RBC MORPH BLD: ABNORMAL
SAMPLES BEING HELD,HOLD: NORMAL
SODIUM SERPL-SCNC: 140 MMOL/L (ref 136–145)
SP GR UR REFRACTOMETRY: 1.01 (ref 1–1.03)
TROPONIN I SERPL-MCNC: <0.05 NG/ML
UROBILINOGEN UR QL STRIP.AUTO: 1 EU/DL (ref 0.2–1)
VENTRICULAR RATE, ECG03: 82 BPM
WBC # BLD AUTO: 3.5 K/UL (ref 3.6–11)
WBC URNS QL MICRO: ABNORMAL /HPF (ref 0–4)

## 2020-11-18 PROCEDURE — 80053 COMPREHEN METABOLIC PANEL: CPT

## 2020-11-18 PROCEDURE — 71045 X-RAY EXAM CHEST 1 VIEW: CPT

## 2020-11-18 PROCEDURE — 36415 COLL VENOUS BLD VENIPUNCTURE: CPT

## 2020-11-18 PROCEDURE — 85025 COMPLETE CBC W/AUTO DIFF WBC: CPT

## 2020-11-18 PROCEDURE — 99285 EMERGENCY DEPT VISIT HI MDM: CPT

## 2020-11-18 PROCEDURE — 81001 URINALYSIS AUTO W/SCOPE: CPT

## 2020-11-18 PROCEDURE — 93005 ELECTROCARDIOGRAM TRACING: CPT

## 2020-11-18 PROCEDURE — 84484 ASSAY OF TROPONIN QUANT: CPT

## 2020-11-18 PROCEDURE — 83735 ASSAY OF MAGNESIUM: CPT

## 2020-11-18 PROCEDURE — 74011250637 HC RX REV CODE- 250/637: Performed by: STUDENT IN AN ORGANIZED HEALTH CARE EDUCATION/TRAINING PROGRAM

## 2020-11-18 RX ORDER — FUROSEMIDE 20 MG/1
TABLET ORAL
Qty: 30 TAB | Refills: 0 | Status: SHIPPED | OUTPATIENT
Start: 2020-11-18 | End: 2020-11-19

## 2020-11-18 RX ORDER — CHOLECALCIFEROL (VITAMIN D3) 125 MCG
5 CAPSULE ORAL
COMMUNITY

## 2020-11-18 RX ORDER — ASPIRIN 81 MG/1
TABLET ORAL
Qty: 120 TAB | Refills: 0 | Status: SHIPPED | OUTPATIENT
Start: 2020-11-18 | End: 2021-10-13

## 2020-11-18 RX ORDER — HYDROCODONE BITARTRATE AND ACETAMINOPHEN 5; 325 MG/1; MG/1
1 TABLET ORAL
COMMUNITY
End: 2021-03-31

## 2020-11-18 RX ORDER — ACETAMINOPHEN 500 MG
500 TABLET ORAL
COMMUNITY
End: 2020-12-28 | Stop reason: SDUPTHER

## 2020-11-18 RX ORDER — CHOLECALCIFEROL (VITAMIN D3) 125 MCG
5 CAPSULE ORAL
COMMUNITY
End: 2021-01-18 | Stop reason: SDUPTHER

## 2020-11-18 RX ORDER — BACITRACIN 500 [USP'U]/G
1 OINTMENT OPHTHALMIC
COMMUNITY
End: 2021-03-31

## 2020-11-18 RX ORDER — ALBUTEROL SULFATE 90 UG/1
2 AEROSOL, METERED RESPIRATORY (INHALATION)
COMMUNITY
End: 2021-03-31

## 2020-11-18 RX ORDER — QUETIAPINE FUMARATE 25 MG/1
12.5 TABLET, FILM COATED ORAL DAILY
COMMUNITY
End: 2021-01-18 | Stop reason: SDUPTHER

## 2020-11-18 RX ORDER — FLUOROURACIL 50 MG/G
1 CREAM TOPICAL 2 TIMES DAILY
COMMUNITY
Start: 2020-11-10 | End: 2020-12-09

## 2020-11-18 RX ORDER — POTASSIUM CHLORIDE 20 MEQ/1
20 TABLET, EXTENDED RELEASE ORAL
Status: COMPLETED | OUTPATIENT
Start: 2020-11-18 | End: 2020-11-18

## 2020-11-18 RX ORDER — GUAIFENESIN 100 MG/5ML
100 SOLUTION ORAL
COMMUNITY
End: 2021-03-31

## 2020-11-18 RX ORDER — PANTOPRAZOLE SODIUM 40 MG/1
40 TABLET, DELAYED RELEASE ORAL 2 TIMES DAILY
COMMUNITY
End: 2021-08-16 | Stop reason: SDUPTHER

## 2020-11-18 RX ORDER — METOPROLOL TARTRATE 25 MG/1
25 TABLET, FILM COATED ORAL 2 TIMES DAILY
COMMUNITY
End: 2020-12-28 | Stop reason: SDUPTHER

## 2020-11-18 RX ORDER — QUETIAPINE FUMARATE 25 MG/1
25 TABLET, FILM COATED ORAL
COMMUNITY
End: 2021-01-18 | Stop reason: SDUPTHER

## 2020-11-18 RX ORDER — METOPROLOL TARTRATE 25 MG/1
12.5 TABLET, FILM COATED ORAL
COMMUNITY
End: 2021-04-02

## 2020-11-18 RX ADMIN — POTASSIUM CHLORIDE 20 MEQ: 20 TABLET, EXTENDED RELEASE ORAL at 13:46

## 2020-11-18 NOTE — ED NOTES
MD discussed d/c instructions and information with pt. Pt verbalized understanding. Pt assisted out of ED via w/c by RN into daughter's car. No acute distress upon leave.

## 2020-11-18 NOTE — PROGRESS NOTES
HISTORY OF PRESENT ILLNESS  Steffen Foster is a 80 y.o. female. HPI     Pt last seen vv 10/16/20 Here for routine care. This is an established visit completed with telemedicine was completed with video assist  the patient acknowle history of A. fib in the past had an isolated dges and agrees to this method of visitation santiago  Presents with daughter who provides hx  Lives at the [facility]     Recall: the patient was dx with corona virus earlier in April, she has recovered and is doing well     She was c/o chills and not feeling well x 11/16/20, she was in afib -180 before going to ED  She was in ED 11/18/20 for weakness  Reviewed cxr 11/18/20: 1. Lungs are clear acute process.   2. There is a large hiatal hernia  Reviewed ekg 11/18/20: nsr  She had rapid covid test that was negative  Some bacteria in urine - denies uti sxs    She is still weak, she has lost her appetite  She is feeling too weak to go downstairs   She has only eaten to take her medicine   Her tremors have come back and seem to be worse   Will give keflex for 5 days for possible UTI   Discussed if she feels better after 3 days she can stop this -- Daughter would like order to [facility] to take keflex after each meal   Discussed it may just be a virus       She was getting PT for last 3 weeks, this was helping her, strength in her legs was improving  This week it seemed all strength she had gained, she lost again   She hasn't been able to do PT this week    She had fall prev, olga has been bandaging leg  Discussed elevation  Discussed once better to use compression hose again     She c/o neck pain   This is in the L back part of her neck     She c/o heartburn  She takes protonix bid  Discussed this is due to hiatal hernia   Discussed she can take pepcid BID     Prev, she c/o cp  She c/o this again today   The pain is located in her breastbone, constant ache  Discussed hiatal hernia may be giving her issues   Discussed GI would need to get egd    BP in /67, 116/67  Continues on metoprolol 25mg bid   Continues on lasix 20 mg daily - only swelling in R leg due to injury      Wt was 117 lbs in ED - up 3 lbs x lov    Her wt is in the nl ranges      Reviewed labs      Pt follows wt Dr Anthony Medina (neuro) for tremor and hallucinations   Last visit 9/14/20  . Pt had been being treated for Parkinson's but the neuro decided it may not be this so they stopped this treatment   Has chronic tremors and hallucinations -- tremors seem to be worse this past week  She is on seroquel 25mg for sleep and hallucinations --   Also gets another half in the morning.   She started primidone 50 mg for tremor - has had improvement thinks this is working well     Talking melatonin 5-10mg at bedtime, wasn't able to sleep well once this week due to chills       Pt follows with Dr Bustillo (cardio) for a fib   Last visit 8/21/20   Atypical symptoms in the setting of large hiatal hernia -- prohibitive risk for surgery   Lexiscan in 6/2018 normal   Echo done 6/2018 with preserved ejection fraction 60-65% with grade 1 DD   Continue NTG as prescribed -- taking 1 dose which resolves pain   If she has no relief in symptoms despite NTGx3 in 15 minutes, then she must go to ER   Pt is on norpace 100mg BID      Pt sees Dr Bailee Livingston (GI) for diverticulosis   Pt also has a large hiatal hernia   Pt also has a gluten sensitivity and mostly avoids this   Pt takes protonix 40mg bid sx controlled works well, discussed pepcid prn if needed  Discussed hernia will cause heart burn     klor con 10meq daily      Pt saw Dr Letty Horowitz (neuropsych) in the past   Last visit was 2015 - no dementia      Pt saw Dr Rosalba Conklin (ENT) for hearing loss   Last visit was 12/18/18  Pt decided not to get hearing aids      Pt sees Dr Vania Sharma (ortho) for low back pain   Last visit was 1/9/19   Previously, provided info for Dr Olivares Officer  She had been taking tramadol but had SE so was switched to tylenol which helps somewhat Continues on tylenol for this      Pt declined taking reclast and fosamax in the past      Pt is taking vit D 5000U daily      Pt takes miralax for constipation   This works well as long as she takes it daily occasionally takes extra dose      Continues on zoloft 75 mg helps w mood/depression , happy w dose      Continues on klor con 10meqs      She saw Dr Chanell Dominguez (uro gyn) for this thick fecal discharge coming form her vagina   Last visit was 11/19   She states this has resolved so he is planning on just observing this      ACP on file.  SDMs are her daughters, Gilford Reilly and Abdi Vela.      PREVENTIVE:   Colonoscopy: ~10 years ago with Dr Nguyễn Pickett, per pt the way her colon is formed makes it difficult to get COLOs, her declines further   Pap: 11/19 with Dr Gutierrez Sensor: declines further  Dexa: 8/18, osteoporosis, declines further tx other than vit d   Tdap: 10/19   Prevnar 13: 03/01/19   Pneumovax: 03/09/20   Shingrix: both rounds completed   Flu shot: 10/20   A1C: 3/19 5.9 9/19 5.7 3/20 5.8 10/20 5.5  Micro: 9/19   Eye exam: Dr Bryn Carmona ~summer 2018, Dr Marino Belinda: 10/20     Patient Active Problem List    Diagnosis Date Noted    Depression, major, recurrent, mild ([facility] [address] 06/04/2019    Pleural effusion 04/18/2019    History of stroke 06/11/2018    Gastroesophageal reflux disease without esophagitis 09/25/2017    Diverticulosis of large intestine without hemorrhage 09/25/2017    Benign essential tremor 09/25/2017    Irritable bowel syndrome with diarrhea 09/25/2017    Hypercholesterolemia 09/25/2017    Allergic rhinitis 09/25/2017    Fuchs' corneal dystrophy 09/25/2017    Idiopathic small and large fiber sensory neuropathy 04/21/2016    Diabetic peripheral neuropathy associated with type 2 diabetes mellitus ([address] 04/21/2016    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 04/21/2016    Anxiety 07/24/2015    Cerebrovascular disease, unspecified 05/03/2015    B12 deficiency 04/20/2015    Osteoporosis 04/20/2015    Memory loss 04/20/2015    Celiac sprue 05/14/2013    Atrial fibrillation (HCC)     Hypertension      Current Outpatient Medications   Medication Sig Dispense Refill    cephALEXin (KEFLEX) 500 mg capsule Take 1 Cap by mouth three (3) times daily for 5 days. 15 Cap 0    sertraline (ZOLOFT) 50 mg tablet TAKE 1 & 1/2 TABLETS BY MOUTH EVERY DAY 45 Tab 0    furosemide (LASIX) 20 mg tablet TAKE ONE TABLET BY MOUTH EVERY DAY 30 Tab 0    potassium chloride (KLOR-CON) 10 mEq tablet TAKE 1 TABLET BY MOUTH EVERY DAY. 30 Tab 0    Vitamin B-12 100 mcg tablet Take 1 Tab by mouth daily. 100 Tab 0    metoprolol tartrate (LOPRESSOR) 25 mg tablet Take 25 mg by mouth two (2) times a day.  pantoprazole (PROTONIX) 40 mg tablet Take 40 mg by mouth two (2) times a day.  QUEtiapine (SEROquel) 25 mg tablet Take 12.5 mg by mouth daily. Morning dose at 10am      acetaminophen (TYLENOL) 500 mg tablet Take 500 mg by mouth every six (6) hours as needed for Pain.  albuterol (Ventolin HFA) 90 mcg/actuation inhaler Take 2 Puffs by inhalation every six (6) hours as needed for Wheezing.  aspirin delayed-release 81 mg tablet TAKE ONE TABLET BY MOUTH DAILY AS NEEDED 120 Tab 0    primidone (MYSOLINE) 50 mg tablet One half tab twice daily for tremor at approx 0900 and approx 2000 90 Tab 1    potassium chloride SR (KLOR-CON 10) 10 mEq tablet TAKE 1 TABLET BY MOUTH EVERY DAY. 30 Tab 0    Biotin 2,500 mcg cap Take 1 Cap by mouth daily. 30 Cap 3    disopyramide phosphate (NORPACE) 100 mg capsule TAKE ONE CAPSULE BY MOUTH TWICE A  Cap 4    aspirin 81 mg chewable tablet Take 1 Tab by mouth daily. 120 Tab 5    melatonin 5 mg tablet Take 5 mg by mouth nightly.  QUEtiapine (SEROquel) 25 mg tablet Take 25 mg by mouth nightly.  fluorouraciL (EFUDEX) 5 % chemo cream Apply 1 Each to affected area two (2) times a day.  Apply topically to left neck and lateral to left eye x 21 days (start: 11/10/20)      bacitracin ophthalmic ointment Administer 1 g to both eyes daily as needed (infection).  HYDROcodone-acetaminophen (Norco) 5-325 mg per tablet Take 1 Tab by mouth every four (4) hours as needed for Pain.  melatonin 5 mg tablet Take 5 mg by mouth nightly as needed. May take an additional tablet if unable to sleep      metoprolol tartrate (LOPRESSOR) 25 mg tablet Take 12.5 mg by mouth two (2) times daily as needed. In addition to 25mg bid if BP > 150-90      guaiFENesin (ROBITUSSIN) 100 mg/5 mL liquid Take 100 mg by mouth every six (6) hours as needed for Cough.  cholecalciferol (Vitamin D3) (5000 Units/125 mcg) tab tablet Take 1 Tab by mouth daily. 60 Tab 5    magnesium hydroxide (Baez Milk of Magnesia) 400 mg/5 mL suspension Take 30 mL by mouth daily as needed for Constipation.  Lactobac. rhamnosus GG-inulin ([address]) 10 billion cell -200 mg cpSP [facility] 10 billion cell-200 mg sprinkle capsule      nitroglycerin (NITROSTAT) 0.4 mg SL tablet 1 Tab by SubLINGual route every five (5) minutes as needed for Chest Pain. 25 Tab 1    polyethylene glycol (MIRALAX) 17 gram/dose powder Take 17 g by mouth daily.        Past Surgical History:   Procedure Laterality Date    HX APPENDECTOMY      HX CHOLECYSTECTOMY      HX GI      hemorrhoidectomy    HX HEENT      sinus surgery    HX HERNIA REPAIR      HX TONSILLECTOMY        Lab Results   Component Value Date/Time    WBC 3.5 (L) 11/18/2020 11:38 AM    WBC (POC) 5.6 07/23/2018 03:07 PM    HGB 11.6 11/18/2020 11:38 AM    HGB (POC) 15.0 07/23/2018 03:07 PM    HCT 35.4 11/18/2020 11:38 AM    HCT (POC) 45.9 07/23/2018 03:07 PM    PLATELET 170 (L) 52/65/8899 11:38 AM    PLATELET (POC) 144.6 07/23/2018 03:07 PM    MCV 90.8 11/18/2020 11:38 AM    MCV (POC) 90.0 07/23/2018 03:07 PM     Lab Results   Component Value Date/Time    Cholesterol, total 227 (H) 10/13/2020 08:20 AM HDL Cholesterol 74 10/13/2020 08:20 AM    LDL, calculated 154 (H) 03/01/2019 10:15 AM    LDL Chol Calc (NIH) 143 (H) 10/13/2020 08:20 AM    Triglyceride 58 10/13/2020 08:20 AM     Lab Results   Component Value Date/Time    GFR est non-AA 60 (L) 11/18/2020 11:38 AM    GFRNA, POC >60 10/15/2018 01:54 PM    GFR est AA >60 11/18/2020 11:38 AM    GFRAA, POC >60 10/15/2018 01:54 PM    Creatinine 0.88 11/18/2020 11:38 AM    Creatinine (POC) 0.8 10/15/2018 01:54 PM    BUN 22 (H) 11/18/2020 11:38 AM    Sodium 140 11/18/2020 11:38 AM    Potassium 3.4 (L) 11/18/2020 11:38 AM    Chloride 107 11/18/2020 11:38 AM    CO2 28 11/18/2020 11:38 AM    Magnesium 2.1 11/18/2020 11:38 AM        Review of Systems   Constitutional: Positive for malaise/fatigue. Respiratory: Negative for shortness of breath. Cardiovascular: Positive for chest pain. Gastrointestinal: Positive for heartburn. Genitourinary: Negative for dysuria, flank pain, frequency, hematuria and urgency. Musculoskeletal: Positive for neck pain. Neurological: Positive for tremors. Physical Exam  Constitutional:       General: She is not in acute distress. Appearance: Normal appearance. She is not ill-appearing, toxic-appearing or diaphoretic. HENT:      Head: Normocephalic and atraumatic. Eyes:      General:         Right eye: No discharge. Left eye: No discharge. Conjunctiva/sclera: Conjunctivae normal.   Pulmonary:      Effort: No respiratory distress. Neurological:      Mental Status: She is alert and oriented to person, place, and time. Mental status is at baseline. Gait: Gait normal.   Psychiatric:         Mood and Affect: Mood normal.         Behavior: Behavior normal.         ASSESSMENT and PLAN    ICD-10-CM ICD-9-CM    1.  Paroxysmal atrial fibrillation (HCC)       Episode of A. fib patient is on aspirin only due to bleeding risk on other anticoagulants was back in NSR in the ER will monitor sees cardiology   I48.0 427.31    2. Gastroesophageal reflux disease without esophagitis       Improved Protonix twice daily    Discussed adding Pepcid    Still has symptoms    I suspect her large hiatal hernia is playing a role here    Next up is to see gastroenterology for EGD     K21.9 530.81    3. Essential hypertension  I10 401.9    4. Pleural effusion       Resolved remains on Lasix J90 511.9    5. Hypercholesterolemia diet controlled E78.00 272.0    6. Anxiety   Stable Zoloft    Seroquel in the evening to help with sleep F41.9 300.00    7. Hiatal hernia       See above K44.9 553.3    8. Malaise       General malaise unclear etiology    She is on primidone which helps with her tremors but tremors have been a bit worse since she has had her malaise symptoms    ER work-up was unremarkable    Reviewed evaluation    Mild bacteria in urine    Perhaps this was playing a role    We will give with Keflex for possible UTI    Monitor symptoms     R53.81 780.79            Scribed by Arlene Craft [facility], as dictated by Dr. Chapo Jerome. Current diagnosis and concerns discussed with pt at length. Pt understands risks and benefits or current treatment plan and medications, and accepts the treatment and medication with any possible risks. Pt asks appropriate questions, which were answered. Pt was instructed to call with any concerns or problems. I have reviewed the note documented by the scribe. The services provided are my own. The documentation is accurate     Dileep Miranda, who was evaluated through a synchronous (real-time) audio-video encounter, and/or her healthcare decision maker, is aware that it is a billable service, with coverage as determined by her insurance carrier. She provided verbal consent to proceed: Yes, and patient identification was verified.  It was conducted pursuant to the emergency declaration under the [address] [place], [address] and Response Supplemental Appropriations Act. A caregiver was present when appropriate. Ability to conduct physical exam was limited. I was at home. The patient was at home.

## 2020-11-18 NOTE — PROGRESS NOTES
Pharmacy Clarification of Prior to Admission Medication Regimen     The patient was NOT interviewed regarding clarification of the prior to admission medication regimen because of transfer papers from SNF . Information Obtained From: Nursing Home    Pertinent Pharmacy Findings:  none    PTA medication list was corrected to the following:     Prior to Admission Medications   Prescriptions Last Dose Informant Taking? Biotin 2,500 mcg cap 11/18/2020 at 0900 Transfer Papers Yes   Sig: Take 1 Cap by mouth daily. HYDROcodone-acetaminophen (Norco) 5-325 mg per tablet  Transfer Papers Yes   Sig: Take 1 Tab by mouth every four (4) hours as needed for Pain. Lactobac. rhamnosus GG-inulin ([address]) 10 billion cell -200 mg cpSP 11/18/2020 at 0900 Transfer Papers Yes   Sig: [address] 10 billion cell-200 mg sprinkle capsule   QUEtiapine (SEROquel) 25 mg tablet  Transfer Papers Yes   Sig: Take 12.5 mg by mouth daily. Morning dose at 10am   QUEtiapine (SEROquel) 25 mg tablet 11/17/2020 at 2000 Transfer Papers Yes   Sig: Take 25 mg by mouth nightly. acetaminophen (TYLENOL) 500 mg tablet  Transfer Papers Yes   Sig: Take 500 mg by mouth every six (6) hours as needed for Pain. albuterol (Ventolin HFA) 90 mcg/actuation inhaler  Transfer Papers Yes   Sig: Take 2 Puffs by inhalation every six (6) hours as needed for Wheezing. aspirin 81 mg chewable tablet 11/18/2020 at 0900 Transfer Papers Yes   Sig: Take 1 Tab by mouth daily. bacitracin ophthalmic ointment  Transfer Papers Yes   Sig: Administer 1 g to both eyes daily as needed (infection). cholecalciferol (Vitamin D3) (5000 Units/125 mcg) tab tablet 11/18/2020 at 0900 Transfer Papers Yes   Sig: Take 1 Tab by mouth daily. cyanocobalamin (Vitamin B-12) 100 mcg tablet 11/18/2020 at 0900 Transfer Papers Yes   Sig: Take 1 Tab by mouth daily.  Indications: 500mcg   disopyramide phosphate (NORPACE) 100 mg capsule 11/18/2020 at 0900 Transfer Papers Yes   Sig: TAKE ONE CAPSULE BY MOUTH TWICE A DAY   fluorouraciL (EFUDEX) 5 % chemo cream  Transfer Papers Yes   Sig: Apply 1 Each to affected area two (2) times a day. Apply topically to left neck and lateral to left eye x 21 days (start: 11/10/20)   furosemide (LASIX) 20 mg tablet 2020 at 0900 Transfer Papers Yes   Sig: TAKE ONE TABLET BY MOUTH EVERY DAY   guaiFENesin (ROBITUSSIN) 100 mg/5 mL liquid  Transfer Papers Yes   Sig: Take 100 mg by mouth every six (6) hours as needed for Cough.   magnesium hydroxide (Baez Milk of Magnesia) 400 mg/5 mL suspension  Transfer Papers Yes   Sig: Take 30 mL by mouth daily as needed for Constipation. melatonin 5 mg tablet 2020 at 2000 Transfer Papers Yes   Sig: Take 5 mg by mouth nightly. melatonin 5 mg tablet  Transfer Papers Yes   Sig: Take 5 mg by mouth nightly as needed. May take an additional tablet if unable to sleep   metoprolol tartrate (LOPRESSOR) 25 mg tablet 2020 at 0900 Transfer Papers Yes   Sig: Take 25 mg by mouth two (2) times a day. metoprolol tartrate (LOPRESSOR) 25 mg tablet  Transfer Papers Yes   Sig: Take 12.5 mg by mouth two (2) times daily as needed. In addition to 25mg bid if BP > 150-90   nitroglycerin (NITROSTAT) 0.4 mg SL tablet  Transfer Papers Yes   Si Tab by SubLINGual route every five (5) minutes as needed for Chest Pain.   pantoprazole (PROTONIX) 40 mg tablet 2020 at 0730 Transfer Papers Yes   Sig: Take 40 mg by mouth two (2) times a day. polyethylene glycol (MIRALAX) 17 gram/dose powder 2020 at 1100 Transfer Papers Yes   Sig: Take 17 g by mouth daily. potassium chloride SR (KLOR-CON 10) 10 mEq tablet 2020 at 0900 Transfer Papers Yes   Sig: TAKE 1 TABLET BY MOUTH EVERY DAY.    primidone (MYSOLINE) 50 mg tablet 2020 at 0900 Transfer Papers Yes   Sig: One half tab twice daily for tremor at approx 0900 and approx 2000   sertraline (ZOLOFT) 50 mg tablet 2020 at 0800 Transfer Papers Yes   Sig: TAKE 1 & 1/2 TABLETS BY MOUTH EVERY DAY      Facility-Administered Medications: None          Thank you,  CESAR [place]

## 2020-11-18 NOTE — ED PROVIDER NOTES
EMERGENCY DEPARTMENT HISTORY AND PHYSICAL EXAM      Date: 11/18/2020  Patient Name: Malcolm Dasilva    History of Presenting Illness     Chief Complaint   Patient presents with    Irregular Heart Beat     Np notified pt's daughter indicating HR fluctuating between 80-130s. Hx of Afib.  c/o weakness in legs. HPI: Malcolm Dasilva, 80 y.o. female presents to the ED with cc of fluctuating heart rate. Per EMS, she is sent by a nurse practitioner because her heart rate was noted to be fluctuating from the 80s to 130s. Here her heart rate is normal sinus. She reports some slight generalized weakness, otherwise denies any pain or complaints. No chest pain, shortness of breath, fevers or coughing. Denies abdominal pain, vomiting or diarrhea, no dysuria or hematuria. She reports chronic swelling in her lower extremities, no change in this. She denies any palpitations. She denies any new neck or back pain, any recent falls. There are no other complaints, changes, or physical findings at this time. PCP: Chela Gusman MD    No current facility-administered medications on file prior to encounter. Current Outpatient Medications on File Prior to Encounter   Medication Sig Dispense Refill    melatonin 5 mg tablet Take 5 mg by mouth nightly.  metoprolol tartrate (LOPRESSOR) 25 mg tablet Take 25 mg by mouth two (2) times a day.  pantoprazole (PROTONIX) 40 mg tablet Take 40 mg by mouth two (2) times a day.  QUEtiapine (SEROquel) 25 mg tablet Take 12.5 mg by mouth daily. Morning dose at 10am      QUEtiapine (SEROquel) 25 mg tablet Take 25 mg by mouth nightly.  fluorouraciL (EFUDEX) 5 % chemo cream Apply 1 Each to affected area two (2) times a day. Apply topically to left neck and lateral to left eye x 21 days (start: 11/10/20)      acetaminophen (TYLENOL) 500 mg tablet Take 500 mg by mouth every six (6) hours as needed for Pain.       bacitracin ophthalmic ointment Administer 1 g to both eyes daily as needed (infection).  HYDROcodone-acetaminophen (Norco) 5-325 mg per tablet Take 1 Tab by mouth every four (4) hours as needed for Pain.  melatonin 5 mg tablet Take 5 mg by mouth nightly as needed. May take an additional tablet if unable to sleep      metoprolol tartrate (LOPRESSOR) 25 mg tablet Take 12.5 mg by mouth two (2) times daily as needed. In addition to 25mg bid if BP > 150-90      guaiFENesin (ROBITUSSIN) 100 mg/5 mL liquid Take 100 mg by mouth every six (6) hours as needed for Cough.  albuterol (Ventolin HFA) 90 mcg/actuation inhaler Take 2 Puffs by inhalation every six (6) hours as needed for Wheezing.  primidone (MYSOLINE) 50 mg tablet One half tab twice daily for tremor at approx 0900 and approx 2000 90 Tab 1    cholecalciferol (Vitamin D3) (5000 Units/125 mcg) tab tablet Take 1 Tab by mouth daily. 60 Tab 5    furosemide (LASIX) 20 mg tablet TAKE ONE TABLET BY MOUTH EVERY DAY 30 Tab 0    potassium chloride SR (KLOR-CON 10) 10 mEq tablet TAKE 1 TABLET BY MOUTH EVERY DAY. 30 Tab 0    sertraline (ZOLOFT) 50 mg tablet TAKE 1 & 1/2 TABLETS BY MOUTH EVERY DAY 45 Tab 0    Biotin 2,500 mcg cap Take 1 Cap by mouth daily. 30 Cap 3    magnesium hydroxide (Baez Milk of Magnesia) 400 mg/5 mL suspension Take 30 mL by mouth daily as needed for Constipation.  Lactobac. rhamnosus GG-inulin (Trista 13) 10 billion cell -200 mg cpSP [facility] 10 billion cell-200 mg sprinkle capsule      cyanocobalamin (Vitamin B-12) 100 mcg tablet Take 1 Tab by mouth daily. Indications: 500mcg 90 Tab 1    disopyramide phosphate (NORPACE) 100 mg capsule TAKE ONE CAPSULE BY MOUTH TWICE A  Cap 4    nitroglycerin (NITROSTAT) 0.4 mg SL tablet 1 Tab by SubLINGual route every five (5) minutes as needed for Chest Pain. 25 Tab 1    aspirin 81 mg chewable tablet Take 1 Tab by mouth daily.  120 Tab 5    polyethylene glycol (MIRALAX) 17 gram/dose powder Take 17 g by mouth daily.  [DISCONTINUED] QUEtiapine (SEROquel) 25 mg tablet 1/2 tab in the mid-morning (10am) and 1 tab at bedtime 180 Tab 1    [DISCONTINUED] cephALEXin (KEFLEX) 500 mg capsule Take 1 Cap by mouth three (3) times daily. 14 Cap 0    [DISCONTINUED] acetaminophen (Tylenol Extra Strength) 500 mg tablet Take 1 Tab by mouth every eight (8) hours as needed for Pain. 100 Tab 2    [DISCONTINUED] metoprolol tartrate (LOPRESSOR) 25 mg tablet Metoprolol 25 mg 1 @ 900 AM and 1 @ 700 PM an additional 12.5 mg if  BP > 150/90  one hour after taking regular dose 270 Tab 1    [DISCONTINUED] melatonin 5 mg tablet Take 1 or 2 tabs at bedtime for sleep 60 Tab 5    [DISCONTINUED] Lactobacillus rhamnosus GG (CULTURELLE PO) Take  by mouth daily.  [DISCONTINUED] pantoprazole (PROTONIX) 40 mg tablet Take 1 Tab by mouth daily.  (Patient taking differently: Take 40 mg by mouth two (2) times a day.) 30 Tab 12       Past History     Past Medical History:  Past Medical History:   Diagnosis Date    Acute diverticulitis 9/25/2017    Allergic rhinitis 9/25/2017    Arrhythmia     afib    Arthritis     Asthma     [facility]     Benign essential tremor 9/25/2017    Cancer ([facility]     skin    Celiac disease 9/25/2017    Constipation 9/25/2017    Diabetic peripheral neuropathy associated with type 2 diabetes mellitus ([facility] 4/21/2016    Diarrhea 9/25/2017    Diverticulosis of large intestine without hemorrhage 9/25/2017    Early satiety 9/25/2017    Fuchs' corneal dystrophy 9/25/2017    Gastroesophageal reflux disease without esophagitis 9/25/2017    GERD (gastroesophageal reflux disease)     High cholesterol     Hypercholesterolemia 9/25/2017    Hypertension     Irritable bowel syndrome with diarrhea 9/25/2017    Numbness 9/25/2017    Osteopenia 9/25/2017    Other ill-defined conditions(799.89)     collapsed lung prior to hernia repair surgery    Postmenopausal 2018    Ptosis, both eyelids 2017    Statin intolerance 2017    Unspecified adverse effect of anesthesia     pt states she went into cardiac arrest prior to hernia repair after the insertion of gas in stomach       Past Surgical History:  Past Surgical History:   Procedure Laterality Date    HX APPENDECTOMY      HX CHOLECYSTECTOMY      HX GI      hemorrhoidectomy    HX HEENT      sinus surgery    HX HERNIA REPAIR      HX TONSILLECTOMY         Family History:  Family History   Problem Relation Age of Onset    Heart Disease Mother     Dementia Mother     Heart Disease Father     Neuropathy Child     Depression Child     Other Child         diverticulitis    Lung Cancer Sister 80        lung ca       Social History:  Social History     Tobacco Use    Smoking status: Former Smoker     Packs/day: 0.75     Years: 5.00     Pack years: 3.75     Last attempt to quit: 10/11/1967     Years since quittin.1    Smokeless tobacco: Never Used   Substance Use Topics    Alcohol use: No    Drug use: No       Allergies: Allergies   Allergen Reactions    Bactrim [Sulfamethoprim Ds] Other (comments)     consipation    Ciprofloxacin (Bulk) Other (comments)     Low wbc    Prednisone Other (comments)     tachycardia    Shellfish Derived Other (comments)     Abdominal pain, Carried epi pen for this at one point.  Statins-Hmg-Coa Reductase Inhibitors Unknown (comments)     Stomach uipset and mild muscle aches         Review of Systems   no fever  No eye pain  No ear pain  no shortness of breath  no chest pain  no abdominal pain  no dysuria  no leg pain  No rash  No lymphadenopathy  No weight loss    Physical Exam   Physical Exam  Constitutional:       Appearance: Normal appearance. HENT:      Head: Normocephalic. Nose: Nose normal.      Mouth/Throat:      Mouth: Mucous membranes are moist.   Eyes:      Extraocular Movements: Extraocular movements intact.    Neck: Musculoskeletal: Neck supple. Cardiovascular:      Rate and Rhythm: Normal rate and regular rhythm. Pulmonary:      Effort: Pulmonary effort is normal.      Breath sounds: Normal breath sounds. Abdominal:      Palpations: Abdomen is soft. Tenderness: There is no abdominal tenderness. Musculoskeletal:         General: Swelling present. No tenderness. Comments: No midline cervical, thoracic or lumbar spine tenderness, no tenderness of the extremities   Skin:     General: Skin is warm and dry. Neurological:      General: No focal deficit present. Mental Status: She is alert and oriented to person, place, and time. Cranial Nerves: No cranial nerve deficit. Comments: 5/5 strength with bicep flexion and extension bilaterally, 5/5 strength with ankle flexion and extension bilaterally. Sensation to light touch intact over upper and lower extremities bilaterally.     Psychiatric:         Mood and Affect: Mood normal.         Diagnostic Study Results     Labs -     Recent Results (from the past 24 hour(s))   EKG, 12 LEAD, INITIAL    Collection Time: 11/18/20 11:28 AM   Result Value Ref Range    Ventricular Rate 82 BPM    Atrial Rate 82 BPM    P-R Interval 162 ms    QRS Duration 76 ms    Q-T Interval 374 ms    QTC Calculation (Bezet) 436 ms    Calculated P Axis 66 degrees    Calculated R Axis -3 degrees    Calculated T Axis 38 degrees    Diagnosis       Normal sinus rhythm  Nonspecific ST abnormality      Confirmed by Meryl Sims ([id number]) on 11/18/2020 12:42:12 PM     CBC WITH AUTOMATED DIFF    Collection Time: 11/18/20 11:38 AM   Result Value Ref Range    WBC 3.5 (L) 3.6 - 11.0 K/uL    RBC 3.90 3.80 - 5.20 M/uL    HGB 11.6 11.5 - 16.0 g/dL    HCT 35.4 35.0 - 47.0 %    MCV 90.8 80.0 - 99.0 FL    MCH 29.7 26.0 - 34.0 PG    MCHC 32.8 30.0 - 36.5 g/dL    RDW 13.4 11.5 - 14.5 %    PLATELET 866 (L) 558 - 400 K/uL    MPV 10.7 8.9 - 12.9 FL    NRBC 0.0 0  WBC    ABSOLUTE NRBC 0.00 0.00 - 0.01 K/uL    NEUTROPHILS 69 32 - 75 %    LYMPHOCYTES 10 (L) 12 - 49 %    MONOCYTES 19 (H) 5 - 13 %    EOSINOPHILS 1 0 - 7 %    BASOPHILS 1 0 - 1 %    IMMATURE GRANULOCYTES 0 0.0 - 0.5 %    ABS. NEUTROPHILS 2.4 1.8 - 8.0 K/UL    ABS. LYMPHOCYTES 0.4 (L) 0.8 - 3.5 K/UL    ABS. MONOCYTES 0.7 0.0 - 1.0 K/UL    ABS. EOSINOPHILS 0.0 0.0 - 0.4 K/UL    ABS. BASOPHILS 0.0 0.0 - 0.1 K/UL    ABS. IMM. GRANS. 0.0 0.00 - 0.04 K/UL    DF SMEAR SCANNED      RBC COMMENTS RBC FRAGMENTS     METABOLIC PANEL, COMPREHENSIVE    Collection Time: 11/18/20 11:38 AM   Result Value Ref Range    Sodium 140 136 - 145 mmol/L    Potassium 3.4 (L) 3.5 - 5.1 mmol/L    Chloride 107 97 - 108 mmol/L    CO2 28 21 - 32 mmol/L    Anion gap 5 5 - 15 mmol/L    Glucose 124 (H) 65 - 100 mg/dL    BUN 22 (H) 6 - 20 MG/DL    Creatinine 0.88 0.55 - 1.02 MG/DL    BUN/Creatinine ratio 25 (H) 12 - 20      GFR est AA >60 >60 ml/min/1.73m2    GFR est non-AA 60 (L) >60 ml/min/1.73m2    Calcium 9.0 8.5 - 10.1 MG/DL    Bilirubin, total 0.5 0.2 - 1.0 MG/DL    ALT (SGPT) 17 12 - 78 U/L    AST (SGOT) 14 (L) 15 - 37 U/L    Alk. phosphatase 69 45 - 117 U/L    Protein, total 6.0 (L) 6.4 - 8.2 g/dL    Albumin 3.1 (L) 3.5 - 5.0 g/dL    Globulin 2.9 2.0 - 4.0 g/dL    A-G Ratio 1.1 1.1 - 2.2     SAMPLES BEING HELD    Collection Time: 11/18/20 11:38 AM   Result Value Ref Range    SAMPLES BEING HELD  BLUE     COMMENT        Add-on orders for these samples will be processed based on acceptable specimen integrity and analyte stability, which may vary by analyte.    TROPONIN I    Collection Time: 11/18/20 11:38 AM   Result Value Ref Range    Troponin-I, Qt. <0.05 <0.05 ng/mL   MAGNESIUM    Collection Time: 11/18/20 11:38 AM   Result Value Ref Range    Magnesium 2.1 1.6 - 2.4 mg/dL   URINALYSIS W/ RFLX MICROSCOPIC    Collection Time: 11/18/20  1:20 PM   Result Value Ref Range    Color YELLOW/STRAW      Appearance CLEAR CLEAR      Specific gravity 1.014 1.003 - 1.030      pH (UA) 6.0 5.0 - 8.0      Protein Negative NEG mg/dL    Glucose Negative NEG mg/dL    Ketone Negative NEG mg/dL    Bilirubin Negative NEG      Blood Negative NEG      Urobilinogen 1.0 0.2 - 1.0 EU/dL    Nitrites Negative NEG      Leukocyte Esterase SMALL (A) NEG      WBC 10-20 0 - 4 /hpf    RBC 0-5 0 - 5 /hpf    Epithelial cells MODERATE (A) FEW /lpf    Bacteria 1+ (A) NEG /hpf       Radiologic Studies -   XR CHEST PORT   Final Result   IMPRESSION:   1. Lungs are clear acute process. 2. There is a large hiatal hernia        CT Results  (Last 48 hours)    None        CXR Results  (Last 48 hours)               11/18/20 1135  XR CHEST PORT Final result    Impression:  IMPRESSION:   1. Lungs are clear acute process. 2. There is a large hiatal hernia       Narrative:  EXAM: XR CHEST PORT       INDICATION: weakness       COMPARISON: 10/13/2020       FINDINGS: A portable AP radiograph of the chest was obtained at 11:30 hours. The   patient is on a cardiac monitor. Heart size is normal. Lungs are clear of an   acute process. No pneumonia. Tiny calcified nodular densities likely due to old   granulomatous disease are unchanged. There is a large hiatal hernia. Osseous structures are unremarkable. Medical Decision Making   I am the first provider for this patient. I reviewed the vital signs, available nursing notes, past medical history, past surgical history, family history and social history. Vital Signs-Reviewed the patient's vital signs. Patient Vitals for the past 24 hrs:   Temp Pulse Resp BP SpO2   11/18/20 1300  69 13 132/67 94 %   11/18/20 1247  81 12  94 %   11/18/20 1241  82 14  94 %   11/18/20 1230  63 13 116/67 93 %   11/18/20 1200  79 15 117/69 93 %   11/18/20 1130  83 21 (!) 125/56 96 %   11/18/20 1121 98.8 °F (37.1 °C) 84 12 139/61 96 %         Provider Notes (Medical Decision Making):   60-year-old female presenting with concern for tachycardia and generalized weakness.   Here her vital signs are unremarkable, she reports some mild generalized weakness, otherwise denies any complaints. Her neurologic exam is unremarkable, she does have full strength on my exam, no concern for any acute intracranial abnormality. Differential includes possible electrolyte abnormalities, dysrhythmia, UTI. ED Course:     Initial assessment performed. The patients presenting problems have been discussed, and they are in agreement with the care plan formulated and outlined with them. I have encouraged them to ask questions as they arise throughout their visit. EKG is performed at 11: 28, shows normal sinus rhythm at a rate of 82, , QRS 76, QTc of 436, upright axis, no ST segment elevation or depression concerning for ACS. It is interpreted as normal sinus rhythm. CBC is negative for anemia, shows slight leukopenia. UA is not suggestive of UTI. Basic metabolic panel shows a slight hypokalemia, normal renal function, no other worrisome electrode abnormalities. Chest x-ray shows no acute process. On reevaluation, the patient is resting comfortably, she continues to have normal vital signs throughout her stay here. Patient is counseled on supportive care and return precautions. Will return to the ED for any worsening weakness, any unilateral symptoms, any new or worrisome symptoms. Will followup with primary care doctor within 3 days. Critical Care Time:         Disposition:  Home    PLAN:  1. Discharge Medication List as of 11/18/2020  1:46 PM        2.    Follow-up Information     Follow up With Specialties Details Why Contact Info    Rhonda Baltazar MD Internal Medicine Schedule an appointment as soon as possible for a visit in 2 days As needed [address] [address] [facility] [address].  [phone]      [facility] EMERGENCY DEPT Emergency Medicine  As needed, If symptoms worsen [address]  [address] [facility]  [phone]        Return to ED if worse Diagnosis     Clinical Impression: Acute generalized weakness

## 2020-11-19 ENCOUNTER — PATIENT OUTREACH (OUTPATIENT)
Dept: CASE MANAGEMENT | Age: 85
End: 2020-11-19

## 2020-11-19 RX ORDER — SODIUM PHOSPHATE,MONO-DIBASIC 19G-7G/118
ENEMA (ML) RECTAL
Qty: 100 TAB | Refills: 0 | Status: SHIPPED | OUTPATIENT
Start: 2020-11-19

## 2020-11-19 RX ORDER — SERTRALINE HYDROCHLORIDE 50 MG/1
TABLET, FILM COATED ORAL
Qty: 45 TAB | Refills: 0 | Status: SHIPPED | OUTPATIENT
Start: 2020-11-19 | End: 2020-12-28

## 2020-11-19 RX ORDER — FUROSEMIDE 20 MG/1
TABLET ORAL
Qty: 30 TAB | Refills: 0 | Status: SHIPPED | OUTPATIENT
Start: 2020-11-19 | End: 2020-12-28

## 2020-11-19 RX ORDER — POTASSIUM CHLORIDE 750 MG/1
TABLET, EXTENDED RELEASE ORAL
Qty: 30 TAB | Refills: 0 | Status: SHIPPED | OUTPATIENT
Start: 2020-11-19 | End: 2020-12-30 | Stop reason: SDUPTHER

## 2020-11-20 ENCOUNTER — TELEPHONE (OUTPATIENT)
Dept: INTERNAL MEDICINE CLINIC | Age: 85
End: 2020-11-20

## 2020-11-20 ENCOUNTER — VIRTUAL VISIT (OUTPATIENT)
Dept: INTERNAL MEDICINE CLINIC | Age: 85
End: 2020-11-20
Payer: MEDICARE

## 2020-11-20 DIAGNOSIS — F41.9 ANXIETY: ICD-10-CM

## 2020-11-20 DIAGNOSIS — J90 PLEURAL EFFUSION: ICD-10-CM

## 2020-11-20 DIAGNOSIS — I48.0 PAROXYSMAL ATRIAL FIBRILLATION (HCC): Primary | ICD-10-CM

## 2020-11-20 DIAGNOSIS — I10 ESSENTIAL HYPERTENSION: ICD-10-CM

## 2020-11-20 DIAGNOSIS — K44.9 HIATAL HERNIA: ICD-10-CM

## 2020-11-20 DIAGNOSIS — R60.0 LOCALIZED EDEMA: ICD-10-CM

## 2020-11-20 DIAGNOSIS — E78.00 HYPERCHOLESTEROLEMIA: ICD-10-CM

## 2020-11-20 DIAGNOSIS — K21.9 GASTROESOPHAGEAL REFLUX DISEASE WITHOUT ESOPHAGITIS: ICD-10-CM

## 2020-11-20 DIAGNOSIS — R53.81 MALAISE: ICD-10-CM

## 2020-11-20 PROCEDURE — G8427 DOCREV CUR MEDS BY ELIG CLIN: HCPCS | Performed by: INTERNAL MEDICINE

## 2020-11-20 PROCEDURE — G0463 HOSPITAL OUTPT CLINIC VISIT: HCPCS | Performed by: INTERNAL MEDICINE

## 2020-11-20 PROCEDURE — 1100F PTFALLS ASSESS-DOCD GE2>/YR: CPT | Performed by: INTERNAL MEDICINE

## 2020-11-20 PROCEDURE — 1090F PRES/ABSN URINE INCON ASSESS: CPT | Performed by: INTERNAL MEDICINE

## 2020-11-20 PROCEDURE — 3288F FALL RISK ASSESSMENT DOCD: CPT | Performed by: INTERNAL MEDICINE

## 2020-11-20 PROCEDURE — G9717 DOC PT DX DEP/BP F/U NT REQ: HCPCS | Performed by: INTERNAL MEDICINE

## 2020-11-20 PROCEDURE — 99214 OFFICE O/P EST MOD 30 MIN: CPT | Performed by: INTERNAL MEDICINE

## 2020-11-20 RX ORDER — CEPHALEXIN 500 MG/1
500 CAPSULE ORAL 3 TIMES DAILY
Qty: 15 CAP | Refills: 0 | Status: SHIPPED | OUTPATIENT
Start: 2020-11-20 | End: 2020-11-25

## 2020-11-20 RX ORDER — CEPHALEXIN 500 MG/1
500 CAPSULE ORAL 3 TIMES DAILY
Qty: 15 CAP | Refills: 0 | Status: SHIPPED | OUTPATIENT
Start: 2020-11-20 | End: 2020-11-20 | Stop reason: SDUPTHER

## 2020-11-20 NOTE — TELEPHONE ENCOUNTER
[facility] Two pt identifiers confirmed. Dustin Bright informed the letter was faxed w/ confirmation received. Dustin Bright states that the letter was missing the dosage and needs to be re-faxed. Informed Dustin Bright letter will be faxed. Dustin Bright verbalized understanding of information discussed w/ no further questions at this time. Updated letter faxed w/ confirmation received.

## 2020-11-20 NOTE — LETTER
11/20/2020 4:45 PM 
 
Ms. Temitope Miner [address] To whom this may concern, Please administer Mrs. Angela's Keflex 500mg 1 Cap by mouth three (3) times daily for 5 days with meals. If there are any question please call our office at [phone].  
 
 
Sincerely, 
 
 
Vicente Rosa MD

## 2020-11-20 NOTE — LETTER
11/20/2020 2:49 PM 
 
Ms. Stewart Umaña [address] To Jayson Allan, Please administer Mrs. Angela's Keflex   1 Cap by mouth three (3) times daily for 5 days with meals. If there are any question please call our office at [phone] Sincerely, 
 
 
Paulino Baeza MD

## 2020-11-20 NOTE — TELEPHONE ENCOUNTER
----- Message from [employer] sent at 11/20/2020  4:02 PM EST -----  Regarding: /Telephone  Contact: [phone]  General Message/Vendor Calls    Caller's first and last name:Julieth Ivey      Reason for call:med verification for nursing facility      Callback required yes/no and why:yes to confirm med for nursing order      Best contact number(s):[phone]      Details to clarify the request:pt daughter is requesting medication order and instructions for cephALEXin (KEFLEX) 500 mg capsule to be faxed to [phone]      Message from [facility]

## 2020-12-03 ENCOUNTER — PATIENT OUTREACH (OUTPATIENT)
Dept: CASE MANAGEMENT | Age: 85
End: 2020-12-03

## 2020-12-03 NOTE — PROGRESS NOTES
Patient resolved from Transition of Care episode on 12/3/20. ACM/CTN was unsuccessful at contacting this patient today. Patient/family was provided the following resources and education related to COVID-19 during the initial call:                         Signs, symptoms and red flags related to COVID-19            CDC exposure and quarantine guidelines            [employer] exposure contact - [phone]            Contact for their local Department of Health               Patient has not had any additional ED or hospital visits. No further outreach scheduled with this CTN/ACM. Episode of Care resolved. Patient has this CTN/ACM contact information if future needs arise.

## 2020-12-28 ENCOUNTER — TELEPHONE (OUTPATIENT)
Dept: INTERNAL MEDICINE CLINIC | Age: 85
End: 2020-12-28

## 2020-12-28 ENCOUNTER — PATIENT MESSAGE (OUTPATIENT)
Dept: INTERNAL MEDICINE CLINIC | Age: 85
End: 2020-12-28

## 2020-12-28 ENCOUNTER — PATIENT MESSAGE (OUTPATIENT)
Dept: NEUROLOGY | Age: 85
End: 2020-12-28

## 2020-12-28 RX ORDER — ACETAMINOPHEN 500 MG
500 TABLET ORAL
Qty: 120 TAB | Refills: 3 | Status: SHIPPED | OUTPATIENT
Start: 2020-12-28 | End: 2021-10-13

## 2020-12-28 RX ORDER — FUROSEMIDE 20 MG/1
TABLET ORAL
Qty: 30 TAB | Refills: 0 | Status: SHIPPED | OUTPATIENT
Start: 2020-12-28 | End: 2020-12-28 | Stop reason: SDUPTHER

## 2020-12-28 RX ORDER — METOPROLOL TARTRATE 25 MG/1
25 TABLET, FILM COATED ORAL 2 TIMES DAILY
Qty: 225 TAB | Refills: 1 | Status: SHIPPED | OUTPATIENT
Start: 2020-12-28 | End: 2021-08-06 | Stop reason: SDUPTHER

## 2020-12-28 RX ORDER — FUROSEMIDE 20 MG/1
20 TABLET ORAL DAILY
Qty: 30 TAB | Refills: 3 | Status: SHIPPED | OUTPATIENT
Start: 2020-12-28 | End: 2021-01-14 | Stop reason: SDUPTHER

## 2020-12-28 RX ORDER — SERTRALINE HYDROCHLORIDE 50 MG/1
TABLET, FILM COATED ORAL
Qty: 45 TAB | Refills: 3 | Status: SHIPPED | OUTPATIENT
Start: 2020-12-28 | End: 2021-01-14 | Stop reason: SDUPTHER

## 2020-12-28 RX ORDER — LACTOBACILLUS RHAMNOSUS GG 10B CELL
CAPSULE ORAL
Qty: 60 CAP | Refills: 3 | Status: SHIPPED | OUTPATIENT
Start: 2020-12-28 | End: 2020-12-31 | Stop reason: SDUPTHER

## 2020-12-28 RX ORDER — POTASSIUM CHLORIDE 750 MG/1
20 TABLET, FILM COATED, EXTENDED RELEASE ORAL DAILY
Qty: 30 TAB | Refills: 3 | Status: SHIPPED | OUTPATIENT
Start: 2020-12-28 | End: 2021-01-14 | Stop reason: SDUPTHER

## 2020-12-28 RX ORDER — SERTRALINE HYDROCHLORIDE 50 MG/1
TABLET, FILM COATED ORAL
Qty: 45 TAB | Refills: 0 | Status: SHIPPED | OUTPATIENT
Start: 2020-12-28 | End: 2020-12-28 | Stop reason: SDUPTHER

## 2020-12-28 NOTE — TELEPHONE ENCOUNTER
----- Message from Billy Tripathi sent at 12/28/2020 12:48 PM EST -----  Regarding: Dr. Tomasz Hilario Last    Caller's first and last name: Mrs. Ivey  Reason for call: Pharmacy called and stated that the directions on the potassium chloride SR and the lactobac rhamnosus GG- insulin are not correct. Daughter would like a phone call to clarify. Callback required yes/no and why: Yes, to clarify.   Best contact number(s): [phone]  Details to clarify the request:    Message from [facility]

## 2020-12-28 NOTE — TELEPHONE ENCOUNTER
Future Appointments:  Future Appointments   Date Time Provider Barbara Ramey   3/16/2021  1:40 PM Yelena Boss DO [facility]        Last Appointment With Me:  11/20/2020     Requested Prescriptions     Pending Prescriptions Disp Refills    acetaminophen (TYLENOL) 500 mg tablet 120 Tab 3     Sig: Take 1 Tab by mouth every six (6) hours as needed for Pain.  furosemide (LASIX) 20 mg tablet 30 Tab 3     Sig: Take 1 Tab by mouth daily.  potassium chloride SR (KLOR-CON 10) 10 mEq tablet 30 Tab 3     Sig: Take 2 Tabs by mouth daily.  sertraline (ZOLOFT) 50 mg tablet 45 Tab 3     Sig: TAKE 1 & 1/2 TABLETS BY MOUTH EVERY DAY    Lactobac.  rhamnosus GG-inulin (Graben 13) 10 billion cell -200 mg cpSP 60 Cap 3     Sig: [address] 10 billion cell-200 mg sprinkle capsule

## 2020-12-28 NOTE — TELEPHONE ENCOUNTER
From: Stewart Umaña  To: Paulino Baeza MD  Sent: 12/28/2020 9:41 AM EST  Subject: Prescription Question    Regarding my previous message about new prescriptions - please be sure they are sent to [facility]. We'd like to ensure this is done before Jan 1 if at all possible.

## 2020-12-28 NOTE — TELEPHONE ENCOUNTER
LV 8/21/20  T  Take 25 mg tablet twice a day, 1 at 9AM and 1 at [facility].   May take additional 12.5 mg metoprolol as needed ONLY IF BP remains > 150/90 ONE HOUR after taking regular metoprolol dose.

## 2020-12-29 NOTE — TELEPHONE ENCOUNTER
----- Message from [employer] sent at 12/29/2020  2:04 PM EST -----  Regarding: Dr. Valenzuela Bowels: [phone]  General Message/Vendor Calls    Caller's first and last name: Leia Ballard Daughter      Reason for call: Medication Refill      Callback required yes/no and why: yes      Best contact number(s):[phone]      Details to clarify the request: Ms. Cheyenne Paul send a message thru My chart on behalf oh her mother (pt) regarding her medications for \"Culturelle and Potassium\" Ms. Cheyenne Paul would like to have these medications refilled before 12/31/20.       Message from [facility]

## 2020-12-30 NOTE — TELEPHONE ENCOUNTER
Future Appointments:  Future Appointments   Date Time Provider Barbara Karoline   3/16/2021  1:40 PM Yelena Boss DO [facility] AMB        Last Appointment With Me:  11/20/2020     Requested Prescriptions     Pending Prescriptions Disp Refills    potassium chloride (KLOR-CON) 10 mEq tablet 30 Tab 0

## 2020-12-30 NOTE — TELEPHONE ENCOUNTER
#[phone] Brooke with [facility] is asking for a call back today as pt's potassium pills script is incorrect and needs to be fixed asap. She states they have called and so has family with no response.

## 2020-12-31 RX ORDER — LACTOBACILLUS RHAMNOSUS GG 10B CELL
1 CAPSULE ORAL DAILY
Qty: 30 CAP | Refills: 3 | Status: SHIPPED | OUTPATIENT
Start: 2020-12-31

## 2020-12-31 RX ORDER — LACTOBACILLUS RHAMNOSUS GG 10B CELL
2 CAPSULE ORAL DAILY
Qty: 60 CAP | Refills: 3 | Status: SHIPPED | OUTPATIENT
Start: 2020-12-31 | End: 2020-12-31

## 2020-12-31 RX ORDER — POTASSIUM CHLORIDE 750 MG/1
10 TABLET, EXTENDED RELEASE ORAL DAILY
Qty: 30 TAB | Refills: 0 | Status: SHIPPED | OUTPATIENT
Start: 2020-12-31 | End: 2021-02-02 | Stop reason: SDUPTHER

## 2020-12-31 NOTE — TELEPHONE ENCOUNTER
Spoke to July Montilla (HIPAA). Two pt identifiers confirmed. Informed Felicitas that klor con ordered this morning. Informed July Montilla that probiotic ordered for once daily. July Montilla verbalized understanding of information discussed w/ no further questions at this time.

## 2021-01-13 ENCOUNTER — PATIENT MESSAGE (OUTPATIENT)
Dept: INTERNAL MEDICINE CLINIC | Age: 86
End: 2021-01-13

## 2021-01-14 NOTE — TELEPHONE ENCOUNTER
----- Message from [place]. Coreen sent at 1/13/2021 12:49 PM EST -----  Regarding: Prescription Question  Contact: [phone]  We are transferring prescriptions from [facility] to [facility]. All prescriptions will now be filled by [facility], [address], [address]. Phone: [phone], Fax: [phone]. During the last week of Jan we will need to have new prescriptions from Dr. Vernell Curran for the following: Furosimide, Potassium, Sertraline and Vit D3 as they have no more refills. Please ensure dosage and instructions are provided. If possible please send the prescription to [facility] in the next couple of days so they will have it ready to fill when the time is right. Also, if possible, please include 6-12 months of refills. Please remove [facility] and add [facility] for her prescriptions.

## 2021-01-15 NOTE — TELEPHONE ENCOUNTER
Called, Adi Ivey(Daughter)  Received two pt identifiers  Pt offered and accepted appt for 02/09/21 @ 215. Pt verbalizes understanding of the instructions and has no further questions at this time.

## 2021-01-16 RX ORDER — FUROSEMIDE 20 MG/1
20 TABLET ORAL DAILY
Qty: 30 TAB | Refills: 0 | Status: SHIPPED | OUTPATIENT
Start: 2021-01-16

## 2021-01-16 RX ORDER — POTASSIUM CHLORIDE 750 MG/1
20 TABLET, FILM COATED, EXTENDED RELEASE ORAL DAILY
Qty: 60 TAB | Refills: 0 | Status: SHIPPED | OUTPATIENT
Start: 2021-01-16 | End: 2021-02-02 | Stop reason: CLARIF

## 2021-01-16 RX ORDER — SERTRALINE HYDROCHLORIDE 50 MG/1
TABLET, FILM COATED ORAL
Qty: 45 TAB | Refills: 0 | Status: SHIPPED | OUTPATIENT
Start: 2021-01-16

## 2021-01-16 RX ORDER — CHOLECALCIFEROL TAB 125 MCG (5000 UNIT) 125 MCG
5000 TAB ORAL DAILY
Qty: 30 TAB | Refills: 0 | Status: SHIPPED | OUTPATIENT
Start: 2021-01-16

## 2021-01-18 NOTE — TELEPHONE ENCOUNTER
Patient's daughter calling to check on the refills requested through the [employer] message. Requested Prescriptions     Pending Prescriptions Disp Refills    melatonin 5 mg tablet        Sig: Take 1 Tab by mouth nightly.  QUEtiapine (SEROquel) 25 mg tablet        Sig: Take 1 Tab by mouth nightly.  primidone (MYSOLINE) 50 mg tablet 90 Tab 1     Sig: One half tab twice daily for tremor at approx 0900 and approx 2000    QUEtiapine (SEROquel) 25 mg tablet        Sig: Take 0.5 Tabs by mouth daily.  Morning dose at 10am

## 2021-01-19 RX ORDER — CHOLECALCIFEROL (VITAMIN D3) 125 MCG
5 CAPSULE ORAL
Qty: 90 TAB | Refills: 3 | Status: SHIPPED | OUTPATIENT
Start: 2021-01-19 | End: 2021-04-02

## 2021-01-19 RX ORDER — PRIMIDONE 50 MG/1
TABLET ORAL
Qty: 90 TAB | Refills: 2 | Status: SHIPPED | OUTPATIENT
Start: 2021-01-19 | End: 2021-03-29 | Stop reason: SDUPTHER

## 2021-01-19 RX ORDER — QUETIAPINE FUMARATE 25 MG/1
12.5 TABLET, FILM COATED ORAL DAILY
Qty: 45 TAB | Refills: 2 | Status: SHIPPED | OUTPATIENT
Start: 2021-01-19 | End: 2021-02-02 | Stop reason: SDUPTHER

## 2021-01-19 RX ORDER — QUETIAPINE FUMARATE 25 MG/1
25 TABLET, FILM COATED ORAL
Qty: 90 TAB | Refills: 3 | Status: SHIPPED | OUTPATIENT
Start: 2021-01-19 | End: 2021-02-09 | Stop reason: DRUGHIGH

## 2021-01-19 NOTE — TELEPHONE ENCOUNTER
Emma Trevino LPN 12/24/8441 7:97 AM EST      ----- Message -----  From: Lizeth Benitez  Sent: 12/28/2020 9:44 AM EST  To: [facility]  Subject: Prescription Question     We are trying to synchronize the medication refills for my Mother Keny Arauz) in order to monitor them more closely at the facility where she lives. Would you please send new prescriptions to [address] [place] [address] for the following meds and include multiple refills. We'd like to get this done by Jan 1 so we can begin the new year on an even keel with all her meds.     Melatonin  [employer], Debbie Ruiz ([phone])

## 2021-02-01 ENCOUNTER — TELEPHONE (OUTPATIENT)
Dept: INTERNAL MEDICINE CLINIC | Age: 86
End: 2021-02-01

## 2021-02-01 NOTE — TELEPHONE ENCOUNTER
----- Message from Darwin Mora sent at 2/1/2021  1:01 PM EST -----  Regarding: Dr. Cintron Marlon: [phone]  General Message/Vendor Calls    Caller's first and last name: Yayo Ivey/Daughter      Reason for call: Correction needed for pt's chart. Callback required yes/no and why: Yes, requested. Best contact number(s):[phone]. Details to clarify the request: Needing to speak to someone about correcting data in My Chart regarding Potassium and dosage.       Message from [facility]

## 2021-02-02 RX ORDER — POTASSIUM CHLORIDE 750 MG/1
10 TABLET, EXTENDED RELEASE ORAL DAILY
Qty: 90 TAB | Refills: 0 | Status: SHIPPED | OUTPATIENT
Start: 2021-02-02

## 2021-02-02 NOTE — TELEPHONE ENCOUNTER
Spoke with patients daughter Apryl Rush. Two pt identifiers confirmed. Apryl Rush states that she spoke with Eli Will several months ago and asked him to correct patients potassium dose. Apryl Rush states that patients MyChart is still showing the incorrect dose. Apryl Rush states that patient only takes her potassium once a day, not twice a day. Cuong Guevara that I have corrected this in patients chart. Apryl Rush is asking that a corrected prescription be sent to [id number] so that they have it on file when the next refill is needed. Cuong Guevara that I will have Dr. Julian Norris send over a new prescription to the patients pharmacy. Apryl Rush verbalized understanding of information discussed w/ no further questions at this time.

## 2021-02-08 NOTE — PROGRESS NOTES
HISTORY OF PRESENT ILLNESS  Olga Cunningham is a 80 y.o. female. HPI     Pt last seen 11/20/20 Here for routine care. Presents with daughter who provides hx  Lives at the [facility]      Recall: the patient was dx with corona virus earlier in April, she has recovered and is doing well     She c/o sores on Lips  She had this before, gave biotin 10/20 which did not help   Discussed these are apthous ulcers and there isn't much to do for them, discussed salt water gargles     BP today is 149/89, repeat improved  BP is checked [place], will get an extra half tab if bp is elevated - this happened a couple of times  Continues on metoprolol 25mg bid   Continues on lasix 20 mg daily - only swelling in R leg due to injury      Wt is 120 lbs - up 3 lbs x lov    Her wt is in the nl ranges      Reviewed labs      Pt follows [facility] Dr Selene Lama (neuro) for tremor and hallucinations   Last visit 9/14/20  . Pt had been being treated for Parkinson's but the neuro decided it may not be this so they stopped this treatment   Has chronic tremors and hallucinations   She is on seroquel 25mg for sleep and hallucinations -  Also gets another half in the morning.   She started primidone 50 mg for tremor - has had improvement    Will be f/u 3/21     Talking melatonin 5-10mg at bedtime, wasn't able to sleep well once this week due to chills       Pt follows with Dr Liam Handley (cardio) for a fib   Last visit 8/21/20   Atypical symptoms in the setting of large hiatal hernia -- prohibitive risk for surgery   Edward Bell in 6/2018 normal   Echo done 6/2018 with preserved ejection fraction 60-65% with grade 1 DD   Continue NTG as prescribed -- taking 1 dose which resolves pain   If she has no relief in symptoms despite NTGx3 in 15 minutes, then she must go to ER   Pt is on norpace 100mg BID      Pt sees Dr Faith Juan (GI) for diverticulosis   Pt also has a large hiatal hernia - will prop up her legs when she is having worsening gerd sxs   She had some pain in her chest after PT, took an extra nitrostat and this resolved  Pt also has a gluten sensitivity and mostly avoids this   Pt takes protonix 40mg bid sx controlled works well, discussed pepcid prn if needed     klor con 10meq daily      Pt saw Dr Lai Nguyễn (neuropsych) in the past   Last visit was 2015 - no dementia      Pt saw Dr Pio Eden (ENT) for hearing loss   Last visit was 12/18/18  Pt decided not to get hearing aids      Pt sees Dr Sheila Heller (ortho) for low back pain   Last visit was 1/9/19   Previously, provided info for Dr Keerthi Magallon  She had been taking tramadol but had SE so was switched to tylenol which helps somewhat   Continues on tylenol for this   Finished PT      Pt declined taking reclast and fosamax in the past      Pt is taking vit D 5000U daily      Pt takes miralax for constipation   This works well as long as she takes it daily occasionally takes extra dose      Continues on zoloft 75 mg helps w mood/depression , happy w dose      Continues on klor con 10meqs      She saw Dr Nahomy Lee (uro gyn) for this thick fecal discharge coming form her vagina   Last visit was 11/19   She saw Dr. Gayatri Lopez (uro gyn) for vaginal discharge  Last there 11/20 - no infection, there is not much they can do      ACP on file.  SDMs are her daughters, Handy and Gisel Jaramillo.      PREVENTIVE:   Colonoscopy: ~10 years ago with Dr Florence Bullock, per pt the way her colon is formed makes it difficult to get COLOs, her declines further   Pap: 11/19 with Dr Britney Marcum: declines further  Dexa: 8/18, osteoporosis, declines further tx other than vit d   Tdap: 10/19   Prevnar 13: 03/01/19   Pneumovax: 03/09/20   Shingrix: both rounds completed   Flu shot: 10/20   A1C: 3/19 5.9 9/19 5.7 3/20 5.8 10/20 5.5  Micro: 9/19   Eye exam: Dr Chencho Rudd ~summer 2018, Dr Suzette Allen 143  Covid: 1st round 1/18/21    Patient Active Problem List    Diagnosis Date Noted    Depression, major, recurrent, mild ([address] 06/04/2019    Pleural effusion 04/18/2019    History of stroke 06/11/2018    Gastroesophageal reflux disease without esophagitis 09/25/2017    Diverticulosis of large intestine without hemorrhage 09/25/2017    Benign essential tremor 09/25/2017    Irritable bowel syndrome with diarrhea 09/25/2017    Hypercholesterolemia 09/25/2017    Allergic rhinitis 09/25/2017    Fuchs' corneal dystrophy 09/25/2017    Idiopathic small and large fiber sensory neuropathy 04/21/2016    Diabetic peripheral neuropathy associated with type 2 diabetes mellitus ([facility] 04/21/2016    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 04/21/2016    Anxiety 07/24/2015    Cerebrovascular disease, unspecified 05/03/2015    B12 deficiency 04/20/2015    Osteoporosis 04/20/2015    Memory loss 04/20/2015    Celiac sprue 05/14/2013    Atrial fibrillation (HCC)     Hypertension      Current Outpatient Medications   Medication Sig Dispense Refill    potassium chloride (KLOR-CON) 10 mEq tablet Take 1 Tab by mouth daily. 90 Tab 0    QUEtiapine (SEROquel) 25 mg tablet 1/2 tablet in the morning around 10 AM then 1 whole tablet in the evening 90 Tab 2    melatonin 5 mg tablet Take 1 Tab by mouth nightly. 90 Tab 3    primidone (MYSOLINE) 50 mg tablet One half tab twice daily for tremor at approx 0900 and approx 2000 90 Tab 2    furosemide (LASIX) 20 mg tablet Take 1 Tab by mouth daily. 30 Tab 0    sertraline (ZOLOFT) 50 mg tablet TAKE 1 & 1/2 TABLETS BY MOUTH EVERY DAY 45 Tab 0    cholecalciferol (Vitamin D3) (5000 Units/125 mcg) tab tablet Take 1 Tab by mouth daily. [address] rhamnosus GG-inulin ([address]) 10 billion cell -200 mg cpSP Take 1 Cap by mouth daily. morning 30 Cap 3    acetaminophen (TYLENOL) 500 mg tablet Take 1 Tab by mouth every six (6) hours as needed for Pain. 120 Tab 3    metoprolol tartrate (LOPRESSOR) 25 mg tablet Take 1 Tab by mouth two (2) times a day.  Take 25 mg tablet twice a day, 1 at 9AM and 1 at [facility].   May take additional 12.5 mg metoprolol as needed ONLY IF BP remains > 150/90 ONE HOUR after taking regular metoprolol dose. 225 Tab 1    Vitamin B-12 100 mcg tablet Take 1 Tab by mouth daily. 100 Tab 0    pantoprazole (PROTONIX) 40 mg tablet Take 40 mg by mouth two (2) times a day.  melatonin 5 mg tablet Take 5 mg by mouth nightly as needed. May take an additional tablet if unable to sleep      metoprolol tartrate (LOPRESSOR) 25 mg tablet Take 12.5 mg by mouth two (2) times daily as needed. In addition to 25mg bid if BP > 150-90      aspirin delayed-release 81 mg tablet TAKE ONE TABLET BY MOUTH DAILY AS NEEDED 120 Tab 0    magnesium hydroxide (Baez Milk of Magnesia) 400 mg/5 mL suspension Take 30 mL by mouth daily as needed for Constipation.  disopyramide phosphate (NORPACE) 100 mg capsule TAKE ONE CAPSULE BY MOUTH TWICE A  Cap 4    nitroglycerin (NITROSTAT) 0.4 mg SL tablet 1 Tab by SubLINGual route every five (5) minutes as needed for Chest Pain. 25 Tab 1    aspirin 81 mg chewable tablet Take 1 Tab by mouth daily. 120 Tab 5    polyethylene glycol (MIRALAX) 17 gram/dose powder Take 17 g by mouth daily.  bacitracin ophthalmic ointment Administer 1 g to both eyes daily as needed (infection).  HYDROcodone-acetaminophen (Norco) 5-325 mg per tablet Take 1 Tab by mouth every four (4) hours as needed for Pain.  guaiFENesin (ROBITUSSIN) 100 mg/5 mL liquid Take 100 mg by mouth every six (6) hours as needed for Cough.  albuterol (Ventolin HFA) 90 mcg/actuation inhaler Take 2 Puffs by inhalation every six (6) hours as needed for Wheezing.  Biotin 2,500 mcg cap Take 1 Cap by mouth daily.  30 Cap 3     Past Surgical History:   Procedure Laterality Date    HX APPENDECTOMY      HX CHOLECYSTECTOMY      HX GI      hemorrhoidectomy    HX HEENT      sinus surgery    HX HERNIA REPAIR      HX TONSILLECTOMY        Lab Results   Component Value Date/Time WBC 3.5 (L) 11/18/2020 11:38 AM    WBC (POC) 5.6 07/23/2018 03:07 PM    HGB 11.6 11/18/2020 11:38 AM    HGB (POC) 15.0 07/23/2018 03:07 PM    HCT 35.4 11/18/2020 11:38 AM    HCT (POC) 45.9 07/23/2018 03:07 PM    PLATELET 499 (L) 61/41/0310 11:38 AM    PLATELET (POC) 674.4 07/23/2018 03:07 PM    MCV 90.8 11/18/2020 11:38 AM    MCV (POC) 90.0 07/23/2018 03:07 PM     Lab Results   Component Value Date/Time    Cholesterol, total 227 (H) 10/13/2020 08:20 AM    HDL Cholesterol 74 10/13/2020 08:20 AM    LDL, calculated 143 (H) 10/13/2020 08:20 AM    LDL, calculated 154 (H) 03/01/2019 10:15 AM    Triglyceride 58 10/13/2020 08:20 AM     Lab Results   Component Value Date/Time    GFR est non-AA 60 (L) 11/18/2020 11:38 AM    GFRNA, POC >60 10/15/2018 01:54 PM    GFR est AA >60 11/18/2020 11:38 AM    GFRAA, POC >60 10/15/2018 01:54 PM    Creatinine 0.88 11/18/2020 11:38 AM    Creatinine (POC) 0.8 10/15/2018 01:54 PM    BUN 22 (H) 11/18/2020 11:38 AM    Sodium 140 11/18/2020 11:38 AM    Potassium 3.4 (L) 11/18/2020 11:38 AM    Chloride 107 11/18/2020 11:38 AM    CO2 28 11/18/2020 11:38 AM    Magnesium 2.1 11/18/2020 11:38 AM        Review of Systems   Respiratory: Negative for shortness of breath. Cardiovascular: Negative for chest pain. Physical Exam  Constitutional:       General: She is not in acute distress. Appearance: Normal appearance. She is not ill-appearing, toxic-appearing or diaphoretic. HENT:      Head: Normocephalic and atraumatic. Right Ear: External ear normal.      Left Ear: External ear normal.   Eyes:      General:         Right eye: No discharge. Left eye: No discharge. Conjunctiva/sclera: Conjunctivae normal.      Pupils: Pupils are equal, round, and reactive to light. Neck:      Musculoskeletal: Normal range of motion and neck supple. Cardiovascular:      Rate and Rhythm: Normal rate and regular rhythm. Pulses: Normal pulses. Heart sounds: Normal heart sounds. No murmur. No friction rub. No gallop. Pulmonary:      Effort: No respiratory distress. Breath sounds: Normal breath sounds. No wheezing or rales. Chest:      Chest wall: No tenderness. Musculoskeletal: Normal range of motion. Right lower leg: Edema (stable pitting bilaterally, compression hose) present. Left lower leg: Edema present. Skin:     General: Skin is warm and dry. Neurological:      Mental Status: She is alert and oriented to person, place, and time. Mental status is at baseline. Coordination: Coordination normal.      Gait: Gait normal.   Psychiatric:         Mood and Affect: Mood normal.         Behavior: Behavior normal.         ASSESSMENT and PLAN    ICD-10-CM ICD-9-CM    1. Essential hypertension       Blood pressure well controlled continue metoprolol twice daily     I10 401.9    2. Paroxysmal atrial fibrillation (HCC)       Follows with cardiology    On Norpace 100 mg twice daily     I48.0 427.31    3. Hypercholesterolemia       Diet controlled     E78.00 272.0    4. Age-related osteoporosis without current pathological fracture    Declines treatment will no longer get bone densities     M81.0 733.01    5. Gastroesophageal reflux disease without esophagitis       Has a large hiatal hernia symptoms are improved when she tries to sit up in the evening takes Protonix twice daily    Can use Pepcid as needed as well K21.9 530.81    6. Benign essential tremor       Improved with primidone follows with neurology G25.0 333.1    7. Depression, major, recurrent, mild (HCC)         Improved Zoloft 75 mg F33.0 296.31    8. Pleural effusion       Continues on Lasix which has kept the effusion resolved J90 511.9    9. Anxiety    Improved with Zoloft     F41.9 300.00    10. Cerebrovascular disease, unspecified       Aspirin for stroke prevention I67.9 437.9    11.  Hypokalemia       Controlled on Klor-Con last labs we will monitor BMP E87.6 276.8            Scribed by Belle Perdomo of [facility] Maldonado, as dictated by Dr. Lou Ahumada. Current diagnosis and concerns discussed with pt at length. Pt understands risks and benefits or current treatment plan and medications, and accepts the treatment and medication with any possible risks. Pt asks appropriate questions, which were answered. Pt was instructed to call with any concerns or problems. I have reviewed the note documented by the scribe. The services provided are my own.   The documentation is accurate

## 2021-02-09 ENCOUNTER — OFFICE VISIT (OUTPATIENT)
Dept: INTERNAL MEDICINE CLINIC | Age: 86
End: 2021-02-09
Payer: MEDICARE

## 2021-02-09 VITALS
WEIGHT: 120.8 LBS | TEMPERATURE: 98.4 F | OXYGEN SATURATION: 97 % | BODY MASS INDEX: 22.23 KG/M2 | HEART RATE: 93 BPM | RESPIRATION RATE: 16 BRPM | HEIGHT: 62 IN | DIASTOLIC BLOOD PRESSURE: 82 MMHG | SYSTOLIC BLOOD PRESSURE: 125 MMHG

## 2021-02-09 DIAGNOSIS — E78.00 HYPERCHOLESTEROLEMIA: ICD-10-CM

## 2021-02-09 DIAGNOSIS — M81.0 AGE-RELATED OSTEOPOROSIS WITHOUT CURRENT PATHOLOGICAL FRACTURE: ICD-10-CM

## 2021-02-09 DIAGNOSIS — F33.0 DEPRESSION, MAJOR, RECURRENT, MILD (HCC): ICD-10-CM

## 2021-02-09 DIAGNOSIS — K21.9 GASTROESOPHAGEAL REFLUX DISEASE WITHOUT ESOPHAGITIS: ICD-10-CM

## 2021-02-09 DIAGNOSIS — J90 PLEURAL EFFUSION: ICD-10-CM

## 2021-02-09 DIAGNOSIS — E87.6 HYPOKALEMIA: ICD-10-CM

## 2021-02-09 DIAGNOSIS — I48.0 PAROXYSMAL ATRIAL FIBRILLATION (HCC): ICD-10-CM

## 2021-02-09 DIAGNOSIS — G25.0 BENIGN ESSENTIAL TREMOR: ICD-10-CM

## 2021-02-09 DIAGNOSIS — F41.9 ANXIETY: ICD-10-CM

## 2021-02-09 DIAGNOSIS — I67.9 CEREBROVASCULAR DISEASE, UNSPECIFIED: ICD-10-CM

## 2021-02-09 DIAGNOSIS — I10 ESSENTIAL HYPERTENSION: Primary | ICD-10-CM

## 2021-02-09 PROCEDURE — G0463 HOSPITAL OUTPT CLINIC VISIT: HCPCS | Performed by: INTERNAL MEDICINE

## 2021-02-09 PROCEDURE — 3288F FALL RISK ASSESSMENT DOCD: CPT | Performed by: INTERNAL MEDICINE

## 2021-02-09 PROCEDURE — G8420 CALC BMI NORM PARAMETERS: HCPCS | Performed by: INTERNAL MEDICINE

## 2021-02-09 PROCEDURE — 1090F PRES/ABSN URINE INCON ASSESS: CPT | Performed by: INTERNAL MEDICINE

## 2021-02-09 PROCEDURE — G8536 NO DOC ELDER MAL SCRN: HCPCS | Performed by: INTERNAL MEDICINE

## 2021-02-09 PROCEDURE — G8427 DOCREV CUR MEDS BY ELIG CLIN: HCPCS | Performed by: INTERNAL MEDICINE

## 2021-02-09 PROCEDURE — G9717 DOC PT DX DEP/BP F/U NT REQ: HCPCS | Performed by: INTERNAL MEDICINE

## 2021-02-09 PROCEDURE — 99214 OFFICE O/P EST MOD 30 MIN: CPT | Performed by: INTERNAL MEDICINE

## 2021-02-09 PROCEDURE — 1100F PTFALLS ASSESS-DOCD GE2>/YR: CPT | Performed by: INTERNAL MEDICINE

## 2021-03-17 ENCOUNTER — TRANSCRIBE ORDER (OUTPATIENT)
Dept: REGISTRATION | Age: 86
End: 2021-03-17

## 2021-03-17 ENCOUNTER — HOSPITAL ENCOUNTER (OUTPATIENT)
Dept: GENERAL RADIOLOGY | Age: 86
Discharge: HOME | End: 2021-03-17
Payer: MEDICARE

## 2021-03-17 DIAGNOSIS — K90.0 CELIAC DISEASE: ICD-10-CM

## 2021-03-17 DIAGNOSIS — S27.809A RUPTURE OF DIAPHRAGM: ICD-10-CM

## 2021-03-17 DIAGNOSIS — R14.0 GASTRIC TYMPANY: ICD-10-CM

## 2021-03-17 DIAGNOSIS — R19.7 DIARRHEA OF PRESUMED INFECTIOUS ORIGIN: ICD-10-CM

## 2021-03-17 DIAGNOSIS — R19.7 DIARRHEA OF PRESUMED INFECTIOUS ORIGIN: Primary | ICD-10-CM

## 2021-03-17 PROCEDURE — 74018 RADEX ABDOMEN 1 VIEW: CPT

## 2021-03-19 ENCOUNTER — TRANSCRIBE ORDER (OUTPATIENT)
Dept: SCHEDULING | Age: 86
End: 2021-03-19

## 2021-03-19 DIAGNOSIS — K90.0 CELIAC DISEASE: ICD-10-CM

## 2021-03-19 DIAGNOSIS — R14.0 BLOATING SYMPTOM: ICD-10-CM

## 2021-03-19 DIAGNOSIS — R19.7 DIARRHEA, UNSPECIFIED: Primary | ICD-10-CM

## 2021-03-19 DIAGNOSIS — K21.9 GERD (GASTROESOPHAGEAL REFLUX DISEASE): ICD-10-CM

## 2021-03-29 ENCOUNTER — OFFICE VISIT (OUTPATIENT)
Dept: NEUROLOGY | Age: 86
End: 2021-03-29
Payer: MEDICARE

## 2021-03-29 VITALS
TEMPERATURE: 97.4 F | HEART RATE: 78 BPM | DIASTOLIC BLOOD PRESSURE: 84 MMHG | BODY MASS INDEX: 22.08 KG/M2 | OXYGEN SATURATION: 96 % | WEIGHT: 120 LBS | RESPIRATION RATE: 16 BRPM | HEIGHT: 62 IN | SYSTOLIC BLOOD PRESSURE: 136 MMHG

## 2021-03-29 DIAGNOSIS — G25.0 BENIGN ESSENTIAL TREMOR: ICD-10-CM

## 2021-03-29 DIAGNOSIS — I48.0 PAROXYSMAL ATRIAL FIBRILLATION (HCC): ICD-10-CM

## 2021-03-29 DIAGNOSIS — G31.83 LEWY BODY PARKINSON DISEASE (HCC): Primary | ICD-10-CM

## 2021-03-29 DIAGNOSIS — F02.80 LEWY BODY PARKINSON DISEASE (HCC): Primary | ICD-10-CM

## 2021-03-29 DIAGNOSIS — R44.3 HALLUCINATIONS: ICD-10-CM

## 2021-03-29 PROCEDURE — G8427 DOCREV CUR MEDS BY ELIG CLIN: HCPCS | Performed by: PSYCHIATRY & NEUROLOGY

## 2021-03-29 PROCEDURE — 99215 OFFICE O/P EST HI 40 MIN: CPT | Performed by: PSYCHIATRY & NEUROLOGY

## 2021-03-29 PROCEDURE — 3288F FALL RISK ASSESSMENT DOCD: CPT | Performed by: PSYCHIATRY & NEUROLOGY

## 2021-03-29 PROCEDURE — 1090F PRES/ABSN URINE INCON ASSESS: CPT | Performed by: PSYCHIATRY & NEUROLOGY

## 2021-03-29 PROCEDURE — G8536 NO DOC ELDER MAL SCRN: HCPCS | Performed by: PSYCHIATRY & NEUROLOGY

## 2021-03-29 PROCEDURE — G8420 CALC BMI NORM PARAMETERS: HCPCS | Performed by: PSYCHIATRY & NEUROLOGY

## 2021-03-29 PROCEDURE — G9717 DOC PT DX DEP/BP F/U NT REQ: HCPCS | Performed by: PSYCHIATRY & NEUROLOGY

## 2021-03-29 PROCEDURE — 1100F PTFALLS ASSESS-DOCD GE2>/YR: CPT | Performed by: PSYCHIATRY & NEUROLOGY

## 2021-03-29 RX ORDER — QUETIAPINE FUMARATE 25 MG/1
25 TABLET, FILM COATED ORAL 2 TIMES DAILY
Qty: 60 TAB | Refills: 3 | Status: SHIPPED | OUTPATIENT
Start: 2021-03-29 | End: 2021-04-13

## 2021-03-29 RX ORDER — PRIMIDONE 50 MG/1
TABLET ORAL
Qty: 45 TAB | Refills: 2 | Status: SHIPPED | OUTPATIENT
Start: 2021-03-29 | End: 2022-03-29 | Stop reason: SDUPTHER

## 2021-03-29 NOTE — PROGRESS NOTES
Chief Complaint   Patient presents with    Other     transfer from Dr Terrance Gomez.   has tremors and wants to discuss increasing medication and medication for dreams was helping but they are coming back

## 2021-03-29 NOTE — PROGRESS NOTES
Follow-up Visit    Name Olga Cunningham Age 80 y.o. MRN [id number]  1926       Chief Complaint: memory loss    She was followed by Dr. Reva Moy for essential tremors and Lewy body dementia. She is new to my practice. She was being treated with primidone however now they have progressed to the point where she has been unable to eat her food. We discussed adjusting her medications specifically her primidone. She has also been diagnosed with Lewy body dementia and she has visions of people left past.  She frequently interact with these and they are disturbing to her. She has been taking Seroquel. We discussed adjusting the dose of the Seroquel as her \"dreams\", which is the term used by the patient and family describe her hallucinations, has become more prominent. I addressed the risk for sudden death that has been associated with these drugs with daughter and patient and they viewed the risk as being worth the benefit. Assesment and Plan  1. Lewy body Parkinson disease ([address]  Continue Seroquel increasing the dose to 25 twice daily    2. Hallucinations  Continue Seroquel    3. Benign essential tremor  Continue primidone increasing the dose to 25 mg in the a.m. and 50 mg in p.m.    4. Paroxysmal atrial fibrillation (HCC)  Followed by cardiology currently on metoprolol   And Norpace  Chart reviewed    Allergies  Bactrim [sulfamethoprim ds], Ciprofloxacin (bulk), Prednisone, Shellfish derived, and Statins-hmg-coa reductase inhibitors     Medications  Current Outpatient Medications   Medication Sig    primidone (MYSOLINE) 50 mg tablet Take 1/2 tab in the morning and full tab in the evening.  QUEtiapine (SEROquel) 25 mg tablet Take 1 Tab by mouth two (2) times a day.  disopyramide phosphate (NORPACE) 100 mg capsule TAKE ONE CAPSULE BY MOUTH 2 TIMES A DAY    potassium chloride (KLOR-CON) 10 mEq tablet Take 1 Tab by mouth daily.  melatonin 5 mg tablet Take 1 Tab by mouth nightly.     furosemide (LASIX) 20 mg tablet Take 1 Tab by mouth daily.  sertraline (ZOLOFT) 50 mg tablet TAKE 1 & 1/2 TABLETS BY MOUTH EVERY DAY    cholecalciferol (Vitamin D3) (5000 Units/125 mcg) tab tablet Take 1 Tab by mouth daily.  Lactobac. rhamnosus GG-inulin ([address] 13) 10 billion cell -200 mg cpSP Take 1 Cap by mouth daily. morning    acetaminophen (TYLENOL) 500 mg tablet Take 1 Tab by mouth every six (6) hours as needed for Pain.  metoprolol tartrate (LOPRESSOR) 25 mg tablet Take 1 Tab by mouth two (2) times a day. Take 25 mg tablet twice a day, 1 at 9AM and 1 at [facility].   May take additional 12.5 mg metoprolol as needed ONLY IF BP remains > 150/90 ONE HOUR after taking regular metoprolol dose.  Vitamin B-12 100 mcg tablet Take 1 Tab by mouth daily.  pantoprazole (PROTONIX) 40 mg tablet Take 40 mg by mouth two (2) times a day.  melatonin 5 mg tablet Take 5 mg by mouth nightly as needed. May take an additional tablet if unable to sleep    metoprolol tartrate (LOPRESSOR) 25 mg tablet Take 12.5 mg by mouth two (2) times daily as needed. In addition to 25mg bid if BP > 150-90    aspirin delayed-release 81 mg tablet TAKE ONE TABLET BY MOUTH DAILY AS NEEDED    magnesium hydroxide (Baez Milk of Magnesia) 400 mg/5 mL suspension Take 30 mL by mouth daily as needed for Constipation.  nitroglycerin (NITROSTAT) 0.4 mg SL tablet 1 Tab by SubLINGual route every five (5) minutes as needed for Chest Pain.  aspirin 81 mg chewable tablet Take 1 Tab by mouth daily.  polyethylene glycol (MIRALAX) 17 gram/dose powder Take 17 g by mouth daily. No current facility-administered medications for this visit.          Medical History  Past Medical History:   Diagnosis Date    Acute diverticulitis 9/25/2017    Allergic rhinitis 9/25/2017    Arrhythmia     afib    Arthritis     Asthma     Atrial fibrillation (HCC)     Benign essential tremor 9/25/2017    Cancer ([facility]     skin    Celiac disease 9/25/2017    Constipation 9/25/2017    Diabetic peripheral neuropathy associated with type 2 diabetes mellitus ([facility] [address] [address] 4/21/2016    Diarrhea 9/25/2017    Diverticulosis of large intestine without hemorrhage 9/25/2017    Early satiety 9/25/2017    Fuchs' corneal dystrophy 9/25/2017    Gastroesophageal reflux disease without esophagitis 9/25/2017    GERD (gastroesophageal reflux disease)     High cholesterol     Hypercholesterolemia 9/25/2017    Hypertension     Irritable bowel syndrome with diarrhea 9/25/2017    Numbness 9/25/2017    Osteopenia 9/25/2017    Other ill-defined conditions(799.89)     collapsed lung prior to hernia repair surgery    Postmenopausal 7/23/2018    Ptosis, both eyelids 9/25/2017    Statin intolerance 9/25/2017    Unspecified adverse effect of anesthesia     pt states she went into cardiac arrest prior to hernia repair after the insertion of gas in stomach       Review of Systems   Eyes: Negative for blurred vision and double vision. Respiratory: Negative for cough and shortness of breath. Cardiovascular: Negative for chest pain, palpitations and orthopnea. Gastrointestinal: Negative for nausea and vomiting. Neurological: Positive for tremors. Negative for dizziness and headaches. Psychiatric/Behavioral: Positive for hallucinations and memory loss. Negative for depression. The patient is nervous/anxious. Exam:  Visit Vitals  /84 (BP 1 Location: Left upper arm, BP Patient Position: Sitting, BP Cuff Size: Adult)   Pulse 78   Temp 97.4 °F (36.3 °C) (Oral)   Resp 16   Ht 5' 2\" (1.575 m)   Wt 120 lb (54.4 kg)   SpO2 96%   BMI 21.95 kg/m²        General: Well developed, well nourished. Patient in no apparent distress   Head: Normocephalic, atraumatic, anicteric sclera   Lungs:  Clear to auscultation    Cardiac: Regular rate and rhythm    Abd: Bowel sounds were audible.     Ext: No pedal edema   Skin: Supple no rash     NeurologicExam:  Mental Status: Alert and oriented to person and place   Speech:  Paucity of speech but no aphasia or dysarthria. Cranial Nerves:  II - XII Intact with the exception of hearing loss   Motor:  Full and symmetric strength of upper and lower extremities. Normal bulk and tone. Reflexes:   Deep tendon reflexes 1+/4 and symmetric. Sensory:   Symmetric and intact with no perceived deficits . Gait:  Gait is walker dependent   Tremor:   tremor affecting primarily the upper extremities. Cerebellar:  Coordination intact. Neurovascular: No carotid bruits.  No JVD          Lab Review  Lab Results   Component Value Date/Time    WBC 3.5 (L) 11/18/2020 11:38 AM    HCT 35.4 11/18/2020 11:38 AM    HGB 11.6 11/18/2020 11:38 AM    PLATELET 500 (L) 42/51/8531 11:38 AM       Lab Results   Component Value Date/Time    Sodium 140 11/18/2020 11:38 AM    Potassium 3.4 (L) 11/18/2020 11:38 AM    Chloride 107 11/18/2020 11:38 AM    CO2 28 11/18/2020 11:38 AM    Glucose 124 (H) 11/18/2020 11:38 AM    BUN 22 (H) 11/18/2020 11:38 AM    Creatinine 0.88 11/18/2020 11:38 AM    Calcium 9.0 11/18/2020 11:38 AM         Lab Results   Component Value Date/Time    Hemoglobin A1c 5.5 10/13/2020 08:20 AM        Lab Results   Component Value Date/Time    Vitamin B12 465 04/21/2016 11:05 AM    Folate >20.0 04/21/2016 11:05 AM       Lab Results   Component Value Date/Time    VIKTOR by IFA,IgG <1:40 02/11/2009 01:04 PM       Lab Results   Component Value Date/Time    Cholesterol, total 227 (H) 10/13/2020 08:20 AM    HDL Cholesterol 74 10/13/2020 08:20 AM    LDL, calculated 143 (H) 10/13/2020 08:20 AM    LDL, calculated 154 (H) 03/01/2019 10:15 AM    VLDL, calculated 10 10/13/2020 08:20 AM    VLDL, calculated 16 03/01/2019 10:15 AM    Triglyceride 58 10/13/2020 08:20 AM

## 2021-03-29 NOTE — LETTER
3/29/2021 1:45 PM 
 
Ms. Alli Rosario [address] [facility] [address] [facility] [address] Dear Mrs Harry Cervantes We have changed you medications as follows: Please stop the Primidone 25mg twice daily. Please stop the seroquel 12.5mg in the morning and 25mg in the evening. Start taking Primidone 25mg in the morning and 50mg in the evening. Start taking seroquel  25mg in the morning and 25mg in the evening Sincerely, Rosalva Gomez MD

## 2021-03-30 ENCOUNTER — TELEPHONE (OUTPATIENT)
Dept: INTERNAL MEDICINE CLINIC | Age: 86
End: 2021-03-30

## 2021-03-30 NOTE — TELEPHONE ENCOUNTER
Patient's daughter, Handy states she needs a call back in reference to getting the patient a Complete Physical & form done for her to move from one [address] [place] [address] to A Different/New Facility in [place], [place]. Please call to advise who this can be done & If/When Appt required.  Thank you

## 2021-03-31 NOTE — TELEPHONE ENCOUNTER
Called out and spoke to [employer] (HIPAA). Two pt identifiers confirmed. Pt offered/accepted Virtual appt for 4/2/21 at 1200--appt will be around 1000. Dara Pang states she will have the forms faxed or she will deliver them to the office. Dara Pang verbalized understanding of information discussed w/ no further questions at this time.

## 2021-03-31 NOTE — PROGRESS NOTES
HISTORY OF PRESENT ILLNESS  Matt Thomas is a 80 y.o. female. HPI     Pt last seen 2/9/21 Here for routine care. This is an established visit completed with telemedicine was completed with video assist  the patient acknowledges and agrees to this method of visitation santiago  Presents with daughter, Trev Srinivasan, who provides hx  Lives at the [facility]    She will be moving from [facility] to assisted living facility in [address] [place] [address]  Will complete paperwork   Needs tb test - will get labs      BP at home 125/70   Continues on metoprolol 25mg bid   Continues on lasix 20 mg daily - only swelling in R leg due to injury      Wt is 119 lbs per pt - down 1 lb x lov   Her wt is in the nl ranges      Reviewed labs   She recently completed labs with Dr. Abi Victor      Pt follows wt Dr Ashley Jaimes (neuro) for tremor and hallucinations   Last visit 9/14/20  . Pt had been being treated for Parkinson's but the neuro decided it may not be this so they stopped this treatment   Has chronic tremors and hallucinations   She is on seroquel 25mg BID, increased by neuro due to increased hallucinations - this has helped  primidone was increased to 25 mg in am and 50 mg for tremor   She saw Dr. Dorie Woodson (neuro) 3/29/21: 1. Lewy body Parkinson disease ([address]  Continue Seroquel increasing the dose to 25 twice daily  2. Hallucinations  Continue Seroquel  3.  Benign essential tremor  Continue primidone increasing the dose to 25 mg in the a.m. and 50 mg in p.m.  4. Paroxysmal atrial fibrillation (HCC)  Followed by cardiology currently on metoprolol   And Norpace  Chart reviewed     Talking melatonin 5-10mg at bedtime, wasn't able to sleep well once this week due to chills       Pt follows with Dr Mc Trinidad (cardio) for a fib   Last visit 8/21/20   Atypical symptoms in the setting of large hiatal hernia -- prohibitive risk for surgery   Lexiscan in 6/2018 normal   Echo done 6/2018 with preserved ejection fraction 60-65% with grade 1 DD   Continue NTG as prescribed -- taking 1 dose which resolves pain   If she has no relief in symptoms despite NTGx3 in 15 minutes, then she must go to ER   Pt is on norpace 100mg BID      Pt sees Dr Tavia uBitrago (GI) for diverticulosis   Pt also has a large hiatal hernia - will prop up her legs when she is having worsening gerd sxs   Pt also has a gluten sensitivity and mostly avoids this   Pt takes protonix 40mg bid sx controlled works well   Last there 3/21  Reviewed kub 3/21: Moderate stool. No acute findings. She is having daily BM      klor con 10meq daily      Pt saw Dr Ghada Painting (neuropsych) in the past   Last visit was 2015 - no dementia      Pt saw Dr Marshal Schreiber (ENT) for hearing loss   Last visit was 12/18/18  Pt decided not to get hearing aids      Pt sees Dr oNa Ron (ortho) for low back pain   Last visit was 1/9/19   Previously, provided info for Dr Magaly Moore  She had been taking tramadol but had SE so was switched to tylenol which helps somewhat   Continues on tylenol for this   Finished PT      Pt declined taking reclast and fosamax in the past      Pt is taking vit D 5000U daily      Pt takes miralax for constipation   This works well as long as she takes it daily occasionally takes extra dose      Continues on zoloft 75 mg helps w mood/depression , happy w dose      Continues on klor con 10meqs      She saw Dr Torie Benavides (uro gyn) for this thick fecal discharge coming form her vagina   Last visit was 11/19   She saw Dr. Rolan Torres (uro gyn) for vaginal discharge  Last there 11/20     Of note she is legally blind, has macular degeneration     ACP on file.  SDMs are her daughters, Piotr Caldwell and Christianne Rodriguez.      PREVENTIVE:   Colonoscopy: ~10 years ago with Dr Tavia Buitrago, per pt the way her colon is formed makes it difficult to get COLOs, her declines further   Pap: 11/19 with Dr Robinson Ureña: declines further  Dexa: 8/18, osteoporosis, declines further tx other than vit d   Tdap: 10/19   Prevnar 13: 03/01/19   Pneumovax: 03/09/20   Shingrix: both rounds completed   Flu shot: 10/20   A1C: 3/19 5.9 9/19 5.7 3/20 5.8 10/20 5.5  Micro: 9/19   Eye exam: Dr Jennie Dial ~summer 2018, Dr Faizan Cantu, scheduled 4/21-- legally blind  Lipids: 10/20   Covid: completed both (Nathan Verma)     Patient Active Problem List    Diagnosis Date Noted    Depression, major, recurrent, mild ([facility] 06/04/2019    Pleural effusion 04/18/2019    History of stroke 06/11/2018    Gastroesophageal reflux disease without esophagitis 09/25/2017    Diverticulosis of large intestine without hemorrhage 09/25/2017    Benign essential tremor 09/25/2017    Irritable bowel syndrome with diarrhea 09/25/2017    Hypercholesterolemia 09/25/2017    Allergic rhinitis 09/25/2017    Fuchs' corneal dystrophy 09/25/2017    Idiopathic small and large fiber sensory neuropathy 04/21/2016    Diabetic peripheral neuropathy associated with type 2 diabetes mellitus ([facility] 04/21/2016    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 04/21/2016    Anxiety 07/24/2015    Cerebrovascular disease, unspecified 05/03/2015    B12 deficiency 04/20/2015    Osteoporosis 04/20/2015    Memory loss 04/20/2015    Celiac sprue 05/14/2013    Atrial fibrillation (HCC)     Hypertension      Current Outpatient Medications   Medication Sig Dispense Refill    primidone (MYSOLINE) 50 mg tablet Take 1/2 tab in the morning and full tab in the evening. 45 Tab 2    QUEtiapine (SEROquel) 25 mg tablet Take 1 Tab by mouth two (2) times a day. 60 Tab 3    disopyramide phosphate (NORPACE) 100 mg capsule TAKE ONE CAPSULE BY MOUTH 2 TIMES A DAY 60 Cap 4    potassium chloride (KLOR-CON) 10 mEq tablet Take 1 Tab by mouth daily. 90 Tab 0    furosemide (LASIX) 20 mg tablet Take 1 Tab by mouth daily. 30 Tab 0    sertraline (ZOLOFT) 50 mg tablet TAKE 1 & 1/2 TABLETS BY MOUTH EVERY DAY 45 Tab 0    cholecalciferol (Vitamin D3) (5000 Units/125 mcg) tab tablet Take 1 Tab by mouth daily. [address]  rhamnosus GG-inulin ([address]) 10 billion cell -200 mg cpSP Take 1 Cap by mouth daily. morning 30 Cap 3    acetaminophen (TYLENOL) 500 mg tablet Take 1 Tab by mouth every six (6) hours as needed for Pain. 120 Tab 3    metoprolol tartrate (LOPRESSOR) 25 mg tablet Take 1 Tab by mouth two (2) times a day. Take 25 mg tablet twice a day, 1 at 9AM and 1 at [facility].   May take additional 12.5 mg metoprolol as needed ONLY IF BP remains > 150/90 ONE HOUR after taking regular metoprolol dose. 225 Tab 1    Vitamin B-12 100 mcg tablet Take 1 Tab by mouth daily. 100 Tab 0    pantoprazole (PROTONIX) 40 mg tablet Take 40 mg by mouth two (2) times a day.  melatonin 5 mg tablet Take 5 mg by mouth nightly as needed. May take an additional tablet if unable to sleep      aspirin delayed-release 81 mg tablet TAKE ONE TABLET BY MOUTH DAILY AS NEEDED 120 Tab 0    magnesium hydroxide (Baez Milk of Magnesia) 400 mg/5 mL suspension Take 30 mL by mouth daily as needed for Constipation.  nitroglycerin (NITROSTAT) 0.4 mg SL tablet 1 Tab by SubLINGual route every five (5) minutes as needed for Chest Pain. 25 Tab 1    aspirin 81 mg chewable tablet Take 1 Tab by mouth daily. 120 Tab 5    polyethylene glycol (MIRALAX) 17 gram/dose powder Take 17 g by mouth daily.        Past Surgical History:   Procedure Laterality Date    HX APPENDECTOMY      HX CHOLECYSTECTOMY      HX GI      hemorrhoidectomy    HX HEENT      sinus surgery    HX HERNIA REPAIR      HX TONSILLECTOMY        Lab Results   Component Value Date/Time    WBC 3.5 (L) 11/18/2020 11:38 AM    WBC (POC) 5.6 07/23/2018 03:07 PM    HGB 11.6 11/18/2020 11:38 AM    HGB (POC) 15.0 07/23/2018 03:07 PM    HCT 35.4 11/18/2020 11:38 AM    HCT (POC) 45.9 07/23/2018 03:07 PM    PLATELET 773 (L) 69/12/8255 11:38 AM    PLATELET (POC) 093.7 07/23/2018 03:07 PM    MCV 90.8 11/18/2020 11:38 AM    MCV (POC) 90.0 07/23/2018 03:07 PM     Lab Results   Component Value Date/Time Cholesterol, total 227 (H) 10/13/2020 08:20 AM    HDL Cholesterol 74 10/13/2020 08:20 AM    LDL, calculated 143 (H) 10/13/2020 08:20 AM    LDL, calculated 154 (H) 03/01/2019 10:15 AM    Triglyceride 58 10/13/2020 08:20 AM     Lab Results   Component Value Date/Time    GFR est non-AA 60 (L) 11/18/2020 11:38 AM    GFRNA, POC >60 10/15/2018 01:54 PM    GFR est AA >60 11/18/2020 11:38 AM    GFRAA, POC >60 10/15/2018 01:54 PM    Creatinine 0.88 11/18/2020 11:38 AM    Creatinine (POC) 0.8 10/15/2018 01:54 PM    BUN 22 (H) 11/18/2020 11:38 AM    Sodium 140 11/18/2020 11:38 AM    Potassium 3.4 (L) 11/18/2020 11:38 AM    Chloride 107 11/18/2020 11:38 AM    CO2 28 11/18/2020 11:38 AM    Magnesium 2.1 11/18/2020 11:38 AM        Review of Systems   Respiratory: Negative for shortness of breath. Cardiovascular: Negative for chest pain. Physical Exam  Constitutional:       General: She is not in acute distress. Appearance: Normal appearance. She is not ill-appearing, toxic-appearing or diaphoretic. HENT:      Head: Normocephalic and atraumatic. Eyes:      General:         Right eye: No discharge. Left eye: No discharge. Conjunctiva/sclera: Conjunctivae normal.   Pulmonary:      Effort: No respiratory distress. Neurological:      Mental Status: She is alert and oriented to person, place, and time. Mental status is at baseline. Gait: Gait normal.   Psychiatric:         Mood and Affect: Mood normal.         Behavior: Behavior normal.         ASSESSMENT and PLAN    ICD-10-CM ICD-9-CM    1. Paroxysmal atrial fibrillation (HCC)       On Norpace twice daily up-to-date with cardiology sees them annually     L07.5 204.47 METABOLIC PANEL, COMPREHENSIVE      HEMOGLOBIN A1C WITH EAG      QUANTIFERON-TB GOLD PLUS   2. Depression, major, recurrent, mild (HCC)  I31.5 723.80 METABOLIC PANEL, COMPREHENSIVE   Controlled on Zoloft   HEMOGLOBIN A1C WITH EAG      QUANTIFERON-TB GOLD PLUS   3.  Gastroesophageal reflux disease without esophagitis  Y58.6 533.14 METABOLIC PANEL, COMPREHENSIVE      HEMOGLOBIN A1C WITH EAG   Controlled Protonix twice daily       QUANTIFERON-TB GOLD PLUS   4. Essential hypertension  O90 757.5 METABOLIC PANEL, COMPREHENSIVE   Controlled on metoprolol twice daily continue   HEMOGLOBIN A1C WITH EAG      QUANTIFERON-TB GOLD PLUS   5. Cerebrovascular disease, unspecified  F79.7 079.0 METABOLIC PANEL, COMPREHENSIVE   Aspirin for stroke prevention   HEMOGLOBIN A1C WITH EAG      QUANTIFERON-TB GOLD PLUS   6. Age-related osteoporosis without current pathological fracture  Z58.9 548.38 METABOLIC PANEL, COMPREHENSIVE      HEMOGLOBIN A1C WITH EAG   Vitamin D for treatment declines other evaluation or treatment   QUANTIFERON-TB GOLD PLUS   7. Hypercholesterolemia  N69.98 412.0 METABOLIC PANEL, COMPREHENSIVE   Diet controlled   HEMOGLOBIN A1C WITH EAG      QUANTIFERON-TB GOLD PLUS   8. IFG (impaired fasting glucose)  O32.45 543.82 METABOLIC PANEL, COMPREHENSIVE   Monitor A1c diet controlled   HEMOGLOBIN A1C WITH EAG      QUANTIFERON-TB GOLD PLUS   9. Benign essential tremor    Up-to-date with neurologist primidone was recently increased continue higher dose     G25.0 333.1    10. Celiac sprue    Works with GI routinely follows a gluten-free diet K90.0 579.0     11 Parkinson's/Lewy body disease follows with neurology on Seroquel to help with hallucinations          Patient has forms that need to be filled out to go to an assisted living facility she is legally blind she is not capable of self preservation she will need to have her medications administered to her she has no evidence of active TB had a recent chest x-ray completed this fall that was negative a chronic urine gold has been added to her blood work to ensure it is negative         Scribed by Dalia Doss of [facility], as dictated by Dr. Shirley Lee. Current diagnosis and concerns discussed with pt at length.  Pt understands risks and benefits or current treatment plan and medications, and accepts the treatment and medication with any possible risks. Pt asks appropriate questions, which were answered. Pt was instructed to call with any concerns or problems. I have reviewed the note documented by the scribe. The services provided are my own. The documentation is accurate     Alida Apo, was evaluated through a synchronous (real-time) audio-video encounter. The patient (or guardian if applicable) is aware that this is a billable service. Verbal consent to proceed has been obtained within the past 12 months. The visit was conducted pursuant to the emergency declaration under the [address] [place] [address], [address] [place] [address] authority and the [employer] Act. Patient identification was verified, and a caregiver was present when appropriate. The patient was located in a state where the provider was credentialed to provide care.

## 2021-03-31 NOTE — TELEPHONE ENCOUNTER
MD Sakina Sanchez LPN   Caller: Unspecified Phyllis Comfort,  3:34 PM)             You can add her Friday, schedule for morning

## 2021-04-02 ENCOUNTER — VIRTUAL VISIT (OUTPATIENT)
Dept: INTERNAL MEDICINE CLINIC | Age: 86
End: 2021-04-02
Payer: MEDICARE

## 2021-04-02 DIAGNOSIS — I67.9 CEREBROVASCULAR DISEASE, UNSPECIFIED: ICD-10-CM

## 2021-04-02 DIAGNOSIS — K90.0 CELIAC SPRUE: ICD-10-CM

## 2021-04-02 DIAGNOSIS — I10 ESSENTIAL HYPERTENSION: ICD-10-CM

## 2021-04-02 DIAGNOSIS — I48.0 PAROXYSMAL ATRIAL FIBRILLATION (HCC): Primary | ICD-10-CM

## 2021-04-02 DIAGNOSIS — E78.00 HYPERCHOLESTEROLEMIA: ICD-10-CM

## 2021-04-02 DIAGNOSIS — R73.01 IFG (IMPAIRED FASTING GLUCOSE): ICD-10-CM

## 2021-04-02 DIAGNOSIS — G25.0 BENIGN ESSENTIAL TREMOR: ICD-10-CM

## 2021-04-02 DIAGNOSIS — F33.0 DEPRESSION, MAJOR, RECURRENT, MILD (HCC): ICD-10-CM

## 2021-04-02 DIAGNOSIS — M81.0 AGE-RELATED OSTEOPOROSIS WITHOUT CURRENT PATHOLOGICAL FRACTURE: ICD-10-CM

## 2021-04-02 DIAGNOSIS — K21.9 GASTROESOPHAGEAL REFLUX DISEASE WITHOUT ESOPHAGITIS: ICD-10-CM

## 2021-04-02 PROCEDURE — 99214 OFFICE O/P EST MOD 30 MIN: CPT | Performed by: INTERNAL MEDICINE

## 2021-04-02 PROCEDURE — G9717 DOC PT DX DEP/BP F/U NT REQ: HCPCS | Performed by: INTERNAL MEDICINE

## 2021-04-02 PROCEDURE — G8427 DOCREV CUR MEDS BY ELIG CLIN: HCPCS | Performed by: INTERNAL MEDICINE

## 2021-04-02 PROCEDURE — 3288F FALL RISK ASSESSMENT DOCD: CPT | Performed by: INTERNAL MEDICINE

## 2021-04-02 PROCEDURE — G0463 HOSPITAL OUTPT CLINIC VISIT: HCPCS | Performed by: INTERNAL MEDICINE

## 2021-04-02 PROCEDURE — 1090F PRES/ABSN URINE INCON ASSESS: CPT | Performed by: INTERNAL MEDICINE

## 2021-04-02 PROCEDURE — 1100F PTFALLS ASSESS-DOCD GE2>/YR: CPT | Performed by: INTERNAL MEDICINE

## 2021-04-03 ENCOUNTER — APPOINTMENT (OUTPATIENT)
Dept: CT IMAGING | Age: 86
End: 2021-04-03
Attending: EMERGENCY MEDICINE
Payer: MEDICARE

## 2021-04-03 ENCOUNTER — APPOINTMENT (OUTPATIENT)
Dept: GENERAL RADIOLOGY | Age: 86
End: 2021-04-03
Attending: EMERGENCY MEDICINE
Payer: MEDICARE

## 2021-04-03 ENCOUNTER — HOSPITAL ENCOUNTER (EMERGENCY)
Age: 86
Discharge: HOME | End: 2021-04-03
Attending: EMERGENCY MEDICINE
Payer: MEDICARE

## 2021-04-03 VITALS
TEMPERATURE: 97.5 F | BODY MASS INDEX: 22.08 KG/M2 | HEART RATE: 83 BPM | SYSTOLIC BLOOD PRESSURE: 152 MMHG | RESPIRATION RATE: 16 BRPM | WEIGHT: 120 LBS | DIASTOLIC BLOOD PRESSURE: 81 MMHG | HEIGHT: 62 IN | OXYGEN SATURATION: 95 %

## 2021-04-03 DIAGNOSIS — S39.012A BACK STRAIN, INITIAL ENCOUNTER: ICD-10-CM

## 2021-04-03 DIAGNOSIS — R19.7 DIARRHEA, UNSPECIFIED: ICD-10-CM

## 2021-04-03 DIAGNOSIS — S09.90XA CLOSED HEAD INJURY, INITIAL ENCOUNTER: ICD-10-CM
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
## 2021-04-03 DIAGNOSIS — K90.0 CELIAC DISEASE: ICD-10-CM

## 2021-04-03 DIAGNOSIS — R14.0 BLOATING SYMPTOM: ICD-10-CM

## 2021-04-03 DIAGNOSIS — K21.9 GERD (GASTROESOPHAGEAL REFLUX DISEASE): ICD-10-CM

## 2021-04-03 LAB
ALBUMIN SERPL-MCNC: 3.6 G/DL (ref 3.5–5)
ALBUMIN/GLOB SERPL: 1.1 {RATIO} (ref 1.1–2.2)
ALP SERPL-CCNC: 74 U/L (ref 45–117)
ALT SERPL-CCNC: 21 U/L (ref 12–78)
ANION GAP SERPL CALC-SCNC: 2 MMOL/L (ref 5–15)
APPEARANCE UR: ABNORMAL
AST SERPL-CCNC: 15 U/L (ref 15–37)
ATRIAL RATE: 83 BPM
BACTERIA URNS QL MICRO: NEGATIVE /HPF
BASOPHILS # BLD: 0 K/UL (ref 0–0.1)
BASOPHILS NFR BLD: 1 % (ref 0–1)
BILIRUB SERPL-MCNC: 0.4 MG/DL (ref 0.2–1)
BILIRUB UR QL: NEGATIVE
BUN SERPL-MCNC: 22 MG/DL (ref 6–20)
BUN/CREAT SERPL: 26 (ref 12–20)
CALCIUM SERPL-MCNC: 9.5 MG/DL (ref 8.5–10.1)
CALCULATED P AXIS, ECG09: 64 DEGREES
CALCULATED R AXIS, ECG10: 17 DEGREES
CALCULATED T AXIS, ECG11: 64 DEGREES
CHLORIDE SERPL-SCNC: 103 MMOL/L (ref 97–108)
CO2 SERPL-SCNC: 32 MMOL/L (ref 21–32)
COLOR UR: ABNORMAL
COMMENT, HOLDF: NORMAL
CREAT SERPL-MCNC: 0.84 MG/DL (ref 0.55–1.02)
DIAGNOSIS, 93000: NORMAL
DIFFERENTIAL METHOD BLD: NORMAL
EOSINOPHIL # BLD: 0.1 K/UL (ref 0–0.4)
EOSINOPHIL NFR BLD: 1 % (ref 0–7)
EPITH CASTS URNS QL MICRO: ABNORMAL /LPF
ERYTHROCYTE [DISTWIDTH] IN BLOOD BY AUTOMATED COUNT: 13.9 % (ref 11.5–14.5)
GLOBULIN SER CALC-MCNC: 3.2 G/DL (ref 2–4)
GLUCOSE SERPL-MCNC: 77 MG/DL (ref 65–100)
GLUCOSE UR STRIP.AUTO-MCNC: NEGATIVE MG/DL
HCT VFR BLD AUTO: 40.8 % (ref 35–47)
HGB BLD-MCNC: 13.3 G/DL (ref 11.5–16)
HGB UR QL STRIP: NEGATIVE
HYALINE CASTS URNS QL MICRO: ABNORMAL /LPF (ref 0–5)
IMM GRANULOCYTES # BLD AUTO: 0 K/UL (ref 0–0.04)
IMM GRANULOCYTES NFR BLD AUTO: 0 % (ref 0–0.5)
KETONES UR QL STRIP.AUTO: NEGATIVE MG/DL
LEUKOCYTE ESTERASE UR QL STRIP.AUTO: ABNORMAL
LYMPHOCYTES # BLD: 0.9 K/UL (ref 0.8–3.5)
LYMPHOCYTES NFR BLD: 17 % (ref 12–49)
MCH RBC QN AUTO: 29.7 PG (ref 26–34)
MCHC RBC AUTO-ENTMCNC: 32.6 G/DL (ref 30–36.5)
MCV RBC AUTO: 91.1 FL (ref 80–99)
MONOCYTES # BLD: 0.6 K/UL (ref 0–1)
MONOCYTES NFR BLD: 12 % (ref 5–13)
NEUTS SEG # BLD: 3.4 K/UL (ref 1.8–8)
NEUTS SEG NFR BLD: 69 % (ref 32–75)
NITRITE UR QL STRIP.AUTO: NEGATIVE
NRBC # BLD: 0 K/UL (ref 0–0.01)
NRBC BLD-RTO: 0 PER 100 WBC
P-R INTERVAL, ECG05: 172 MS
PH UR STRIP: 6.5 [PH] (ref 5–8)
PLATELET # BLD AUTO: 196 K/UL (ref 150–400)
PMV BLD AUTO: 10.5 FL (ref 8.9–12.9)
POTASSIUM SERPL-SCNC: 4.2 MMOL/L (ref 3.5–5.1)
PROT SERPL-MCNC: 6.8 G/DL (ref 6.4–8.2)
PROT UR STRIP-MCNC: NEGATIVE MG/DL
Q-T INTERVAL, ECG07: 392 MS
QRS DURATION, ECG06: 82 MS
QTC CALCULATION (BEZET), ECG08: 460 MS
RBC # BLD AUTO: 4.48 M/UL (ref 3.8–5.2)
RBC #/AREA URNS HPF: ABNORMAL /HPF (ref 0–5)
SAMPLES BEING HELD,HOLD: NORMAL
SODIUM SERPL-SCNC: 137 MMOL/L (ref 136–145)
SP GR UR REFRACTOMETRY: 1.01 (ref 1–1.03)
TROPONIN I SERPL-MCNC: <0.05 NG/ML
UA: UC IF INDICATED,UAUC: ABNORMAL
UROBILINOGEN UR QL STRIP.AUTO: 0.2 EU/DL (ref 0.2–1)
VENTRICULAR RATE, ECG03: 83 BPM
WBC # BLD AUTO: 5 K/UL (ref 3.6–11)
WBC URNS QL MICRO: ABNORMAL /HPF (ref 0–4)

## 2021-04-03 PROCEDURE — 80053 COMPREHEN METABOLIC PANEL: CPT

## 2021-04-03 PROCEDURE — 72125 CT NECK SPINE W/O DYE: CPT

## 2021-04-03 PROCEDURE — 74011250637 HC RX REV CODE- 250/637: Performed by: EMERGENCY MEDICINE

## 2021-04-03 PROCEDURE — 93005 ELECTROCARDIOGRAM TRACING: CPT

## 2021-04-03 PROCEDURE — 84484 ASSAY OF TROPONIN QUANT: CPT

## 2021-04-03 PROCEDURE — 72170 X-RAY EXAM OF PELVIS: CPT

## 2021-04-03 PROCEDURE — 85025 COMPLETE CBC W/AUTO DIFF WBC: CPT

## 2021-04-03 PROCEDURE — 70450 CT HEAD/BRAIN W/O DYE: CPT

## 2021-04-03 PROCEDURE — 72100 X-RAY EXAM L-S SPINE 2/3 VWS: CPT

## 2021-04-03 PROCEDURE — 36415 COLL VENOUS BLD VENIPUNCTURE: CPT

## 2021-04-03 PROCEDURE — 81001 URINALYSIS AUTO W/SCOPE: CPT

## 2021-04-03 PROCEDURE — 99285 EMERGENCY DEPT VISIT HI MDM: CPT

## 2021-04-03 RX ORDER — OXYCODONE HYDROCHLORIDE 5 MG/1
5 TABLET ORAL
Status: COMPLETED | OUTPATIENT
Start: 2021-04-03 | End: 2021-04-03

## 2021-04-03 RX ADMIN — OXYCODONE 5 MG: 5 TABLET ORAL at 11:07

## 2021-04-03 NOTE — ED NOTES
Patient c/o bladder pain. Bladder scanned pt before voiding and viewed approx. 900 mL of urine in bladder. Patient urinated approx. 300 mL while on bedside commode. Post residual void bladder scan shows approx. 650 mL of urine still in bladder. MD Smith aware.

## 2021-04-03 NOTE — ED NOTES
Dr. Darby Zambrano at bedside to provide discharge paperwork. Vital signs stable. Pt in no apparent distress at this time. Mental status at baseline. Wheeled out to car by [employer] staff discharge paperwork in hand. Patient and or family acknowledged and signed discharge instructions that were created/provided by the Physician. Signed discharge form with patient label placed in scan box. Accompanied by daughter to drive pt back to nursing facility.

## 2021-04-03 NOTE — ED TRIAGE NOTES
Pt arrives via EMS from [place] for c/o GLF in bedroom this am, unwitnessed. c/o posterior head and lower back pain, EMS states hematoma to back of head.

## 2021-04-03 NOTE — ED PROVIDER NOTES
EMERGENCY DEPARTMENT HISTORY AND PHYSICAL EXAM      Date: 4/3/2021  Patient Name: Kosta Rowland    Please note that this dictation was completed with EUDOWEB, the computer voice recognition software. Quite often unanticipated grammatical, syntax, homophones, and other interpretive errors are inadvertently transcribed by the computer software. Please disregard these errors. Please excuse any errors that have escaped final proofreading. History of Presenting Illness     Chief Complaint   Patient presents with    Fall     Pt arrives via EMS from [place] for c/o GLF in bedroom this am, unwitnessed. c/o posterior head and lower back pain, EMS states hematoma to back of head.  Back Pain     pt c/o lower back pain and posterior HA from pall       History Provided By: Patient     HPI: Kosta Rowland, 80 y.o. female, presenting the emergency department with a ground-level fall. Patient states she was putting clothes away and fell, she is unsure how she fell, but denies any syncope or loss of consciousness. Has pain in the posterior scalp, lower back and pelvis. Rates her pain is moderate. Not on any blood thinners. Does take a daily aspirin. Patient denies any fever, chills, cough, chest pain, shortness of breath. No other exacerbating relieving factors or associated symptoms at this time    PCP: Ever Stone MD    No current facility-administered medications on file prior to encounter. Current Outpatient Medications on File Prior to Encounter   Medication Sig Dispense Refill    primidone (MYSOLINE) 50 mg tablet Take 1/2 tab in the morning and full tab in the evening. 45 Tab 2    QUEtiapine (SEROquel) 25 mg tablet Take 1 Tab by mouth two (2) times a day. 60 Tab 3    disopyramide phosphate (NORPACE) 100 mg capsule TAKE ONE CAPSULE BY MOUTH 2 TIMES A DAY 60 Cap 4    potassium chloride (KLOR-CON) 10 mEq tablet Take 1 Tab by mouth daily.  90 Tab 0    furosemide (LASIX) 20 mg tablet Take 1 Tab by mouth daily. 30 Tab 0    sertraline (ZOLOFT) 50 mg tablet TAKE 1 & 1/2 TABLETS BY MOUTH EVERY DAY 45 Tab 0    cholecalciferol (Vitamin D3) (5000 Units/125 mcg) tab tablet Take 1 Tab by mouth daily. [address] rhamnosus GG-inulin ([address] 13) 10 billion cell -200 mg cpSP Take 1 Cap by mouth daily. morning 30 Cap 3    acetaminophen (TYLENOL) 500 mg tablet Take 1 Tab by mouth every six (6) hours as needed for Pain. 120 Tab 3    metoprolol tartrate (LOPRESSOR) 25 mg tablet Take 1 Tab by mouth two (2) times a day. Take 25 mg tablet twice a day, 1 at 9AM and 1 at [facility].   May take additional 12.5 mg metoprolol as needed ONLY IF BP remains > 150/90 ONE HOUR after taking regular metoprolol dose. 225 Tab 1    Vitamin B-12 100 mcg tablet Take 1 Tab by mouth daily. 100 Tab 0    pantoprazole (PROTONIX) 40 mg tablet Take 40 mg by mouth two (2) times a day.  melatonin 5 mg tablet Take 5 mg by mouth nightly as needed. May take an additional tablet if unable to sleep      aspirin delayed-release 81 mg tablet TAKE ONE TABLET BY MOUTH DAILY AS NEEDED 120 Tab 0    magnesium hydroxide (Baez Milk of Magnesia) 400 mg/5 mL suspension Take 30 mL by mouth daily as needed for Constipation.  nitroglycerin (NITROSTAT) 0.4 mg SL tablet 1 Tab by SubLINGual route every five (5) minutes as needed for Chest Pain. 25 Tab 1    aspirin 81 mg chewable tablet Take 1 Tab by mouth daily. 120 Tab 5    polyethylene glycol (MIRALAX) 17 gram/dose powder Take 17 g by mouth daily.          Past History     Past Medical History:  Past Medical History:   Diagnosis Date    Acute diverticulitis 9/25/2017    Allergic rhinitis 9/25/2017    Arrhythmia     afib    Arthritis     Asthma     Atrial fibrillation ([address]     Benign essential tremor 9/25/2017    Cancer ([facility]     skin    Celiac disease 9/25/2017    Constipation 9/25/2017    Diabetic peripheral neuropathy associated with type 2 diabetes mellitus ([facility] 2016    Diarrhea 2017    Diverticulosis of large intestine without hemorrhage 2017    Early satiety 2017    Fuchs' corneal dystrophy 2017    Gastroesophageal reflux disease without esophagitis 2017    GERD (gastroesophageal reflux disease)     High cholesterol     Hypercholesterolemia 2017    Hypertension     Irritable bowel syndrome with diarrhea 2017    Memory impairment     Numbness 2017    Osteopenia 2017    Other ill-defined conditions(799.89)     collapsed lung prior to hernia repair surgery    Postmenopausal 2018    Ptosis, both eyelids 2017    Statin intolerance 2017    Unspecified adverse effect of anesthesia     pt states she went into cardiac arrest prior to hernia repair after the insertion of gas in stomach       Past Surgical History:  Past Surgical History:   Procedure Laterality Date    HX APPENDECTOMY      HX CHOLECYSTECTOMY      HX GI      hemorrhoidectomy    HX HEENT      sinus surgery    HX HERNIA REPAIR      HX TONSILLECTOMY         Family History:  Family History   Problem Relation Age of Onset    Heart Disease Mother     Dementia Mother     Heart Disease Father     Neuropathy Child     Depression Child     Other Child         diverticulitis    Lung Cancer Sister 80        lung ca       Social History:  Social History     Tobacco Use    Smoking status: Former Smoker     Packs/day: 0.75     Years: 5.00     Pack years: 3.75     Quit date: 10/11/1967     Years since quittin.5    Smokeless tobacco: Never Used   Substance Use Topics    Alcohol use: No    Drug use: No       Allergies: Allergies   Allergen Reactions    Bactrim [Sulfamethoprim Ds] Other (comments)     consipation    Ciprofloxacin (Bulk) Other (comments)     Low wbc    Prednisone Other (comments)     tachycardia    Shellfish Derived Other (comments)     Abdominal pain, Carried epi pen for this at one point.     Statins-Hmg-Coa Reductase Inhibitors Unknown (comments)     Stomach uipset and mild muscle aches         Review of Systems   Review of Systems   Constitutional: Negative for chills and fever. HENT: Negative for congestion and sore throat. Eyes: Negative for visual disturbance. Respiratory: Negative for cough and shortness of breath. Cardiovascular: Negative for chest pain and leg swelling. Gastrointestinal: Negative for abdominal pain, blood in stool, diarrhea and nausea. Endocrine: Negative for polyuria. Genitourinary: Negative for dysuria, flank pain, vaginal bleeding and vaginal discharge. Musculoskeletal: Positive for back pain and myalgias. Skin: Negative for rash. Allergic/Immunologic: Negative for immunocompromised state. Neurological: Positive for headaches. Negative for weakness. Psychiatric/Behavioral: Negative for confusion. Physical Exam   Physical Exam  Vitals signs and nursing note reviewed. Constitutional:       Appearance: She is well-developed. HENT:      Head: Normocephalic. Comments: Cephalhematoma on the posterior scalp  Eyes:      General:         Right eye: No discharge. Left eye: No discharge. Conjunctiva/sclera: Conjunctivae normal.      Pupils: Pupils are equal, round, and reactive to light. Neck:      Musculoskeletal: Normal range of motion and neck supple. Trachea: No tracheal deviation. Comments: No midline point tenderness, no step-off  Cardiovascular:      Rate and Rhythm: Normal rate and regular rhythm. Heart sounds: Murmur present. Comments: 3 out of 6 systolic murmur  Pulmonary:      Effort: Pulmonary effort is normal. No respiratory distress. Breath sounds: Normal breath sounds. No wheezing or rales. Abdominal:      General: Bowel sounds are normal.      Palpations: Abdomen is soft. Tenderness: There is no abdominal tenderness. There is no guarding or rebound. Musculoskeletal: Normal range of motion. General: No tenderness or deformity. Comments: No gross deformity of the hips, some mild tenderness to palpation at the right greater trochanter. No pain with logroll. Able to flex at the hips bilaterally. Neurovascularly intact. Skin:     General: Skin is warm and dry. Findings: No erythema or rash. Neurological:      Mental Status: She is alert and oriented to person, place, and time. Psychiatric:         Behavior: Behavior normal.         Diagnostic Study Results     Labs -     Recent Results (from the past 12 hour(s))   EKG, 12 LEAD, INITIAL    Collection Time: 04/03/21 10:41 AM   Result Value Ref Range    Ventricular Rate 83 BPM    Atrial Rate 83 BPM    P-R Interval 172 ms    QRS Duration 82 ms    Q-T Interval 392 ms    QTC Calculation (Bezet) 460 ms    Calculated P Axis 64 degrees    Calculated R Axis 17 degrees    Calculated T Axis 64 degrees    Diagnosis       Normal sinus rhythm with sinus arrhythmia  Normal ECG  When compared with ECG of 18-NOV-2020 11:28,  No significant change was found     CBC WITH AUTOMATED DIFF    Collection Time: 04/03/21 11:11 AM   Result Value Ref Range    WBC 5.0 3.6 - 11.0 K/uL    RBC 4.48 3.80 - 5.20 M/uL    HGB 13.3 11.5 - 16.0 g/dL    HCT 40.8 35.0 - 47.0 %    MCV 91.1 80.0 - 99.0 FL    MCH 29.7 26.0 - 34.0 PG    MCHC 32.6 30.0 - 36.5 g/dL    RDW 13.9 11.5 - 14.5 %    PLATELET 104 723 - 516 K/uL    MPV 10.5 8.9 - 12.9 FL    NRBC 0.0 0  WBC    ABSOLUTE NRBC 0.00 0.00 - 0.01 K/uL    NEUTROPHILS 69 32 - 75 %    LYMPHOCYTES 17 12 - 49 %    MONOCYTES 12 5 - 13 %    EOSINOPHILS 1 0 - 7 %    BASOPHILS 1 0 - 1 %    IMMATURE GRANULOCYTES 0 0.0 - 0.5 %    ABS. NEUTROPHILS 3.4 1.8 - 8.0 K/UL    ABS. LYMPHOCYTES 0.9 0.8 - 3.5 K/UL    ABS. MONOCYTES 0.6 0.0 - 1.0 K/UL    ABS. EOSINOPHILS 0.1 0.0 - 0.4 K/UL    ABS. BASOPHILS 0.0 0.0 - 0.1 K/UL    ABS. IMM.  GRANS. 0.0 0.00 - 0.04 K/UL    DF AUTOMATED     METABOLIC PANEL, COMPREHENSIVE    Collection Time: 04/03/21 11:11 AM   Result Value Ref Range    Sodium 137 136 - 145 mmol/L    Potassium 4.2 3.5 - 5.1 mmol/L    Chloride 103 97 - 108 mmol/L    CO2 32 21 - 32 mmol/L    Anion gap 2 (L) 5 - 15 mmol/L    Glucose 77 65 - 100 mg/dL    BUN 22 (H) 6 - 20 MG/DL    Creatinine 0.84 0.55 - 1.02 MG/DL    BUN/Creatinine ratio 26 (H) 12 - 20      GFR est AA >60 >60 ml/min/1.73m2    GFR est non-AA >60 >60 ml/min/1.73m2    Calcium 9.5 8.5 - 10.1 MG/DL    Bilirubin, total 0.4 0.2 - 1.0 MG/DL    ALT (SGPT) 21 12 - 78 U/L    AST (SGOT) 15 15 - 37 U/L    Alk. phosphatase 74 45 - 117 U/L    Protein, total 6.8 6.4 - 8.2 g/dL    Albumin 3.6 3.5 - 5.0 g/dL    Globulin 3.2 2.0 - 4.0 g/dL    A-G Ratio 1.1 1.1 - 2.2     TROPONIN I    Collection Time: 04/03/21 11:11 AM   Result Value Ref Range    Troponin-I, Qt. <0.05 <0.05 ng/mL   SAMPLES BEING HELD    Collection Time: 04/03/21 11:11 AM   Result Value Ref Range    SAMPLES BEING HELD  1 BLUE     COMMENT        Add-on orders for these samples will be processed based on acceptable specimen integrity and analyte stability, which may vary by analyte. URINALYSIS W/ REFLEX CULTURE    Collection Time: 04/03/21 12:16 PM    Specimen: Urine   Result Value Ref Range    Color YELLOW/STRAW      Appearance CLOUDY (A) CLEAR      Specific gravity 1.007 1.003 - 1.030      pH (UA) 6.5 5.0 - 8.0      Protein Negative NEG mg/dL    Glucose Negative NEG mg/dL    Ketone Negative NEG mg/dL    Bilirubin Negative NEG      Blood Negative NEG      Urobilinogen 0.2 0.2 - 1.0 EU/dL    Nitrites Negative NEG      Leukocyte Esterase SMALL (A) NEG      WBC 0-4 0 - 4 /hpf    RBC 0-5 0 - 5 /hpf    Epithelial cells MODERATE (A) FEW /lpf    Bacteria Negative NEG /hpf    UA:UC IF INDICATED CULTURE NOT INDICATED BY UA RESULT CNI      Hyaline cast 2-5 0 - 5 /lpf       Radiologic Studies -   CT HEAD WO CONT   Final Result   No acute findings suspected. CT SPINE CERV WO CONT   Final Result    impression: No acute findings suspected. XR SPINE LUMB 2 OR 3 V   Final Result   No acute findings suspected. XR PELV AP ONLY   Final Result    impression: No acute bone findings suspected. XR ABD (KUB)    (Results Pending)     CT Results  (Last 48 hours)               04/03/21 1055  CT HEAD WO CONT Final result    Impression:  No acute findings suspected. Narrative:  EXAM: CT HEAD WO CONT       INDICATION: fall, closed head injury       COMPARISON: October 2 2020. CONTRAST: None. TECHNIQUE: Unenhanced CT of the head was performed using 5 mm images. Brain and   bone windows were generated. Coronal and sagittal reformats. CT dose reduction   was achieved through use of a standardized protocol tailored for this   examination and automatic exposure control for dose modulation. FINDINGS:   Diffuse atrophy and nonspecific white matter changes. There is no extra-axial fluid collection hemorrhage shift or masses. Possible   remote right basal ganglia lacune. 04/03/21 1055  CT SPINE CERV WO CONT Final result    Impression:   impression: No acute findings suspected. Narrative:  EXAM:  CT CERVICAL SPINE WITHOUT CONTRAST       INDICATION: fall neck pain. COMPARISON: None. CONTRAST:  None. TECHNIQUE: Multislice helical CT of the cervical spine was performed without   intravenous contrast administration. Sagittal and coronal reformats were   generated. CT dose reduction was achieved through use of a standardized   protocol tailored for this examination and automatic exposure control for dose   modulation. FINDINGS:   Moderate changes of osteopenia, disc degeneration and spondylosis. No acute fracture or dislocation. No significant bony canal compromise. Facet hypertrophy is prominent on the right at C3-C4. C5-6 show prominent   spondylosis.                CXR Results  (Last 48 hours)    None            Medical Decision Making   I am the first provider for this patient. I reviewed the vital signs, available nursing notes, past medical history, past surgical history, family history and social history. Vital Signs-Reviewed the patient's vital signs. Patient Vitals for the past 12 hrs:   Temp Pulse Resp BP SpO2   04/03/21 1107  83  (!) 152/81 95 %   04/03/21 1007 97.5 °F (36.4 °C) 70 16 (!) 158/73 95 %       EKG interpretation: (Preliminary)  EKG shows sinus rhythm, rate 83. Leftward axis. No evidence of acute ischemic ST or T wave changes. Interpreted by me    Records Reviewed:   Nursing notes, Prior visits     Provider Notes (Medical Decision Making):   Fall, closed head injury, back strain, compression fracture, hip fracture. Will obtain CT of the head and neck. ,  X-ray of the back and AP of the x-ray of the pelvis. Disposition pending laboratory work-up and imaging. We will check some basic labs and EKG    ED Course:   Initial assessment performed. The patients presenting problems have been discussed, and they are in agreement with the care plan formulated and outlined with them. I have encouraged them to ask questions as they arise throughout their visit. ED Course as of Apr 03 1612   Sat Apr 03, 2021   1226 Patient now having some urinary retention, bladder scan revealed 600 post void residual.  We will see if she has evidence of UTI, if no evidence of UTI given the fall will need to obtain MRI imaging of the lumbar spine    [AR]   1248 On discussion with family urinary retention is more of a chronic issue. No concern for cauda equina at this time. Patient safe for discharge to home she has been up and ambulatory in the room with a walker at her baseline    [AR]      ED Course User Index  [AR] Braydon Swan DO               Critical Care Time:   none    Disposition:    DISCHARGE NOTE  Patients results have been reviewed with them.   Patient and/or family have verbally conveyed their understanding and agreement of the patient's signs, symptoms, diagnosis, treatment and prognosis and additionally agree to follow up as recommended or return to the Emergency Room should their condition change or have any new concerns prior to their follow-up appointment. Patient verbally agrees with the care-plan and verbally conveys that all of their questions have been answered. Discharge instructions have also been provided to the patient with some educational information regarding their diagnosis as well a list of reasons why they would want to return to the ER prior to their follow-up appointment should their condition change. PLAN:  1. Discharge Medication List as of 4/3/2021 12:49 PM        2. Follow-up Information     Follow up With Specialties Details Why Contact Info    Leticia Herrera MD Internal Medicine Schedule an appointment as soon as possible for a visit   [address] [place] [address]  [phone]      [facility] EMERGENCY DEPT Emergency Medicine  If symptoms worsen [address] [facility]  [address] [facility]  [phone]          Return to ED if worse     Diagnosis     Clinical Impression:   1. Fall, initial encounter    2. Diarrhea, unspecified    3. Bloating symptom    4. GERD (gastroesophageal reflux disease)    5. Celiac disease    6. Closed head injury, initial encounter    7. Back strain, initial encounter        Attestations:   This note was completed by Ashwini Melendez DO

## 2021-04-05 ENCOUNTER — PATIENT OUTREACH (OUTPATIENT)
Dept: CASE MANAGEMENT | Age: 86
End: 2021-04-05

## 2021-04-05 NOTE — PROGRESS NOTES
Patient assigned to this ACM for [address] Transitions s/p ED [facility] ED Visit 4/3/21. Per chart review, patient is a resident of [facility]. No ACM outreach and/or follow-up is scheduled at this time.

## 2021-04-06 ENCOUNTER — TELEPHONE (OUTPATIENT)
Dept: INTERNAL MEDICINE CLINIC | Age: 86
End: 2021-04-06

## 2021-04-06 DIAGNOSIS — I10 ESSENTIAL HYPERTENSION: ICD-10-CM

## 2021-04-06 DIAGNOSIS — I48.0 PAROXYSMAL ATRIAL FIBRILLATION (HCC): ICD-10-CM

## 2021-04-06 DIAGNOSIS — R73.01 IFG (IMPAIRED FASTING GLUCOSE): ICD-10-CM

## 2021-04-06 DIAGNOSIS — E78.00 HYPERCHOLESTEROLEMIA: ICD-10-CM

## 2021-04-06 DIAGNOSIS — M81.0 AGE-RELATED OSTEOPOROSIS WITHOUT CURRENT PATHOLOGICAL FRACTURE: ICD-10-CM

## 2021-04-06 DIAGNOSIS — F33.0 DEPRESSION, MAJOR, RECURRENT, MILD (HCC): ICD-10-CM

## 2021-04-06 DIAGNOSIS — I67.9 CEREBROVASCULAR DISEASE, UNSPECIFIED: ICD-10-CM

## 2021-04-06 DIAGNOSIS — K21.9 GASTROESOPHAGEAL REFLUX DISEASE WITHOUT ESOPHAGITIS: ICD-10-CM

## 2021-04-06 DIAGNOSIS — Z11.1 SCREENING-PULMONARY TB: Primary | ICD-10-CM

## 2021-04-06 NOTE — TELEPHONE ENCOUNTER
Spoke to [employer] (HIPAA). Two pt identifiers confirmed. Ruben Ferrer states that pt had labs done through GI and will have them faxed. Informed Ruben Ferrer that pt will need Quant Gold TB test for the new prison home--informed of turnaround time. Ruben Ferrer asking order be faxed to [facility]--will fax. Ruben Ferrer states that she will stop by next week once forms are done to pick them up. Ruben Ferrer verbalized understanding of information discussed w/ no further questions at this time. Lab orders faxed to [facility] at [address] [facility].

## 2021-04-06 NOTE — TELEPHONE ENCOUNTER
----- Message from Nica Dinero sent at 4/6/2021 10:54 AM EDT -----  Regarding: Dr. Alem Andujar: [phone]  General Message/Vendor Calls    Caller's first and last name: Adriana Benton      Reason for call:Labs/ Paperwork      Callback required yes/no and why:yes      Best contact number(s): [phone]      Details to clarify the request: Audra Brock states pt has had recent lab work from [facility] and have requested to have that faxed over to Dr. Luis Fernando Grajeda. She would like a call back once paperwork is ready for .       Message from [facility]

## 2021-04-07 ENCOUNTER — TELEPHONE (OUTPATIENT)
Dept: INTERNAL MEDICINE CLINIC | Age: 86
End: 2021-04-07

## 2021-04-07 ENCOUNTER — HOSPITAL ENCOUNTER (OUTPATIENT)
Dept: LAB | Age: 86
Discharge: HOME | End: 2021-04-07
Payer: MEDICARE

## 2021-04-07 PROCEDURE — 86480 TB TEST CELL IMMUN MEASURE: CPT

## 2021-04-07 PROCEDURE — 36415 COLL VENOUS BLD VENIPUNCTURE: CPT

## 2021-04-07 NOTE — TELEPHONE ENCOUNTER
Patient's daughter, Olga Ku, states she's calling you back to discuss if any further information needed that she may be able to help with for patient Form Completion for her Moving from one [address] to Another. Please call to discuss.  Thank you

## 2021-04-10 LAB
GAMMA INTERFERON BACKGROUND BLD IA-ACNC: 0.04 IU/ML
M TB IFN-G BLD-IMP: NEGATIVE
M TB IFN-G CD4+ BCKGRND COR BLD-ACNC: 0.05 IU/ML
MITOGEN IGNF BLD-ACNC: >10 IU/ML
QUANTIFERON INCUBATION, QF1T: NORMAL
QUANTIFERON TB2 AG: 0.05 IU/ML
SERVICE CMNT-IMP: NORMAL

## 2021-04-12 ENCOUNTER — TELEPHONE (OUTPATIENT)
Dept: NEUROLOGY | Age: 86
End: 2021-04-12

## 2021-04-12 NOTE — TELEPHONE ENCOUNTER
Spoke to [employer] (HIPAA). Two pt identifiers confirmed. Rena Meltonar informed that TB test was negative. Informed that forms should be ready 4/13/21. Torywillamalick Shalonda verbalized understanding of information discussed w/ no further questions at this time.

## 2021-04-13 ENCOUNTER — TELEPHONE (OUTPATIENT)
Dept: INTERNAL MEDICINE CLINIC | Age: 86
End: 2021-04-13

## 2021-04-13 RX ORDER — QUETIAPINE FUMARATE 25 MG/1
TABLET, FILM COATED ORAL
Qty: 45 TAB | Refills: 5 | Status: SHIPPED | OUTPATIENT
Start: 2021-04-13 | End: 2022-03-29 | Stop reason: ALTCHOICE

## 2021-04-13 NOTE — TELEPHONE ENCOUNTER
[phone]  Shana Moreno  (Verified On hipaa dated 3/29/21)      Ms Bales Ax returning call, states she was supposed to return call when had the requested info needed for forms.  (4/7 encounter)    States please call back to get the info bc it is detailed      [phone]

## 2021-04-14 NOTE — TELEPHONE ENCOUNTER
Spoke to [employer] [facility] (HIPAA). Two pt identifiers confirmed. Jayme Allen states there was an update on pt's Seroquel--updated on pt's forms. Informed Jayme Allen that forms are ready for pickup. Jayme Allen verbalized understanding of information discussed w/ no further questions at this time.

## 2021-04-16 ENCOUNTER — TELEPHONE (OUTPATIENT)
Dept: INTERNAL MEDICINE CLINIC | Age: 86
End: 2021-04-16

## 2021-04-16 NOTE — TELEPHONE ENCOUNTER
[address]    States that the patient is due to move in on April 28th. States patient had their H&P for admission on 4/2 however the document submitted by provider was missing key information needed before patient can be admitted. States \"significant medical history\" was not filled out at all. States phys history was too general.    States diagnosis codes and icd-10 codes are needed as well. She is return-faxing the form to the office and request missing info be completed. States dictated notes would be acceptable to add as well if easier. Please call if any questions.

## 2021-04-20 ENCOUNTER — TELEPHONE (OUTPATIENT)
Dept: INTERNAL MEDICINE CLINIC | Age: 86
End: 2021-04-20

## 2021-04-20 NOTE — TELEPHONE ENCOUNTER
Called, spoke to Anca(Daughter)  Received two pt identifiers  Informed Ashly Salmon faxed w/confirmation forms to [facility]. Gillian Camps understanding of the instructions and has no further questions at this time.

## 2021-04-20 NOTE — TELEPHONE ENCOUNTER
----- Message from Nia Parisi sent at 4/20/2021 11:58 AM EDT -----  Regarding: Dr. Lonnie Crews  Contact: [phone]  General Message/Vendor Calls    Caller's first and last name: Margarita Parikh - Daughter      Reason for call: Requesting callback from [facility] regarding information being faxed to [facility] living [facility]      Callback required yes/no and why: yes/follow up      Best contact number(s):[phone]      Details to clarify the request: Margarita Parikh requesting callback as soon as possible, due to matter being time sensitive.        Nia Parisi

## 2021-04-20 NOTE — TELEPHONE ENCOUNTER
Liss Flores//Barrett Aguilar [address] states she needs a call back to get the Status of Form completion that form was re-faxed on Friday, 4/16/21. Form Completion is required Prior to patient Move In on Next Wed., 4/28/21. Please call to update on Status. Mikaeladolly Drew states a detailed message can be left on her voice mail at [phone].  Thank you normal

## 2021-04-20 NOTE — TELEPHONE ENCOUNTER
----- Message from Lionel Suarez sent at 4/20/2021 11:58 AM EDT -----  Regarding: Dr. Willem Kulkarni  Contact: [phone]  General Message/Vendor Calls    Caller's first and last name: Monica Donovan - Daughter      Reason for call: Requesting callback from [facility] regarding information being faxed to [facility] living [facility]      Callback required yes/no and why: yes/follow up      Best contact number(s):[phone]      Details to clarify the request: Monica Donovan requesting callback as soon as possible, due to matter being time sensitive.        Lionel Suarez

## 2021-04-22 ENCOUNTER — TELEPHONE (OUTPATIENT)
Dept: INTERNAL MEDICINE CLINIC | Age: 86
End: 2021-04-22

## 2021-04-22 NOTE — TELEPHONE ENCOUNTER
Craig Hay states that there is a conflict on the document(s) that Dr. Jennie Keller filled out for the assisted living facility. The diet is the problem with diet & allergies. Craig Hay sent a My Chart message with the form attached to fill out. They need this back as soon as possible as pt moves into facility next week. I advised doctor is not in the office today, but most likely Isabella Morales would be able to forward this to Dr. Jennie Keller to look at. She is asking that Isabella Morales call her with any questions.

## 2021-04-26 NOTE — TELEPHONE ENCOUNTER
Updated diet and allergy list faxed to [facility] attn Nidia Flores at [phone] w/ confirmation received. Will respond to daughter Enriqueta.

## 2021-04-29 ENCOUNTER — TELEPHONE (OUTPATIENT)
Dept: INTERNAL MEDICINE CLINIC | Age: 86
End: 2021-04-29

## 2021-04-29 NOTE — TELEPHONE ENCOUNTER
[phone]  Fax: [phone]  Yenny Schmidt [facility]    States pt living at their facility. States they need to know how to submit order requests and how/who to contact for after hours/ on call if needed       Please call to advise.

## 2021-04-30 NOTE — TELEPHONE ENCOUNTER
#[phone] what Luis Danielsoledad Rupesh needs is after hours who is the facility to call in case of emergency and a contact number. This is all she needs and you may leave on her vm if needed.

## 2021-05-03 NOTE — TELEPHONE ENCOUNTER
Left vm jagdeep Sawyer that they would need to call the office number and follow the prompts for the after-hours physician on call. Call back prn. Closing.

## 2021-05-05 RX ORDER — NITROGLYCERIN 0.4 MG/1
0.4 TABLET SUBLINGUAL
Qty: 25 TAB | Refills: 1 | Status: SHIPPED | OUTPATIENT
Start: 2021-05-05

## 2021-05-10 ENCOUNTER — TELEPHONE (OUTPATIENT)
Dept: INTERNAL MEDICINE CLINIC | Age: 86
End: 2021-05-10

## 2021-05-10 DIAGNOSIS — Z71.89 HEARING AID CONSULTATION: Primary | ICD-10-CM

## 2021-05-10 NOTE — TELEPHONE ENCOUNTER
Liss with [employer] states these are two fax numbers to try and also her email. Fax #[phone]  Fax #[phone]    Email:  [email or web address]

## 2021-06-07 ENCOUNTER — TELEPHONE (OUTPATIENT)
Dept: INTERNAL MEDICINE CLINIC | Age: 86
End: 2021-06-07

## 2021-06-07 DIAGNOSIS — Z86.73 HISTORY OF STROKE: Primary | ICD-10-CM

## 2021-06-07 NOTE — TELEPHONE ENCOUNTER
----- Message from [place]. Jorge Galvin sent at 6/7/2021  9:28 AM EDT -----  Regarding: Non-Urgent Medical Question  Contact: [phone]  Mom Donaldo Fang) is getting hearing aids this Thursday and we will need an order sent to Narda at [facility] stating that she will need someone to put them in in the mornings and take them out and put in  in the evenings. This is due to her tremors and poor vision. She does not feel she can do it on her own. Can the order be faxed to Narda by Thurs morning at [phone] and a hard copy mailed to Donaldo Coxmary, ([address]) [address] [facility].     Thanks,  [facility]  [phone]

## 2021-06-23 ENCOUNTER — TELEPHONE (OUTPATIENT)
Dept: INTERNAL MEDICINE CLINIC | Age: 86
End: 2021-06-23

## 2021-06-23 DIAGNOSIS — M54.9 CHRONIC BACK PAIN, UNSPECIFIED BACK LOCATION, UNSPECIFIED BACK PAIN LATERALITY: Primary | ICD-10-CM

## 2021-06-23 DIAGNOSIS — G89.29 CHRONIC BACK PAIN, UNSPECIFIED BACK LOCATION, UNSPECIFIED BACK PAIN LATERALITY: Primary | ICD-10-CM

## 2021-06-23 RX ORDER — ACETAMINOPHEN 500 MG
TABLET ORAL
Qty: 90 TABLET | Refills: 1 | Status: SHIPPED | OUTPATIENT
Start: 2021-06-23

## 2021-06-23 NOTE — TELEPHONE ENCOUNTER
Narda//[facility] states she needs to get an Order for patient to receive Tylenol approx. 3 times a day for Back & General Pain relief Per Family Request. Please call if any questions.  Thank you      Fax# is [phone]    ATTN: Juan Curiel

## 2021-06-28 ENCOUNTER — TELEPHONE (OUTPATIENT)
Dept: INTERNAL MEDICINE CLINIC | Age: 86
End: 2021-06-28

## 2021-06-28 DIAGNOSIS — M54.9 CHRONIC BACK PAIN, UNSPECIFIED BACK LOCATION, UNSPECIFIED BACK PAIN LATERALITY: ICD-10-CM

## 2021-06-28 DIAGNOSIS — G89.29 CHRONIC BACK PAIN, UNSPECIFIED BACK LOCATION, UNSPECIFIED BACK PAIN LATERALITY: ICD-10-CM

## 2021-06-28 NOTE — TELEPHONE ENCOUNTER
----- Message from Hector Noriega sent at 6/28/2021  1:12 PM EDT -----  Regarding: Dr. Celso Merlin Message/Vendor Calls    Caller's first and last name:Julieth- Daughter      Reason for call: New Order for Acetaminophen      Callback required yes/no and why:no      Best contact number(s):[phone]      Details to clarify the request: Pt is currently on acetaminophen PRN and she needs it at least BID and sometimes TID for her back pain. Sometimes BID is not enough so Daughter would like to have the order changed to TID instead of PRN. Pt is in [employer] in [place]. Fax [phone] and also mail a copy to the address that is on file.       Message from [facility]

## 2021-06-29 RX ORDER — ACETAMINOPHEN 500 MG
500 TABLET ORAL 3 TIMES DAILY
Qty: 90 TABLET | Refills: 1 | Status: SHIPPED | OUTPATIENT
Start: 2021-06-29 | End: 2021-07-06 | Stop reason: SDUPTHER

## 2021-07-05 ENCOUNTER — TELEPHONE (OUTPATIENT)
Dept: INTERNAL MEDICINE CLINIC | Age: 86
End: 2021-07-05

## 2021-07-05 NOTE — TELEPHONE ENCOUNTER
Returned page Monday July 5 at 13:50. Spoke with miguelito, who stated pts bp was 170/100. She had already taken her am dose of metoprolol, wanted to take an additional 12.5 mg dose then, which I approved. Check bp again with evening dose, and if bp remains elevated, take another half pill of metoprolol.
No

## 2021-07-06 ENCOUNTER — TELEPHONE (OUTPATIENT)
Dept: INTERNAL MEDICINE CLINIC | Age: 86
End: 2021-07-06

## 2021-07-06 RX ORDER — ACETAMINOPHEN 500 MG
500 TABLET ORAL 3 TIMES DAILY
Qty: 270 TABLET | Refills: 1 | Status: SHIPPED | OUTPATIENT
Start: 2021-07-06 | End: 2021-07-20 | Stop reason: SDUPTHER

## 2021-07-06 NOTE — TELEPHONE ENCOUNTER
Called pt, left detailed vm for pt and to return call to office prn.    Informed Masood Sell rx of tylenol stating 1 tablet TID was sent in on 06/29 to [facility]

## 2021-07-06 NOTE — TELEPHONE ENCOUNTER
----- Message from [facility] sent at 7/6/2021 12:05 PM EDT -----  Regarding: Dr. Delroy Álvarez  General Message/Vendor Calls    Caller's first and last name: Kenna Hanley, daughter      Reason for call: Genie Becerra would like the tylenol increased to TID. Pt needs tylenol every day and would like pt to be on this routinely instead of PRN.  Caller has not gotten anything back after leaving a [employer] message       Callback required yes/no and why: Yes      Best contact number(s): [phone]      Details to clarify the request: [facility]

## 2021-07-06 NOTE — TELEPHONE ENCOUNTER
Future Appointments:  Future Appointments   Date Time Provider Barbara Ramey   9/29/2021  2:40 PM Katelynn Campbell MD [facility] AM[facility]        Last Appointment With Me:  4/2/2021     Requested Prescriptions     Pending Prescriptions Disp Refills    acetaminophen (TYLENOL) 500 mg tablet 270 Tablet 1     Sig: Take 1 Tablet by mouth three (3) times daily.

## 2021-07-29 ENCOUNTER — TELEPHONE (OUTPATIENT)
Dept: INTERNAL MEDICINE CLINIC | Age: 86
End: 2021-07-29

## 2021-07-29 NOTE — TELEPHONE ENCOUNTER
Caller: Roshan Found, Daughter  Verified on [employer]aa dated 3/29/21    States pt lives in assisted living    States she was called this morning due to pt having dizzy spell    States pt had dizzy spell in early morning hours    States pt bp at time of dizzy spell was 177/80    State pt then given normal bp meds + 1/2 dosage of bp meds    States bp came down to 150/80    States this is the 2nd time in 6 weeks. States does dr advise changing meds or suggest an appt? States pt lives 1.25 hours away from the office but can make in office visit if advise, or VV is ok as well. Please call Alvino Angulo to advise on next steps.

## 2021-07-29 NOTE — TELEPHONE ENCOUNTER
Called, Spoke with Julieth(Daughter)  Received two pt identifiers  Luisana Bangura offered and accept vv appt for 08/06/21 @ [phone]. Luisana Bangura requests change in metoprolol order and asks that it be given once in the morning and at night along with prn when bp is higher than 150/90. Freddie Kumari was the last one to order this and to call his office. Advised Sergio Khanna will send to Dr. Blu Koenig to see if she will change order. Luisana Bangura verbalizes understanding of the instructions and has no further questions at this time.

## 2021-07-30 NOTE — TELEPHONE ENCOUNTER
We will discuss medication change options at her next visit    She is currently on metoprolol 25 mg twice daily and her facility can give 1 extra half dose as needed we can allow them to give as many extra doses per day as they do like --you can only give the medication sometimes I would not want her to be given too many doses of this medication systolic blood pressure in the 150 range is okay for her given her advanced age    We will discuss additional changes at her follow-up    Need to be careful not to overcorrect her blood pressure as this would cause more falls and blood pressure climbing with pain is normal it needs to be repeated when she is calm and pain-free

## 2021-08-02 NOTE — TELEPHONE ENCOUNTER
Called, spoke to Julieth(Daughter)  Received two pt identifiers  Raymond Rojas informed we can have the home give the extra dose of metoprolol but a new order would have to be put in.  Raymond Rojas informed pts bp of 150 is normal for her age and does not really want to give too much. Perla Number we can wait until appt this Friday for any changes needed. Raymond Rojas agrees. Raymond Rojas states will call the home to make sure they at least have enough to last next week. Raymond Rojas verbalizes understanding of the instructions and has no further questions at this time.

## 2021-08-04 NOTE — PROGRESS NOTES
HISTORY OF PRESENT ILLNESS  Fatimah Ramirez is a 80 y.o. female. HPI     Pt last seen 4/02/21. Here for routine care. This is an established visit completed with telemedicine was completed with video assist  the patient acknowledges and agrees to this method of visitation santiago    Presents with daughter, Joseline Vitale, who provides hx  She is at [facility]    She c/o burning with urination, she states that this has been going on for a while for weeks and months not currently she has a long history of urinary incontinence unclear if it is skin burning versus urinary burning we will order a UA to complete at the facility    She c/o problems with indigestion, will order pepcid in addition to her Protonix twice daily she has been having more breakthrough symptoms and is worried about taking something that precipitates constipation    She was in the ED on 4/03/21 for a fall doing well since then reviewed labs from then    Reviewed xr abd 4/03/21    Reviewed xr pelv 4/03/21:  impression: No acute bone findings suspected. Reviewed xr L spine 4/03/21:  No acute findings suspected. Reviewed CT head 4/03/21:  No acute findings suspected.     Reviewed CT c spine 4/03/21:   impression: No acute findings suspected      BP at home 142/88, 131/83 129/70 161/83, 149/94 121/73  Continues on metoprolol 25mg bid  She has an order that she can take an additional half dose of metoprolol if her blood pressure is still high after 1 hour of having it checked we will continue this way discussed that we do not want to take too many additional half doses of metoprolol patient does have extremely labile blood pressure and given her advanced age blood pressures going into the 217 systolic at times is okay  Discussed half doses, no more than 2 additional half doses per day  Discussed this at length      Continues on lasix 20 mg daily - only swelling in R leg due to injury in the past swelling is stable     Wt was 119 lbs per pt   Her wt is in the nl ranges      Reviewed labs      Pt follows wt Dr Kathy Marquez (neuro) for tremor and hallucinations   Last visit 9/14/20     Pt had been being treated for Parkinson's but the neuro decided it may not be this so they stopped this treatment   Has chronic tremors and hallucinations   Reports that she has mild bad/scary dreams  She is on seroquel 25mg BID, increased by neuro due to increased hallucinations - this has helped  primidone was increased to 25 mg in am and 50 mg for tremor     She saw Dr. Abraham Smith (neuro) 3/29/21 she is now following with him for tremor and hallucinations and has a follow-up pending  Talking melatonin 5-10mg at bedtime  Continues on Seroquel and primidone     Pt follows with Dr Lina Cooper (cardio) for a fib   Last visit 8/21/20   Atypical symptoms in the setting of large hiatal hernia -- prohibitive risk for surgery   Lexiscan in 6/2018 normal   Echo done 6/2018 with preserved ejection fraction 60-65% with grade 1 DD   Continue NTG as prescribed -- taking 1 dose which resolves pain   If she has no relief in symptoms despite NTGx3 in 15 minutes, then she must go to ER   Pt is on norpace 100mg BID   Discussed schedule for f/u     Pt sees Dr Hilaria Pizarro (GI) for diverticulosis   Pt also has a large hiatal hernia - will prop up her legs when she is having worsening gerd sxs   Pt also has a gluten sensitivity and mostly avoids this   Pt takes protonix 40mg bid having more symptoms see above she does have a large hiatal hernia not a surgical candidate will be adding Pepcid as needed  Last there 3/21    Reviewed kub 3/21: Moderate stool.  No acute findings.     klor con 10meq daily      Pt saw Dr Leann Rodriguez (neuropsych) in the past   Last visit was 2015 - no dementia      Pt saw Dr Kindra Oseguera (ENT) for hearing loss   Last visit was 12/18/18  Pt decided not to get hearing aids      Pt sees Dr Margot Rivers (ortho) for low back pain   Last visit was 1/9/19   Previously, provided info for  Juliana Abu  She had been taking tramadol but had SE so was switched to tylenol which helps somewhat   Continues on tylenol for this   Finished PT      Pt declined taking reclast and fosamax in the past      Pt is taking vit D 5000U daily      Pt takes miralax for constipation   This works well as long as she takes it daily occasionally takes extra dose      Continues on zoloft 75 mg helps w mood/depression stable     Continues on klor con 10meqs      She saw Dr Elizabeth Mccollum (uro gyn) for this thick fecal discharge coming from her vagina   Last visit was 11/19   She saw Dr. Mack Harris (uro gyn) for vaginal discharge  Last there 11/20  She is having more burning recently and may schedule follow-up     Of note she is legally blind, has macular degeneration      ACP on file.  SDMs are her daughters, Orquidea Armstrong and Gaye Rodriguez.      PREVENTIVE:   Colonoscopy: ~10 years ago with Dr Hilaria Pizarro, per pt the way her colon is formed makes it difficult to get colos, her declines further   Pap: 11/19 with Dr Merry Petersne: declines further  Dexa: 8/18, osteoporosis, declines further tx other than vit d   Tdap: 10/19   Prevnar 13: 03/01/19   Pneumovax: 03/09/20   Shingrix: both rounds completed   Flu shot: 10/20   A1C: 3/19 5.9 9/19 5.7 3/20 5.8 10/20 5.5  Micro: 9/19   Eye exam: Dr Vane Hollingsworth ~summer 2018, Dr Dada San, -- legally blind  Lipids: 10/20   Covid: completed both (Linda Adi)     Patient Active Problem List    Diagnosis Date Noted    Depression, major, recurrent, mild ([facility] 06/04/2019    Pleural effusion 04/18/2019    History of stroke 06/11/2018    Gastroesophageal reflux disease without esophagitis 09/25/2017    Diverticulosis of large intestine without hemorrhage 09/25/2017    Benign essential tremor 09/25/2017    Irritable bowel syndrome with diarrhea 09/25/2017    Hypercholesterolemia 09/25/2017    Allergic rhinitis 09/25/2017    Fuchs' corneal dystrophy 09/25/2017    Idiopathic small and large fiber sensory neuropathy 04/21/2016    Diabetic peripheral neuropathy associated with type 2 diabetes mellitus ([facility] 04/21/2016    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 60 Tablet 1    acetaminophen (TYLENOL) 500 mg tablet Take 1 Tablet by mouth every eight (8) hours as needed for Pain or Fever. 270 Tablet 1    acetaminophen (TYLENOL) 500 mg tablet Take 1 tablet 3 times daily as needed. 90 Tablet 1    nitroglycerin (NITROSTAT) 0.4 mg SL tablet 1 Tab by SubLINGual route every five (5) minutes as needed for Chest Pain. 25 Tab 1    QUEtiapine (SEROquel) 25 mg tablet Take 0.5 Tabs by mouth daily AND 1 Tab nightly. 45 Tab 5    primidone (MYSOLINE) 50 mg tablet Take 1/2 tab in the morning and full tab in the evening. 45 Tab 2    disopyramide phosphate (NORPACE) 100 mg capsule TAKE ONE CAPSULE BY MOUTH 2 TIMES A DAY 60 Cap 4    potassium chloride (KLOR-CON) 10 mEq tablet Take 1 Tab by mouth daily. 90 Tab 0    furosemide (LASIX) 20 mg tablet Take 1 Tab by mouth daily. 30 Tab 0    sertraline (ZOLOFT) 50 mg tablet TAKE 1 & 1/2 TABLETS BY MOUTH EVERY DAY 45 Tab 0    cholecalciferol (Vitamin D3) (5000 Units/125 mcg) tab tablet Take 1 Tab by mouth daily. [address] rhamnosus GG-inulin ([address] 13) 10 billion cell -200 mg cpSP Take 1 Cap by mouth daily. morning 30 Cap 3    acetaminophen (TYLENOL) 500 mg tablet Take 1 Tab by mouth every six (6) hours as needed for Pain. 120 Tab 3    metoprolol tartrate (LOPRESSOR) 25 mg tablet Take 1 Tab by mouth two (2) times a day. Take 25 mg tablet twice a day, 1 at 9AM and 1 at [facility].   May take additional 12.5 mg metoprolol as needed ONLY IF BP remains > 150/90 ONE HOUR after taking regular metoprolol dose. 225 Tab 1    Vitamin B-12 100 mcg tablet Take 1 Tab by mouth daily. 100 Tab 0    pantoprazole (PROTONIX) 40 mg tablet Take 40 mg by mouth two (2) times a day.  melatonin 5 mg tablet Take 5 mg by mouth nightly as needed. May take an additional tablet if unable to sleep      aspirin delayed-release 81 mg tablet TAKE ONE TABLET BY MOUTH DAILY AS NEEDED 120 Tab 0    magnesium hydroxide (Baez Milk of Magnesia) 400 mg/5 mL suspension Take 30 mL by mouth daily as needed for Constipation.  aspirin 81 mg chewable tablet Take 1 Tab by mouth daily. 120 Tab 5    polyethylene glycol (MIRALAX) 17 gram/dose powder Take 17 g by mouth daily.        Past Surgical History:   Procedure Laterality Date    HX APPENDECTOMY      HX CHOLECYSTECTOMY      HX GI      hemorrhoidectomy    HX HEENT      sinus surgery    HX HERNIA REPAIR      HX TONSILLECTOMY        Lab Results   Component Value Date/Time    WBC 5.0 04/03/2021 11:11 AM    WBC (POC) 5.6 07/23/2018 03:07 PM    HGB 13.3 04/03/2021 11:11 AM    HGB (POC) 15.0 07/23/2018 03:07 PM    HCT 40.8 04/03/2021 11:11 AM    HCT (POC) 45.9 07/23/2018 03:07 PM    PLATELET 033 09/81/8730 11:11 AM    PLATELET (POC) 253.4 07/23/2018 03:07 PM    MCV 91.1 04/03/2021 11:11 AM    MCV (POC) 90.0 07/23/2018 03:07 PM     Lab Results   Component Value Date/Time    Cholesterol, total 227 (H) 10/13/2020 08:20 AM    HDL Cholesterol 74 10/13/2020 08:20 AM    LDL, calculated 143 (H) 10/13/2020 08:20 AM    LDL, calculated 154 (H) 03/01/2019 10:15 AM    Triglyceride 58 10/13/2020 08:20 AM     Lab Results   Component Value Date/Time    GFR est non-AA >60 04/03/2021 11:11 AM    GFRNA, POC >60 10/15/2018 01:54 PM    GFR est AA >60 04/03/2021 11:11 AM    GFRAA, POC >60 10/15/2018 01:54 PM    Creatinine 0.84 04/03/2021 11:11 AM    Creatinine (POC) 0.8 10/15/2018 01:54 PM    BUN 22 (H) 04/03/2021 11:11 AM    Sodium 137 04/03/2021 11:11 AM    Potassium 4.2 04/03/2021 11:11 AM    Chloride 103 04/03/2021 11:11 AM    CO2 32 04/03/2021 11:11 AM    Magnesium 2.1 11/18/2020 11:38 AM        Review of Systems   Respiratory: Negative for shortness of breath. Cardiovascular: Negative for chest pain. Gastrointestinal: Positive for heartburn. Physical Exam  Constitutional:       General: She is not in acute distress. Appearance: Normal appearance. She is not ill-appearing, toxic-appearing or diaphoretic. HENT:      Head: Normocephalic and atraumatic. Eyes:      General:         Right eye: No discharge. Left eye: No discharge. Conjunctiva/sclera: Conjunctivae normal.   Pulmonary:      Effort: No respiratory distress. Neurological:      Mental Status: She is alert. Mental status is at baseline. Coordination: Coordination abnormal.      Gait: Gait abnormal.   Psychiatric:         Mood and Affect: Mood normal.         Behavior: Behavior normal.         ASSESSMENT and PLAN    ICD-10-CM ICD-9-CM    1. Diabetic peripheral neuropathy associated with type 2 diabetes mellitus ([facility]     Really more prediabetic monitor A1c's E11.42 250.60      357.2    2. Parkinson's disease ([facility]       Follows with neurology routinely has follow-up pending    She has an essential tremor and is on primidone for this    She gets hallucinations and is on Seroquel for this    Overall these conditions are stable     G20 332.0    3. Paroxysmal atrial fibrillation ([facility]    Follows with cardiology routinely and is on Norpace    Due to fall risk she is not on NOAC I48.0 427.31    4. Depression, major, recurrent, mild (HCC)        Stable with Zoloft     F33.0 296.31    5. Pleural effusion    This improved with Lasix continue     J90 511.9    6.  Age-related osteoporosis without current pathological fracture        On vitamin D not interested in additional treatment     M81.0 733.01    7. Essential hypertension    Has very labile blood pressure given her advanced age and comorbidities to include possible Parkinson's with Lewy body we really want her to be careful to avoid over correcting blood pressure and causing hypotension and precipitating falls    Discussed this at great length with her and her daughter    She is at a assisted facility    They monitor her blood pressure she currently takes metoprolol 25 mg twice daily    She has an order that if her blood pressure is elevated to repeat the blood pressure in 1 hour and can take an extra half dose of metoprolol at that time    I would like to avoid taking more than 2 extra half doses of metoprolol on a given day to avoid too much medication bradycardia and hypotension    Family expressed understanding I10 401.9    8. Hypercholesterolemia        Diet controlled     E78.00 272.0    9. Anxiety    Improved with Zoloft     F41.9 300.00    10. Gastroesophageal reflux disease without esophagitis        Has a very large inoperable hiatal hernia    On Protonix twice daily    Continue this    We will add Pepcid twice daily as needed for breakthrough symptoms     K21.9 530.81    11. Benign essential tremor    Improved on primidone     G25.0 333.1    12. Hypokalemia    On potassium supplement at goal on last labs     E87.6 276.8    13. Dysuria        Appears to be more chronic than acute we will not give antibiotics at this time we will check urine with culture at her facility and treat further as needed    She may need to follow back up with urogynecology R30.0 788. 1 URINALYSIS W/ REFLEX CULTURE        Scribed by Je Mallory of [address] [place] [address] [address], as dictated by Dr. Leah Ramirez. Current diagnosis and concerns discussed with pt at length. Pt understands risks and benefits or current treatment plan and medications, and accepts the treatment and medication with any possible risks.  Pt asks appropriate questions, which were answered. Pt was instructed to call with any concerns or problems. I have reviewed the note documented by the scribe. The services provided are my own. The documentation is accurate     Arcenio Carroll, was evaluated through a synchronous (real-time) audio-video encounter. The patient (or guardian if applicable) is aware that this is a billable service. Verbal consent to proceed has been obtained within the past 12 months. The visit was conducted pursuant to the emergency declaration under the [address] [place] [address], [address] [place] [address] authority and the [employer] and [employer] Act. Patient identification was verified, and a caregiver was present when appropriate. The patient was located in a state where the provider was credentialed to provide care.

## 2021-08-06 ENCOUNTER — VIRTUAL VISIT (OUTPATIENT)
Dept: INTERNAL MEDICINE CLINIC | Age: 86
End: 2021-08-06
Payer: MEDICARE

## 2021-08-06 DIAGNOSIS — I10 ESSENTIAL HYPERTENSION: ICD-10-CM

## 2021-08-06 DIAGNOSIS — E87.6 HYPOKALEMIA: ICD-10-CM

## 2021-08-06 DIAGNOSIS — J90 PLEURAL EFFUSION: ICD-10-CM

## 2021-08-06 DIAGNOSIS — G25.0 BENIGN ESSENTIAL TREMOR: ICD-10-CM

## 2021-08-06 DIAGNOSIS — F41.9 ANXIETY: ICD-10-CM

## 2021-08-06 DIAGNOSIS — R30.0 DYSURIA: ICD-10-CM

## 2021-08-06 DIAGNOSIS — F33.0 DEPRESSION, MAJOR, RECURRENT, MILD (HCC): ICD-10-CM

## 2021-08-06 DIAGNOSIS — I48.0 PAROXYSMAL ATRIAL FIBRILLATION (HCC): ICD-10-CM

## 2021-08-06 DIAGNOSIS — M81.0 AGE-RELATED OSTEOPOROSIS WITHOUT CURRENT PATHOLOGICAL FRACTURE: ICD-10-CM

## 2021-08-06 DIAGNOSIS — K21.9 GASTROESOPHAGEAL REFLUX DISEASE WITHOUT ESOPHAGITIS: ICD-10-CM

## 2021-08-06 DIAGNOSIS — E78.00 HYPERCHOLESTEROLEMIA: ICD-10-CM

## 2021-08-06 DIAGNOSIS — G20 PARKINSON'S DISEASE (HCC): ICD-10-CM

## 2021-08-06 DIAGNOSIS — E11.42 DIABETIC PERIPHERAL NEUROPATHY ASSOCIATED WITH TYPE 2 DIABETES MELLITUS (HCC): Primary | ICD-10-CM

## 2021-08-06 PROCEDURE — 1100F PTFALLS ASSESS-DOCD GE2>/YR: CPT | Performed by: INTERNAL MEDICINE

## 2021-08-06 PROCEDURE — G9717 DOC PT DX DEP/BP F/U NT REQ: HCPCS | Performed by: INTERNAL MEDICINE

## 2021-08-06 PROCEDURE — G8427 DOCREV CUR MEDS BY ELIG CLIN: HCPCS | Performed by: INTERNAL MEDICINE

## 2021-08-06 PROCEDURE — 3288F FALL RISK ASSESSMENT DOCD: CPT | Performed by: INTERNAL MEDICINE

## 2021-08-06 PROCEDURE — 99214 OFFICE O/P EST MOD 30 MIN: CPT | Performed by: INTERNAL MEDICINE

## 2021-08-06 PROCEDURE — 1090F PRES/ABSN URINE INCON ASSESS: CPT | Performed by: INTERNAL MEDICINE

## 2021-08-06 RX ORDER — METOPROLOL TARTRATE 25 MG/1
25 TABLET, FILM COATED ORAL 2 TIMES DAILY
Qty: 60 TABLET | Refills: 3 | Status: SHIPPED | OUTPATIENT
Start: 2021-08-06 | End: 2021-08-10 | Stop reason: SDUPTHER

## 2021-08-06 RX ORDER — FAMOTIDINE 20 MG/1
20 TABLET, FILM COATED ORAL
Qty: 60 TABLET | Refills: 1 | Status: SHIPPED | OUTPATIENT
Start: 2021-08-06 | End: 2021-08-10 | Stop reason: SDUPTHER

## 2021-08-06 RX ORDER — FAMOTIDINE 20 MG/1
20 TABLET, FILM COATED ORAL
Qty: 60 TABLET | Refills: 1 | Status: SHIPPED | OUTPATIENT
Start: 2021-08-06 | End: 2021-08-06

## 2021-08-06 RX ORDER — METOPROLOL SUCCINATE 25 MG/1
TABLET, EXTENDED RELEASE ORAL
Qty: 15 TABLET | Refills: 2 | Status: CANCELLED | OUTPATIENT
Start: 2021-08-06

## 2021-08-06 NOTE — Clinical Note
Needs orders faxed to her assisted living--please call daughter and go over where she wants scripts sent--needs several scripts sent

## 2021-08-10 DIAGNOSIS — K21.9 GASTROESOPHAGEAL REFLUX DISEASE WITHOUT ESOPHAGITIS: ICD-10-CM

## 2021-08-10 DIAGNOSIS — I10 ESSENTIAL HYPERTENSION: Primary | ICD-10-CM

## 2021-08-10 RX ORDER — METOPROLOL TARTRATE 25 MG/1
25 TABLET, FILM COATED ORAL 2 TIMES DAILY
Qty: 60 TABLET | Refills: 3 | Status: SHIPPED | OUTPATIENT
Start: 2021-08-10 | End: 2021-08-11 | Stop reason: SDUPTHER

## 2021-08-10 RX ORDER — FAMOTIDINE 20 MG/1
20 TABLET, FILM COATED ORAL
Qty: 60 TABLET | Refills: 1 | Status: SHIPPED | OUTPATIENT
Start: 2021-08-10

## 2021-08-10 NOTE — TELEPHONE ENCOUNTER
----- Message from Isabell Moss sent at 8/10/2021  3:39 PM EDT -----  Regarding: /Refill  Medication Refill    Caller (if not patient): Anabell Emmanuel with [facility]       Relationship of caller (if not patient):      Best contact number(s):[phone]      Name of medication and dosage if known: Pepcid & Metoprolol Tartrate      Is patient out of this medication (yes/no):Yes      Pharmacy name:[facility]     Pharmacy listed in chart? (yes/no):Yes  Pharmacy phone  (phone) [phone] (fax)      Details to clarify the request:Needing new scripts       Message from [facility]

## 2021-08-10 NOTE — TELEPHONE ENCOUNTER
PCP: Tammy Nelson MD    Last appt: 8/6/2021  Future Appointments   Date Time Provider Barbara Ramey   9/29/2021  2:40 PM Kimmy Myers MD [facility] AMB       Requested Prescriptions     Pending Prescriptions Disp Refills    metoprolol tartrate (LOPRESSOR) 25 mg tablet 60 Tablet 3     Sig: Take 1 Tablet by mouth two (2) times a day. Take 25 mg tablet twice a day, 1 at 9AM and 1 at [facility].   May take additional 12.5 mg metoprolol as needed ONLY IF BP remains > 150/90 ONE HOUR after taking regular metoprolol dose.  famotidine (PEPCID) 20 mg tablet 60 Tablet 1     Sig: Take 1 Tablet by mouth two (2) times daily as needed for Heartburn.

## 2021-08-11 DIAGNOSIS — I10 ESSENTIAL HYPERTENSION: ICD-10-CM

## 2021-08-11 RX ORDER — METOPROLOL TARTRATE 25 MG/1
TABLET, FILM COATED ORAL
Qty: 30 TABLET | Refills: 2 | Status: SHIPPED | OUTPATIENT
Start: 2021-08-11 | End: 2021-08-30 | Stop reason: SDUPTHER

## 2021-08-11 NOTE — TELEPHONE ENCOUNTER
Future Appointments:  Future Appointments   Date Time Provider Barbara Ramey   9/29/2021  2:40 PM Aundera Gonzalez MD [facility] AMB        Last Appointment With Me:  8/6/2021     Requested Prescriptions     Pending Prescriptions Disp Refills    metoprolol tartrate (LOPRESSOR) 25 mg tablet 30 Tablet 2     Sig: Take 12.5mg  as needed ONLY IF BP remains higher than 150/90  after taking regular metoprolol dose.

## 2021-08-11 NOTE — TELEPHONE ENCOUNTER
PCP: Jaya Werner MD    Last appt: 8/6/2021  Future Appointments   Date Time Provider Barbara Ramey   9/29/2021  2:40 PM Jenny Hooks MD [facility] BS AMB       Requested Prescriptions     Pending Prescriptions Disp Refills    metoprolol tartrate (LOPRESSOR) 25 mg tablet 60 Tablet 3     Sig: Take 1 Tablet by mouth two (2) times a day. Take 25 mg tablet twice a day, 1 at 9AM and [facility]   May take additional 12.5 mg metoprolol as needed ONLY IF BP remains > 150/90 ONE HOUR after taking regular metoprolol dose.

## 2021-08-11 NOTE — TELEPHONE ENCOUNTER
----- Message from Jordyn Eli sent at 8/11/2021  9:26 AM EDT -----  Regarding: Dr Austin Pate (if not patient): Williams Troncoso of [facility]      Relationship of caller (if not patient):Care giver/[employer] contact number(s):[phone]      Name of medication and dosage if known:Metoprolol Partrate 25 mg n(half tablets)      Is patient out of this medication (yes/no):yes      Pharmacy name:[facility]    Pharmacy listed in chart? (yes/no):  Pharmacy phone INVRGX:[phone]      Details to clarify the request:  Rob Porter is requesting a Rx Metoprolol Partrat 25 mg n(half tablets) to be called to the pharmacy on file and to fax  a copy of order [id number]      Message from [facility]

## 2021-08-16 ENCOUNTER — TELEPHONE (OUTPATIENT)
Dept: INTERNAL MEDICINE CLINIC | Age: 86
End: 2021-08-16

## 2021-08-16 DIAGNOSIS — K21.9 GASTROESOPHAGEAL REFLUX DISEASE WITHOUT ESOPHAGITIS: Primary | ICD-10-CM

## 2021-08-16 RX ORDER — PANTOPRAZOLE SODIUM 40 MG/1
40 TABLET, DELAYED RELEASE ORAL 2 TIMES DAILY
Qty: 180 TABLET | Refills: 0 | Status: SHIPPED | OUTPATIENT
Start: 2021-08-16

## 2021-08-16 NOTE — TELEPHONE ENCOUNTER
PCP: Marga Marcano MD    Last appt: 8/6/2021  Future Appointments   Date Time Provider Barbara Ramey   9/29/2021  2:40 PM Yoseph Crum MD [facility] BS AMB       Requested Prescriptions     Pending Prescriptions Disp Refills    pantoprazole (PROTONIX) 40 mg tablet 180 Tablet 0     Sig: Take 1 Tablet by mouth two (2) times a day.

## 2021-08-16 NOTE — TELEPHONE ENCOUNTER
----- Message from Jai Mini sent at 8/14/2021  7:44 AM EDT -----  Regarding: Dr. Valeria Jim  General Message/Vendor Calls    Caller's first and last name: Mitchel Mukherjee, [facility]; Ms. Lizziemary Henriquez      Reason for call: requesting to know should the pt be taking both the \"pepsid 20mg\" and \"protonix\" at the same time or only the \"pepsid 20mg\"      Callback required yes/no and why: yes      Best contact number(s):  [phone]      Details to clarify the request: stated that the pt is out of the \"protonix\" and an rx for the \"Pepsid\" was sent;         Jai Thomas  Copy/paste [facility]

## 2021-08-16 NOTE — TELEPHONE ENCOUNTER
----- Message from Shaun Hernandes sent at 8/16/2021  2:24 PM EDT -----  Regarding: Dr. Esthela High: [phone]  General Message/Vendor Calls    Caller's first and last name: Pavel Loo, Daughter. Reason for call: Had VV call with pcp 2 weeks ago, got order for Pepcid. Order says twice a day as needed. Facility pt is living in called daughter stating pt can take medication once a day at bed time. Which is different from what pcp ordered. Facility spoke to someone named Zeynep from [facility] office. Callback required yes/no and why: Yes, clarification on directions for Pepcid.        Best contact number(s): [phone]      Message from [facility]

## 2021-08-16 NOTE — TELEPHONE ENCOUNTER
Norman Griffin MD  You 3 hours ago (8:51 AM)   JS  Protonix before breakfast and dinner       Pepcid before bed    Message text

## 2021-08-16 NOTE — TELEPHONE ENCOUNTER
Called, spoke with Navid Portillo (HIPAA). Two pt identifiers confirmed. Jonathon Durand stated the Deep Oracio is supposed to be as needed twice a day per Dr. Schmitz January, she stated that is if does need it between the Protonix. Jonathon Durand informed I'll call the facility back and let them know. Jonathon Durand verbalized understanding of information discussed w/ no further questions at this time.

## 2021-08-30 ENCOUNTER — TELEPHONE (OUTPATIENT)
Dept: INTERNAL MEDICINE CLINIC | Age: 86
End: 2021-08-30

## 2021-08-30 DIAGNOSIS — I10 ESSENTIAL HYPERTENSION: Primary | ICD-10-CM

## 2021-08-30 RX ORDER — METOPROLOL SUCCINATE 25 MG/1
TABLET, EXTENDED RELEASE ORAL
Qty: 180 TABLET | Refills: 0 | Status: SHIPPED | OUTPATIENT
Start: 2021-08-30 | End: 2021-10-13

## 2021-08-30 RX ORDER — METOPROLOL TARTRATE 25 MG/1
TABLET, FILM COATED ORAL
Qty: 30 TABLET | Refills: 2 | Status: SHIPPED | OUTPATIENT
Start: 2021-08-30

## 2021-08-30 NOTE — TELEPHONE ENCOUNTER
#[phone] daughter, Theodore Romero states that since pt has moved to another facility her b/p medication has not been being dispensed properly. There needs to be a new order written to be sure pt is getting what is needed. Please call Theodore Romero to go over this and see what Dr. Patricia Tran can do to help. Thanks.

## 2021-08-30 NOTE — TELEPHONE ENCOUNTER
Called, spoke with Magaly Gray (HIPAA). Two pt identifiers confirmed. Cogary Michaelleslie stated pt needs medication rewritten as she is not getting it correctly. Stated pt is in facility and they are just giving One and half pills automatically. Aislinn Shaw informed will send two separate Rx's with two different directions. Aislinn Shaw stated the one tablet she wants patient to take at [address] because if no time is put on the Rx, then they will give it to her whenever. Aislinn Shaw informed I will take care of it. Aislinn Shaw verbalized understanding of information discussed w/ no further questions at this time.

## 2021-09-01 NOTE — TELEPHONE ENCOUNTER
Patient's Daughter, Deyvi Taylor, states she needs a call back in reference to orders discussed previously on 8/30/21 with nurse & status. Deyvi Taylor states Assisted Living has not gotten anything. Please call to discuss.  Thank you

## 2021-09-02 NOTE — TELEPHONE ENCOUNTER
Called, spoke with Wenceslao Weston (HIPAA). Two pt identifiers confirmed. Masood Macdonald stated the facility has not received the orders for the medication. Masood Macdonald informed the Rx was sent to pt's pharmacy with the instructions on the Rx how to take medication. Masood Macdonald stated they need it in writing now. Asked Masood Macdonald if there is anyone I can give verbal instructions too at the facility. Masood Macdonald stated call [phone] and speak with [employer] there to give instructions. Let her know I will call them. Masood Macdonald verbalized understanding of information discussed w/ no further questions at this time. Called facility, no answer left msg to call back.

## 2021-09-03 ENCOUNTER — TELEPHONE (OUTPATIENT)
Dept: INTERNAL MEDICINE CLINIC | Age: 86
End: 2021-09-03

## 2021-09-03 DIAGNOSIS — I10 ESSENTIAL HYPERTENSION: Primary | ICD-10-CM

## 2021-09-03 NOTE — TELEPHONE ENCOUNTER
[facility] Order for patient's Metoprolol to be re-faxed as it was not received. Please call if any questions.  Thank you      New Fax# is [phone]     ATTN: Racheal Nolasco

## 2021-09-09 NOTE — TELEPHONE ENCOUNTER
Caller states assisted living facility still has not received the metoprolol script, asks for a call to discuss.       Caller would like a paper copy of order for reference as well mailed to patient:  [address], [place] [address]        Please call Mrs Halina Driver, pt daughter:  [phone]

## 2021-10-13 ENCOUNTER — OFFICE VISIT (OUTPATIENT)
Dept: CARDIOLOGY CLINIC | Age: 86
End: 2021-10-13
Payer: MEDICARE

## 2021-10-13 VITALS
BODY MASS INDEX: 19.78 KG/M2 | OXYGEN SATURATION: 94 % | RESPIRATION RATE: 18 BRPM | HEIGHT: 62 IN | WEIGHT: 107.5 LBS | HEART RATE: 82 BPM | DIASTOLIC BLOOD PRESSURE: 90 MMHG | SYSTOLIC BLOOD PRESSURE: 130 MMHG

## 2021-10-13 DIAGNOSIS — I48.0 PAROXYSMAL ATRIAL FIBRILLATION (HCC): Primary | ICD-10-CM

## 2021-10-13 DIAGNOSIS — I10 PRIMARY HYPERTENSION: ICD-10-CM

## 2021-10-13 DIAGNOSIS — R00.2 HEART PALPITATIONS: ICD-10-CM

## 2021-10-13 DIAGNOSIS — R42 DIZZINESS: ICD-10-CM

## 2021-10-13 DIAGNOSIS — I10 ESSENTIAL HYPERTENSION: ICD-10-CM

## 2021-10-13 DIAGNOSIS — R07.89 OTHER CHEST PAIN: ICD-10-CM

## 2021-10-13 PROCEDURE — 99214 OFFICE O/P EST MOD 30 MIN: CPT | Performed by: INTERNAL MEDICINE

## 2021-10-13 PROCEDURE — 93005 ELECTROCARDIOGRAM TRACING: CPT | Performed by: INTERNAL MEDICINE

## 2021-10-13 PROCEDURE — G8427 DOCREV CUR MEDS BY ELIG CLIN: HCPCS | Performed by: INTERNAL MEDICINE

## 2021-10-13 PROCEDURE — G0463 HOSPITAL OUTPT CLINIC VISIT: HCPCS | Performed by: INTERNAL MEDICINE

## 2021-10-13 PROCEDURE — G8536 NO DOC ELDER MAL SCRN: HCPCS | Performed by: INTERNAL MEDICINE

## 2021-10-13 PROCEDURE — 1101F PT FALLS ASSESS-DOCD LE1/YR: CPT | Performed by: INTERNAL MEDICINE

## 2021-10-13 PROCEDURE — G8420 CALC BMI NORM PARAMETERS: HCPCS | Performed by: INTERNAL MEDICINE

## 2021-10-13 PROCEDURE — G9717 DOC PT DX DEP/BP F/U NT REQ: HCPCS | Performed by: INTERNAL MEDICINE

## 2021-10-13 PROCEDURE — 1090F PRES/ABSN URINE INCON ASSESS: CPT | Performed by: INTERNAL MEDICINE

## 2021-10-13 PROCEDURE — 93010 ELECTROCARDIOGRAM REPORT: CPT | Performed by: INTERNAL MEDICINE

## 2021-10-13 NOTE — LETTER
10/13/2021    Patient: Anat Barkley   YOB: 1926   Date of Visit: 10/13/2021     Syed Sparks MD  [address]  [address]  [place]    Dear Syed Sparks MD,      Thank you for referring Ms. Minoo Bills to [address] [place] [address] for evaluation. My notes for this consultation are attached. If you have questions, please do not hesitate to call me. I look forward to following your patient along with you.       Sincerely,    Karla Stephenson MD

## 2021-10-13 NOTE — PROGRESS NOTES
Nikki Pardo, FNP-BC    Subjective/HPI:     Cruz Jimenez is a 80 y.o. female is here for routine f/u. She has a PMHx of PAF, HTN, CVA, HLD, and hiatal hernia. Doing well today. Notes palpitation symptoms every so often, last only 1 minute. Daughter is concerned about dizziness. Symptoms come on first thing in the morning before she gets out of bed. Doesn't have dizziness throughout the day. Denies syncopal episode. This doesn't happen every morning. Also feels fatigued and lazy. Does not get much activity done during the day, primarily sits around at the nursing home. The patient denies chest pain/ shortness of breath, orthopnea, PND, LE edema, syncope, or presyncope.        Past Medical History:   Diagnosis Date    Acute diverticulitis 9/25/2017    Allergic rhinitis 9/25/2017    Arrhythmia     afib    Arthritis     Asthma     Atrial fibrillation ([address]     Benign essential tremor 9/25/2017    Cancer [facility]     skin    Celiac disease 9/25/2017    Constipation 9/25/2017    Diabetic peripheral neuropathy associated with type 2 diabetes mellitus ([facility] 4/21/2016    Diarrhea 9/25/2017    Diverticulosis of large intestine without hemorrhage 9/25/2017    Early satiety 9/25/2017    Fuchs' corneal dystrophy 9/25/2017    Gastroesophageal reflux disease without esophagitis 9/25/2017    GERD (gastroesophageal reflux disease)     High cholesterol     Hypercholesterolemia 9/25/2017    Hypertension     Irritable bowel syndrome with diarrhea 9/25/2017    Long term current use of anticoagulant therapy     Memory impairment     Numbness 9/25/2017    Osteopenia 9/25/2017    Other ill-defined conditions(799.89)     collapsed lung prior to hernia repair surgery    Postmenopausal 7/23/2018    Ptosis, both eyelids 9/25/2017    Statin intolerance 9/25/2017    Unspecified adverse effect of anesthesia     pt states she went into cardiac arrest prior to hernia repair after the insertion of gas in stomach       Past Surgical History:   Procedure Laterality Date    HX APPENDECTOMY      HX CHOLECYSTECTOMY      HX GI      hemorrhoidectomy    HX HEENT      sinus surgery    HX HERNIA REPAIR      HX TONSILLECTOMY         Family History   Problem Relation Age of Onset    Heart Disease Mother     Dementia Mother     Heart Disease Father     Neuropathy Child     Depression Child     Other Child         diverticulitis    Lung Cancer Sister 80        lung ca       Social History     Socioeconomic History    Marital status:      Spouse name: Not on file    Number of children: Not on file    Years of education: Not on file    Highest education level: Not on file   Occupational History    Not on file   Tobacco Use    Smoking status: Former Smoker     Packs/day: 0.75     Years: 5.00     Pack years: 3.75     Quit date: 10/11/1967     Years since quittin.0    Smokeless tobacco: Never Used   Substance and Sexual Activity    Alcohol use: No    Drug use: No    Sexual activity: Not on file   Other Topics Concern    Not on file   Social History Narrative    Not on file     Social Determinants of Health     Financial Resource Strain:     Difficulty of Paying Living Expenses:    Food Insecurity:     Worried About Running Out of Food in the Last Year:     Ran Out of Food in the Last Year:    Transportation Needs:     Lack of Transportation (Medical):      Lack of Transportation (Non-Medical):    Physical Activity:     Days of Exercise per Week:     Minutes of Exercise per Session:    Stress:     Feeling of Stress :    Social Connections:     Frequency of Communication with Friends and Family:     Frequency of Social Gatherings with Friends and Family:     Attends Nondenominational Services:     Active Member of Clubs or Organizations:     Attends Club or Organization Meetings:     Marital Status:    Intimate Partner Violence:     Fear of Current or Ex-Partner:     Emotionally Abused:     Physically Abused:     Sexually Abused: Allergies   Allergen Reactions    Bactrim [Sulfamethoprim Ds] Other (comments)     consipation    Ciprofloxacin (Bulk) Other (comments)     Low wbc    Prednisone Other (comments)     tachycardia    Shellfish Derived Other (comments)     Abdominal pain, Carried epi pen for this at one point.  Statins-Hmg-Coa Reductase Inhibitors Unknown (comments)     Stomach uipset and mild muscle aches       Current Outpatient Medications on File Prior to Visit   Medication Sig Dispense Refill    metoprolol tartrate (LOPRESSOR) 25 mg tablet Take 12.5mg  as needed ONLY IF BP remains higher than 150/90 after 1 hour of taking regular metoprolol dose . 30 Tablet 2    metoprolol succinate (TOPROL-XL) 25 mg XL tablet TAKE 1 TABLET BID AT 9AM & 7PM. THEN CHECK BP AN HOUR LATER. 180 Tablet 0    pantoprazole (PROTONIX) 40 mg tablet Take 1 Tablet by mouth two (2) times a day. 180 Tablet 0    famotidine (PEPCID) 20 mg tablet Take 1 Tablet by mouth two (2) times daily as needed for Heartburn. 60 Tablet 1    acetaminophen (TYLENOL) 500 mg tablet Take 1 tablet 3 times daily as needed. 90 Tablet 1    nitroglycerin (NITROSTAT) 0.4 mg SL tablet 1 Tab by SubLINGual route every five (5) minutes as needed for Chest Pain. 25 Tab 1    QUEtiapine (SEROquel) 25 mg tablet Take 0.5 Tabs by mouth daily AND 1 Tab nightly. 45 Tab 5    primidone (MYSOLINE) 50 mg tablet Take 1/2 tab in the morning and full tab in the evening. 45 Tab 2    disopyramide phosphate (NORPACE) 100 mg capsule TAKE ONE CAPSULE BY MOUTH 2 TIMES A DAY 60 Cap 4    potassium chloride (KLOR-CON) 10 mEq tablet Take 1 Tab by mouth daily. 90 Tab 0    furosemide (LASIX) 20 mg tablet Take 1 Tab by mouth daily. 30 Tab 0    sertraline (ZOLOFT) 50 mg tablet TAKE 1 & 1/2 TABLETS BY MOUTH EVERY DAY 45 Tab 0    cholecalciferol (Vitamin D3) (5000 Units/125 mcg) tab tablet Take 1 Tab by mouth daily.  30 Tab 0    Lactobac. rhamnosus GG-inulin ([address] 13) 10 billion cell -200 mg cpSP Take 1 Cap by mouth daily. morning 30 Cap 3    acetaminophen (TYLENOL) 500 mg tablet Take 1 Tab by mouth every six (6) hours as needed for Pain. 120 Tab 3    Vitamin B-12 100 mcg tablet Take 1 Tab by mouth daily. 100 Tab 0    melatonin 5 mg tablet Take 5 mg by mouth nightly as needed. May take an additional tablet if unable to sleep      magnesium hydroxide (Baez Milk of Magnesia) 400 mg/5 mL suspension Take 30 mL by mouth daily as needed for Constipation.  aspirin 81 mg chewable tablet Take 1 Tab by mouth daily. 120 Tab 5    polyethylene glycol (MIRALAX) 17 gram/dose powder Take 17 g by mouth daily.  acetaminophen (TYLENOL) 500 mg tablet Take 1 Tablet by mouth every eight (8) hours as needed for Pain or Fever. 270 Tablet 1    aspirin delayed-release 81 mg tablet TAKE ONE TABLET BY MOUTH DAILY AS NEEDED 120 Tab 0     No current facility-administered medications on file prior to visit. Review of Symptoms:    Review of Systems   Constitutional: Negative for chills, fever and weight loss. HENT: Negative for nosebleeds. Eyes: Negative for blurred vision and double vision. Respiratory: Negative for cough, shortness of breath and wheezing. Cardiovascular: Negative for chest pain, palpitations, orthopnea, leg swelling and PND. Skin: Negative for rash. Neurological: Negative for dizziness and loss of consciousness. Physical Exam:      General: Well developed, in no acute distress, cooperative and alert  Heart:  reg rate and rhythm; normal S1/S2; no murmurs, no gallops or rubs. Respiratory: Clear bilaterally x 4, no wheezing or rales  Extremities:  Normal cap refill, no cyanosis, atraumatic. No edema.   Vascular: 2+ pulses symmetric in all extremities    Vitals:    10/13/21 1447 10/13/21 1512 10/13/21 1513   BP: (!) 140/80 132/88 (!) 130/90   BP 1 Location: Left arm Left arm Left arm   BP Patient Position: Supine Sitting Standing   BP Cuff Size: Adult Adult Adult   Pulse: 82     Resp: 18     Height: 5' 2\" (1.575 m)     Weight: 107 lb 8 oz (48.8 kg)     SpO2: 94%         Lab Results   Component Value Date/Time    Cholesterol, total 227 (H) 10/13/2020 08:20 AM    HDL Cholesterol 74 10/13/2020 08:20 AM    LDL, calculated 143 (H) 10/13/2020 08:20 AM    LDL, calculated 154 (H) 03/01/2019 10:15 AM    VLDL, calculated 10 10/13/2020 08:20 AM    VLDL, calculated 16 03/01/2019 10:15 AM    Triglyceride 58 10/13/2020 08:20 AM     Lab Results   Component Value Date/Time    WBC 5.0 04/03/2021 11:11 AM    HGB (POC) 15.0 07/23/2018 03:07 PM    HGB 13.3 04/03/2021 11:11 AM    HCT (POC) 45.9 07/23/2018 03:07 PM    HCT 40.8 04/03/2021 11:11 AM    PLATELET 409 76/03/9996 11:11 AM    MCV 91.1 04/03/2021 11:11 AM     Lab Results   Component Value Date/Time    Sodium 137 04/03/2021 11:11 AM    Potassium 4.2 04/03/2021 11:11 AM    Chloride 103 04/03/2021 11:11 AM    CO2 32 04/03/2021 11:11 AM    Anion gap 2 (L) 04/03/2021 11:11 AM    Glucose 77 04/03/2021 11:11 AM    BUN 22 (H) 04/03/2021 11:11 AM    Creatinine 0.84 04/03/2021 11:11 AM    BUN/Creatinine ratio 26 (H) 04/03/2021 11:11 AM    GFR est AA >60 04/03/2021 11:11 AM    GFR est non-AA >60 04/03/2021 11:11 AM    Calcium 9.5 04/03/2021 11:11 AM    Bilirubin, total 0.4 04/03/2021 11:11 AM    Alk. phosphatase 74 04/03/2021 11:11 AM    Protein, total 6.8 04/03/2021 11:11 AM    Albumin 3.6 04/03/2021 11:11 AM    Globulin 3.2 04/03/2021 11:11 AM    A-G Ratio 1.1 04/03/2021 11:11 AM    ALT (SGPT) 21 04/03/2021 11:11 AM    AST (SGOT) 15 04/03/2021 11:11 AM       ECG done today sinus rhythm     Assessment:       ICD-10-CM ICD-9-CM    1. Paroxysmal atrial fibrillation (HCC)  I48.0 427.31    2. Primary hypertension  I10 401.9 AMB POC EKG ROUTINE W/ 12 LEADS, INTER & REP   3. Other chest pain  R07.89 786.59         Plan:     1.   Paroxysmal atrial fibrillation ([address]  Maintaining sinus rhythm  Continue rate control therapies  Continue ASA only given no recurrence in many years     2.  Essential hypertension  BP controlled. Continue anti-hypertensive therapy and low sodium diet     3.  Other chest pain  No recurrent symptoms now  Has large hiatal hernia -- prohibitive risk for surgery  Lexiscan in 6/2018 normal  Echo done 6/2018 with preserved ejection fraction 60-65% with grade 1 DD    4. Dizziness  Likely related to inner ear dysfunction  Recommend to discuss with PCP    5. Fatigue  Likely due to decreased activity, sedentary lifestyle  Recommend to increase activity at least 10 minutes every hour    F/u with Dr. Graeme Tolbert in 1 year    Patient seen and examined by me with the above nurse practitioner. I personally performed all components of the history, physical, and medical decision making and agree with the assessment and plan with minor modifications as noted. Today the patient presents for routine follow-up, in normal sinus rhythm, with rare and brief palpitations and occasional brief dizziness that does not bother her. General PE  Gen:  NAD  Mental Status - Alert. General Appearance - Not in acute distress. HEENT:  PERRL, no carotid bruits or JVD  Chest and Lung Exam   Inspection: Accessory muscles - No use of accessory muscles in breathing. Auscultation:   Breath sounds: - Normal.   Cardiovascular   Inspection: Jugular vein - Bilateral - Inspection Normal.   Palpation/Percussion:   Apical Impulse: - Normal.   Auscultation: Rhythm - Regular. Heart Sounds - S1 WNL and S2 WNL. No S3 or S4. Murmurs & Other Heart Sounds: Auscultation of the heart reveals - No Murmurs. Peripheral Vascular   Upper Extremity: Inspection - Bilateral - No Cyanotic nailbeds or Digital clubbing. Lower Extremity:   Palpation: Edema - Bilateral - No edema. Abdomen:   Soft, non-tender, bowel sounds are active.   Neuro: A&O times 3, CN and motor grossly WNL    Remains in sinus rhythm, with no recurrence of atrial fibrillation known for years. Continue aspirin and metoprolol. Encouraged to maintain as much activity as she can in a supervised environment with assistive devices. Follow up in 1 year, sooner as needed.

## 2021-10-13 NOTE — PROGRESS NOTES
1. Have you been to the ER, urgent care clinic since your last visit? Hospitalized since your last visit? No    2. Have you seen or consulted any other health care providers outside of the [address] [place] [address] since your last visit? Include any pap smears or colon screening.  No     Chief Complaint   Patient presents with    Check Up     yrly check up    Dizziness     happened 4 times within the last 5 months    Fatigue     no energy to move or get out of bed

## 2021-10-27 ENCOUNTER — TELEPHONE (OUTPATIENT)
Dept: INTERNAL MEDICINE CLINIC | Age: 86
End: 2021-10-27

## 2021-10-27 NOTE — TELEPHONE ENCOUNTER
----- Message from Troy Beverlyne sent at 10/26/2021  4:17 PM EDT -----  Subject: Message to Provider    QUESTIONS  Information for Provider? Pt DTR wanted to let the office know that her   mother is in the hospital, she broke her left pelvic bone. Jw Haynes would   like to speak to a nurse about the medication that the hospital has given   her. Please contact her  ---------------------------------------------------------------------------  --------------  CALL BACK INFO  What is the best way for the office to contact you? OK to leave message on   voicemail  Preferred Call Back Phone Number? [phone]  ---------------------------------------------------------------------------  --------------  SCRIPT ANSWERS  Relationship to Patient? Other  Representative Name? Moose Mejia  Is the Representative on the appropriate HIPAA document in Epic?  Yes

## 2021-10-27 NOTE — TELEPHONE ENCOUNTER
Called, spoke with Kristi Peralta (HIPAA). Two pt identifiers confirmed. Marylene Césarsalazar stated she just wanted Dr. David Dixon to know pt had broke her pelvis and now she's in a rehab facility. Marylene Spice informed will call back she needs anything. Marylene Spice verbalized understanding of information discussed w/ no further questions at this time.

## 2021-11-10 ENCOUNTER — TELEPHONE (OUTPATIENT)
Dept: CARDIOLOGY CLINIC | Age: 86
End: 2021-11-10

## 2021-11-10 NOTE — TELEPHONE ENCOUNTER
Karan Bergman called regarding wanting to know what's the compression number to get for the pt's socks.      Please call Karan Bergman back at [phone]    Thanks  William August

## 2021-11-11 NOTE — TELEPHONE ENCOUNTER
Spoke with daughter she is in adult care in [place] recommend they get measurement for adequate fit .

## 2022-01-14 ENCOUNTER — TELEPHONE (OUTPATIENT)
Dept: NEUROLOGY | Age: 87
End: 2022-01-14

## 2022-01-14 NOTE — TELEPHONE ENCOUNTER
Patients daughter called to see if Ms. Gosia Ray, who is 80 would be able to have a virtual visit since she is worried about bringing her in.

## 2022-02-23 NOTE — TELEPHONE ENCOUNTER
2/23/2022         RE: Hortensia Umaña  [address] [place] [address]        Dear Colleague,    Thank you for referring your patient, Hortensia Umaña, to the [facility]. Please see a copy of my visit note below.    Hortensia Umaña is an extremely pleasant 21 year old year old female patient here today for acne on face.   .   Patient states this has been present for a while.  Patient reports the following symptoms:  pimples.  Patient reports the following previous treatments none.  These treatments did not work.  Patient reports the following modifying factors none.  Associated symptoms: none.  Patient has no other skin complaints today.  Remainder of the HPI, Meds, PMH, Allergies, FH, and SH was reviewed in chart.      Past Medical History:   Diagnosis Date     Uncomplicated asthma        Past Surgical History:   Procedure Laterality Date     LAPAROSCOPIC APPENDECTOMY N/A 11/21/2020    Procedure: APPENDECTOMY, LAPAROSCOPIC;  Surgeon: Laura Preston MD;  Location:  OR        No family history on file.    Social History     Socioeconomic History     Marital status: Single     Spouse name: Not on file     Number of children: Not on file     Years of education: Not on file     Highest education level: Not on file   Occupational History     Not on file   Tobacco Use     Smoking status: Never Smoker     Smokeless tobacco: Never Used   Substance and Sexual Activity     Alcohol use: Yes     Comment: occ     Drug use: Yes     Types: Marijuana     Sexual activity: Not on file   Other Topics Concern     Parent/sibling w/ CABG, MI or angioplasty before 65F 55M? Not Asked   Social History Narrative     Not on file     Social Determinants of Health     Financial Resource Strain: Not on file   Food Insecurity: Not on file   Transportation Needs: Not on file   Physical Activity: Not on file   Stress: Not on file   Social Connections: Not on file   Intimate Partner Violence: Not on file   Housing  Patients Nexium no longer covered by insurance- they suggest Ompeprazole, Pantorazole, or Raceprazole. Needs 90 d sent to [facility]. Stability: Not on file       Outpatient Encounter Medications as of 2/23/2022   Medication Sig Dispense Refill     cetirizine (ZYRTEC) 10 MG tablet Take 10 mg by mouth daily       COMPOUNDED NON-CONTROLLED SUBSTANCE (CMPD RX) - PHARMACY TO MIX COMPOUNDED MEDICATION Please compound viscous lidocaine 2% and maalox in a 1:1 ratio    Please take 15 to 30 ml by mouth every 4-6 hours as needed for abdominal pain 500 mL 0     metoclopramide (REGLAN) 10 MG tablet Take 1 tablet (10 mg) by mouth 3 times daily as needed (Nausea or Vomiting) 20 tablet 0     ondansetron (ZOFRAN ODT) 4 MG ODT tab Take 1 tablet (4 mg) by mouth every 6 hours as needed for nausea or vomiting 12 tablet 0     prochlorperazine (COMPAZINE) 5 MG tablet Take 1 tablet (5 mg) by mouth every 8 hours as needed for nausea or vomiting 20 tablet 0     sucralfate (CARAFATE) 1 GM/10ML suspension Take 10 mLs (1 g) by mouth 4 times daily 420 mL 0     No facility-administered encounter medications on file as of 2/23/2022.             O:   NAD, WDWN, Alert & Oriented, Mood & Affect wnl, Vitals stable   Here today alone   General appearance normal, aramis v   Vitals stable   Alert, oriented and in no acute distress     Inflammatory papules on face      Eyes: Conjunctivae/lids:Normal     ENT: Lips, buccal mucosa, tongue: normal    MSK:Normal    Cardiovascular: peripheral edema none    Pulm: Breathing Normal    Neuro/Psych: Orientation:Alert and Orientedx3 ; Mood/Affect:normal       A/P:  1. Acne  Acne vulgaris    Pathophysiology discussed with pateint and information provided   I discussed with patient Oral Abx, Aldactone, Topical creams, light therapies and OCT  Treating acne is preventative    Acne can be effectively treated, although response may sometimes be slow.   Where possible, avoid excessively humid conditions such as a sauna, working in an unventilated kitchen or tropical vacations.   If you smoke, stop. Nicotine increases sebum retention and increased scale  within the follicles, forming comedones (black and whiteheads).    Minimize the application of oils and cosmetics to the affected skin.   Abrasive skin treatments can aggravate acne.   Try not to scratch or pick the spots.   To avoid sunburn, protect your skin outdoors using a sunscreen and protective clothing.  No relationship between particular foods and acne has been proven. However, reports suggest low glycemic and low dairy diet are helpful for some people.    May take 3-4 months to see 50% improvement  May get worse during initial phase of treatment  Tretinoin at bedtime, dryness, irritation and way to prevent discussed with patient   BPO wash daily or every other day depending on dryness  Aggressive use of bland emollients discussed with patient   Doxycycline 100mg daily GI upset, esophagitis and UV precautions discussed with patient     It was a pleasure speaking to Hortensia Umaña today.  Previous clinic notes and pertinent laboratory tests were reviewed prior to Hortensia Umaña's visit.  UV precautions reviewed with patient.  Skin care regimen reviewed with patient: Eliminate harsh soaps, i.e. Dial, zest, irsih spring; Mild soaps such as Cetaphil or Dove sensitive skin, avoid hot or cold showers, aggressive use of emollients including vanicream, cetaphil or cerave discussed with patient.    Return to clinic 3 months        Again, thank you for allowing me to participate in the care of your patient.        Sincerely,        Delano Alexander MD

## 2022-03-18 PROBLEM — E78.00 HYPERCHOLESTEROLEMIA: Status: ACTIVE | Noted: 2017-09-25

## 2022-03-18 PROBLEM — K58.0 IRRITABLE BOWEL SYNDROME WITH DIARRHEA: Status: ACTIVE | Noted: 2017-09-25

## 2022-03-18 PROBLEM — J30.9 ALLERGIC RHINITIS: Status: ACTIVE | Noted: 2017-09-25

## 2022-03-18 PROBLEM — Z86.73 HISTORY OF STROKE: Status: ACTIVE | Noted: 2018-06-11

## 2022-03-19 PROBLEM — K57.30 DIVERTICULOSIS OF LARGE INTESTINE WITHOUT HEMORRHAGE: Status: ACTIVE | Noted: 2017-09-25

## 2022-03-19 PROBLEM — F33.0 DEPRESSION, MAJOR, RECURRENT, MILD (HCC): Status: ACTIVE | Noted: 2019-06-04

## 2022-03-19 PROBLEM — J90 PLEURAL EFFUSION: Status: ACTIVE | Noted: 2019-04-18

## 2022-03-19 PROBLEM — G25.0 BENIGN ESSENTIAL TREMOR: Status: ACTIVE | Noted: 2017-09-25

## 2022-03-20 PROBLEM — K21.9 GASTROESOPHAGEAL REFLUX DISEASE WITHOUT ESOPHAGITIS: Status: ACTIVE | Noted: 2017-09-25

## 2022-03-20 PROBLEM — H18.519 FUCHS' CORNEAL DYSTROPHY: Status: ACTIVE | Noted: 2017-09-25

## 2022-03-29 ENCOUNTER — VIRTUAL VISIT (OUTPATIENT)
Dept: NEUROLOGY | Age: 87
End: 2022-03-29
Payer: MEDICARE

## 2022-03-29 DIAGNOSIS — G31.83 LEWY BODY PARKINSON DISEASE (HCC): Primary | ICD-10-CM

## 2022-03-29 DIAGNOSIS — R44.3 HALLUCINATIONS: ICD-10-CM

## 2022-03-29 DIAGNOSIS — F02.80 LEWY BODY PARKINSON DISEASE (HCC): Primary | ICD-10-CM

## 2022-03-29 PROCEDURE — G8427 DOCREV CUR MEDS BY ELIG CLIN: HCPCS | Performed by: NURSE PRACTITIONER

## 2022-03-29 PROCEDURE — 1090F PRES/ABSN URINE INCON ASSESS: CPT | Performed by: NURSE PRACTITIONER

## 2022-03-29 PROCEDURE — G9717 DOC PT DX DEP/BP F/U NT REQ: HCPCS | Performed by: NURSE PRACTITIONER

## 2022-03-29 PROCEDURE — 1101F PT FALLS ASSESS-DOCD LE1/YR: CPT | Performed by: NURSE PRACTITIONER

## 2022-03-29 PROCEDURE — 99215 OFFICE O/P EST HI 40 MIN: CPT | Performed by: NURSE PRACTITIONER

## 2022-03-29 RX ORDER — QUETIAPINE FUMARATE 50 MG/1
TABLET, FILM COATED ORAL
Qty: 180 TABLET | Refills: 5 | Status: SHIPPED | OUTPATIENT
Start: 2022-03-29 | End: 2022-03-30 | Stop reason: SDUPTHER

## 2022-03-29 RX ORDER — PRIMIDONE 50 MG/1
TABLET ORAL
Qty: 180 TABLET | Refills: 5 | Status: SHIPPED | OUTPATIENT
Start: 2022-03-29 | End: 2022-03-30 | Stop reason: SDUPTHER

## 2022-03-29 NOTE — PROGRESS NOTES
Александр Tiwari is a 80 y.o. female who was seen by synchronous (real-time) audio-video technology on 3/29/2022 for Follow-up (parkinson's disease, dementia )      Virtual FU with patient and daughter for PD, dementia. She was diagnosed with a Lewy Body dementia years ago   She is exhibiting Parkinson symptoms also   She was seeing Dr. Josefina Yu, Dr. Paolo Villarreal   She is currently on Mysoline 25 mg in the AM and 50 mg in the PM   It had been working well until that last few months   She notices an increase in right arm tremor. Right hand mostly, then up the arm. She has trouble with eating at times. Functionally she is fairly self sufficient   She is in assisted living right now. - [facility]   They have games, gatherings, but she has MD   She is also not as cognitively sharp as she usually has been   Also strength and balance has been getting worse  She gets worse tremor with anxiety, stress. She has not fallen. Appetite is good - feeds herself   Sleep is good     She does have hallucinations and delusions daily   On Seroquel 25 mg in AM and 50 at night  She has been having very vivid, wild dreams also   Has been the past few months   Memory is slowing getting worse too. Not acting on any of this   She also has some depression per report of her and daughter. No falls, no changes in other medications. Assessment & Plan:   Diagnoses and all orders for this visit:    1. Lewy body Parkinson disease ([facility] [facility]    2. Hallucinations    Other orders  -     primidone (MYSOLINE) 50 mg tablet; Take 1 tablet by mouth BID  -     QUEtiapine (SEROquel) 50 mg tablet; Take 1/2 tablets in the AM and 1.5 tablets nightly. Discussed POC   Increase Mysoline to 1 tablet in the AM and 1 tablet nightly for tremor. Increase Seroquel night dose. 1/2 in AM still and 1.5 tablets nightly. Keep active mentally and physically     Will send out home health to evaluate for PT, OT.    Can consider increase Zoloft for mood. Subjective:       Prior to Admission medications    Medication Sig Start Date End Date Taking? Authorizing Provider   primidone (MYSOLINE) 50 mg tablet Take 1 tablet by mouth BID 3/29/22  Yes Jp Yun NP   QUEtiapine (SEROquel) 50 mg tablet Take 1/2 tablets in the AM and 1.5 tablets nightly. 3/29/22  Yes Jp Yun NP   metoprolol tartrate (LOPRESSOR) 25 mg tablet Take 12.5mg  as needed ONLY IF BP remains higher than 150/90 after 1 hour of taking regular metoprolol dose . 8/30/21  Yes Makayla Jacob MD   pantoprazole (PROTONIX) 40 mg tablet Take 1 Tablet by mouth two (2) times a day. 8/16/21  Yes Makayla Jacob MD   famotidine (PEPCID) 20 mg tablet Take 1 Tablet by mouth two (2) times daily as needed for Heartburn. 8/10/21  Yes Makayla Jacob MD   acetaminophen (TYLENOL) 500 mg tablet Take 1 tablet 3 times daily as needed. 6/23/21  Yes Makayla Jacob MD   nitroglycerin (NITROSTAT) 0.4 mg SL tablet 1 Tab by SubLINGual route every five (5) minutes as needed for Chest Pain. 5/5/21  Yes Cris Leach NP   disopyramide phosphate (NORPACE) 100 mg capsule TAKE ONE CAPSULE BY MOUTH 2 TIMES A DAY 3/24/21  Yes Angelica De La O NP   potassium chloride (KLOR-CON) 10 mEq tablet Take 1 Tab by mouth daily. 2/2/21  Yes Makayla Jacob MD   furosemide (LASIX) 20 mg tablet Take 1 Tab by mouth daily. 1/16/21  Yes Makayla Jacob MD   sertraline (ZOLOFT) 50 mg tablet TAKE 1 & 1/2 TABLETS BY MOUTH EVERY DAY 1/16/21  Yes Makayla Jacob MD   cholecalciferol (Vitamin D3) (5000 Units/125 mcg) tab tablet Take 1 Tab by mouth daily. 1/16/21  Yes Makayla Jacob MD   Lactobac. rhamnosus GG-inulin ([address]) 10 billion cell -200 mg cpSP Take 1 Cap by mouth daily. morning 12/31/20  Yes Makayla Jacob MD   Vitamin B-12 100 mcg tablet Take 1 Tab by mouth daily.  11/19/20  Yes Makayla Jacob MD   melatonin 5 mg tablet Take 5 mg by mouth nightly as needed. May take an additional tablet if unable to sleep   Yes Provider, Historical   magnesium hydroxide (Baez Milk of Magnesia) 400 mg/5 mL suspension Take 30 mL by mouth daily as needed for Constipation. Yes Provider, Historical   aspirin 81 mg chewable tablet Take 1 Tab by mouth daily. 8/31/19  Yes Maricarmen Phillips MD   polyethylene glycol [facility]) 17 gram/dose powder Take 17 g by mouth daily. Yes Provider, Historical   QUEtiapine (SEROquel) 25 mg tablet Take 0.5 Tabs by mouth daily AND 1 Tab nightly. 4/13/21 3/29/22  Kade Peacock MD   primidone (MYSOLINE) 50 mg tablet Take 1/2 tab in the morning and full tab in the evening.  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    Diabetic peripheral neuropathy associated with type 2 diabetes mellitus (HCC) E11.42    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 I65.23    Gastroesophageal reflux disease without esophagitis K21.9    Diverticulosis of large intestine without hemorrhage K57.30    Benign essential tremor G25.0    Irritable bowel syndrome with diarrhea K58.0    Hypercholesterolemia E78.00    Allergic rhinitis J30.9    Fuchs' corneal dystrophy H18.519    History of stroke Z86.73    Pleural effusion J90    Depression, major, recurrent, 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     Patient Active Problem List    Diagnosis Date Noted    Depression, [facility], [facility], [facility] 06/04/2019    Pleural effusion 04/18/2019    History of stroke 06/11/2018    Gastroesophageal reflux disease without esophagitis 09/25/2017    Diverticulosis of large intestine without hemorrhage 09/25/2017    Benign essential tremor 09/25/2017    Irritable bowel syndrome with diarrhea 09/25/2017    Hypercholesterolemia 09/25/2017    Allergic rhinitis 09/25/2017    Fuchs' corneal dystrophy 09/25/2017    Idiopathic small and large fiber sensory neuropathy 04/21/2016    Diabetic peripheral neuropathy associated with type 2 diabetes mellitus ([facility] 04/21/2016    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 04/21/2016    Anxiety 07/24/2015    Cerebrovascular disease, unspecified 05/03/2015    B12 deficiency 04/20/2015    Osteoporosis 04/20/2015    Memory loss 04/20/2015    Celiac sprue 05/14/2013    Atrial fibrillation (HCC)     Hypertension      Current Outpatient Medications   Medication Sig Dispense Refill    primidone (MYSOLINE) 50 mg tablet Take 1 tablet by mouth  Tablet 5    QUEtiapine (SEROquel) 50 mg tablet Take 1/2 tablets in the AM and 1.5 tablets nightly. 180 Tablet 5    metoprolol tartrate (LOPRESSOR) 25 mg tablet Take 12.5mg  as needed ONLY IF BP remains higher than 150/90 after 1 hour of taking regular metoprolol dose . 30 Tablet 2    pantoprazole (PROTONIX) 40 mg tablet Take 1 Tablet by mouth two (2) times a day. 180 Tablet 0    famotidine (PEPCID) 20 mg tablet Take 1 Tablet by mouth two (2) times daily as needed for Heartburn. 60 Tablet 1    acetaminophen (TYLENOL) 500 mg tablet Take 1 tablet 3 times daily as needed. 90 Tablet 1    nitroglycerin (NITROSTAT) 0.4 mg SL tablet 1 Tab by SubLINGual route every five (5) minutes as needed for Chest Pain. 25 Tab 1    disopyramide phosphate (NORPACE) 100 mg capsule TAKE ONE CAPSULE BY MOUTH 2 TIMES A DAY 60 Cap 4    potassium chloride (KLOR-CON) 10 mEq tablet Take 1 Tab by mouth daily. 90 Tab 0    furosemide (LASIX) 20 mg tablet Take 1 Tab by mouth daily. 30 Tab 0    sertraline (ZOLOFT) 50 mg tablet TAKE 1 & 1/2 TABLETS BY MOUTH EVERY DAY 45 Tab 0    cholecalciferol (Vitamin D3) (5000 Units/125 mcg) tab tablet Take 1 Tab by mouth daily.  30 Tab 0    Lactobac. rhamnosus GG-inulin ([address]) 10 billion cell -200 mg cpSP Take 1 Cap by mouth daily. morning 30 Cap 3    Vitamin B-12 100 mcg tablet Take 1 Tab by mouth daily. 100 Tab 0    melatonin 5 mg tablet Take 5 mg by mouth nightly as needed. May take an additional tablet if unable to sleep      magnesium hydroxide (Baez Milk of Magnesia) 400 mg/5 mL suspension Take 30 mL by mouth daily as needed for Constipation.  aspirin 81 mg chewable tablet Take 1 Tab by mouth daily. 120 Tab 5    polyethylene glycol (MIRALAX) 17 gram/dose powder Take 17 g by mouth daily. Allergies   Allergen Reactions    Bactrim [Sulfamethoprim Ds] Other (comments)     consipation    Ciprofloxacin (Bulk) Other (comments)     Low wbc    Prednisone Other (comments)     tachycardia    Shellfish Derived Other (comments)     Abdominal pain, Carried epi pen for this at one point.     Statins-Hmg-Coa Reductase Inhibitors Unknown (comments)     Stomach uipset and mild muscle aches     Past Medical History:   Diagnosis Date    Acute diverticulitis 9/25/2017    Allergic rhinitis 9/25/2017    Arrhythmia     afib    Arthritis     Asthma     Atrial fibrillation ([address]     Benign essential tremor 9/25/2017    Cancer ([address]     skin    Celiac disease 9/25/2017    Constipation 9/25/2017    Diabetic peripheral neuropathy associated with type 2 diabetes mellitus ([address] 4/21/2016    Diarrhea 9/25/2017    Diverticulosis of large intestine without hemorrhage 9/25/2017    Early satiety 9/25/2017    Fuchs' corneal dystrophy 9/25/2017    Gastroesophageal reflux disease without esophagitis 9/25/2017    GERD (gastroesophageal reflux disease)     High cholesterol     Hypercholesterolemia 9/25/2017    Hypertension     Irritable bowel syndrome with diarrhea 9/25/2017    Long term current use of anticoagulant therapy     Memory impairment     Numbness 9/25/2017    Osteopenia 9/25/2017    Other ill-defined conditions(799.89)     collapsed lung prior to hernia repair surgery    Postmenopausal 2018    Ptosis, both eyelids 2017    Statin intolerance 2017    Unspecified adverse effect of anesthesia     pt states she went into cardiac arrest prior to hernia repair after the insertion of gas in stomach     Past Surgical History:   Procedure Laterality Date    HX APPENDECTOMY      HX CHOLECYSTECTOMY      HX GI      hemorrhoidectomy    HX HEENT      sinus surgery    HX HERNIA REPAIR      HX TONSILLECTOMY       Family History   Problem Relation Age of Onset    Heart Disease Mother     Dementia Mother     Heart Disease Father     Neuropathy Child     Depression Child     Other Child         diverticulitis    Lung Cancer Sister 80        lung ca     Social History     Tobacco Use    Smoking status: Former Smoker     Packs/day: 0.75     Years: 5.00     Pack years: 3.75     Quit date: 10/11/1967     Years since quittin.5    Smokeless tobacco: Never Used   Substance Use Topics    Alcohol use: No       Review of Systems   Constitutional: Positive for malaise/fatigue. Eyes: Negative for blurred vision, double vision and photophobia. Respiratory: Negative for shortness of breath and wheezing. Musculoskeletal: Negative for falls. Neurological: Negative for seizures and loss of consciousness. Psychiatric/Behavioral: Positive for depression, hallucinations and memory loss. Negative for substance abuse and suicidal ideas. The patient is not nervous/anxious and does not have insomnia.         Objective:     Patient-Reported Vitals 2021   Patient-Reported Weight -   Patient-Reported Height -   Patient-Reported Systolic  431   Patient-Reported Diastolic 83        [INSTRUCTIONS:  \"[x]\" Indicates a positive item  \"[]\" Indicates a negative item  -- DELETE ALL ITEMS NOT EXAMINED]    Constitutional: [x] Appears well-developed and well-nourished [x] No apparent distress      [] Abnormal - Mental status: [x] Alert and awake  [x] Oriented to person/place/time [x] Able to follow commands    [] Abnormal -     Eyes:   EOM    [x]  Normal    [] Abnormal -   Sclera  [x]  Normal    [] Abnormal -          Discharge [x]  None visible   [] Abnormal -     HENT: [x] Normocephalic, atraumatic  [] Abnormal -   [x] Mouth/Throat: Mucous membranes are moist    External Ears [x] Normal  [] Abnormal -    Neck: [x] No visualized mass [] Abnormal -     Pulmonary/Chest: [x] Respiratory effort normal   [x] No visualized signs of difficulty breathing or respiratory distress        [] Abnormal -      Musculoskeletal:   [x] Normal gait with no signs of ataxia         [x] Normal range of motion of neck        [] Abnormal -     Neurological:        [x] No Facial Asymmetry (Cranial nerve 7 motor function) (limited exam due to video visit)          [x] No gaze palsy        [] Abnormal -          Skin:        [x] No significant exanthematous lesions or discoloration noted on facial skin         [] Abnormal -            Psychiatric:       [x] Normal Affect [] Abnormal -        [x] No Hallucinations    Other pertinent observable physical exam findings:-        We discussed the expected course, resolution and complications of the diagnosis(es) in detail. Medication risks, benefits, costs, interactions, and alternatives were discussed as indicated. I advised her to contact the office if her condition worsens, changes or fails to improve as anticipated. She expressed understanding with the diagnosis(es) and plan. Dang Rosado, was evaluated through a synchronous (real-time) audio-video encounter. The patient (or guardian if applicable) is aware that this is a billable service, which includes applicable co-pays. Verbal consent to proceed has been obtained.  The visit was conducted pursuant to the emergency declaration under the [facility] waiver authority and the Coronavirus Preparedness and Response Supplemental Appropriations Act. Patient identification was verified, and a caregiver was present when appropriate. The patient was located at home in a state where the provider was licensed to provide care.       Elicia Seals NP

## 2022-03-30 ENCOUNTER — TELEPHONE (OUTPATIENT)
Dept: NEUROLOGY | Age: 87
End: 2022-03-30

## 2022-03-30 DIAGNOSIS — Z86.73 HISTORY OF STROKE: ICD-10-CM

## 2022-03-30 DIAGNOSIS — G60.8 IDIOPATHIC SMALL AND LARGE FIBER SENSORY NEUROPATHY: ICD-10-CM

## 2022-03-30 DIAGNOSIS — I10 ESSENTIAL HYPERTENSION: ICD-10-CM

## 2022-03-30 DIAGNOSIS — R41.3 MEMORY LOSS: ICD-10-CM

## 2022-03-30 DIAGNOSIS — R93.0 ABNORMAL MRI OF HEAD: ICD-10-CM

## 2022-03-30 DIAGNOSIS — E11.42 DIABETIC PERIPHERAL NEUROPATHY ASSOCIATED WITH TYPE 2 DIABETES MELLITUS (HCC): ICD-10-CM

## 2022-03-30 DIAGNOSIS — I67.9 CEREBROVASCULAR DISEASE, UNSPECIFIED: ICD-10-CM

## 2022-03-30 DIAGNOSIS — G25.0 BENIGN ESSENTIAL TREMOR: ICD-10-CM

## 2022-03-30 DIAGNOSIS — I65.23 STENOSIS OF BOTH CAROTID ARTERIES WITHOUT CEREBRAL INFARCTION: ICD-10-CM

## 2022-03-30 DIAGNOSIS — K21.9 GASTROESOPHAGEAL REFLUX DISEASE WITHOUT ESOPHAGITIS: ICD-10-CM

## 2022-03-30 RX ORDER — NITROGLYCERIN 0.4 MG/1
0.4 TABLET SUBLINGUAL
Qty: 25 TABLET | Refills: 1 | Status: CANCELLED | OUTPATIENT
Start: 2022-03-30

## 2022-03-30 RX ORDER — FAMOTIDINE 20 MG/1
20 TABLET, FILM COATED ORAL
Qty: 60 TABLET | Refills: 1 | Status: CANCELLED | OUTPATIENT
Start: 2022-03-30

## 2022-03-30 RX ORDER — QUETIAPINE FUMARATE 50 MG/1
TABLET, FILM COATED ORAL
Qty: 180 TABLET | Refills: 5 | Status: SHIPPED | OUTPATIENT
Start: 2022-03-30 | End: 2022-03-31 | Stop reason: SDUPTHER

## 2022-03-30 RX ORDER — CHOLECALCIFEROL TAB 125 MCG (5000 UNIT) 125 MCG
5000 TAB ORAL DAILY
Qty: 30 TABLET | Refills: 0 | Status: CANCELLED | OUTPATIENT
Start: 2022-03-30

## 2022-03-30 RX ORDER — METOPROLOL TARTRATE 25 MG/1
TABLET, FILM COATED ORAL
Qty: 30 TABLET | Refills: 2 | Status: CANCELLED | OUTPATIENT
Start: 2022-03-30

## 2022-03-30 RX ORDER — LACTOBACILLUS RHAMNOSUS GG 10B CELL
1 CAPSULE ORAL DAILY
Qty: 30 CAPSULE | Refills: 3 | Status: CANCELLED | OUTPATIENT
Start: 2022-03-30

## 2022-03-30 RX ORDER — PRIMIDONE 50 MG/1
TABLET ORAL
Qty: 180 TABLET | Refills: 5 | Status: SHIPPED | OUTPATIENT
Start: 2022-03-30 | End: 2022-03-31 | Stop reason: SDUPTHER

## 2022-03-30 RX ORDER — GUAIFENESIN 100 MG/5ML
81 LIQUID (ML) ORAL DAILY
Qty: 120 TABLET | Refills: 5 | Status: CANCELLED | OUTPATIENT
Start: 2022-03-30

## 2022-03-30 NOTE — TELEPHONE ENCOUNTER
Patient daughter calling requesting an order to be faxed over to the [address] ([address]- [phone])  The order is for her medication . Please call her to confirm that it was done.

## 2022-03-31 RX ORDER — PRIMIDONE 50 MG/1
TABLET ORAL
Qty: 180 TABLET | Refills: 5 | Status: SHIPPED | OUTPATIENT
Start: 2022-03-31 | End: 2022-04-20

## 2022-03-31 RX ORDER — PRIMIDONE 50 MG/1
TABLET ORAL
Qty: 180 TABLET | Refills: 5 | Status: SHIPPED | OUTPATIENT
Start: 2022-03-31 | End: 2022-03-31 | Stop reason: SDUPTHER

## 2022-03-31 RX ORDER — QUETIAPINE FUMARATE 50 MG/1
TABLET, FILM COATED ORAL
Qty: 180 TABLET | Refills: 5 | Status: SHIPPED | OUTPATIENT
Start: 2022-03-31

## 2022-03-31 RX ORDER — QUETIAPINE FUMARATE 50 MG/1
TABLET, FILM COATED ORAL
Qty: 180 TABLET | Refills: 5 | Status: SHIPPED | OUTPATIENT
Start: 2022-03-31 | End: 2022-03-31 | Stop reason: SDUPTHER

## 2022-04-01 ENCOUNTER — PATIENT MESSAGE (OUTPATIENT)
Dept: NEUROLOGY | Age: 87
End: 2022-04-01

## 2022-04-01 ENCOUNTER — TELEPHONE (OUTPATIENT)
Dept: NEUROLOGY | Age: 87
End: 2022-04-01

## 2022-04-01 NOTE — TELEPHONE ENCOUNTER
Татьяна Trevino send conner for PT ot at the facility    The rep told me the daughter wanted to hold off    Can you tell her to call them back and they can come out  Thanks

## 2022-04-01 NOTE — TELEPHONE ENCOUNTER
Informed daughter the RXs were faxed as requested. She now wants to have PT ordered at the facility where patient resides. Patient fell the day after the appointment. Please send hard copy of order to daughter as well as facility.

## 2022-04-20 RX ORDER — PRIMIDONE 50 MG/1
TABLET ORAL
Qty: 60 TABLET | Refills: 11 | Status: SHIPPED | OUTPATIENT
Start: 2022-04-20

## 2022-04-28 DIAGNOSIS — R30.0 DYSURIA: ICD-10-CM

## 2022-06-02 NOTE — PROGRESS NOTES
Pt previously treated with Keflex. Only 6000 colonies present (including two different strains). She was advised to follow up with PCP for recheck. No further tx indicated at this time.
No

## 2022-10-13 NOTE — PROGRESS NOTES
Ms. Alex Poon is struggling with hallucinations, particularly at night making it difficult for her to sleep. Quetiapine or Nuplazid might be helpful, but potentially QTC prolonging. If her family understands and accepts this risk, do think it is okay to add one of those? [facility]

## 2023-09-08 NOTE — TELEPHONE ENCOUNTER
Ruston calling wanting to speak to the nurse about a few things going on with the pt. And some medication issues that are happening.   Please call back 180

## 2024-05-24 NOTE — PROGRESS NOTES
Mary Qureshi (:  1973) is a 50 y.o. female,Established patient, here for evaluation of the following chief complaint(s):  Weight Management (Follow up )    Patient also has been participating in nutritional counseling in addition the patient has been involved in an physical activity program and the current obesity is disabling which could also be life-threatening due to the fact that the patient is not able to have a normal body image, in addition,  pt is w/out hx of pancreatic problem and thryoid  medullary cancer or thyroid problem, nor MEN I, and II, in current medical history.        Constitutional: no chills and fever,  , nad     HENT: no ear pain or nosebleeds. No blurred vision  Respiratory: no shortness of breath, wheezing cough   Cardiovascular: Has no chest pain, ,and racing heart .   Gastrointestinal: No constipation, diarrhea, nausea and vomiting.   Genitourinary: No frequency.   Musculoskeletal: Negative for joint pain.   Skin: no itching, no rash.   Neurological: Negative for dizziness, no tremors  Psychiatric/Behavioral: Negative for depression, is not nervous/anxious.      Constitutional: Well developed, well nourished.  non-toxic in appearance, not diaphoretic.   HEENT: PERRL. EOMI. The left TM is unremarkable. The right TM is unremarkable. No nasal  erythema noted.  THROAT: Posterior pharynx has no erythema, no exudates.    Neck:  no cervical lymphadenopathy. Neck is supple   Cardiovascular: Regular rate and rhythm, no murmurs, rubs, or gallops.   Pulmonary: Clear to auscultation bilaterally. Has no wheezing, rales or rhonchi.,  speaking in full sentences, has no accessory muscle used.  Abdomen: Bowel sounds are normal. Having no distension, no palpable mass. Soft,  No tenderness, rebound or guarding.   Musculoskeletal: No peripheral edema, having normal ROM  Skin:   warm and dry. No diaphoresis, rashes, or lesions.   Neurological: Alert, awake,  oriented x3 ,  normal speech.  Donna Thao, [facility]    Subjective/HPI:     Ms. Jazlyn Correa is a 80 y.o. female is here for routine follow-up. She has a PMHx of PAF, HTN, CVA, HLD, and hiatal hernia. She is doing well. She has chest pain symptoms infrequently. She also has indigestion which is treated with Pepto Bismol. She was tested positive for COVID-19 in April, but was asymptomatic throughout. She quarantined at home with her daughter and has tested negative after a few weeks. 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 I65.23    Gastroesophageal reflux disease without esophagitis K21.9    Diverticulosis of large intestine without hemorrhage K57.30    Benign essential tremor G25.0    Irritable bowel syndrome with diarrhea K58.0    Hypercholesterolemia E78.00    Allergic rhinitis J30.9    Fuchs' corneal dystrophy H18.51    History of stroke Z86.73    Pleural effusion J90    Depression, major, recurrent, 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     melatonin 5 mg tablet Take 1 or 2 tabs at bedtime for sleep    QUEtiapine (SEROquel) 25 mg tablet 1/2 tab in the mid-morning (10am) and 1 tab at bedtime    potassium chloride SR (KLOR-CON 10) 10 mEq tablet TAKE 1 TABLET BY MOUTH EVERY DAY.  metoprolol tartrate (LOPRESSOR) 25 mg tablet TAKE ONE TABLET BY MOUTH 2 TIMES A DAY    Lactobac. rhamnosus GG-inulin (Graben 13) 10 billion cell -200 mg cpSP [employer] 10 billion cell-200 mg sprinkle capsule    cyanocobalamin (Vitamin B-12) 100 mcg tablet Take 1 Tab by mouth daily. Indications: 500mcg    acetaminophen (TYLENOL EXTRA STRENGTH) 500 mg tablet Take 1 Tab by mouth every eight (8) hours as needed for Pain.  disopyramide phosphate (NORPACE) 100 mg capsule TAKE ONE CAPSULE BY MOUTH TWICE A DAY    sertraline (ZOLOFT) 50 mg tablet Take 1.5 Tabs by mouth daily.  furosemide (LASIX) 20 mg tablet TAKE ONE TABLET BY MOUTH EVERY DAY    nitroglycerin (NITROSTAT) 0.4 mg SL tablet 1 Tab by SubLINGual route every five (5) minutes as needed for Chest Pain.  cholecalciferol, VITAMIN D3, (VITAMIN D3) 5,000 unit tab tablet Take 1 Tab by mouth daily.  aspirin 81 mg chewable tablet Take 1 Tab by mouth daily.  Lactobacillus rhamnosus GG (CULTURELLE PO) Take  by mouth daily.  pantoprazole (PROTONIX) 40 mg tablet Take 1 Tab by mouth daily. (Patient taking differently: Take 40 mg by mouth two (2) times a day.)    polyethylene glycol (MIRALAX) 17 gram/dose powder Take 17 g by mouth daily. No current facility-administered medications for this visit. Allergies   Allergen Reactions    Bactrim [Sulfamethoprim Ds] Other (comments)     consipation    Ciprofloxacin (Bulk) Other (comments)     Low wbc    Prednisone Other (comments)     tachycardia    Shellfish Derived Other (comments)     Abdominal pain, Carried epi pen for this at one point.     Statins-Hmg-Coa Reductase Inhibitors Unknown (comments)     Stomach uipset and mild muscle aches        I have reviewed the problem list, allergy list, medical history, family, social history and medications. Review of Symptoms:    Review of Systems   Constitutional: Negative for chills, fever and weight loss. HENT: Negative for nosebleeds. Eyes: Negative for blurred vision and double vision. Respiratory: Negative for cough, shortness of breath and wheezing. Cardiovascular: Negative for chest pain, palpitations, orthopnea, leg swelling and PND. Gastrointestinal: Negative for abdominal pain, blood in stool, diarrhea, nausea and vomiting. Musculoskeletal: Negative for joint pain. Skin: Negative for rash. Neurological: Negative for dizziness, tingling and loss of consciousness. Endo/Heme/Allergies: Does not bruise/bleed easily. Physical Exam:      General: Well developed, in no acute distress, cooperative and alert  HEENT: No carotid bruits, no JVD, trach is midline. Neck Supple, PEERL, EOM intact. Heart:  reg rate and rhythm; normal S1/S2; no murmurs, no gallops or rubs. Respiratory: Clear bilaterally x 4, no wheezing or rales  Abdomen:   Soft, non-tender, no distention, no masses. + BS. Extremities:  Normal cap refill, no cyanosis, atraumatic. No edema. Neuro: A&Ox3, speech clear, gait stable. Skin: Skin color is normal. No rashes or lesions.  Non diaphoretic  Vascular: 2+ pulses symmetric in all extremities    Vitals:    08/21/20 0951 08/21/20 1012   BP: 124/86 120/86   Pulse: 87    Resp: 18    Weight: 114 lb 8 oz (51.9 kg)    Height: 5' 2\" (1.575 m)        ECG: sinus rhythm    Cardiology Labs:    Lab Results   Component Value Date/Time    Cholesterol, total 246 (H) 03/01/2019 10:15 AM    HDL Cholesterol 76 03/01/2019 10:15 AM    LDL, calculated 154 (H) 03/01/2019 10:15 AM    LDL, calculated 168 (H) 07/23/2018 03:07 PM    LDL, calculated 172 (H) 08/20/2013 08:16 AM    VLDL, calculated 16 03/01/2019 10:15 AM       Lab Results   Component Value Date/Time    Hemoglobin A1c 5.8 (H) 03/03/2020 01:50 PM       Lab Results   Component Value Date/Time    Sodium 140 07/04/2020 08:48 AM    Potassium 3.7 07/04/2020 08:48 AM    Chloride 105 07/04/2020 08:48 AM    CO2 32 07/04/2020 08:48 AM    Glucose 103 (H) 07/04/2020 08:48 AM    BUN 28 (H) 07/04/2020 08:48 AM    Creatinine 0.90 07/04/2020 08:48 AM    BUN/Creatinine ratio 31 (H) 07/04/2020 08:48 AM    GFR est AA >60 07/04/2020 08:48 AM    GFR est non-AA 58 (L) 07/04/2020 08:48 AM    Calcium 9.2 07/04/2020 08:48 AM    Anion gap 3 (L) 07/04/2020 08:48 AM    Bilirubin, total 0.5 07/04/2020 08:48 AM    ALT (SGPT) 16 07/04/2020 08:48 AM    Alk. phosphatase 73 07/04/2020 08:48 AM    Protein, total 6.7 07/04/2020 08:48 AM    Albumin 3.5 07/04/2020 08:48 AM    Globulin 3.2 07/04/2020 08:48 AM    A-G Ratio 1.1 07/04/2020 08:48 AM       Orders Placed This Encounter    AMB POC EKG ROUTINE W/ 12 LEADS, INTER & REP     Order Specific Question:   Reason for Exam:     Answer:   routine    magnesium hydroxide ([employer] Milk of Magnesia) 400 mg/5 mL suspension     Sig: Take 30 mL by mouth daily as needed for Constipation. Assessment:     Assessment:       ICD-10-CM ICD-9-CM    1. Paroxysmal atrial fibrillation (HCC)  I48.0 427.31    2. Essential hypertension  I10 401.9 AMB POC EKG ROUTINE W/ 12 LEADS, INTER & REP   3. Other chest pain  R07.89 786.59         Plan:     Paroxysmal atrial fibrillation (HCC)  Maintaining sinus rhythm  Continue rate control therapies  Continue ASA only given no recurrence in many years     Essential hypertension  BP controlled.   Continue anti-hypertensive therapy and low sodium diet     Other chest pain  Atypical symptoms in the setting of large hiatal hernia -- prohibitive risk for surgery  Lexiscan in 6/2018 normal  Echo done 6/2018 with preserved ejection fraction 60-65% with grade 1 DD  Continue NTG as prescribed -- taking 1 dose which resolves pain  If she has no relief in symptoms despite NTGx3 in 15 minutes, then she must go to ER    Counseled on diet and exercise. Follow up in 1 year, sooner as needed.      Gaby Nesbitt MD

## 2025-03-28 NOTE — TELEPHONE ENCOUNTER
PCP: Karina Lang MD    Last appt: 4/9/2019  Future Appointments   Date Time Provider Barbara Raemy   5/14/2019 11:40 AM Osman Rangel DO [facility]   6/4/2019  1:45 PM Karina Lang MD [address]   7/24/2019  3:15 PM Shantel Andrade MD [address] [facility]       Requested Prescriptions     Pending Prescriptions Disp Refills    acetaminophen (TYLENOL EXTRA STRENGTH) 500 mg tablet       Sig: Take  by mouth every six (6) hours as needed for Pain. Please inform patient of the following results  Testing positive for flu  Please send Tamiflu 75 Mg twice daily x 5 days